# Patient Record
Sex: MALE | Race: WHITE | NOT HISPANIC OR LATINO | Employment: FULL TIME | URBAN - METROPOLITAN AREA
[De-identification: names, ages, dates, MRNs, and addresses within clinical notes are randomized per-mention and may not be internally consistent; named-entity substitution may affect disease eponyms.]

---

## 2023-10-04 ENCOUNTER — HOSPITAL ENCOUNTER (EMERGENCY)
Facility: HOSPITAL | Age: 53
Discharge: HOME | End: 2023-10-04
Attending: STUDENT IN AN ORGANIZED HEALTH CARE EDUCATION/TRAINING PROGRAM | Admitting: STUDENT IN AN ORGANIZED HEALTH CARE EDUCATION/TRAINING PROGRAM
Payer: COMMERCIAL

## 2023-10-04 VITALS
SYSTOLIC BLOOD PRESSURE: 137 MMHG | TEMPERATURE: 98 F | OXYGEN SATURATION: 96 % | HEIGHT: 75 IN | BODY MASS INDEX: 35.43 KG/M2 | HEART RATE: 64 BPM | RESPIRATION RATE: 18 BRPM | WEIGHT: 285 LBS | DIASTOLIC BLOOD PRESSURE: 94 MMHG

## 2023-10-04 DIAGNOSIS — E11.9 NEW ONSET TYPE 2 DIABETES MELLITUS (MULTI): Primary | ICD-10-CM

## 2023-10-04 LAB
ALBUMIN SERPL BCP-MCNC: 4.4 G/DL (ref 3.4–5)
ALP SERPL-CCNC: 85 U/L (ref 33–120)
ALT SERPL W P-5'-P-CCNC: 49 U/L (ref 10–52)
ANION GAP BLDV CALCULATED.4IONS-SCNC: 4 MMOL/L (ref 10–25)
ANION GAP SERPL CALC-SCNC: 11 MMOL/L (ref 10–20)
AST SERPL W P-5'-P-CCNC: 19 U/L (ref 9–39)
B-OH-BUTYR SERPL-SCNC: 0.15 MMOL/L (ref 0.02–0.27)
BASE EXCESS BLDV CALC-SCNC: 8.1 MMOL/L (ref -2–3)
BASOPHILS # BLD AUTO: 0.07 X10*3/UL (ref 0–0.1)
BASOPHILS NFR BLD AUTO: 1 %
BILIRUB SERPL-MCNC: 0.7 MG/DL (ref 0–1.2)
BODY TEMPERATURE: 37 DEGREES CELSIUS
BUN SERPL-MCNC: 20 MG/DL (ref 6–23)
CA-I BLDV-SCNC: 1.28 MMOL/L (ref 1.1–1.33)
CALCIUM SERPL-MCNC: 10 MG/DL (ref 8.6–10.3)
CHLORIDE BLDV-SCNC: 100 MMOL/L (ref 98–107)
CHLORIDE SERPL-SCNC: 100 MMOL/L (ref 98–107)
CO2 SERPL-SCNC: 27 MMOL/L (ref 21–32)
CREAT SERPL-MCNC: 0.98 MG/DL (ref 0.5–1.3)
EOSINOPHIL # BLD AUTO: 0.35 X10*3/UL (ref 0–0.7)
EOSINOPHIL NFR BLD AUTO: 5.2 %
ERYTHROCYTE [DISTWIDTH] IN BLOOD BY AUTOMATED COUNT: 12.2 % (ref 11.5–14.5)
GFR SERPL CREATININE-BSD FRML MDRD: >90 ML/MIN/1.73M*2
GLUCOSE BLDV-MCNC: 434 MG/DL (ref 74–99)
GLUCOSE SERPL-MCNC: 386 MG/DL (ref 74–99)
HCO3 BLDV-SCNC: 35.3 MMOL/L (ref 22–26)
HCT VFR BLD AUTO: 44.4 % (ref 41–52)
HCT VFR BLD EST: 49 % (ref 41–52)
HGB BLD-MCNC: 15.8 G/DL (ref 13.5–17.5)
HGB BLDV-MCNC: 16.2 G/DL (ref 13.5–17.5)
IMM GRANULOCYTES # BLD AUTO: 0.01 X10*3/UL (ref 0–0.7)
IMM GRANULOCYTES NFR BLD AUTO: 0.1 % (ref 0–0.9)
LACTATE BLDV-SCNC: 1.3 MMOL/L (ref 0.4–2)
LYMPHOCYTES # BLD AUTO: 2.22 X10*3/UL (ref 1.2–4.8)
LYMPHOCYTES NFR BLD AUTO: 33.1 %
MCH RBC QN AUTO: 30.9 PG (ref 26–34)
MCHC RBC AUTO-ENTMCNC: 35.6 G/DL (ref 32–36)
MCV RBC AUTO: 87 FL (ref 80–100)
MONOCYTES # BLD AUTO: 0.52 X10*3/UL (ref 0.1–1)
MONOCYTES NFR BLD AUTO: 7.7 %
NEUTROPHILS # BLD AUTO: 3.54 X10*3/UL (ref 1.2–7.7)
NEUTROPHILS NFR BLD AUTO: 52.9 %
NRBC BLD-RTO: 0 /100 WBCS (ref 0–0)
OXYHGB MFR BLDV: 47.7 % (ref 45–75)
PCO2 BLDV: 57 MM HG (ref 41–51)
PH BLDV: 7.4 PH (ref 7.33–7.43)
PLATELET # BLD AUTO: 163 X10*3/UL (ref 150–450)
PMV BLD AUTO: 9.5 FL (ref 7.5–11.5)
PO2 BLDV: 32 MM HG (ref 35–45)
POTASSIUM BLDV-SCNC: 4.3 MMOL/L (ref 3.5–5.3)
POTASSIUM SERPL-SCNC: 4.4 MMOL/L (ref 3.5–5.3)
PROT SERPL-MCNC: 7 G/DL (ref 6.4–8.2)
RBC # BLD AUTO: 5.11 X10*6/UL (ref 4.5–5.9)
SAO2 % BLDV: 48 % (ref 45–75)
SODIUM BLDV-SCNC: 135 MMOL/L (ref 136–145)
SODIUM SERPL-SCNC: 134 MMOL/L (ref 136–145)
WBC # BLD AUTO: 6.7 X10*3/UL (ref 4.4–11.3)

## 2023-10-04 PROCEDURE — 82010 KETONE BODYS QUAN: CPT | Performed by: NURSE PRACTITIONER

## 2023-10-04 PROCEDURE — 80053 COMPREHEN METABOLIC PANEL: CPT | Performed by: NURSE PRACTITIONER

## 2023-10-04 PROCEDURE — 82435 ASSAY OF BLOOD CHLORIDE: CPT | Performed by: NURSE PRACTITIONER

## 2023-10-04 PROCEDURE — 2500000004 HC RX 250 GENERAL PHARMACY W/ HCPCS (ALT 636 FOR OP/ED): Performed by: NURSE PRACTITIONER

## 2023-10-04 PROCEDURE — 36415 COLL VENOUS BLD VENIPUNCTURE: CPT | Performed by: NURSE PRACTITIONER

## 2023-10-04 PROCEDURE — 99284 EMERGENCY DEPT VISIT MOD MDM: CPT | Mod: 25 | Performed by: STUDENT IN AN ORGANIZED HEALTH CARE EDUCATION/TRAINING PROGRAM

## 2023-10-04 PROCEDURE — 96360 HYDRATION IV INFUSION INIT: CPT

## 2023-10-04 PROCEDURE — 85025 COMPLETE CBC W/AUTO DIFF WBC: CPT | Performed by: NURSE PRACTITIONER

## 2023-10-04 RX ORDER — METFORMIN HYDROCHLORIDE 500 MG/1
500 TABLET ORAL
Qty: 60 TABLET | Refills: 0 | Status: SHIPPED | OUTPATIENT
Start: 2023-10-04 | End: 2023-10-05 | Stop reason: SDUPTHER

## 2023-10-04 RX ADMIN — SODIUM CHLORIDE 1000 ML: 9 INJECTION, SOLUTION INTRAVENOUS at 08:43

## 2023-10-04 ASSESSMENT — PAIN SCALES - GENERAL: PAINLEVEL_OUTOF10: 0 - NO PAIN

## 2023-10-04 ASSESSMENT — COLUMBIA-SUICIDE SEVERITY RATING SCALE - C-SSRS
6. HAVE YOU EVER DONE ANYTHING, STARTED TO DO ANYTHING, OR PREPARED TO DO ANYTHING TO END YOUR LIFE?: NO
2. HAVE YOU ACTUALLY HAD ANY THOUGHTS OF KILLING YOURSELF?: NO
1. IN THE PAST MONTH, HAVE YOU WISHED YOU WERE DEAD OR WISHED YOU COULD GO TO SLEEP AND NOT WAKE UP?: NO

## 2023-10-04 ASSESSMENT — PAIN - FUNCTIONAL ASSESSMENT: PAIN_FUNCTIONAL_ASSESSMENT: 0-10

## 2023-10-04 NOTE — ED PROVIDER NOTES
Emergency Department Encounter  [facility] EMERGENCY MEDICINE    Patient: Nate Alvarez  MRN: [id number]  : 1970  Date of Evaluation: 10/4/2023  ED Provider: LEE Rosario    ED care was supervised by Dr. Lazo who independently examined and evaluated the patient. Please see their attestation note for further details.    Chief Complaint       Chief Complaint   Patient presents with    Hyperglycemia     New onset with urinary symptoms     [place]    (Location/Symptom, Timing/Onset, Context/Setting, Quality, Duration, Modifying Factors, Severity) Note limiting factors.     Limitations to history: none  Historian: self  Records reviewed: Care everywhere, inpatient and outpatient notes    Nate Alvarez is a 53 y.o. male who presents to the emergency department complaining of polyuria, polydipsia, new onset hyperglycemia, took his blood sugar with his wife's glucometer and was found to have a blood sugar greater than 400.  Denies any history of diabetes.  Symptoms started yesterday.  Denies any fevers, chills, chest pain, shortness of breath, abdominal pain, nausea, vomiting, lightheadedness, dizziness, syncope.    ROS:     Review of Systems  14 systems reviewed and otherwise acutely negative except as in the [place].          Past History     Past Medical History:   Diagnosis Date    Other conditions influencing health status 2018    History of cough    Otitis media, unspecified, right ear 2018    Right otitis media    Personal history of other malignant neoplasm of skin 2021    History of other malignant neoplasm of skin    Personal history of other specified conditions 2018    History of nausea and vomiting     Past Surgical History:   Procedure Laterality Date    OTHER SURGICAL HISTORY  09/10/2020    Tonsillectomy    OTHER SURGICAL HISTORY  2021    Vasectomy    OTHER SURGICAL HISTORY  2021    Knee surgery    OTHER SURGICAL HISTORY  2021     Shoulder surgery    OTHER SURGICAL HISTORY  11/30/2021    Testicular surgery     Social History     Socioeconomic History    Marital status:      Spouse name: Not on file    Number of children: Not on file    Years of education: Not on file    Highest education level: Not on file   Occupational History    Not on file   Tobacco Use    Smoking status: Not on file    Smokeless tobacco: Not on file   Substance and Sexual Activity    Alcohol use: Not on file    Drug use: Not on file    Sexual activity: Not on file   Other Topics Concern    Not on file   Social History Narrative    Not on file     Social Determinants of Health     Financial Resource Strain: Not on file   Food Insecurity: Not on file   Transportation Needs: Not on file   Physical Activity: Not on file   Stress: Not on file   Social Connections: Not on file   Intimate Partner Violence: Not on file   Housing Stability: Not on file       Medications/Allergies     Previous Medications    No medications on file     Allergies   Allergen Reactions    Tramadol Nausea/vomiting        Physical Exam       ED Triage Vitals [10/04/23 0819]   Temp Heart Rate Resp BP   36.7 °C (98 °F) 59 18 (!) 147/91      SpO2 Temp Source Heart Rate Source Patient Position   95 % Temporal Monitor Sitting      BP Location FiO2 (%)     Left arm --         Physical Exam    GENERAL:  The patient appears nourished and normally developed. Vital signs as documented.     HEENT:  Head normocephalic, atraumatic, EOMs intact, PERRLA, Mucous membranes moist. Nares patent without copious rhinorrhea.  No lymphadenopathy.    PULMONARY:  Lungs are clear to auscultation, without any respiratory distress. Able to speak full sentences, no accessory muscle use    CARDIAC:   Normal rate. No murmurs, rubs or gallops    ABDOMEN:  Soft, non distended, non tender, BS positive x 4 quadrants, No rebound or guarding, no peritoneal signs, no CVA tenderness, no masses or organomegaly    MUSCULOSKELETAL:    Able to ambulate, Non edematous, with no obvious deformities. Pulses intact distal    SKIN:   Good color, with no significant rashes.  No pallor.    NEURO:  No obvious neurological deficits, normal sensation and strength bilaterally.  Able to follow commands, NIH 0, CN 2-12 intact.        Diagnostics   Labs:  Labs Reviewed   BLOOD GAS VENOUS FULL PANEL - Abnormal       Result Value    POCT pH, Venous 7.40      POCT pCO2, Venous 57 (*)     POCT pO2, Venous 32 (*)     POCT SO2, Venous 48      POCT Oxy Hemoglobin, Venous 47.7      POCT Hematocrit Calculated, Venous 49.0      POCT Sodium, Venous 135 (*)     POCT Potassium, Venous 4.3      POCT Chloride, Venous 100      POCT Ionized Calicum, Venous 1.28      POCT Glucose, Venous 434 (*)     POCT Lactate, Venous 1.3      POCT Base Excess, Venous 8.1 (*)     POCT HCO3 Calculated, Venous 35.3 (*)     POCT Hemoglobin, Venous 16.2      POCT Anion Gap, Venous 4.0 (*)     Patient Temperature 37.0     COMPREHENSIVE METABOLIC PANEL - Abnormal    Glucose 386 (*)     Sodium 134 (*)     Potassium 4.4      Chloride 100      Bicarbonate 27      Anion Gap 11      Urea Nitrogen 20      Creatinine 0.98      eGFR >90      Calcium 10.0      Albumin 4.4      Alkaline Phosphatase 85      Total Protein 7.0      AST 19      Bilirubin, Total 0.7      ALT 49     BETA HYDROXYBUTYRATE - Normal    Beta-Hydroxybutyrate 0.15      Narrative:     The beta-hydroxybutyrate test performance characteristics have been validated by  [facility]. This test has not been approved by the FDA; however,such approval is not necessary.     CBC WITH AUTO DIFFERENTIAL    WBC 6.7      nRBC 0.0      RBC 5.11      Hemoglobin 15.8      Hematocrit 44.4      MCV 87      MCH 30.9      MCHC 35.6      RDW 12.2      Platelets 163      MPV 9.5      Neutrophils % 52.9      Immature Granulocytes %, Automated 0.1      Lymphocytes % 33.1      Monocytes % 7.7      Eosinophils % 5.2       "Basophils % 1.0      Neutrophils Absolute 3.54      Immature Granulocytes Absolute, Automated 0.01      Lymphocytes Absolute 2.22      Monocytes Absolute 0.52      Eosinophils Absolute 0.35      Basophils Absolute 0.07     URINALYSIS WITH REFLEX MICROSCOPIC   POCT FINGERSTICK GLUCOSE     Radiographs:  No orders to display           Assessment   In brief, Nate Alvarez is a 53 y.o. male who presented to the emergency department with hyperglycemia, polyuria, polydipsia with no history of diabetes    Plan   IV, fluids, lab work    Differentials   Hyperglycemia, new onset diabetes  DKA  HHNK    ED Course   Visit Vitals  BP (!) 147/91 (BP Location: Left arm, Patient Position: Sitting)   Pulse 59   Temp 36.7 °C (98 °F) (Temporal)   Resp 18   Ht 1.905 m (6' 3\")   Wt 129 kg (285 lb)   SpO2 95%   BMI 35.62 kg/m²   BSA 2.61 m²       Medications   sodium chloride 0.9 % bolus 1,000 mL (1,000 mL intravenous New Bag 10/4/23 0843)       Plan of care discussed, patient is well-appearing, in no distress, lab work was reviewed showing elevated glucose, anion gap is normal, vital signs remained stable, patient does not wish to be admitted, has likely type 2 diabetes, new onset, consulted patient's primary care doctor, Dr. Neela Medeiros who was able to see patient in her office tomorrow morning at 730, patient will be discharged home with a prescription for metformin 500 mg twice a day, educated on any worsening signs and symptoms to return to the emergency department      Final Impression      1. New onset type 2 diabetes mellitus (CMS/[facility])          DISPOSITION  Disposition: Discharge to home  Patient condition is stable    Comment: Please note this report has been produced using speech recognition software and may contain errors related to that system including errors in grammar, punctuation, and spelling, as well as words and phrases that may be inappropriate.  If there are any questions or concerns please feel free to contact the " dictating provider for clarification.    LEE Rosario APRN-CNP  10/04/23 0919

## 2023-10-05 ENCOUNTER — OFFICE VISIT (OUTPATIENT)
Dept: PRIMARY CARE | Facility: CLINIC | Age: 53
End: 2023-10-05
Payer: COMMERCIAL

## 2023-10-05 VITALS
BODY MASS INDEX: 34.94 KG/M2 | WEIGHT: 281 LBS | SYSTOLIC BLOOD PRESSURE: 127 MMHG | TEMPERATURE: 98 F | RESPIRATION RATE: 16 BRPM | HEIGHT: 75 IN | HEART RATE: 70 BPM | DIASTOLIC BLOOD PRESSURE: 90 MMHG | OXYGEN SATURATION: 94 %

## 2023-10-05 DIAGNOSIS — Z23 ENCOUNTER FOR IMMUNIZATION: ICD-10-CM

## 2023-10-05 DIAGNOSIS — R60.0 PEDAL EDEMA: ICD-10-CM

## 2023-10-05 DIAGNOSIS — G47.31 COMPLEX SLEEP APNEA SYNDROME: ICD-10-CM

## 2023-10-05 DIAGNOSIS — E66.01 CLASS 2 SEVERE OBESITY DUE TO EXCESS CALORIES WITH SERIOUS COMORBIDITY AND BODY MASS INDEX (BMI) OF 35.0 TO 35.9 IN ADULT (MULTI): ICD-10-CM

## 2023-10-05 DIAGNOSIS — E11.65 TYPE 2 DIABETES MELLITUS WITH HYPERGLYCEMIA, WITHOUT LONG-TERM CURRENT USE OF INSULIN (MULTI): Primary | ICD-10-CM

## 2023-10-05 DIAGNOSIS — R06.01 ORTHOPNEA: ICD-10-CM

## 2023-10-05 DIAGNOSIS — Z13.220 LIPID SCREENING: ICD-10-CM

## 2023-10-05 DIAGNOSIS — H53.8 BLURRY VISION: ICD-10-CM

## 2023-10-05 DIAGNOSIS — I10 ESSENTIAL HYPERTENSION: ICD-10-CM

## 2023-10-05 PROBLEM — L30.9 ECZEMA OF BOTH HANDS: Status: ACTIVE | Noted: 2023-10-05

## 2023-10-05 PROBLEM — E55.9 VITAMIN D DEFICIENCY: Status: ACTIVE | Noted: 2023-10-05

## 2023-10-05 PROBLEM — G47.39 COMPLEX SLEEP APNEA SYNDROME: Status: ACTIVE | Noted: 2023-04-13

## 2023-10-05 PROBLEM — E66.812 CLASS 2 SEVERE OBESITY DUE TO EXCESS CALORIES WITH SERIOUS COMORBIDITY AND BODY MASS INDEX (BMI) OF 35.0 TO 35.9 IN ADULT: Status: ACTIVE | Noted: 2023-10-05

## 2023-10-05 PROBLEM — R25.2 LEG CRAMPS: Status: RESOLVED | Noted: 2023-10-05 | Resolved: 2023-10-05

## 2023-10-05 PROBLEM — Z80.0 FAMILY HISTORY OF COLON CANCER IN FATHER: Status: ACTIVE | Noted: 2023-10-05

## 2023-10-05 PROBLEM — R13.12 OROPHARYNGEAL DYSPHAGIA: Status: RESOLVED | Noted: 2023-10-05 | Resolved: 2023-10-05

## 2023-10-05 PROBLEM — K21.9 GERD (GASTROESOPHAGEAL REFLUX DISEASE): Status: ACTIVE | Noted: 2023-10-05

## 2023-10-05 PROBLEM — M19.90 ARTHRITIS: Status: ACTIVE | Noted: 2023-10-05

## 2023-10-05 LAB — POC HEMOGLOBIN A1C: 8.8 % (ref 4.2–6.5)

## 2023-10-05 PROCEDURE — 1036F TOBACCO NON-USER: CPT | Performed by: FAMILY MEDICINE

## 2023-10-05 PROCEDURE — 83036 HEMOGLOBIN GLYCOSYLATED A1C: CPT | Performed by: FAMILY MEDICINE

## 2023-10-05 PROCEDURE — 3008F BODY MASS INDEX DOCD: CPT | Performed by: FAMILY MEDICINE

## 2023-10-05 PROCEDURE — 3080F DIAST BP >= 90 MM HG: CPT | Performed by: FAMILY MEDICINE

## 2023-10-05 PROCEDURE — 99214 OFFICE O/P EST MOD 30 MIN: CPT | Performed by: FAMILY MEDICINE

## 2023-10-05 PROCEDURE — 4010F ACE/ARB THERAPY RXD/TAKEN: CPT | Performed by: FAMILY MEDICINE

## 2023-10-05 PROCEDURE — 3074F SYST BP LT 130 MM HG: CPT | Performed by: FAMILY MEDICINE

## 2023-10-05 RX ORDER — LANCETS
EACH MISCELLANEOUS
Qty: 100 EACH | Refills: 3 | Status: SHIPPED | OUTPATIENT
Start: 2023-10-05

## 2023-10-05 RX ORDER — METFORMIN HYDROCHLORIDE 500 MG/1
500 TABLET ORAL
Qty: 60 TABLET | Refills: 2 | Status: SHIPPED | OUTPATIENT
Start: 2023-10-05 | End: 2023-10-12 | Stop reason: SINTOL

## 2023-10-05 RX ORDER — IBUPROFEN 200 MG
CAPSULE ORAL
Qty: 100 STRIP | Refills: 3 | Status: SHIPPED | OUTPATIENT
Start: 2023-10-05

## 2023-10-05 RX ORDER — LISINOPRIL 5 MG/1
5 TABLET ORAL DAILY
Qty: 30 TABLET | Refills: 5 | Status: SHIPPED | OUTPATIENT
Start: 2023-10-05 | End: 2024-04-17 | Stop reason: SDUPTHER

## 2023-10-05 RX ORDER — DEXTROSE 4 G
TABLET,CHEWABLE ORAL
Qty: 1 EACH | Refills: 0 | Status: SHIPPED | OUTPATIENT
Start: 2023-10-05

## 2023-10-05 ASSESSMENT — ENCOUNTER SYMPTOMS
DYSURIA: 0
CHILLS: 0
CONSTIPATION: 0
SINUS PRESSURE: 0
FREQUENCY: 0
NAUSEA: 0
UNEXPECTED WEIGHT CHANGE: 0
COUGH: 0
VOMITING: 0
ADENOPATHY: 0
POLYPHAGIA: 0
FEVER: 0
WHEEZING: 0
HEMATURIA: 0
CONFUSION: 0
SINUS PAIN: 0
DIZZINESS: 0
HEADACHES: 0
NERVOUS/ANXIOUS: 0
CHEST TIGHTNESS: 0
POLYDIPSIA: 0
NUMBNESS: 0
SORE THROAT: 0
DYSPHORIC MOOD: 0
PALPITATIONS: 0
LIGHT-HEADEDNESS: 0
DIARRHEA: 0
SHORTNESS OF BREATH: 0
DIAPHORESIS: 0
ABDOMINAL PAIN: 0

## 2023-10-05 ASSESSMENT — PATIENT HEALTH QUESTIONNAIRE - PHQ9
1. LITTLE INTEREST OR PLEASURE IN DOING THINGS: NOT AT ALL
2. FEELING DOWN, DEPRESSED OR HOPELESS: NOT AT ALL
SUM OF ALL RESPONSES TO PHQ9 QUESTIONS 1 AND 2: 0

## 2023-10-05 NOTE — ASSESSMENT & PLAN NOTE
- possible dependent edema, although he does not note any improvements with elevation of legs  - check echocardiogram

## 2023-10-05 NOTE — ASSESSMENT & PLAN NOTE
- secondary to hyperglycemia  - should improve as glucose improves  - recommended he schedule with his ophthalmologist

## 2023-10-05 NOTE — ASSESSMENT & PLAN NOTE
- started on metformin 500 mg twice daily yesterday  - Check blood sugars once daily, first thing in the morning prior to eating. Record and bring to each visit.   - handouts on diabetic education provided

## 2023-10-05 NOTE — PATIENT INSTRUCTIONS
Nate Alvarez ,    Thank you for coming in today. We at [facility] appreciate your trust in our care. If you have any questions or concerns about the care you received today, please do not hesitate to contact us at [phone].    The following instructions were discussed today:    - get labs in 1-2 weeks   - For blood work: Nothing to eat or drink for at least 10 hours prior. Okay for water or black coffee.   - Check blood sugars once daily, first thing in the morning prior to eating. Record and bring to each visit. Goal fasting blood sugar for you is between 80 to 120.  - Follow up in 1 month for virtual visit and 3 months for in person visit   - get in to see your eye doctor for vision exam  - get chest x-ray   - get echocardiogram

## 2023-10-05 NOTE — PROGRESS NOTES
Subjective   Patient ID: Nate Alvarez is a 53 y.o. male who presents for ER follow up at [facility] for new onset (Of diabetes  refusing flu vaccine).    ED follow up. Was in ED yesterday complaining of polyuria, polydipsia, new onset hyperglycemia. Took his blood sugar with his wife's glucometer and was found to have a blood sugar greater than 400.  Denies any history of diabetes.  Symptoms started two days.  Denies any fevers, chills, chest pain, shortness of breath, abdominal pain, nausea, vomiting, lightheadedness, dizziness, syncope. Lab work up as below. Was not found to be in DKA of [facility]. Was discharged home with a prescription for metformin 500 mg twice a day    HbA1c done in the office today was 8.8. Gluocse was 255 (not recorded because test strip was ).     He complains of edema in his bilateral legs. He will notice it every once and awhile, not a on a daily basis. Not painful. He does spend a lot of time on his feet at work. He states it seems worse when he is off his feet more. Denies shortness of breath. Occasional does have to prop himself up on pillows at night. Denies chest pain.          Office Visit on 10/05/2023   Component Date Value Ref Range Status    POC HEMOGLOBIN A1c 10/05/2023 8.8 (A)  4.2 - 6.5 % Final   Admission on 10/04/2023, Discharged on 10/04/2023   Component Date Value Ref Range Status    POCT pH, Venous 10/04/2023 7.40  7.33 - 7.43 pH Final    POCT pCO2, Venous 10/04/2023 57 (H)  41 - 51 mm Hg Final    POCT pO2, Venous 10/04/2023 32 (L)  35 - 45 mm Hg Final    POCT SO2, Venous 10/04/2023 48  45 - 75 % Final    POCT Oxy Hemoglobin, Venous 10/04/2023 47.7  45.0 - 75.0 % Final    POCT Hematocrit Calculated, Venous 10/04/2023 49.0  41.0 - 52.0 % Final    POCT Sodium, Venous 10/04/2023 135 (L)  136 - 145 mmol/L Final    POCT Potassium, Venous 10/04/2023 4.3  3.5 - 5.3 mmol/L Final    POCT Chloride, Venous 10/04/2023 100  98 - 107 mmol/L Final    POCT Ionized Calicum, Venous  10/04/2023 1.28  1.10 - 1.33 mmol/L Final    POCT Glucose, Venous 10/04/2023 434 (H)  74 - 99 mg/dL Final    POCT Lactate, Venous 10/04/2023 1.3  0.4 - 2.0 mmol/L Final    POCT Base Excess, Venous 10/04/2023 8.1 (H)  -2.0 - 3.0 mmol/L Final    POCT HCO3 Calculated, Venous 10/04/2023 35.3 (H)  22.0 - 26.0 mmol/L Final    POCT Hemoglobin, Venous 10/04/2023 16.2  13.5 - 17.5 g/dL Final    POCT Anion Gap, Venous 10/04/2023 4.0 (L)  10.0 - 25.0 mmol/L Final    Patient Temperature 10/04/2023 37.0  degrees Celsius Final    NOTE: Patient Results are Not Corrected for Temperature    Glucose 10/04/2023 386 (H)  74 - 99 mg/dL Final    Sodium 10/04/2023 134 (L)  136 - 145 mmol/L Final    Potassium 10/04/2023 4.4  3.5 - 5.3 mmol/L Final    MILD HEMOLYSIS DETECTED. The result may be falsely elevated due to hemolysis or other interferents. Clinical correlation is recommended. Repeat testing may be considered.    Chloride 10/04/2023 100  98 - 107 mmol/L Final    Bicarbonate 10/04/2023 27  21 - 32 mmol/L Final    Anion Gap 10/04/2023 11  10 - 20 mmol/L Final    Urea Nitrogen 10/04/2023 20  6 - 23 mg/dL Final    Creatinine 10/04/2023 0.98  0.50 - 1.30 mg/dL Final    eGFR 10/04/2023 >90  >60 mL/min/1.73m*2 Final    Calculations of estimated GFR are performed using the 2021 CKD-EPI Study Refit equation without the race variable for the IDMS-Traceable creatinine methods.  https://jasn.asnjournals.org/content/early/2021/09/22/ASN.8383058868    Calcium 10/04/2023 10.0  8.6 - 10.3 mg/dL Final    Albumin 10/04/2023 4.4  3.4 - 5.0 g/dL Final    Alkaline Phosphatase 10/04/2023 85  33 - 120 U/L Final    Total Protein 10/04/2023 7.0  6.4 - 8.2 g/dL Final    AST 10/04/2023 19  9 - 39 U/L Final    MILD HEMOLYSIS DETECTED. The result may be falsely elevated due to hemolysis or other interferents. Clinical correlation is recommended. Repeat testing may be considered.    Bilirubin, Total 10/04/2023 0.7  0.0 - 1.2 mg/dL Final    ALT 10/04/2023 49  10  - 52 U/L Final    Patients treated with Sulfasalazine may generate falsely decreased results for ALT.    WBC 10/04/2023 6.7  4.4 - 11.3 x10*3/uL Final    nRBC 10/04/2023 0.0  0.0 - 0.0 /100 WBCs Final    RBC 10/04/2023 5.11  4.50 - 5.90 x10*6/uL Final    Hemoglobin 10/04/2023 15.8  13.5 - 17.5 g/dL Final    Hematocrit 10/04/2023 44.4  41.0 - 52.0 % Final    MCV 10/04/2023 87  80 - 100 fL Final    MCH 10/04/2023 30.9  26.0 - 34.0 pg Final    MCHC 10/04/2023 35.6  32.0 - 36.0 g/dL Final    RDW 10/04/2023 12.2  11.5 - 14.5 % Final    Platelets 10/04/2023 163  150 - 450 x10*3/uL Final    MPV 10/04/2023 9.5  7.5 - 11.5 fL Final    Neutrophils % 10/04/2023 52.9  40.0 - 80.0 % Final    Immature Granulocytes %, Automated 10/04/2023 0.1  0.0 - 0.9 % Final    Immature Granulocyte Count (IG) includes promyelocytes, myelocytes and metamyelocytes but does not include bands. Percent differential counts (%) should be interpreted in the context of the absolute cell counts (cells/UL).    Lymphocytes % 10/04/2023 33.1  13.0 - 44.0 % Final    Monocytes % 10/04/2023 7.7  2.0 - 10.0 % Final    Eosinophils % 10/04/2023 5.2  0.0 - 6.0 % Final    Basophils % 10/04/2023 1.0  0.0 - 2.0 % Final    Neutrophils Absolute 10/04/2023 3.54  1.20 - 7.70 x10*3/uL Final    Percent differential counts (%) should be interpreted in the context of the absolute cell counts (cells/uL).    Immature Granulocytes Absolute, Au* 10/04/2023 0.01  0.00 - 0.70 x10*3/uL Final    Lymphocytes Absolute 10/04/2023 2.22  1.20 - 4.80 x10*3/uL Final    Monocytes Absolute 10/04/2023 0.52  0.10 - 1.00 x10*3/uL Final    Eosinophils Absolute 10/04/2023 0.35  0.00 - 0.70 x10*3/uL Final    Basophils Absolute 10/04/2023 0.07  0.00 - 0.10 x10*3/uL Final    Beta-Hydroxybutyrate 10/04/2023 0.15  0.02 - 0.27 mmol/L Final    MILD HEMOLYSIS DETECTED. The result may be falsely elevated due to hemolysis or other interferents. Clinical correlation is recommended. Repeat testing may be  "considered.        Review of Systems   Constitutional:  Negative for chills, diaphoresis, fever and unexpected weight change.   HENT:  Negative for congestion, sinus pressure, sinus pain, sneezing and sore throat.    Respiratory:  Negative for cough, chest tightness, shortness of breath and wheezing.    Cardiovascular:  Positive for leg swelling. Negative for chest pain and palpitations.   Gastrointestinal:  Negative for abdominal pain, constipation, diarrhea, nausea and vomiting.   Endocrine: Negative for cold intolerance, heat intolerance, polydipsia, polyphagia and polyuria.   Genitourinary:  Negative for dysuria, frequency, hematuria and urgency.   Neurological:  Negative for dizziness, syncope, light-headedness, numbness and headaches.   Hematological:  Negative for adenopathy.   Psychiatric/Behavioral:  Negative for confusion and dysphoric mood. The patient is not nervous/anxious.        Objective   /90   Pulse 70   Temp 36.7 °C (98 °F)   Resp 16   Ht 1.905 m (6' 3\")   Wt 127 kg (281 lb)   SpO2 94%   BMI 35.12 kg/m²     Physical Exam  Vitals and nursing note reviewed.   Constitutional:       General: He is not in acute distress.     Appearance: Normal appearance.   HENT:      Head: Normocephalic and atraumatic.      Nose: Nose normal.   Eyes:      Extraocular Movements: Extraocular movements intact.      Conjunctiva/sclera: Conjunctivae normal.      Pupils: Pupils are equal, round, and reactive to light.   Cardiovascular:      Rate and Rhythm: Normal rate and regular rhythm.      Heart sounds: No murmur heard.     No friction rub. No gallop.   Pulmonary:      Effort: Pulmonary effort is normal.      Breath sounds: Normal breath sounds. No wheezing, rhonchi or rales.   Abdominal:      General: Bowel sounds are normal. There is no distension.      Palpations: Abdomen is soft.      Tenderness: There is no abdominal tenderness.   Musculoskeletal:         General: Normal range of motion.      Cervical " back: Normal range of motion and neck supple.   Skin:     General: Skin is warm and dry.   Neurological:      General: No focal deficit present.      Mental Status: He is alert and oriented to person, place, and time.      Deep Tendon Reflexes: Reflexes normal.   Psychiatric:         Mood and Affect: Mood normal.         Behavior: Behavior normal.         Thought Content: Thought content normal.         Judgment: Judgment normal.         Assessment/Plan   Problem List Items Addressed This Visit             ICD-10-CM    Blurry vision H53.8     - secondary to hyperglycemia  - should improve as glucose improves  - recommended he schedule with his ophthalmologist         Relevant Orders    Vitamin B12    BMI 35.0-35.9,adult Z68.35     - Encouraged healthy lifestyle, including adequate exercise and high fiber, low fat and low carb diet.          Relevant Orders    Vitamin B12    Vitamin D 25-Hydroxy,Total (for eval of Vitamin D levels)    Class 2 severe obesity due to excess calories with serious comorbidity and body mass index (BMI) of 35.0 to 35.9 in adult (CMS/[facility]) E66.01, Z68.35     - Encouraged healthy lifestyle, including adequate exercise and high fiber, low fat and low carb diet.          Relevant Orders    Vitamin B12    Vitamin D 25-Hydroxy,Total (for eval of Vitamin D levels)    Complex sleep apnea syndrome G47.31     - follows with sleep medicine          Encounter for immunization Z23     - Declined flu vaccine.           Essential hypertension I10     - blood pressure 132/92 initially but 127/90 on recheck  - start lisinopril 5 mg daily          Relevant Medications    lisinopril 5 mg tablet    Orthopnea R06.01     - check chest x-ray   - check echocardiogram         Relevant Orders    Vitamin B12    Transthoracic Echo (TTE) Complete    XR chest 2 views    Pedal edema R60.0     - possible dependent edema, although he does not note any improvements with elevation of legs  - check echocardiogram           Relevant Orders    Vitamin B12    Transthoracic Echo (TTE) Complete    XR chest 2 views    Type 2 diabetes mellitus with hyperglycemia, without long-term current use of insulin (CMS/HCC) - Primary E11.65     - started on metformin 500 mg twice daily yesterday  - Check blood sugars once daily, first thing in the morning prior to eating. Record and bring to each visit.   - handouts on diabetic education provided          Relevant Medications    blood sugar diagnostic (Blood Glucose Test) strip    blood-glucose meter misc    lancets misc    metFORMIN (Glucophage) 500 mg tablet    Other Relevant Orders    POCT glycosylated hemoglobin (Hb A1C) manually resulted (Completed)    Vitamin B12    Albumin , Urine Random    TSH with reflex to Free T4 if abnormal    Comprehensive Metabolic Panel     Other Visit Diagnoses         Codes    Lipid screening     Z13.220    Relevant Orders    Lipid Panel

## 2023-10-11 ENCOUNTER — TELEPHONE (OUTPATIENT)
Dept: PRIMARY CARE | Facility: CLINIC | Age: 53
End: 2023-10-11
Payer: COMMERCIAL

## 2023-10-12 ENCOUNTER — TELEPHONE (OUTPATIENT)
Dept: PRIMARY CARE | Facility: CLINIC | Age: 53
End: 2023-10-12
Payer: COMMERCIAL

## 2023-10-12 DIAGNOSIS — E11.65 TYPE 2 DIABETES MELLITUS WITH HYPERGLYCEMIA, WITHOUT LONG-TERM CURRENT USE OF INSULIN (MULTI): Primary | ICD-10-CM

## 2023-10-12 RX ORDER — DAPAGLIFLOZIN 5 MG/1
5 TABLET, FILM COATED ORAL DAILY
Qty: 30 TABLET | Refills: 2 | Status: SHIPPED | OUTPATIENT
Start: 2023-10-12 | End: 2023-11-03 | Stop reason: DRUGHIGH

## 2023-11-03 ENCOUNTER — TELEPHONE (OUTPATIENT)
Dept: PRIMARY CARE | Facility: CLINIC | Age: 53
End: 2023-11-03

## 2023-11-03 ENCOUNTER — TELEMEDICINE (OUTPATIENT)
Dept: PRIMARY CARE | Facility: CLINIC | Age: 53
End: 2023-11-03
Payer: COMMERCIAL

## 2023-11-03 DIAGNOSIS — E11.65 TYPE 2 DIABETES MELLITUS WITH HYPERGLYCEMIA, WITHOUT LONG-TERM CURRENT USE OF INSULIN (MULTI): Primary | ICD-10-CM

## 2023-11-03 DIAGNOSIS — K64.9 HEMORRHOIDS, UNSPECIFIED HEMORRHOID TYPE: ICD-10-CM

## 2023-11-03 PROCEDURE — 99442 PR PHYS/QHP TELEPHONE EVALUATION 11-20 MIN: CPT | Performed by: FAMILY MEDICINE

## 2023-11-03 RX ORDER — HYDROCORTISONE ACETATE 25 MG/1
25 SUPPOSITORY RECTAL 2 TIMES DAILY PRN
Qty: 28 SUPPOSITORY | Refills: 0 | Status: SHIPPED | OUTPATIENT
Start: 2023-11-03 | End: 2023-11-17

## 2023-11-03 RX ORDER — DAPAGLIFLOZIN 10 MG/1
10 TABLET, FILM COATED ORAL DAILY
Qty: 30 TABLET | Refills: 2 | Status: SHIPPED | OUTPATIENT
Start: 2023-11-03 | End: 2023-12-31 | Stop reason: SINTOL

## 2023-11-03 ASSESSMENT — ENCOUNTER SYMPTOMS
NUMBNESS: 0
HEMATURIA: 0
COUGH: 0
CHEST TIGHTNESS: 0
LIGHT-HEADEDNESS: 0
DYSPHORIC MOOD: 0
CONFUSION: 0
HEADACHES: 0
DIZZINESS: 0
CONSTIPATION: 0
NAUSEA: 0
FEVER: 0
DYSURIA: 0
NERVOUS/ANXIOUS: 0
SINUS PAIN: 0
UNEXPECTED WEIGHT CHANGE: 0
ABDOMINAL PAIN: 0
PALPITATIONS: 0
POLYDIPSIA: 0
POLYPHAGIA: 0
WHEEZING: 0
ADENOPATHY: 0
SHORTNESS OF BREATH: 0
SORE THROAT: 0
CHILLS: 0
FREQUENCY: 0
SINUS PRESSURE: 0
DIAPHORESIS: 0
VOMITING: 0
DIARRHEA: 1

## 2023-11-03 NOTE — LETTER
November 3, 2023     Patient: Nate Alvarez   YOB: 1970   Date of Visit: 11/3/2023       To Whom It May Concern:    Nate Alvarez was seen in my clinic on 11/3/2023 at 8:00 am. Please excuse Nate for his absence from work 10/30/23 through 11/03/23. He has been ill.     If you have any questions or concerns, please don't hesitate to call.         Sincerely,         Hazel Medeiros MD

## 2023-11-03 NOTE — TELEPHONE ENCOUNTER
Did virtual with patient. He is requesting work note for time missed due to illness. Note written and in my outbox. He will pick it up on 11/6/23.

## 2023-11-03 NOTE — PATIENT INSTRUCTIONS
Nate Alvarez ,    Thank you for coming in today. We at [facility] appreciate your trust in our care. If you have any questions or concerns about the care you received today, please do not hesitate to contact us at [phone].    The following instructions were discussed today:    - INCREASE farxiga to 10 mg daily   - Follow up 1/11/2024 as scheduled.

## 2023-11-03 NOTE — ASSESSMENT & PLAN NOTE
- improving on farxiga 5 mg daily with fasting blood sugars averaging 140 to 150  - INCREASE farxiga to 10 mg daily

## 2023-11-03 NOTE — PROGRESS NOTES
Subjective   Patient ID: Nate Alvarez is a 53 y.o. male who presents for Follow-up.    Virtual or Telephone Consent    A telephone visit (audio only) between the patient (at the originating site) and the provider (at the distant site) was utilized to provide this telehealth service.   Verbal consent was requested and obtained from Nate Alvarez on this date, 11/03/23 for a telehealth visit.      HPI    Follow up new onset diabetes mellitus. HbA1c was found to be 8.8 on 10/5/23. Started metformin. Stated lisinopril 5 mg daily for renal protection. Also ordered chest x-ray and echo for complaints of pedal edema. He called in 10/12/23 with complaints of nausea on metformin. Stopped metformin and started farxiga instead. He states on the farxiga blood sugars are running 120s to 180s. Usually 140-150 fasting.     He is not feeling well today. He has missed the last four days of week. States he has been having issues with diarrhea. He had been taking magnesium and potassium for muscle cramps but he stopped that due to the diarrhea. He does not the diarrhea stopped yesterday. He states the diarrhea has flared up his hemorrhoids. He is having blood when he wipes. He does state the diarrhea has improved.       Review of Systems   Constitutional:  Negative for chills, diaphoresis, fever and unexpected weight change.   HENT:  Negative for congestion, sinus pressure, sinus pain, sneezing and sore throat.    Respiratory:  Negative for cough, chest tightness, shortness of breath and wheezing.    Cardiovascular:  Negative for chest pain, palpitations and leg swelling.   Gastrointestinal:  Positive for diarrhea. Negative for abdominal pain, constipation, nausea and vomiting.   Endocrine: Negative for cold intolerance, heat intolerance, polydipsia, polyphagia and polyuria.   Genitourinary:  Negative for dysuria, frequency, hematuria and urgency.   Neurological:  Negative for dizziness, syncope, light-headedness, numbness and  headaches.   Hematological:  Negative for adenopathy.   Psychiatric/Behavioral:  Negative for confusion and dysphoric mood. The patient is not nervous/anxious.          Assessment/Plan   Problem List Items Addressed This Visit       Hemorrhoids     - secondary to diarrhea and frequent wiping  - start anusol.          Relevant Medications    hydrocortisone (Anusol-HC) 25 mg suppository    Type 2 diabetes mellitus with hyperglycemia, without long-term current use of insulin (CMS/[facility]) - Primary     - improving on farxiga 5 mg daily with fasting blood sugars averaging 140 to 150  - INCREASE farxiga to 10 mg daily          Relevant Medications    dapagliflozin propanediol (Farxiga) 10 mg       This telephone visit lasted approximately 11 minutes.

## 2023-12-29 ENCOUNTER — TELEPHONE (OUTPATIENT)
Dept: PRIMARY CARE | Facility: CLINIC | Age: 53
End: 2023-12-29
Payer: COMMERCIAL

## 2023-12-29 DIAGNOSIS — E11.65 TYPE 2 DIABETES MELLITUS WITH HYPERGLYCEMIA, WITHOUT LONG-TERM CURRENT USE OF INSULIN (MULTI): Primary | ICD-10-CM

## 2023-12-29 NOTE — LETTER
January 4, 2024     Patient: Nate Alvarez   YOB: 1970   Date of Visit: 12/29/2023       To Whom It May Concern:     Please excuse Nate for his absence from work on Wednesday 12.27.23 and Friday 12.29.23. If you have any questions or concerns, please don't hesitate to call.         Sincerely,         Hazel Medeiros M.D.        CC: No Recipients

## 2023-12-29 NOTE — TELEPHONE ENCOUNTER
Side effects from Farxiga 10mg diarrhea, missed work because of this  Metformin was vomiting and diarrhea    [facility]    Missed Wednesday and Friday of this week, will need work note because of the medication side effects, will  next week, call to let know when ready.      Advised patient to stop medication

## 2023-12-31 NOTE — TELEPHONE ENCOUNTER
1) okay to write work not for him    2) okay to stop medications. He has appointment  1/11/2024 so we can discuss other options then.

## 2024-01-02 ENCOUNTER — TELEPHONE (OUTPATIENT)
Dept: PRIMARY CARE | Facility: CLINIC | Age: 54
End: 2024-01-02
Payer: COMMERCIAL

## 2024-01-03 RX ORDER — GLIPIZIDE 5 MG/1
5 TABLET, FILM COATED, EXTENDED RELEASE ORAL DAILY
Qty: 30 TABLET | Refills: 0 | Status: SHIPPED | OUTPATIENT
Start: 2024-01-03 | End: 2024-02-09 | Stop reason: SINTOL

## 2024-01-03 NOTE — TELEPHONE ENCOUNTER
Nate stopped in to  note and I read him the response from Dr Medeiros about stopping meds and will review at upcoming 1/11/24 visit.

## 2024-01-03 NOTE — TELEPHONE ENCOUNTER
Spoke with wife, she would like you to send in another medication to [facility] [place], his blood sugar numbers have been very high.  Is there any other oral medications or even any of the injectables that would be easier on the stomach for him?  I advised that the injectables may have to go thru [place] with insurance and may still have unpleasant side effects as well, but they are a possibility.

## 2024-01-11 ENCOUNTER — OFFICE VISIT (OUTPATIENT)
Dept: PRIMARY CARE | Facility: CLINIC | Age: 54
End: 2024-01-11
Payer: COMMERCIAL

## 2024-01-11 VITALS
RESPIRATION RATE: 18 BRPM | HEART RATE: 70 BPM | WEIGHT: 268 LBS | DIASTOLIC BLOOD PRESSURE: 80 MMHG | BODY MASS INDEX: 33.32 KG/M2 | SYSTOLIC BLOOD PRESSURE: 120 MMHG | HEIGHT: 75 IN

## 2024-01-11 DIAGNOSIS — I10 ESSENTIAL HYPERTENSION: ICD-10-CM

## 2024-01-11 DIAGNOSIS — Z11.59 NEED FOR HEPATITIS C SCREENING TEST: ICD-10-CM

## 2024-01-11 DIAGNOSIS — Z11.4 ENCOUNTER FOR SCREENING FOR HIV: ICD-10-CM

## 2024-01-11 DIAGNOSIS — G47.31 COMPLEX SLEEP APNEA SYNDROME: ICD-10-CM

## 2024-01-11 DIAGNOSIS — Z13.220 LIPID SCREENING: ICD-10-CM

## 2024-01-11 DIAGNOSIS — Z23 ENCOUNTER FOR IMMUNIZATION: ICD-10-CM

## 2024-01-11 DIAGNOSIS — E55.9 VITAMIN D DEFICIENCY: ICD-10-CM

## 2024-01-11 DIAGNOSIS — E11.65 TYPE 2 DIABETES MELLITUS WITH HYPERGLYCEMIA, WITHOUT LONG-TERM CURRENT USE OF INSULIN (MULTI): Primary | ICD-10-CM

## 2024-01-11 DIAGNOSIS — E66.09 CLASS 1 OBESITY DUE TO EXCESS CALORIES WITH SERIOUS COMORBIDITY AND BODY MASS INDEX (BMI) OF 33.0 TO 33.9 IN ADULT: ICD-10-CM

## 2024-01-11 DIAGNOSIS — R11.0 NAUSEA: ICD-10-CM

## 2024-01-11 PROBLEM — R06.01 ORTHOPNEA: Status: RESOLVED | Noted: 2023-10-05 | Resolved: 2024-01-11

## 2024-01-11 PROBLEM — E66.811 CLASS 1 OBESITY DUE TO EXCESS CALORIES WITH SERIOUS COMORBIDITY AND BODY MASS INDEX (BMI) OF 33.0 TO 33.9 IN ADULT: Status: ACTIVE | Noted: 2023-10-05

## 2024-01-11 PROBLEM — R60.0 PEDAL EDEMA: Status: RESOLVED | Noted: 2023-10-05 | Resolved: 2024-01-11

## 2024-01-11 PROBLEM — R09.81 NASAL CONGESTION: Status: RESOLVED | Noted: 2024-01-11 | Resolved: 2024-01-11

## 2024-01-11 PROBLEM — R12 HEARTBURN: Status: ACTIVE | Noted: 2024-01-11

## 2024-01-11 PROBLEM — R09.81 NASAL CONGESTION: Status: ACTIVE | Noted: 2024-01-11

## 2024-01-11 PROBLEM — R40.0 DAYTIME SOMNOLENCE: Status: ACTIVE | Noted: 2024-01-11

## 2024-01-11 PROBLEM — M19.90 ARTHRITIS: Status: RESOLVED | Noted: 2023-10-05 | Resolved: 2024-01-11

## 2024-01-11 PROBLEM — R40.0 DAYTIME SOMNOLENCE: Status: RESOLVED | Noted: 2024-01-11 | Resolved: 2024-01-11

## 2024-01-11 PROBLEM — R10.9 ABDOMINAL PAIN: Status: RESOLVED | Noted: 2018-11-07 | Resolved: 2024-01-11

## 2024-01-11 PROBLEM — R10.9 ABDOMINAL PAIN: Status: ACTIVE | Noted: 2018-11-07

## 2024-01-11 PROBLEM — C44.310 BASAL CELL CARCINOMA (BCC) OF FACE: Status: ACTIVE | Noted: 2024-01-11

## 2024-01-11 PROBLEM — K64.9 HEMORRHOIDS: Status: RESOLVED | Noted: 2023-11-03 | Resolved: 2024-01-11

## 2024-01-11 LAB — POC HEMOGLOBIN A1C: 7.4 % (ref 4.2–6.5)

## 2024-01-11 PROCEDURE — 4010F ACE/ARB THERAPY RXD/TAKEN: CPT | Performed by: FAMILY MEDICINE

## 2024-01-11 PROCEDURE — 3008F BODY MASS INDEX DOCD: CPT | Performed by: FAMILY MEDICINE

## 2024-01-11 PROCEDURE — 83036 HEMOGLOBIN GLYCOSYLATED A1C: CPT | Performed by: FAMILY MEDICINE

## 2024-01-11 PROCEDURE — 1036F TOBACCO NON-USER: CPT | Performed by: FAMILY MEDICINE

## 2024-01-11 PROCEDURE — 99214 OFFICE O/P EST MOD 30 MIN: CPT | Performed by: FAMILY MEDICINE

## 2024-01-11 PROCEDURE — 3074F SYST BP LT 130 MM HG: CPT | Performed by: FAMILY MEDICINE

## 2024-01-11 PROCEDURE — 3079F DIAST BP 80-89 MM HG: CPT | Performed by: FAMILY MEDICINE

## 2024-01-11 RX ORDER — DAPAGLIFLOZIN 10 MG/1
5 TABLET, FILM COATED ORAL
COMMUNITY
Start: 2023-10-12 | End: 2024-01-11 | Stop reason: WASHOUT

## 2024-01-11 RX ORDER — ONDANSETRON 4 MG/1
4 TABLET, FILM COATED ORAL EVERY 8 HOURS PRN
Qty: 20 TABLET | Refills: 0 | Status: SHIPPED | OUTPATIENT
Start: 2024-01-11 | End: 2024-06-02 | Stop reason: WASHOUT

## 2024-01-11 ASSESSMENT — ENCOUNTER SYMPTOMS
HEMATURIA: 0
CHEST TIGHTNESS: 0
FREQUENCY: 0
DIARRHEA: 0
PALPITATIONS: 0
CHILLS: 0
SINUS PAIN: 0
NUMBNESS: 0
POLYDIPSIA: 0
NAUSEA: 0
VOMITING: 0
SORE THROAT: 0
DYSURIA: 0
ABDOMINAL PAIN: 0
NERVOUS/ANXIOUS: 0
FEVER: 0
SINUS PRESSURE: 0
COUGH: 0
WHEEZING: 0
CONFUSION: 0
DIZZINESS: 0
ADENOPATHY: 0
DYSPHORIC MOOD: 0
UNEXPECTED WEIGHT CHANGE: 0
POLYPHAGIA: 0
LIGHT-HEADEDNESS: 0
CONSTIPATION: 0
DIAPHORESIS: 0
HEADACHES: 0
SHORTNESS OF BREATH: 0

## 2024-01-11 ASSESSMENT — PATIENT HEALTH QUESTIONNAIRE - PHQ9
1. LITTLE INTEREST OR PLEASURE IN DOING THINGS: NOT AT ALL
SUM OF ALL RESPONSES TO PHQ9 QUESTIONS 1 AND 2: 0
SUM OF ALL RESPONSES TO PHQ9 QUESTIONS 1 AND 2: 0
2. FEELING DOWN, DEPRESSED OR HOPELESS: NOT AT ALL
1. LITTLE INTEREST OR PLEASURE IN DOING THINGS: NOT AT ALL
2. FEELING DOWN, DEPRESSED OR HOPELESS: NOT AT ALL

## 2024-01-11 NOTE — PROGRESS NOTES
"Subjective   Patient ID: Nate Alvarez is a 53 y.o. male who presents for Follow-up.    [facility]    routine follow up. chronic issues as per assessment and plan.     Review of Systems   Constitutional:  Negative for chills, diaphoresis, fever and unexpected weight change.   HENT:  Negative for congestion, sinus pressure, sinus pain, sneezing and sore throat.    Respiratory:  Negative for cough, chest tightness, shortness of breath and wheezing.    Cardiovascular:  Negative for chest pain, palpitations and leg swelling.   Gastrointestinal:  Negative for abdominal pain, constipation, diarrhea, nausea and vomiting.   Endocrine: Negative for cold intolerance, heat intolerance, polydipsia, polyphagia and polyuria.   Genitourinary:  Negative for dysuria, frequency, hematuria and urgency.   Neurological:  Negative for dizziness, syncope, light-headedness, numbness and headaches.   Hematological:  Negative for adenopathy.   Psychiatric/Behavioral:  Negative for confusion and dysphoric mood. The patient is not nervous/anxious.        Objective   /80   Pulse 70   Resp 18   Ht 1.905 m (6' 3\")   Wt 122 kg (268 lb)   BMI 33.50 kg/m²     Physical Exam  Vitals and nursing note reviewed.   Constitutional:       General: He is not in acute distress.     Appearance: Normal appearance.   HENT:      Head: Normocephalic and atraumatic.      Nose: Nose normal.   Eyes:      Extraocular Movements: Extraocular movements intact.      Conjunctiva/sclera: Conjunctivae normal.      Pupils: Pupils are equal, round, and reactive to light.   Cardiovascular:      Rate and Rhythm: Normal rate and regular rhythm.      Heart sounds: No murmur heard.     No friction rub. No gallop.   Pulmonary:      Effort: Pulmonary effort is normal.      Breath sounds: Normal breath sounds. No wheezing, rhonchi or rales.   Abdominal:      General: Bowel sounds are normal. There is no distension.      Palpations: Abdomen is soft.      Tenderness: There is no " abdominal tenderness.   Musculoskeletal:         General: Normal range of motion.      Cervical back: Normal range of motion and neck supple.   Skin:     General: Skin is warm and dry.   Neurological:      General: No focal deficit present.      Mental Status: He is alert and oriented to person, place, and time.      Deep Tendon Reflexes: Reflexes normal.   Psychiatric:         Mood and Affect: Mood normal.         Behavior: Behavior normal.         Thought Content: Thought content normal.         Judgment: Judgment normal.         Assessment/Plan   Problem List Items Addressed This Visit             ICD-10-CM    BMI 33.0-33.9,adult Z68.33     - Encouraged healthy lifestyle, including adequate exercise and high fiber, low fat and low carb diet.          Relevant Orders    Vitamin B12    Vitamin D 25-Hydroxy,Total (for eval of Vitamin D levels)    TSH with reflex to Free T4 if abnormal    Comprehensive Metabolic Panel    CBC and Auto Differential    Class 1 obesity due to excess calories with serious comorbidity and body mass index (BMI) of 33.0 to 33.9 in adult E66.09, Z68.33     - Encouraged healthy lifestyle, including adequate exercise and high fiber, low fat and low carb diet.          Relevant Orders    Vitamin B12    Vitamin D 25-Hydroxy,Total (for eval of Vitamin D levels)    TSH with reflex to Free T4 if abnormal    Comprehensive Metabolic Panel    CBC and Auto Differential    Complex sleep apnea syndrome G47.31     - follows with sleep medicine          Encounter for immunization Z23     - Declined flu vaccine.    - Declined PCV 20  - recommended the following vaccines at the pharmacy: Tdap, COVID-19         Essential hypertension I10     - controlled. Continue lisinopril         Relevant Orders    Vitamin B12    TSH with reflex to Free T4 if abnormal    Comprehensive Metabolic Panel    CBC and Auto Differential    Nausea R11.0     - sometimes gets nauseated when he takes medication. Will give him zofran to  have to use as needed          Relevant Medications    ondansetron (Zofran) 4 mg tablet    Other Relevant Orders    Vitamin B12    TSH with reflex to Free T4 if abnormal    Comprehensive Metabolic Panel    CBC and Auto Differential    Type 2 diabetes mellitus with hyperglycemia, without long-term current use of insulin (CMS/[facility]) - Primary E11.65     - HbA1c improved to 7.4 (8.8)  - was on metformin in the past and this caused diarrhea  - was on farxiga but he developed diarreha on this as well. Stopped this about 1 week ago  - current regimen is glipizide XL 5 mg daily. Fasting blood sugars have been in the 90s to 130s  - will continue glipizide 5 mg daily for now  - check HbA1c in 3 months            Relevant Orders    POCT glycosylated hemoglobin (Hb A1C) manually resulted (Completed)    Vitamin B12    TSH with reflex to Free T4 if abnormal    Comprehensive Metabolic Panel    CBC and Auto Differential    Albumin , Urine Random    Vitamin D deficiency E55.9     - continue vitamin D  - check labs          Relevant Orders    Vitamin D 25-Hydroxy,Total (for eval of Vitamin D levels)     Other Visit Diagnoses         Codes    Lipid screening     Z13.220    Relevant Orders    Lipid Panel    Encounter for screening for HIV     Z11.4    Relevant Orders    HIV 1/2 Antigen/Antibody Screen with Reflex to Confirmation    Need for hepatitis C screening test     Z11.59    Relevant Orders    Hepatitis C Antibody

## 2024-01-11 NOTE — ASSESSMENT & PLAN NOTE
- sometimes gets nauseated when he takes medication. Will give him zofran to have to use as needed

## 2024-01-11 NOTE — ASSESSMENT & PLAN NOTE
- Declined flu vaccine.    - Declined PCV 20  - recommended the following vaccines at the pharmacy: Tdap, COVID-19

## 2024-01-11 NOTE — PATIENT INSTRUCTIONS
Nate Alvarez ,    Thank you for coming in today. We at [facility] appreciate your trust in our care. If you have any questions or concerns about the care you received today, please do not hesitate to contact us at [phone].    The following instructions were discussed today:    - I recommend the following vaccines at the pharmacy: Tdap, COVID-19  - get labs. For blood work: Nothing to eat or drink for at least 10 hours prior. Okay for water or black coffee.   - Follow up Visit date not found as scheduled.

## 2024-01-11 NOTE — ASSESSMENT & PLAN NOTE
- HbA1c improved to 7.4 (8.8)  - was on metformin in the past and this caused diarrhea  - was on farxiga but he developed diarreha on this as well. Stopped this about 1 week ago  - current regimen is glipizide XL 5 mg daily. Fasting blood sugars have been in the 90s to 130s  - will continue glipizide 5 mg daily for now  - check HbA1c in 3 months

## 2024-01-24 DIAGNOSIS — E11.65 TYPE 2 DIABETES MELLITUS WITH HYPERGLYCEMIA, WITHOUT LONG-TERM CURRENT USE OF INSULIN (MULTI): ICD-10-CM

## 2024-01-24 RX ORDER — GLIPIZIDE 5 MG/1
5 TABLET, FILM COATED, EXTENDED RELEASE ORAL DAILY
Qty: 30 TABLET | Refills: 0 | OUTPATIENT
Start: 2024-01-24

## 2024-02-05 ENCOUNTER — TELEPHONE (OUTPATIENT)
Dept: PRIMARY CARE | Facility: CLINIC | Age: 54
End: 2024-02-05
Payer: COMMERCIAL

## 2024-02-05 DIAGNOSIS — E11.65 TYPE 2 DIABETES MELLITUS WITH HYPERGLYCEMIA, WITHOUT LONG-TERM CURRENT USE OF INSULIN (MULTI): Primary | ICD-10-CM

## 2024-02-09 NOTE — TELEPHONE ENCOUNTER
He is talking about glipizide. This is the third medication for his diabetes that he has been unable to tolerate. We are running out of options. Can try januvia and see if he tolerates that. Script sent.

## 2024-03-01 ENCOUNTER — APPOINTMENT (OUTPATIENT)
Dept: PRIMARY CARE | Facility: CLINIC | Age: 54
End: 2024-03-01
Payer: COMMERCIAL

## 2024-04-16 ENCOUNTER — LAB (OUTPATIENT)
Dept: LAB | Facility: LAB | Age: 54
End: 2024-04-16
Payer: COMMERCIAL

## 2024-04-16 DIAGNOSIS — I10 ESSENTIAL HYPERTENSION: ICD-10-CM

## 2024-04-16 DIAGNOSIS — E66.09 CLASS 1 OBESITY DUE TO EXCESS CALORIES WITH SERIOUS COMORBIDITY AND BODY MASS INDEX (BMI) OF 33.0 TO 33.9 IN ADULT: ICD-10-CM

## 2024-04-16 DIAGNOSIS — Z13.220 LIPID SCREENING: ICD-10-CM

## 2024-04-16 DIAGNOSIS — R11.0 NAUSEA: ICD-10-CM

## 2024-04-16 DIAGNOSIS — E11.65 TYPE 2 DIABETES MELLITUS WITH HYPERGLYCEMIA, WITHOUT LONG-TERM CURRENT USE OF INSULIN (MULTI): ICD-10-CM

## 2024-04-16 DIAGNOSIS — Z11.4 ENCOUNTER FOR SCREENING FOR HIV: ICD-10-CM

## 2024-04-16 DIAGNOSIS — E55.9 VITAMIN D DEFICIENCY: ICD-10-CM

## 2024-04-16 DIAGNOSIS — Z11.59 NEED FOR HEPATITIS C SCREENING TEST: ICD-10-CM

## 2024-04-16 LAB
25(OH)D3 SERPL-MCNC: 13 NG/ML (ref 30–100)
ALBUMIN SERPL BCP-MCNC: 4.1 G/DL (ref 3.4–5)
ALP SERPL-CCNC: 71 U/L (ref 33–120)
ALT SERPL W P-5'-P-CCNC: 21 U/L (ref 10–52)
ANION GAP SERPL CALC-SCNC: 9 MMOL/L (ref 10–20)
AST SERPL W P-5'-P-CCNC: 13 U/L (ref 9–39)
BASOPHILS # BLD AUTO: 0.06 X10*3/UL (ref 0–0.1)
BASOPHILS NFR BLD AUTO: 0.9 %
BILIRUB SERPL-MCNC: 0.7 MG/DL (ref 0–1.2)
BUN SERPL-MCNC: 17 MG/DL (ref 6–23)
CALCIUM SERPL-MCNC: 9.1 MG/DL (ref 8.6–10.3)
CHLORIDE SERPL-SCNC: 108 MMOL/L (ref 98–107)
CHOLEST SERPL-MCNC: 121 MG/DL (ref 0–199)
CHOLESTEROL/HDL RATIO: 3.5
CO2 SERPL-SCNC: 25 MMOL/L (ref 21–32)
CREAT SERPL-MCNC: 0.78 MG/DL (ref 0.5–1.3)
EGFRCR SERPLBLD CKD-EPI 2021: >90 ML/MIN/1.73M*2
EOSINOPHIL # BLD AUTO: 0.39 X10*3/UL (ref 0–0.7)
EOSINOPHIL NFR BLD AUTO: 5.9 %
ERYTHROCYTE [DISTWIDTH] IN BLOOD BY AUTOMATED COUNT: 13.2 % (ref 11.5–14.5)
GLUCOSE SERPL-MCNC: 216 MG/DL (ref 74–99)
HCT VFR BLD AUTO: 40.8 % (ref 41–52)
HCV AB SER QL: NONREACTIVE
HDLC SERPL-MCNC: 34.2 MG/DL
HGB BLD-MCNC: 13.8 G/DL (ref 13.5–17.5)
HIV 1+2 AB+HIV1 P24 AG SERPL QL IA: NONREACTIVE
IMM GRANULOCYTES # BLD AUTO: 0.02 X10*3/UL (ref 0–0.7)
IMM GRANULOCYTES NFR BLD AUTO: 0.3 % (ref 0–0.9)
LDLC SERPL CALC-MCNC: 59 MG/DL
LYMPHOCYTES # BLD AUTO: 2.25 X10*3/UL (ref 1.2–4.8)
LYMPHOCYTES NFR BLD AUTO: 34.1 %
MCH RBC QN AUTO: 29.6 PG (ref 26–34)
MCHC RBC AUTO-ENTMCNC: 33.8 G/DL (ref 32–36)
MCV RBC AUTO: 88 FL (ref 80–100)
MONOCYTES # BLD AUTO: 0.54 X10*3/UL (ref 0.1–1)
MONOCYTES NFR BLD AUTO: 8.2 %
NEUTROPHILS # BLD AUTO: 3.33 X10*3/UL (ref 1.2–7.7)
NEUTROPHILS NFR BLD AUTO: 50.6 %
NON HDL CHOLESTEROL: 87 MG/DL (ref 0–149)
NRBC BLD-RTO: 0 /100 WBCS (ref 0–0)
PLATELET # BLD AUTO: 166 X10*3/UL (ref 150–450)
POTASSIUM SERPL-SCNC: 3.9 MMOL/L (ref 3.5–5.3)
PROT SERPL-MCNC: 6.1 G/DL (ref 6.4–8.2)
RBC # BLD AUTO: 4.66 X10*6/UL (ref 4.5–5.9)
SODIUM SERPL-SCNC: 138 MMOL/L (ref 136–145)
TRIGL SERPL-MCNC: 141 MG/DL (ref 0–149)
TSH SERPL-ACNC: 3.53 MIU/L (ref 0.44–3.98)
VIT B12 SERPL-MCNC: 311 PG/ML (ref 211–911)
VLDL: 28 MG/DL (ref 0–40)
WBC # BLD AUTO: 6.6 X10*3/UL (ref 4.4–11.3)

## 2024-04-16 PROCEDURE — 87389 HIV-1 AG W/HIV-1&-2 AB AG IA: CPT

## 2024-04-16 PROCEDURE — 36415 COLL VENOUS BLD VENIPUNCTURE: CPT

## 2024-04-16 PROCEDURE — 80053 COMPREHEN METABOLIC PANEL: CPT

## 2024-04-16 PROCEDURE — 85025 COMPLETE CBC W/AUTO DIFF WBC: CPT

## 2024-04-16 PROCEDURE — 86803 HEPATITIS C AB TEST: CPT

## 2024-04-16 PROCEDURE — 82607 VITAMIN B-12: CPT

## 2024-04-16 PROCEDURE — 80061 LIPID PANEL: CPT

## 2024-04-16 PROCEDURE — 82306 VITAMIN D 25 HYDROXY: CPT

## 2024-04-16 PROCEDURE — 84443 ASSAY THYROID STIM HORMONE: CPT

## 2024-04-17 ENCOUNTER — OFFICE VISIT (OUTPATIENT)
Dept: PRIMARY CARE | Facility: CLINIC | Age: 54
End: 2024-04-17
Payer: COMMERCIAL

## 2024-04-17 VITALS
DIASTOLIC BLOOD PRESSURE: 80 MMHG | BODY MASS INDEX: 31.95 KG/M2 | HEIGHT: 75 IN | OXYGEN SATURATION: 95 % | RESPIRATION RATE: 16 BRPM | SYSTOLIC BLOOD PRESSURE: 122 MMHG | WEIGHT: 257 LBS | HEART RATE: 58 BPM

## 2024-04-17 DIAGNOSIS — R63.4 WEIGHT LOSS: ICD-10-CM

## 2024-04-17 DIAGNOSIS — Z23 ENCOUNTER FOR IMMUNIZATION: ICD-10-CM

## 2024-04-17 DIAGNOSIS — Z00.00 WELL ADULT EXAM: Primary | ICD-10-CM

## 2024-04-17 DIAGNOSIS — C44.310 BASAL CELL CARCINOMA (BCC) OF FACE: ICD-10-CM

## 2024-04-17 DIAGNOSIS — E55.9 VITAMIN D DEFICIENCY: ICD-10-CM

## 2024-04-17 DIAGNOSIS — E66.09 CLASS 1 OBESITY DUE TO EXCESS CALORIES WITH SERIOUS COMORBIDITY AND BODY MASS INDEX (BMI) OF 32.0 TO 32.9 IN ADULT: ICD-10-CM

## 2024-04-17 DIAGNOSIS — I10 ESSENTIAL HYPERTENSION: ICD-10-CM

## 2024-04-17 DIAGNOSIS — E11.65 TYPE 2 DIABETES MELLITUS WITH HYPERGLYCEMIA, WITHOUT LONG-TERM CURRENT USE OF INSULIN (MULTI): ICD-10-CM

## 2024-04-17 PROBLEM — H53.8 BLURRY VISION: Status: RESOLVED | Noted: 2023-10-05 | Resolved: 2024-04-17

## 2024-04-17 PROBLEM — K21.9 GASTROESOPHAGEAL REFLUX DISEASE WITHOUT ESOPHAGITIS: Status: RESOLVED | Noted: 2023-10-05 | Resolved: 2024-04-17

## 2024-04-17 LAB
POC ALBUMIN /CREATININE RATIO MANUALLY ENTERED: <30 UG/MG CREAT
POC HEMOGLOBIN A1C: 9.3 % (ref 4.2–6.5)
POC URINE ALBUMIN: 30 MG/L
POC URINE CREATININE: 100 MG/DL

## 2024-04-17 PROCEDURE — 3048F LDL-C <100 MG/DL: CPT | Performed by: FAMILY MEDICINE

## 2024-04-17 PROCEDURE — 3079F DIAST BP 80-89 MM HG: CPT | Performed by: FAMILY MEDICINE

## 2024-04-17 PROCEDURE — 1036F TOBACCO NON-USER: CPT | Performed by: FAMILY MEDICINE

## 2024-04-17 PROCEDURE — 83036 HEMOGLOBIN GLYCOSYLATED A1C: CPT | Performed by: FAMILY MEDICINE

## 2024-04-17 PROCEDURE — 4010F ACE/ARB THERAPY RXD/TAKEN: CPT | Performed by: FAMILY MEDICINE

## 2024-04-17 PROCEDURE — 99214 OFFICE O/P EST MOD 30 MIN: CPT | Performed by: FAMILY MEDICINE

## 2024-04-17 PROCEDURE — 82044 UR ALBUMIN SEMIQUANTITATIVE: CPT | Performed by: FAMILY MEDICINE

## 2024-04-17 PROCEDURE — 3008F BODY MASS INDEX DOCD: CPT | Performed by: FAMILY MEDICINE

## 2024-04-17 PROCEDURE — 3074F SYST BP LT 130 MM HG: CPT | Performed by: FAMILY MEDICINE

## 2024-04-17 PROCEDURE — 99396 PREV VISIT EST AGE 40-64: CPT | Performed by: FAMILY MEDICINE

## 2024-04-17 RX ORDER — TIRZEPATIDE 7.5 MG/.5ML
7.5 INJECTION, SOLUTION SUBCUTANEOUS
Qty: 2 ML | Refills: 0 | Status: SHIPPED | OUTPATIENT
Start: 2024-06-12 | End: 2024-05-18 | Stop reason: HOSPADM

## 2024-04-17 RX ORDER — TIRZEPATIDE 2.5 MG/.5ML
2.5 INJECTION, SOLUTION SUBCUTANEOUS
Qty: 2 ML | Refills: 0 | Status: SHIPPED | OUTPATIENT
Start: 2024-04-21 | End: 2024-05-18 | Stop reason: HOSPADM

## 2024-04-17 RX ORDER — TIRZEPATIDE 5 MG/.5ML
5 INJECTION, SOLUTION SUBCUTANEOUS
Qty: 2 ML | Refills: 0 | Status: SHIPPED | OUTPATIENT
Start: 2024-05-15 | End: 2024-05-18 | Stop reason: HOSPADM

## 2024-04-17 RX ORDER — LISINOPRIL 5 MG/1
5 TABLET ORAL DAILY
Qty: 30 TABLET | Refills: 5 | Status: SHIPPED | OUTPATIENT
Start: 2024-04-17 | End: 2024-10-14

## 2024-04-17 RX ORDER — CHOLECALCIFEROL (VITAMIN D3) 125 MCG
125 CAPSULE ORAL DAILY
Start: 2024-04-17 | End: 2025-04-17

## 2024-04-17 ASSESSMENT — ENCOUNTER SYMPTOMS
DIAPHORESIS: 0
SORE THROAT: 0
HEADACHES: 0
VOMITING: 0
SINUS PRESSURE: 0
DIZZINESS: 0
DIARRHEA: 0
ADENOPATHY: 0
NERVOUS/ANXIOUS: 0
NAUSEA: 0
POLYPHAGIA: 0
NUMBNESS: 0
WHEEZING: 0
CHEST TIGHTNESS: 0
UNEXPECTED WEIGHT CHANGE: 0
SINUS PAIN: 0
DYSPHORIC MOOD: 0
FREQUENCY: 0
CHILLS: 0
CONFUSION: 0
DYSURIA: 0
HEMATURIA: 0
POLYDIPSIA: 0
PALPITATIONS: 0
SHORTNESS OF BREATH: 0
COUGH: 0
FEVER: 0
ABDOMINAL PAIN: 0
CONSTIPATION: 0
LIGHT-HEADEDNESS: 0

## 2024-04-17 ASSESSMENT — PATIENT HEALTH QUESTIONNAIRE - PHQ9
SUM OF ALL RESPONSES TO PHQ9 QUESTIONS 1 AND 2: 0
2. FEELING DOWN, DEPRESSED OR HOPELESS: NOT AT ALL
1. LITTLE INTEREST OR PLEASURE IN DOING THINGS: NOT AT ALL

## 2024-04-17 NOTE — ASSESSMENT & PLAN NOTE
- has lost about 30-40 pounds in the last year  - he has had uncontrolled diabetes in that year. He has also had diarrhea from diabetic medications  - will see if weight stabilizes with improvement in blood sugars  - had colonoscopy in 2020. If weight continues to decrease, will need repeat

## 2024-04-17 NOTE — PATIENT INSTRUCTIONS
Nate Alvarez ,    Thank you for coming in today. We at [facility] appreciate your trust in our care. If you have any questions or concerns about the care you received today, please do not hesitate to contact us at [phone].    The following instructions were discussed today:    - start mounjaro   - restart lisinopril 5 mg daily   - start mounjaro 2.5 mg once weekly. Will increase by 2.5 mg every 4 weeks until reaching goal of 15 mg weekly   - INCREASE  vitamin D to 5000 IU daily   - Follow up in 3 months.    - Please get blood work done 1-2 weeks prior to your next visit.   - I recommend dental exams every 6 months   - I recommend eye exams once a year  - I recommend the following vaccines: PCV 20 (pneumonia); Shingrix (shingles); Tdap (tetanus)

## 2024-04-17 NOTE — ASSESSMENT & PLAN NOTE
- controlled  - is on lisinopril 5 mg daily but has not been taking it. Will restart for renal protection

## 2024-04-17 NOTE — PROGRESS NOTES
Subjective   Patient ID: Nate Alvarez is a 53 y.o. male who presents for lab results (Needs A1C and micalb/Hasn't taken any medication for approx 6-7 wks).    Well Adult Physical   Patient here for a comprehensive physical exam. Also here for routine follow up. chronic issues as per assessment and plan. He has not taking any of his medications for 6 weeks. Was unable to tolerate januvia because it gave him diarrhea. Previously unable to tolerate farxiga, glipizide, metformin because those also gave him diarrhea.     Nate reports his health as Fair.    Does the patient take any herbs or supplements that were not prescribed by a doctor? yes   Is the patiet taking calcium supplements? no   Is the patient taking aspirin daily? no    Outpatient Medications Prior to Visit   Medication Sig Dispense Refill    blood sugar diagnostic (Blood Glucose Test) strip Check blood sugars once daily 100 strip 3    blood-glucose meter misc Check  blood sugar as needed 1 each 0    lancets misc Check blood sugars once daily 100 each 3    ondansetron (Zofran) 4 mg tablet Take 1 tablet (4 mg) by mouth every 8 hours if needed for nausea or vomiting for up to 7 days. 20 tablet 0    lisinopril 5 mg tablet Take 1 tablet (5 mg) by mouth once daily. 30 tablet 5    SITagliptin phosphate (Januvia) 100 mg tablet Take 1 tablet (100 mg) by mouth once daily. (Patient not taking: Reported on 4/17/2024) 30 tablet 2     No facility-administered medications prior to visit.       Social History     Socioeconomic History    Marital status:      Spouse name: None    Number of children: None    Years of education: None    Highest education level: None   Occupational History    None   Tobacco Use    Smoking status: Never    Smokeless tobacco: Never   Vaping Use    Vaping status: Never Used   Substance and Sexual Activity    Alcohol use: Not Currently    Drug use: Never    Sexual activity: None   Other Topics Concern    None   Social History  "Narrative    None     Social Determinants of Health     Financial Resource Strain: Not on file   Food Insecurity: Not on file   Transportation Needs: Not on file   Physical Activity: Not on file   Stress: Not on file   Social Connections: Not on file   Intimate Partner Violence: Not on file   Housing Stability: Not on file           E-cigarette/Vaping       Questions Responses    E-cigarette/Vaping Use Never User            Last Dental Exam: more than 1 year    Last Eye Exam: more than 1 year    Diet: in general, an \"unhealthy\" diet    Exercise: frequently    Health Maintenance Due   Topic Date Due    Derm Melanoma Skin Check  Never done    MMR Vaccines (1 of 1 - Standard series) Never done    Pneumococcal Vaccine: Pediatrics (0 to 5 Years) and At-Risk Patients (6 to 64 Years) (1 of 2 - PCV) Never done    Diabetes: Foot Exam  Never done    Diabetes: Retinopathy Screening  Never done    Hepatitis B Vaccines (1 of 3 - 19+ 3-dose series) Never done    DTaP/Tdap/Td Vaccines (1 - Tdap) Never done    Zoster Vaccines (1 of 2) Never done    COVID-19 Vaccine (3 - 2023-24 season) 09/01/2023     Office Visit on 04/17/2024   Component Date Value Ref Range Status    POC HEMOGLOBIN A1c 04/17/2024 9.3 (A)  4.2 - 6.5 % Final    POC Urine Albumin 04/17/2024 30  No Reference Range Established mg/L Final    POC Urine Creatinine 04/17/2024 100  No Reference Range Established mg/dl Final    POC ALBUMIN /CREATININE RATIO MANU* 04/17/2024 <30  <30 ug/mg Creat Final   Lab on 04/16/2024   Component Date Value Ref Range Status    Vitamin B12 04/16/2024 311  211 - 911 pg/mL Final    Vitamin D, 25-Hydroxy, Total 04/16/2024 13 (L)  30 - 100 ng/mL Final    Thyroid Stimulating Hormone 04/16/2024 3.53  0.44 - 3.98 mIU/L Final    Cholesterol 04/16/2024 121  0 - 199 mg/dL Final          Age      Desirable   Borderline High   High     0-19 Y     0 - 169       170 - 199     >/= 200    20-24 Y     0 - 189       190 - 224     >/= 225         >24 Y     0 " - 199       200 - 239     >/= 240   **All ranges are based on fasting samples. Specific   therapeutic targets will vary based on patient-specific   cardiac risk.    Pediatric guidelines reference:Pediatrics 2011, 128(S5).Adult guidelines reference: NCEP ATPIII Guidelines,COLLINS 2001, 258:2486-97    Venipuncture immediately after or during the administration of Metamizole may lead to falsely low results. Testing should be performed immediately prior to Metamizole dosing.    HDL-Cholesterol 04/16/2024 34.2  mg/dL Final      Age       Very Low   Low     Normal    High    0-19 Y    < 35      < 40     40-45     ----  20-24 Y    ----     < 40      >45      ----        >24 Y      ----     < 40     40-60      >60      Cholesterol/HDL Ratio 04/16/2024 3.5   Final      Ref Values  Desirable  < 3.4  High Risk  > 5.0    LDL Calculated 04/16/2024 59  <=99 mg/dL Final                                Near   Borderline      AGE      Desirable  Optimal    High     High     Very High     0-19 Y     0 - 109     ---    110-129   >/= 130     ----    20-24 Y     0 - 119     ---    120-159   >/= 160     ----      >24 Y     0 -  99   100-129  130-159   160-189     >/=190      VLDL 04/16/2024 28  0 - 40 mg/dL Final    Triglycerides 04/16/2024 141  0 - 149 mg/dL Final       Age         Desirable   Borderline High   High     Very High   0 D-90 D    19 - 174         ----         ----        ----  91 D- 9 Y     0 -  74        75 -  99     >/= 100      ----    10-19 Y     0 -  89        90 - 129     >/= 130      ----    20-24 Y     0 - 114       115 - 149     >/= 150      ----         >24 Y     0 - 149       150 - 199    200- 499    >/= 500    Venipuncture immediately after or during the administration of Metamizole may lead to falsely low results. Testing should be performed immediately prior to Metamizole dosing.    Non HDL Cholesterol 04/16/2024 87  0 - 149 mg/dL Final          Age       Desirable   Borderline High   High     Very High     0-19  Y     0 - 119       120 - 144     >/= 145    >/= 160    20-24 Y     0 - 149       150 - 189     >/= 190      ----         >24 Y    30 mg/dL above LDL Cholesterol goal      Glucose 04/16/2024 216 (H)  74 - 99 mg/dL Final    Sodium 04/16/2024 138  136 - 145 mmol/L Final    Potassium 04/16/2024 3.9  3.5 - 5.3 mmol/L Final    Chloride 04/16/2024 108 (H)  98 - 107 mmol/L Final    Bicarbonate 04/16/2024 25  21 - 32 mmol/L Final    Anion Gap 04/16/2024 9 (L)  10 - 20 mmol/L Final    Urea Nitrogen 04/16/2024 17  6 - 23 mg/dL Final    Creatinine 04/16/2024 0.78  0.50 - 1.30 mg/dL Final    eGFR 04/16/2024 >90  >60 mL/min/1.73m*2 Final    Calculations of estimated GFR are performed using the 2021 CKD-EPI Study Refit equation without the race variable for the IDMS-Traceable creatinine methods.  https://jasn.asnjournals.org/content/early/2021/09/22/ASN.8814868974    Calcium 04/16/2024 9.1  8.6 - 10.3 mg/dL Final    Albumin 04/16/2024 4.1  3.4 - 5.0 g/dL Final    Alkaline Phosphatase 04/16/2024 71  33 - 120 U/L Final    Total Protein 04/16/2024 6.1 (L)  6.4 - 8.2 g/dL Final    AST 04/16/2024 13  9 - 39 U/L Final    Bilirubin, Total 04/16/2024 0.7  0.0 - 1.2 mg/dL Final    ALT 04/16/2024 21  10 - 52 U/L Final    Patients treated with Sulfasalazine may generate falsely decreased results for ALT.    WBC 04/16/2024 6.6  4.4 - 11.3 x10*3/uL Final    nRBC 04/16/2024 0.0  0.0 - 0.0 /100 WBCs Final    RBC 04/16/2024 4.66  4.50 - 5.90 x10*6/uL Final    Hemoglobin 04/16/2024 13.8  13.5 - 17.5 g/dL Final    Hematocrit 04/16/2024 40.8 (L)  41.0 - 52.0 % Final    MCV 04/16/2024 88  80 - 100 fL Final    MCH 04/16/2024 29.6  26.0 - 34.0 pg Final    MCHC 04/16/2024 33.8  32.0 - 36.0 g/dL Final    RDW 04/16/2024 13.2  11.5 - 14.5 % Final    Platelets 04/16/2024 166  150 - 450 x10*3/uL Final    Neutrophils % 04/16/2024 50.6  40.0 - 80.0 % Final    Immature Granulocytes %, Automated 04/16/2024 0.3  0.0 - 0.9 % Final    Immature Granulocyte Count  (IG) includes promyelocytes, myelocytes and metamyelocytes but does not include bands. Percent differential counts (%) should be interpreted in the context of the absolute cell counts (cells/UL).    Lymphocytes % 04/16/2024 34.1  13.0 - 44.0 % Final    Monocytes % 04/16/2024 8.2  2.0 - 10.0 % Final    Eosinophils % 04/16/2024 5.9  0.0 - 6.0 % Final    Basophils % 04/16/2024 0.9  0.0 - 2.0 % Final    Neutrophils Absolute 04/16/2024 3.33  1.20 - 7.70 x10*3/uL Final    Percent differential counts (%) should be interpreted in the context of the absolute cell counts (cells/uL).    Immature Granulocytes Absolute, Au* 04/16/2024 0.02  0.00 - 0.70 x10*3/uL Final    Lymphocytes Absolute 04/16/2024 2.25  1.20 - 4.80 x10*3/uL Final    Monocytes Absolute 04/16/2024 0.54  0.10 - 1.00 x10*3/uL Final    Eosinophils Absolute 04/16/2024 0.39  0.00 - 0.70 x10*3/uL Final    Basophils Absolute 04/16/2024 0.06  0.00 - 0.10 x10*3/uL Final    HIV 1/2 Antigen/Antibody Screen wi* 04/16/2024 Nonreactive  Nonreactive Final    Hepatitis C AB 04/16/2024 Nonreactive  Nonreactive Final    Results from patients taking biotin supplements or receiving high-dose biotin therapy should be interpreted with caution due to possible interference with this test. Providers may contact their local laboratory for further information.           Review of Systems   Constitutional:  Negative for chills, diaphoresis, fever and unexpected weight change.   HENT:  Negative for congestion, sinus pressure, sinus pain, sneezing and sore throat.    Respiratory:  Negative for cough, chest tightness, shortness of breath and wheezing.    Cardiovascular:  Negative for chest pain, palpitations and leg swelling.   Gastrointestinal:  Negative for abdominal pain, constipation, diarrhea, nausea and vomiting.   Endocrine: Negative for cold intolerance, heat intolerance, polydipsia, polyphagia and polyuria.   Genitourinary:  Negative for dysuria, frequency, hematuria and urgency.  "  Neurological:  Negative for dizziness, syncope, light-headedness, numbness and headaches.   Hematological:  Negative for adenopathy.   Psychiatric/Behavioral:  Negative for confusion and dysphoric mood. The patient is not nervous/anxious.          Objective   /80 (BP Location: Right arm, Patient Position: Sitting, BP Cuff Size: Large adult)   Pulse 58   Resp 16   Ht 1.905 m (6' 3\")   Wt 117 kg (257 lb)   SpO2 95%   BMI 32.12 kg/m²     Physical Exam  Vitals and nursing note reviewed.   Constitutional:       General: He is not in acute distress.     Appearance: Normal appearance.   HENT:      Head: Normocephalic and atraumatic.      Nose: Nose normal.   Eyes:      Extraocular Movements: Extraocular movements intact.      Conjunctiva/sclera: Conjunctivae normal.      Pupils: Pupils are equal, round, and reactive to light.   Cardiovascular:      Rate and Rhythm: Normal rate and regular rhythm.      Heart sounds: No murmur heard.     No friction rub. No gallop.   Pulmonary:      Effort: Pulmonary effort is normal.      Breath sounds: Normal breath sounds. No wheezing, rhonchi or rales.   Abdominal:      General: Bowel sounds are normal. There is no distension.      Palpations: Abdomen is soft.      Tenderness: There is no abdominal tenderness.   Musculoskeletal:         General: Normal range of motion.      Cervical back: Normal range of motion and neck supple.   Skin:     General: Skin is warm and dry.   Neurological:      General: No focal deficit present.      Mental Status: He is alert and oriented to person, place, and time.      Deep Tendon Reflexes: Reflexes normal.   Psychiatric:         Mood and Affect: Mood normal.         Behavior: Behavior normal.         Thought Content: Thought content normal.         Judgment: Judgment normal.           Assessment/Plan   Problem List Items Addressed This Visit             ICD-10-CM    Basal cell carcinoma (BCC) of face C44.310     - follows with dermatology "          BMI 32.0-32.9,adult Z68.32     - Encouraged healthy lifestyle, including adequate exercise and high fiber, low fat and low carb diet.          Class 1 obesity due to excess calories with serious comorbidity and body mass index (BMI) of 32.0 to 32.9 in adult E66.09, Z68.32     - Encouraged healthy lifestyle, including adequate exercise and high fiber, low fat and low carb diet.          Encounter for immunization Z23     - Declined PCV 20  - recommended the following vaccines at the pharmacy: Tdap, Shingrix          Essential hypertension I10     - controlled  - is on lisinopril 5 mg daily but has not been taking it. Will restart for renal protection         Relevant Medications    lisinopril 5 mg tablet    Type 2 diabetes mellitus with hyperglycemia, without long-term current use of insulin (Multi) E11.65     - HbA1c worse at 9.3 (7.4) (8.8). he has been off all medications for 6-7 weeks now  - was on metformin in the past and this caused diarrhea  - was on farxiga but he developed diarreha   - was on glipizide but developed GI upset  - was on januvia but this caused diarrhea   - trial mounjaro             Relevant Medications    tirzepatide (Mounjaro) 2.5 mg/0.5 mL pen injector (Start on 4/21/2024)    tirzepatide (Mounjaro) 5 mg/0.5 mL pen injector (Start on 5/15/2024)    tirzepatide (Mounjaro) 7.5 mg/0.5 mL pen injector (Start on 6/12/2024)    Other Relevant Orders    POCT glycosylated hemoglobin (Hb A1C) manually resulted (Completed)    POCT Albumin random urine manually resulted (Completed)    Hemoglobin A1C    Vitamin D deficiency E55.9     - vitamin D low at 13  - INCREASE vitamin D to 5000 IU daily  - recheck labs in 3 months          Relevant Medications    cholecalciferol (Vitamin D-3) 125 MCG (5000 UT) capsule    Other Relevant Orders    Vitamin D 25-Hydroxy,Total (for eval of Vitamin D levels)    Weight loss R63.4     - has lost about 30-40 pounds in the last year  - he has had uncontrolled  diabetes in that year. He has also had diarrhea from diabetic medications  - will see if weight stabilizes with improvement in blood sugars  - had colonoscopy in 2020. If weight continues to decrease, will need repeat          Other Visit Diagnoses         Codes    Well adult exam    -  Primary Z00.00

## 2024-04-17 NOTE — ASSESSMENT & PLAN NOTE
- HbA1c worse at 9.3 (7.4) (8.8). he has been off all medications for 6-7 weeks now  - was on metformin in the past and this caused diarrhea  - was on farxiga but he developed diarreha   - was on glipizide but developed GI upset  - was on januvia but this caused diarrhea   - trial mounjaro

## 2024-04-29 ENCOUNTER — TELEPHONE (OUTPATIENT)
Dept: PRIMARY CARE | Facility: CLINIC | Age: 54
End: 2024-04-29
Payer: COMMERCIAL

## 2024-04-29 NOTE — TELEPHONE ENCOUNTER
Patient called stating that mounjaro is causing significant nausea and he feels weak. He knows that this can be a common side effect but wanted advice. Today he is able to hold food down but he has only been consuming nutrition shakes. He is on 2.5 mg dose

## 2024-05-02 ENCOUNTER — PATIENT MESSAGE (OUTPATIENT)
Dept: PRIMARY CARE | Facility: CLINIC | Age: 54
End: 2024-05-02
Payer: COMMERCIAL

## 2024-05-02 NOTE — TELEPHONE ENCOUNTER
1) stop mounjaro    2) get him an off work note    3) have him make an appointment for tomorrow. Can be virtual.

## 2024-05-03 ENCOUNTER — TELEPHONE (OUTPATIENT)
Dept: PRIMARY CARE | Facility: CLINIC | Age: 54
End: 2024-05-03

## 2024-05-03 ENCOUNTER — TELEMEDICINE (OUTPATIENT)
Dept: PRIMARY CARE | Facility: CLINIC | Age: 54
End: 2024-05-03
Payer: COMMERCIAL

## 2024-05-03 DIAGNOSIS — Z79.4 TYPE 2 DIABETES MELLITUS WITH HYPERGLYCEMIA, WITH LONG-TERM CURRENT USE OF INSULIN (MULTI): Primary | ICD-10-CM

## 2024-05-03 DIAGNOSIS — E11.65 TYPE 2 DIABETES MELLITUS WITH HYPERGLYCEMIA, WITH LONG-TERM CURRENT USE OF INSULIN (MULTI): Primary | ICD-10-CM

## 2024-05-03 PROCEDURE — 3008F BODY MASS INDEX DOCD: CPT | Performed by: FAMILY MEDICINE

## 2024-05-03 PROCEDURE — 1036F TOBACCO NON-USER: CPT | Performed by: FAMILY MEDICINE

## 2024-05-03 PROCEDURE — 99213 OFFICE O/P EST LOW 20 MIN: CPT | Performed by: FAMILY MEDICINE

## 2024-05-03 PROCEDURE — 3048F LDL-C <100 MG/DL: CPT | Performed by: FAMILY MEDICINE

## 2024-05-03 PROCEDURE — 4010F ACE/ARB THERAPY RXD/TAKEN: CPT | Performed by: FAMILY MEDICINE

## 2024-05-03 RX ORDER — INSULIN GLARGINE-YFGN 100 [IU]/ML
5 INJECTION, SOLUTION SUBCUTANEOUS EVERY 24 HOURS
Qty: 5 ML | Refills: 1 | Status: SHIPPED | OUTPATIENT
Start: 2024-05-03 | End: 2024-05-10 | Stop reason: SDUPTHER

## 2024-05-03 RX ORDER — INSULIN LISPRO 100 [IU]/ML
5 INJECTION, SOLUTION INTRAVENOUS; SUBCUTANEOUS
Qty: 15 ML | Refills: 1 | Status: SHIPPED | OUTPATIENT
Start: 2024-05-03 | End: 2024-05-10 | Stop reason: SDUPTHER

## 2024-05-03 RX ORDER — BLOOD-GLUCOSE SENSOR
EACH MISCELLANEOUS
Qty: 2 EACH | Refills: 11 | Status: SHIPPED | OUTPATIENT
Start: 2024-05-03

## 2024-05-03 ASSESSMENT — ENCOUNTER SYMPTOMS
DIARRHEA: 0
DIAPHORESIS: 0
PALPITATIONS: 0
VOMITING: 1
LIGHT-HEADEDNESS: 0
FATIGUE: 1
CHILLS: 0
ABDOMINAL PAIN: 0
HEMATURIA: 0
SHORTNESS OF BREATH: 0
WHEEZING: 0
POLYDIPSIA: 0
FREQUENCY: 0
ADENOPATHY: 0
CONFUSION: 0
CHEST TIGHTNESS: 0
SORE THROAT: 0
COUGH: 0
NERVOUS/ANXIOUS: 0
HEADACHES: 0
SINUS PAIN: 0
UNEXPECTED WEIGHT CHANGE: 0
NAUSEA: 1
DIZZINESS: 0
DYSURIA: 0
CONSTIPATION: 0
DYSPHORIC MOOD: 0
NUMBNESS: 0
POLYPHAGIA: 0
FEVER: 0
SINUS PRESSURE: 0

## 2024-05-03 NOTE — ASSESSMENT & PLAN NOTE
- HbA1c 9.3 (7.4) (8.8)  - was on metformin in the past and this caused diarrhea  - was on farxiga but he developed diarreha   - was on glipizide but developed GI upset  - was on januvia but this caused diarrhea   - was on mounjaro but this caused nausea and vomiting   - start semglee 5 units at bedtime. Check fasting blood sugar every morning and if greater than 150 for 3 days, increase semglee by 2 units  - start humalog 5 units with meals please sliding scale   - refer to endocrinology   - will start continuous glucose monitoring

## 2024-05-03 NOTE — LETTER
May 3, 2024     Patient: Nate Alvarez   YOB: 1970   Date of Visit: 5/3/2024       To Whom It May Concern:    Nate Alvarez was seen in my clinic on 5/3/2024 at 9:30 am. Please excuse Nate for his absence from work as he has been ill and unable to work since 4/25/24. He may return to work on 5/6/24.     If you have any questions or concerns, please don't hesitate to call.         Sincerely,         Hazel Medeiros MD          
Female

## 2024-05-03 NOTE — PATIENT INSTRUCTIONS
Nate Alvarez ,    Thank you for coming in today. We at [facility] appreciate your trust in our care. If you have any questions or concerns about the care you received today, please do not hesitate to contact us at [phone].    The following instructions were discussed today:    - refer to endocrinology   - start continuous glucose monitoring  - START semglee 5 units at bedtime  - start humalog 5 units three times daily with meals PLUS sliding scale as below  - 0-150 = 0 units   151-200 = 2 units  201-250 = 4 units   251-300 = 6 units  301-350 = 8 units  350 - 400 = 10 units  > 400 = 10 units   - check fasting blood sugar every morning. If it is greater than 150 for 3 days in a row, INCREASE semglee by 2 units.

## 2024-05-03 NOTE — TELEPHONE ENCOUNTER
----- Message from Hazel Medeiros MD sent at 5/3/2024 10:28 AM EDT -----  Did virtual with patient. He needs 1 week virtual follow up. Needs referral to endocrinology

## 2024-05-03 NOTE — TELEPHONE ENCOUNTER
Patient is scheduled to see Dr. Robles next Friday, I gave him the phone number for Dr. Vasquez office-he'll call to schedule an appt..

## 2024-05-03 NOTE — PROGRESS NOTES
Subjective   Patient ID: Nate Alvarez is a 53 y.o. male who presents for Diabetes.    Virtual or Telephone Consent    An interactive audio and video telecommunication system which permits real time communications between the patient (at the originating site) and provider (at the distant site) was utilized to provide this telehealth service.   Verbal consent was requested and obtained from Nate Alvarez on this date, 05/03/24 for a telehealth visit.      HPI  Follow up diabetes. Started mounjaro. 4/17/24. Had previously had side effects from the following: metformin, farxiga, glipizide, januvia. His most recent HbA1c was 9.3 on 4/17/24. He is now getting side effects from the monjaro. Last dose was about 10 days ago. This was also his first dose. He had nausea, vomiting with this. The nausea and vomiting have now subsided. His blood sugars are high, in the 400s. He took his wife's insulin and this did help. He has been very week and unable to work since 4/25/24.     Review of Systems   Constitutional:  Positive for fatigue. Negative for chills, diaphoresis, fever and unexpected weight change.   HENT:  Negative for congestion, sinus pressure, sinus pain, sneezing and sore throat.    Respiratory:  Negative for cough, chest tightness, shortness of breath and wheezing.    Cardiovascular:  Negative for chest pain, palpitations and leg swelling.   Gastrointestinal:  Positive for nausea and vomiting. Negative for abdominal pain, constipation and diarrhea.   Endocrine: Negative for cold intolerance, heat intolerance, polydipsia, polyphagia and polyuria.   Genitourinary:  Negative for dysuria, frequency, hematuria and urgency.   Neurological:  Negative for dizziness, syncope, light-headedness, numbness and headaches.   Hematological:  Negative for adenopathy.   Psychiatric/Behavioral:  Negative for confusion and dysphoric mood. The patient is not nervous/anxious.          Assessment/Plan   Problem List Items Addressed  This Visit       Essential hypertension    Type 2 diabetes mellitus with hyperglycemia, without long-term current use of insulin (Multi) - Primary     - HbA1c 9.3 (7.4) (8.8)  - was on metformin in the past and this caused diarrhea  - was on farxiga but he developed diarreha   - was on glipizide but developed GI upset  - was on januvia but this caused diarrhea   - was on mounjaro but this caused nausea and vomiting   - start semglee 5 units at bedtime. Check fasting blood sugar every morning and if greater than 150 for 3 days, increase semglee by 2 units  - start humalog 5 units with meals please sliding scale   - refer to endocrinology   - will start continuous glucose monitoring             Relevant Medications    insulin glargine-yfgn (Semglee,insulin glarg-yfgn,Pen) 100 unit/mL (3 mL) Pen    insulin lispro (HumaLOG) 100 unit/mL injection    Other Relevant Orders    Referral to Endocrinology

## 2024-05-10 ENCOUNTER — APPOINTMENT (OUTPATIENT)
Dept: CARDIOLOGY | Facility: HOSPITAL | Age: 54
End: 2024-05-10
Payer: COMMERCIAL

## 2024-05-10 ENCOUNTER — TELEMEDICINE (OUTPATIENT)
Dept: PRIMARY CARE | Facility: CLINIC | Age: 54
End: 2024-05-10
Payer: COMMERCIAL

## 2024-05-10 ENCOUNTER — HOSPITAL ENCOUNTER (EMERGENCY)
Facility: HOSPITAL | Age: 54
Discharge: HOME | End: 2024-05-10
Attending: EMERGENCY MEDICINE
Payer: COMMERCIAL

## 2024-05-10 ENCOUNTER — APPOINTMENT (OUTPATIENT)
Dept: RADIOLOGY | Facility: HOSPITAL | Age: 54
End: 2024-05-10
Payer: COMMERCIAL

## 2024-05-10 VITALS
HEIGHT: 75 IN | OXYGEN SATURATION: 100 % | SYSTOLIC BLOOD PRESSURE: 122 MMHG | WEIGHT: 225 LBS | TEMPERATURE: 97.9 F | RESPIRATION RATE: 16 BRPM | DIASTOLIC BLOOD PRESSURE: 99 MMHG | BODY MASS INDEX: 27.98 KG/M2 | HEART RATE: 96 BPM

## 2024-05-10 DIAGNOSIS — Z79.4 TYPE 2 DIABETES MELLITUS WITH HYPERGLYCEMIA, WITH LONG-TERM CURRENT USE OF INSULIN (MULTI): Primary | ICD-10-CM

## 2024-05-10 DIAGNOSIS — R53.1 WEAKNESS: Primary | ICD-10-CM

## 2024-05-10 DIAGNOSIS — R73.9 HYPERGLYCEMIA: ICD-10-CM

## 2024-05-10 DIAGNOSIS — E11.65 TYPE 2 DIABETES MELLITUS WITH HYPERGLYCEMIA, WITH LONG-TERM CURRENT USE OF INSULIN (MULTI): Primary | ICD-10-CM

## 2024-05-10 LAB
ALBUMIN SERPL BCP-MCNC: 4.1 G/DL (ref 3.4–5)
ALP SERPL-CCNC: 72 U/L (ref 33–120)
ALT SERPL W P-5'-P-CCNC: 32 U/L (ref 10–52)
ANION GAP BLDV CALCULATED.4IONS-SCNC: 10 MMOL/L (ref 10–25)
ANION GAP SERPL CALC-SCNC: 10 MMOL/L (ref 10–20)
APPEARANCE UR: CLEAR
AST SERPL W P-5'-P-CCNC: 12 U/L (ref 9–39)
B-OH-BUTYR SERPL-SCNC: 0.05 MMOL/L (ref 0.02–0.27)
BASE EXCESS BLDV CALC-SCNC: 0.6 MMOL/L (ref -2–3)
BASOPHILS # BLD AUTO: 0.08 X10*3/UL (ref 0–0.1)
BASOPHILS NFR BLD AUTO: 0.6 %
BILIRUB DIRECT SERPL-MCNC: 0.2 MG/DL (ref 0–0.3)
BILIRUB SERPL-MCNC: 0.8 MG/DL (ref 0–1.2)
BILIRUB UR STRIP.AUTO-MCNC: NEGATIVE MG/DL
BODY TEMPERATURE: 37 DEGREES CELSIUS
BUN SERPL-MCNC: 23 MG/DL (ref 6–23)
CA-I BLDV-SCNC: 1.27 MMOL/L (ref 1.1–1.33)
CALCIUM SERPL-MCNC: 9.9 MG/DL (ref 8.6–10.3)
CHLORIDE BLDV-SCNC: 104 MMOL/L (ref 98–107)
CHLORIDE SERPL-SCNC: 106 MMOL/L (ref 98–107)
CO2 SERPL-SCNC: 24 MMOL/L (ref 21–32)
COLOR UR: YELLOW
CREAT SERPL-MCNC: 0.91 MG/DL (ref 0.5–1.3)
EGFRCR SERPLBLD CKD-EPI 2021: >90 ML/MIN/1.73M*2
EOSINOPHIL # BLD AUTO: 0.26 X10*3/UL (ref 0–0.7)
EOSINOPHIL NFR BLD AUTO: 1.9 %
ERYTHROCYTE [DISTWIDTH] IN BLOOD BY AUTOMATED COUNT: 12.7 % (ref 11.5–14.5)
EST. AVERAGE GLUCOSE BLD GHB EST-MCNC: 214 MG/DL
GLUCOSE BLD MANUAL STRIP-MCNC: 219 MG/DL (ref 74–99)
GLUCOSE BLD MANUAL STRIP-MCNC: 315 MG/DL (ref 74–99)
GLUCOSE BLDV-MCNC: 350 MG/DL (ref 74–99)
GLUCOSE SERPL-MCNC: 328 MG/DL (ref 74–99)
GLUCOSE UR STRIP.AUTO-MCNC: ABNORMAL MG/DL
HBA1C MFR BLD: 9.1 %
HCO3 BLDV-SCNC: 24.6 MMOL/L (ref 22–26)
HCT VFR BLD AUTO: 46 % (ref 41–52)
HCT VFR BLD EST: 50 % (ref 41–52)
HGB BLD-MCNC: 16.4 G/DL (ref 13.5–17.5)
HGB BLDV-MCNC: 16.5 G/DL (ref 13.5–17.5)
IMM GRANULOCYTES # BLD AUTO: 0.05 X10*3/UL (ref 0–0.7)
IMM GRANULOCYTES NFR BLD AUTO: 0.4 % (ref 0–0.9)
INHALED O2 CONCENTRATION: 21 %
KETONES UR STRIP.AUTO-MCNC: NEGATIVE MG/DL
LACTATE BLDV-SCNC: 2 MMOL/L (ref 0.4–2)
LEUKOCYTE ESTERASE UR QL STRIP.AUTO: NEGATIVE
LIPASE SERPL-CCNC: 41 U/L (ref 9–82)
LYMPHOCYTES # BLD AUTO: 2.53 X10*3/UL (ref 1.2–4.8)
LYMPHOCYTES NFR BLD AUTO: 18.6 %
MAGNESIUM SERPL-MCNC: 2.22 MG/DL (ref 1.6–2.4)
MCH RBC QN AUTO: 30.7 PG (ref 26–34)
MCHC RBC AUTO-ENTMCNC: 35.7 G/DL (ref 32–36)
MCV RBC AUTO: 86 FL (ref 80–100)
MONOCYTES # BLD AUTO: 0.97 X10*3/UL (ref 0.1–1)
MONOCYTES NFR BLD AUTO: 7.1 %
NEUTROPHILS # BLD AUTO: 9.68 X10*3/UL (ref 1.2–7.7)
NEUTROPHILS NFR BLD AUTO: 71.4 %
NITRITE UR QL STRIP.AUTO: NEGATIVE
NRBC BLD-RTO: 0 /100 WBCS (ref 0–0)
OXYHGB MFR BLDV: 32.9 % (ref 45–75)
PCO2 BLDV: 37 MM HG (ref 41–51)
PH BLDV: 7.43 PH (ref 7.33–7.43)
PH UR STRIP.AUTO: 5 [PH]
PLATELET # BLD AUTO: 261 X10*3/UL (ref 150–450)
PO2 BLDV: 22 MM HG (ref 35–45)
POTASSIUM BLDV-SCNC: 4.2 MMOL/L (ref 3.5–5.3)
POTASSIUM SERPL-SCNC: 3.9 MMOL/L (ref 3.5–5.3)
PROT SERPL-MCNC: 6.9 G/DL (ref 6.4–8.2)
PROT UR STRIP.AUTO-MCNC: NEGATIVE MG/DL
RBC # BLD AUTO: 5.34 X10*6/UL (ref 4.5–5.9)
RBC # UR STRIP.AUTO: NEGATIVE /UL
SAO2 % BLDV: 33 % (ref 45–75)
SARS-COV-2 RNA RESP QL NAA+PROBE: NOT DETECTED
SODIUM BLDV-SCNC: 134 MMOL/L (ref 136–145)
SODIUM SERPL-SCNC: 136 MMOL/L (ref 136–145)
SP GR UR STRIP.AUTO: 1.02
UROBILINOGEN UR STRIP.AUTO-MCNC: <2 MG/DL
WBC # BLD AUTO: 13.6 X10*3/UL (ref 4.4–11.3)

## 2024-05-10 PROCEDURE — 96361 HYDRATE IV INFUSION ADD-ON: CPT

## 2024-05-10 PROCEDURE — 99213 OFFICE O/P EST LOW 20 MIN: CPT | Performed by: FAMILY MEDICINE

## 2024-05-10 PROCEDURE — 99283 EMERGENCY DEPT VISIT LOW MDM: CPT | Mod: 25

## 2024-05-10 PROCEDURE — 71046 X-RAY EXAM CHEST 2 VIEWS: CPT | Performed by: RADIOLOGY

## 2024-05-10 PROCEDURE — 36415 COLL VENOUS BLD VENIPUNCTURE: CPT | Performed by: EMERGENCY MEDICINE

## 2024-05-10 PROCEDURE — 82947 ASSAY GLUCOSE BLOOD QUANT: CPT | Mod: 59

## 2024-05-10 PROCEDURE — 99284 EMERGENCY DEPT VISIT MOD MDM: CPT | Mod: 25

## 2024-05-10 PROCEDURE — 4010F ACE/ARB THERAPY RXD/TAKEN: CPT | Performed by: FAMILY MEDICINE

## 2024-05-10 PROCEDURE — 3048F LDL-C <100 MG/DL: CPT | Performed by: FAMILY MEDICINE

## 2024-05-10 PROCEDURE — 84132 ASSAY OF SERUM POTASSIUM: CPT | Performed by: EMERGENCY MEDICINE

## 2024-05-10 PROCEDURE — 93005 ELECTROCARDIOGRAM TRACING: CPT

## 2024-05-10 PROCEDURE — 1036F TOBACCO NON-USER: CPT | Performed by: FAMILY MEDICINE

## 2024-05-10 PROCEDURE — 2500000004 HC RX 250 GENERAL PHARMACY W/ HCPCS (ALT 636 FOR OP/ED): Performed by: EMERGENCY MEDICINE

## 2024-05-10 PROCEDURE — 82010 KETONE BODYS QUAN: CPT | Performed by: EMERGENCY MEDICINE

## 2024-05-10 PROCEDURE — 81003 URINALYSIS AUTO W/O SCOPE: CPT | Performed by: EMERGENCY MEDICINE

## 2024-05-10 PROCEDURE — 96360 HYDRATION IV INFUSION INIT: CPT

## 2024-05-10 PROCEDURE — 87635 SARS-COV-2 COVID-19 AMP PRB: CPT | Performed by: EMERGENCY MEDICINE

## 2024-05-10 PROCEDURE — 71046 X-RAY EXAM CHEST 2 VIEWS: CPT

## 2024-05-10 PROCEDURE — 83036 HEMOGLOBIN GLYCOSYLATED A1C: CPT | Performed by: EMERGENCY MEDICINE

## 2024-05-10 PROCEDURE — 3008F BODY MASS INDEX DOCD: CPT | Performed by: FAMILY MEDICINE

## 2024-05-10 PROCEDURE — 83735 ASSAY OF MAGNESIUM: CPT | Performed by: EMERGENCY MEDICINE

## 2024-05-10 PROCEDURE — 85025 COMPLETE CBC W/AUTO DIFF WBC: CPT | Performed by: EMERGENCY MEDICINE

## 2024-05-10 PROCEDURE — 83690 ASSAY OF LIPASE: CPT | Performed by: EMERGENCY MEDICINE

## 2024-05-10 PROCEDURE — 82248 BILIRUBIN DIRECT: CPT | Performed by: EMERGENCY MEDICINE

## 2024-05-10 RX ORDER — INSULIN LISPRO 100 [IU]/ML
30 INJECTION, SOLUTION INTRAVENOUS; SUBCUTANEOUS
Qty: 135 ML | Refills: 1 | Status: SHIPPED | OUTPATIENT
Start: 2024-05-10 | End: 2024-05-13

## 2024-05-10 RX ORDER — INSULIN GLARGINE-YFGN 100 [IU]/ML
20 INJECTION, SOLUTION SUBCUTANEOUS EVERY 24 HOURS
Qty: 20 ML | Refills: 1 | Status: SHIPPED | OUTPATIENT
Start: 2024-05-10 | End: 2024-05-13

## 2024-05-10 RX ORDER — PEN NEEDLE, DIABETIC 30 GX3/16"
NEEDLE, DISPOSABLE MISCELLANEOUS
Qty: 400 EACH | Refills: 1 | Status: SHIPPED | OUTPATIENT
Start: 2024-05-10

## 2024-05-10 RX ADMIN — SODIUM CHLORIDE 1000 ML: 9 INJECTION, SOLUTION INTRAVENOUS at 12:18

## 2024-05-10 RX ADMIN — SODIUM CHLORIDE 1000 ML: 9 INJECTION, SOLUTION INTRAVENOUS at 13:21

## 2024-05-10 ASSESSMENT — ENCOUNTER SYMPTOMS
COUGH: 0
NAUSEA: 0
POLYPHAGIA: 0
CONFUSION: 0
SHORTNESS OF BREATH: 0
SORE THROAT: 0
DIZZINESS: 0
SINUS PAIN: 0
SINUS PRESSURE: 0
FATIGUE: 1
CHILLS: 0
NUMBNESS: 0
ABDOMINAL PAIN: 0
LIGHT-HEADEDNESS: 0
DYSURIA: 0
DIAPHORESIS: 0
WHEEZING: 0
POLYDIPSIA: 0
DIARRHEA: 0
FEVER: 0
ADENOPATHY: 0
HEMATURIA: 0
VOMITING: 0
FREQUENCY: 0
CONSTIPATION: 0
DYSPHORIC MOOD: 0
HEADACHES: 0
NERVOUS/ANXIOUS: 0
UNEXPECTED WEIGHT CHANGE: 0
CHEST TIGHTNESS: 0
PALPITATIONS: 0

## 2024-05-10 ASSESSMENT — COLUMBIA-SUICIDE SEVERITY RATING SCALE - C-SSRS
2. HAVE YOU ACTUALLY HAD ANY THOUGHTS OF KILLING YOURSELF?: NO
6. HAVE YOU EVER DONE ANYTHING, STARTED TO DO ANYTHING, OR PREPARED TO DO ANYTHING TO END YOUR LIFE?: NO
1. IN THE PAST MONTH, HAVE YOU WISHED YOU WERE DEAD OR WISHED YOU COULD GO TO SLEEP AND NOT WAKE UP?: NO

## 2024-05-10 ASSESSMENT — PAIN DESCRIPTION - LOCATION: LOCATION: CHEST

## 2024-05-10 ASSESSMENT — PAIN SCALES - GENERAL: PAINLEVEL_OUTOF10: 0 - NO PAIN

## 2024-05-10 ASSESSMENT — PAIN - FUNCTIONAL ASSESSMENT: PAIN_FUNCTIONAL_ASSESSMENT: 0-10

## 2024-05-10 NOTE — ED PROVIDER NOTES
HPI   Chief Complaint   Patient presents with    Hyperglycemia     Pt states his sugar 339. Pt has been struggling to keep his sugar under control. Pt woke up at 221, ate a peanut butter sandwich, took a hundred units yesterday and thirty units today and is now 339. Pt s weak and fatigued.  Pt has dry mouth. Pt is always thirsty.        HPI:  53-year-old male presents to the emergency department brought in by his significant other with concern for generalized weakness for the last 2 weeks.  He has a newer diagnosis of diabetes as of October and is on Lantus at night and Humalog sliding scale with meals.  He states that despite taking his insulin he has blood sugars that typically get a typically 10 to run high like in the 200s to 300s and he cannot seem to control his last hemoglobin A1c was 9 as performed by his primary care provider.  He denies any pain no fevers or chills no chest discomfort no shortness of breath no cough no diarrhea no vomiting able to eat and drink however just has generalized weakness and no energy.  He came in today for evaluation to make sure there is nothing else going on    Limitations to history: None  Independent Historians: Family at bedside  External Records Reviewed: EMR records, discharge summary, primary providers labs and notes  ------------------------------------------------------------------------------------------------------------------------------------------  ROS: a ten point review of systems was performed and negative except as per HPI.  ------------------------------------------------------------------------------------------------------------------------------------------  PMH / PSH: as per HPI, reviewed in EMR and discussed with the patient []  MEDS:  reviewed in EMR and discussed with the patient []  ALLERGIES: reviewed in EMR[]  SocH:  as per HPI, otherwise reviewed in EMR []  FH:  as per HPI, otherwise reviewed in EMR  []  ------------------------------------------------------------------------------------------------------------------------------------------  Physical Exam:  VS: As documented in the triage note and EMR flowsheet from this visit was reviewed  General: Well appearing. No acute distress.   Eyes:  Extraocular movements grossly intact. No scleral icterus.   HEENT: Atraumatic. Normocephalic.    Neck: Supple. No gross masses  CV: RRR, audible S1/S2, 2+ symmetric peripheral pulses  Resp: Clear to auscultation bilaterally. No respiratory distress.  Non-labored respirations  GI: Soft, non-tender, non-distended, no rebound or gaurding  MSK: Symmetric muscle bulk. No gross step offs or deformities.  Skin: Warm, dry, no obvious rash.  Neuro: Speech fluent. Awake. Alert. Appropriate conversation.  Psych: Appropriate mood and affect for situation  ------------------------------------------------------------------------------------------------------------------------------------------  Hospital Course / Medical Decision Making:  Independent Interpretations:  EKG -   Twelve-lead EKG performed and independently interpreted by me as showing normal sinus rhythm at a ventricular rate of 91 with axis that is upright and normal ST segments are flat not elevated QRS duration of 87 no signs of acute ischemia    53-year-old male with newer diagnosis of diabetes states that his blood sugars have been running high at his primary care doctor his last hemoglobin A1c was 9.4.  He states that he is pretty careful with his diet but despite taking insulin blood sugars seem to run in the 300s he denies being on any other routine medicines for the last 2 weeks he has been having generalized fatigue weakness states is even just difficult for him to take a shower because he has no energy.  He denies any pain no headache no chest pain no shortness of breath no nausea vomiting or diarrhea.    He was hooked up to the cardiac monitor peripheral IV access  was obtained labs were drawn.  Blood work was within normal limits chest x-ray was clear without any acute cardiopulmonary process no signs of a focal consolidation.  No obvious signs of infection hemoglobin A1c was elevated today at 9.1 but his beta hydroxybutyrate was normal lactate normal white blood cell count normal.  Patient was given 2 L of IV fluids he was given reassurance regarding his unremarkable ED workup here today he has an upcoming appointment scheduled with his primary provider in a few days which he was encouraged to keep as well as to get into see the endocrine specialist to get better control over his diabetes he was discharged home from the ED in stable condition and is to see his doctor in 2 days        Patient care discussed with:   Social Determinants affecting care:     Final diagnosis and disposition: Generalized weakness, hyperglycemia            Kerline Lam MD                                      [place] Coma Scale Score: 15                     Patient History   Past Medical History:   Diagnosis Date    Gastroesophageal reflux disease without esophagitis 10/05/2023    Hemorrhoids 11/03/2023    Leg cramps 10/05/2023    Oropharyngeal dysphagia 10/05/2023    Other conditions influencing health status 12/11/2018    History of cough    Otitis media, unspecified, right ear 12/11/2018    Right otitis media    Personal history of other malignant neoplasm of skin 11/30/2021    History of other malignant neoplasm of skin    Personal history of other specified conditions 06/20/2018    History of nausea and vomiting     Past Surgical History:   Procedure Laterality Date    OTHER SURGICAL HISTORY  09/10/2020    Tonsillectomy    OTHER SURGICAL HISTORY  11/30/2021    Vasectomy    OTHER SURGICAL HISTORY  11/30/2021    Knee surgery    OTHER SURGICAL HISTORY  11/30/2021    Shoulder surgery    OTHER SURGICAL HISTORY  11/30/2021    Testicular surgery     Family History   Problem Relation Name Age of  Onset    Diabetes Mother      Ovarian cancer Mother      Liver cancer Father      Lung cancer Father      Colon cancer Father      Hypertension Father      Stroke Maternal Grandmother      Diabetes Maternal Grandmother      Liver cancer Maternal Grandfather      Lung cancer Paternal Grandfather       Social History     Tobacco Use    Smoking status: Never    Smokeless tobacco: Never   Vaping Use    Vaping status: Never Used   Substance Use Topics    Alcohol use: Not Currently    Drug use: Never       Physical Exam   ED Triage Vitals [05/10/24 1147]   Temperature Heart Rate Respirations BP   36.6 °C (97.9 °F) (!) 112 18 123/69      Pulse Ox Temp src Heart Rate Source Patient Position   100 % -- -- Sitting      BP Location FiO2 (%)     Left arm --       Physical Exam    ED Course & MDM   Diagnoses as of 05/10/24 1429   Weakness   Hyperglycemia       Medical Decision Making      Procedure  Procedures     Kerline Lam MD  05/10/24 1430

## 2024-05-10 NOTE — PROGRESS NOTES
Subjective   Patient ID: Nate Alvarez is a 53 y.o. male who presents for Diabetes.    Virtual or Telephone Consent    An interactive audio and video telecommunication system which permits real time communications between the patient (at the originating site) and provider (at the distant site) was utilized to provide this telehealth service.   Verbal consent was requested and obtained from Nate Alvarez on this date, 05/10/24 for a telehealth visit.      HPI  Follow up diabetes. Has had issues (usually nausea or diarrhea) with the following: mounjaro, metformin, farxiga, glipizide, januvia. His most recent HbA1c was 9.3 on 4/17/24. Started insulin at last visit (5/3/24). He states his blood sugars are still very high. He is feeling very weak. He takes 25-30 units of insulin with every meal. Taking 12 units of lantus at night. Cannot get into see endocrinology for 6 months. He states he cannot work because of how weak he is. He cannot mow his lawn. He is having a hard time taking a shower.     Review of Systems   Constitutional:  Positive for fatigue. Negative for chills, diaphoresis, fever and unexpected weight change.   HENT:  Negative for congestion, sinus pressure, sinus pain, sneezing and sore throat.    Respiratory:  Negative for cough, chest tightness, shortness of breath and wheezing.    Cardiovascular:  Negative for chest pain, palpitations and leg swelling.   Gastrointestinal:  Negative for abdominal pain, constipation, diarrhea, nausea and vomiting.   Endocrine: Negative for cold intolerance, heat intolerance, polydipsia, polyphagia and polyuria.   Genitourinary:  Negative for dysuria, frequency, hematuria and urgency.   Neurological:  Negative for dizziness, syncope, light-headedness, numbness and headaches.   Hematological:  Negative for adenopathy.   Psychiatric/Behavioral:  Negative for confusion and dysphoric mood. The patient is not nervous/anxious.          Assessment/Plan   Problem List Items  "Addressed This Visit       Type 2 diabetes mellitus with hyperglycemia, with long-term current use of insulin (Multi) - Primary     - HbA1c 9.3 (7.4) (8.8)  - was on metformin in the past and this caused diarrhea  - was on farxiga but he developed diarreha   - was on glipizide but developed GI upset  - was on januvia but this caused diarrhea   - was on mounjaro but this caused nausea and vomiting   - started semglee and humalog at last visit  - states blood sugars still very high. He is feeling very weak  - INCREASE semglee to 20 units at bedtime. Check fasting blood sugar every morning and if greater than 150 for 3 days, increase semglee by 2 units  - INCREASE humalog to 30 units with meals please sliding scale   - referred to endocrinology but cannot get in for 6 months. Told him to call his insurance to see who else is covered so he can get in sooner  - I would like to see him in the office in 1 week to evaluate his weakness              Relevant Medications    pen needle, diabetic (BD Ultra-Fine Short Pen Needle) 31 gauge x 5/16\" needle    insulin glargine-yfgn (Semglee,insulin glarg-yfgn,Pen) 100 unit/mL (3 mL) Pen    insulin lispro (HumaLOG) 100 unit/mL injection         "

## 2024-05-10 NOTE — PATIENT INSTRUCTIONS
Nate Alvarez ,    Thank you for coming in today. We at [facility] appreciate your trust in our care. If you have any questions or concerns about the care you received today, please do not hesitate to contact us at [phone].    The following instructions were discussed today:    - INCREASE semglee to 20 units at bedtime. If fasting blood sugar greater than 150 for 3 days in a row, increase lantus by 2 units.   - INCREASE humalog to 30 units three times daily PLUS sliding scale   - Follow up 7/11/2024 as scheduled.

## 2024-05-10 NOTE — ASSESSMENT & PLAN NOTE
- HbA1c 9.3 (7.4) (8.8)  - was on metformin in the past and this caused diarrhea  - was on farxiga but he developed diarreha   - was on glipizide but developed GI upset  - was on januvia but this caused diarrhea   - was on mounjaro but this caused nausea and vomiting   - started semglee and humalog at last visit  - states blood sugars still very high. He is feeling very weak  - INCREASE semglee to 20 units at bedtime. Check fasting blood sugar every morning and if greater than 150 for 3 days, increase semglee by 2 units  - INCREASE humalog to 30 units with meals please sliding scale   - referred to endocrinology but cannot get in for 6 months. Told him to call his insurance to see who else is covered so he can get in sooner  - I would like to see him in the office in 1 week to evaluate his weakness

## 2024-05-10 NOTE — LETTER
May 10, 2024     Patient: Nate Alvarez   YOB: 1970   Date of Visit: 5/10/2024       To Whom It May Concern:    Nate Alvarez was seen in my clinic on 5/10/2024 at 9:00 am. Please excuse Nate for his absence from work as he has been ill and unable to work since 4/25/24. He may return to work on 5/20/24  If you have any questions or concerns, please don't hesitate to call.         Sincerely,         Hazel Medeiros MD

## 2024-05-13 ENCOUNTER — OFFICE VISIT (OUTPATIENT)
Dept: PRIMARY CARE | Facility: CLINIC | Age: 54
End: 2024-05-13
Payer: COMMERCIAL

## 2024-05-13 VITALS
HEIGHT: 75 IN | DIASTOLIC BLOOD PRESSURE: 79 MMHG | OXYGEN SATURATION: 98 % | SYSTOLIC BLOOD PRESSURE: 116 MMHG | WEIGHT: 230 LBS | BODY MASS INDEX: 28.6 KG/M2 | RESPIRATION RATE: 16 BRPM | HEART RATE: 54 BPM

## 2024-05-13 DIAGNOSIS — K92.1 MELENA: ICD-10-CM

## 2024-05-13 DIAGNOSIS — E11.65 TYPE 2 DIABETES MELLITUS WITH HYPERGLYCEMIA, WITH LONG-TERM CURRENT USE OF INSULIN (MULTI): ICD-10-CM

## 2024-05-13 DIAGNOSIS — Z79.4 TYPE 2 DIABETES MELLITUS WITH HYPERGLYCEMIA, WITH LONG-TERM CURRENT USE OF INSULIN (MULTI): ICD-10-CM

## 2024-05-13 DIAGNOSIS — R63.4 WEIGHT LOSS: Primary | ICD-10-CM

## 2024-05-13 DIAGNOSIS — D72.829 LEUKOCYTOSIS, UNSPECIFIED TYPE: ICD-10-CM

## 2024-05-13 PROCEDURE — 3074F SYST BP LT 130 MM HG: CPT | Performed by: FAMILY MEDICINE

## 2024-05-13 PROCEDURE — 3046F HEMOGLOBIN A1C LEVEL >9.0%: CPT | Performed by: FAMILY MEDICINE

## 2024-05-13 PROCEDURE — 3078F DIAST BP <80 MM HG: CPT | Performed by: FAMILY MEDICINE

## 2024-05-13 PROCEDURE — 4010F ACE/ARB THERAPY RXD/TAKEN: CPT | Performed by: FAMILY MEDICINE

## 2024-05-13 PROCEDURE — 3048F LDL-C <100 MG/DL: CPT | Performed by: FAMILY MEDICINE

## 2024-05-13 PROCEDURE — 1036F TOBACCO NON-USER: CPT | Performed by: FAMILY MEDICINE

## 2024-05-13 PROCEDURE — 3008F BODY MASS INDEX DOCD: CPT | Performed by: FAMILY MEDICINE

## 2024-05-13 PROCEDURE — 99213 OFFICE O/P EST LOW 20 MIN: CPT | Performed by: FAMILY MEDICINE

## 2024-05-13 RX ORDER — DAPAGLIFLOZIN 10 MG/1
10 TABLET, FILM COATED ORAL DAILY
Qty: 30 TABLET | Refills: 5 | Status: SHIPPED | OUTPATIENT
Start: 2024-05-13 | End: 2024-05-24 | Stop reason: WASHOUT

## 2024-05-13 RX ORDER — DAPAGLIFLOZIN 5 MG/1
5 TABLET, FILM COATED ORAL DAILY
Qty: 14 TABLET | Refills: 0 | Status: SHIPPED | OUTPATIENT
Start: 2024-05-13 | End: 2024-05-24 | Stop reason: WASHOUT

## 2024-05-13 ASSESSMENT — ENCOUNTER SYMPTOMS
DIARRHEA: 0
LIGHT-HEADEDNESS: 0
NUMBNESS: 0
HEADACHES: 0
POLYPHAGIA: 0
UNEXPECTED WEIGHT CHANGE: 0
COUGH: 0
DYSPHORIC MOOD: 0
SHORTNESS OF BREATH: 0
FREQUENCY: 0
VOMITING: 0
FEVER: 0
CONFUSION: 0
SINUS PAIN: 0
CONSTIPATION: 0
PALPITATIONS: 0
DIAPHORESIS: 0
CHILLS: 0
POLYDIPSIA: 0
NAUSEA: 0
HEMATURIA: 0
SINUS PRESSURE: 0
DYSURIA: 0
ABDOMINAL PAIN: 0
CHEST TIGHTNESS: 0
DIZZINESS: 0
BLOOD IN STOOL: 1
FATIGUE: 1
NERVOUS/ANXIOUS: 0
WHEEZING: 0
SORE THROAT: 0
ADENOPATHY: 0

## 2024-05-13 NOTE — PROGRESS NOTES
Subjective   Patient ID: Nate Alvarez is a 53 y.o. male who presents for ER follow up for diabetes (He feels that farxiga worked the best with controlling his numbers and will deal with the diarrhea.  Insulin wasn't taking his numbers down./Blood in stool, dark red blood, started yesterday).    HPI   Acute visit for his blood sugars. States no matter how much insulin he takes, he sugars will not improve. He takes 20 units of lantus before bed and is taking 40 units of lantus with meals. Blood sugars still running upper 200s to lower 300s. Went to ED on 5/10/24 because he could not get his sugars down. They did cardiac monitoring and EKG which were normal. chest x-ray was normal. COVID-19 negative. UA positive for glucose but otherwise negative. Beta hydroxybutyrate was normal. pH normal. white blood cell count high at 13.6 with a left shift. They did give him 2 liters of fluids in the ED.     Has been losing weight over the past year. Weight in Jan. 2023 was 299. Weight today is 230. He has very little appetite. Started having dark blood in his bowel movements yesterday. No pain with with bowel movements. No diarrhea. Last colonoscopy was Oct. 2020    States when he was on farxiga, his blood sugars were much better. This was without insulin. He would like to try this again. He monitors his blood sugars at home. No chance that the machine is bad as his wife monitors hers with the same machine and hers are in the normal range.     Review of Systems   Constitutional:  Positive for fatigue. Negative for chills, diaphoresis, fever and unexpected weight change.   HENT:  Negative for congestion, sinus pressure, sinus pain, sneezing and sore throat.    Respiratory:  Negative for cough, chest tightness, shortness of breath and wheezing.    Cardiovascular:  Negative for chest pain, palpitations and leg swelling.   Gastrointestinal:  Positive for blood in stool. Negative for abdominal pain, constipation, diarrhea, nausea  "and vomiting.   Endocrine: Negative for cold intolerance, heat intolerance, polydipsia, polyphagia and polyuria.   Genitourinary:  Negative for dysuria, frequency, hematuria and urgency.   Neurological:  Negative for dizziness, syncope, light-headedness, numbness and headaches.   Hematological:  Negative for adenopathy.   Psychiatric/Behavioral:  Negative for confusion and dysphoric mood. The patient is not nervous/anxious.        Objective   /79 (BP Location: Right arm, Patient Position: Sitting, BP Cuff Size: Large adult long)   Pulse 54   Resp 16   Ht 1.905 m (6' 3\")   Wt 104 kg (230 lb)   SpO2 98%   BMI 28.75 kg/m²     Physical Exam  Vitals and nursing note reviewed.   Constitutional:       General: He is not in acute distress.     Appearance: Normal appearance.   HENT:      Head: Normocephalic and atraumatic.      Nose: Nose normal.   Eyes:      Extraocular Movements: Extraocular movements intact.      Conjunctiva/sclera: Conjunctivae normal.      Pupils: Pupils are equal, round, and reactive to light.   Cardiovascular:      Rate and Rhythm: Normal rate and regular rhythm.      Heart sounds: No murmur heard.     No friction rub. No gallop.   Pulmonary:      Effort: Pulmonary effort is normal.      Breath sounds: Normal breath sounds. No wheezing, rhonchi or rales.   Abdominal:      General: Bowel sounds are normal. There is no distension.      Palpations: Abdomen is soft.      Tenderness: There is no abdominal tenderness.   Musculoskeletal:         General: Normal range of motion.      Cervical back: Normal range of motion and neck supple.   Skin:     General: Skin is warm and dry.   Neurological:      General: No focal deficit present.      Mental Status: He is alert and oriented to person, place, and time.      Deep Tendon Reflexes: Reflexes normal.   Psychiatric:         Mood and Affect: Mood normal.         Behavior: Behavior normal.         Thought Content: Thought content normal.         " Judgment: Judgment normal.         Assessment/Plan   Problem List Items Addressed This Visit             ICD-10-CM    Leukocytosis D72.829     - recheck CBC         Relevant Orders    CBC and Auto Differential    Melena K92.1     - refer to GI. Has seen Dr. Monahan in the past          Relevant Orders    Referral to Gastroenterology    Type 2 diabetes mellitus with hyperglycemia, with long-term current use of insulin (Multi) E11.65, Z79.4     - HbA1c 9.3 (7.4) (8.8)  - was on metformin in the past and this caused diarrhea  - was on farxiga but he developed diarreha   - was on glipizide but developed GI upset  - was on januvia but this caused diarrhea   - was on mounjaro but this caused nausea and vomiting   - started semglee and humalog at last visit  - current regimen: semglee 20 units at bedtime and humalog 40 units three times daily. Even with this, blood sugars upper 200s to lower 300s  - patient states blood sugars were controlled on farxiga even before he starting insulin. He did have diarrhea, but he is willing to accept that risk   - start  farxiga 5 mg daily for 2 weeks and then increase to 10 mg daily              Relevant Medications    Farxiga 5 mg    Farxiga 10 mg    Weight loss - Primary R63.4     - has lost about 60 pounds in the last year  - he has had uncontrolled diabetes in that year. He has also had diarrhea from diabetic medications  - had colonoscopy in 2020.  - now also having melena. Refer to GI         Relevant Orders    Referral to Gastroenterology

## 2024-05-13 NOTE — ASSESSMENT & PLAN NOTE
- has lost about 60 pounds in the last year  - he has had uncontrolled diabetes in that year. He has also had diarrhea from diabetic medications  - had colonoscopy in 2020.  - now also having melena. Refer to GI

## 2024-05-13 NOTE — PATIENT INSTRUCTIONS
Nate Alvarez ,    Thank you for coming in today. We at [facility] appreciate your trust in our care. If you have any questions or concerns about the care you received today, please do not hesitate to contact us at [phone].    The following instructions were discussed today:    - refer to gastroenterology   - let me know the other endocrinologists that take your insurance so we can try to get you in ASAP  - STOP insulin   - START farxiga 5 mg daily for 2 weeks and then increase to 10 mg daily   - Follow up in 1 week.

## 2024-05-13 NOTE — ASSESSMENT & PLAN NOTE
- HbA1c 9.3 (7.4) (8.8)  - was on metformin in the past and this caused diarrhea  - was on farxiga but he developed diarreha   - was on glipizide but developed GI upset  - was on januvia but this caused diarrhea   - was on mounjaro but this caused nausea and vomiting   - started semglee and humalog at last visit  - current regimen: semglee 20 units at bedtime and humalog 40 units three times daily. Even with this, blood sugars upper 200s to lower 300s  - patient states blood sugars were controlled on farxiga even before he starting insulin. He did have diarrhea, but he is willing to accept that risk   - start  farxiga 5 mg daily for 2 weeks and then increase to 10 mg daily

## 2024-05-15 ENCOUNTER — APPOINTMENT (OUTPATIENT)
Dept: RADIOLOGY | Facility: HOSPITAL | Age: 54
DRG: 378 | End: 2024-05-15
Payer: COMMERCIAL

## 2024-05-15 ENCOUNTER — HOSPITAL ENCOUNTER (INPATIENT)
Facility: HOSPITAL | Age: 54
LOS: 3 days | Discharge: HOME | DRG: 378 | End: 2024-05-18
Attending: EMERGENCY MEDICINE | Admitting: INTERNAL MEDICINE
Payer: COMMERCIAL

## 2024-05-15 ENCOUNTER — TELEPHONE (OUTPATIENT)
Dept: PRIMARY CARE | Facility: CLINIC | Age: 54
End: 2024-05-15
Payer: COMMERCIAL

## 2024-05-15 ENCOUNTER — APPOINTMENT (OUTPATIENT)
Dept: CARDIOLOGY | Facility: HOSPITAL | Age: 54
DRG: 378 | End: 2024-05-15
Payer: COMMERCIAL

## 2024-05-15 DIAGNOSIS — K62.5 RECTAL BLEED: ICD-10-CM

## 2024-05-15 DIAGNOSIS — K92.1 MELENA: ICD-10-CM

## 2024-05-15 DIAGNOSIS — K86.89 PANCREATIC DUCT DILATED (HHS-HCC): Primary | ICD-10-CM

## 2024-05-15 DIAGNOSIS — K86.89 PANCREATIC MASS (HHS-HCC): ICD-10-CM

## 2024-05-15 DIAGNOSIS — R63.4 WEIGHT LOSS: ICD-10-CM

## 2024-05-15 LAB
ABO GROUP (TYPE) IN BLOOD: NORMAL
ALBUMIN SERPL BCP-MCNC: 3.9 G/DL (ref 3.4–5)
ALP SERPL-CCNC: 79 U/L (ref 33–120)
ALT SERPL W P-5'-P-CCNC: 27 U/L (ref 10–52)
ANION GAP BLDV CALCULATED.4IONS-SCNC: 11 MMOL/L (ref 10–25)
ANION GAP SERPL CALC-SCNC: 13 MMOL/L (ref 10–20)
ANTIBODY SCREEN: NORMAL
AST SERPL W P-5'-P-CCNC: 11 U/L (ref 9–39)
BASE EXCESS BLDV CALC-SCNC: -0.8 MMOL/L (ref -2–3)
BASOPHILS # BLD AUTO: 0.07 X10*3/UL (ref 0–0.1)
BASOPHILS NFR BLD AUTO: 0.6 %
BILIRUB SERPL-MCNC: 0.6 MG/DL (ref 0–1.2)
BNP SERPL-MCNC: 22 PG/ML (ref 0–99)
BODY TEMPERATURE: 37 DEGREES CELSIUS
BUN SERPL-MCNC: 22 MG/DL (ref 6–23)
CA-I BLDV-SCNC: 1.31 MMOL/L (ref 1.1–1.33)
CALCIUM SERPL-MCNC: 9.4 MG/DL (ref 8.6–10.3)
CARDIAC TROPONIN I PNL SERPL HS: <3 NG/L (ref 0–20)
CARDIAC TROPONIN I PNL SERPL HS: <3 NG/L (ref 0–20)
CHLORIDE BLDV-SCNC: 105 MMOL/L (ref 98–107)
CHLORIDE SERPL-SCNC: 107 MMOL/L (ref 98–107)
CO2 SERPL-SCNC: 23 MMOL/L (ref 21–32)
CREAT SERPL-MCNC: 0.8 MG/DL (ref 0.5–1.3)
EGFRCR SERPLBLD CKD-EPI 2021: >90 ML/MIN/1.73M*2
EOSINOPHIL # BLD AUTO: 0.32 X10*3/UL (ref 0–0.7)
EOSINOPHIL NFR BLD AUTO: 2.9 %
ERYTHROCYTE [DISTWIDTH] IN BLOOD BY AUTOMATED COUNT: 12.6 % (ref 11.5–14.5)
GLUCOSE BLDV-MCNC: 260 MG/DL (ref 74–99)
GLUCOSE SERPL-MCNC: 253 MG/DL (ref 74–99)
HCO3 BLDV-SCNC: 23.5 MMOL/L (ref 22–26)
HCT VFR BLD AUTO: 43.5 % (ref 41–52)
HCT VFR BLD EST: 46 % (ref 41–52)
HGB BLD-MCNC: 15 G/DL (ref 13.5–17.5)
HGB BLDV-MCNC: 15.4 G/DL (ref 13.5–17.5)
IMM GRANULOCYTES # BLD AUTO: 0.03 X10*3/UL (ref 0–0.7)
IMM GRANULOCYTES NFR BLD AUTO: 0.3 % (ref 0–0.9)
INHALED O2 CONCENTRATION: 21 %
LACTATE BLDV-SCNC: 2 MMOL/L (ref 0.4–2)
LACTATE BLDV-SCNC: 2.2 MMOL/L (ref 0.4–2)
LIPASE SERPL-CCNC: 50 U/L (ref 9–82)
LYMPHOCYTES # BLD AUTO: 2.67 X10*3/UL (ref 1.2–4.8)
LYMPHOCYTES NFR BLD AUTO: 23.9 %
MCH RBC QN AUTO: 30.7 PG (ref 26–34)
MCHC RBC AUTO-ENTMCNC: 34.5 G/DL (ref 32–36)
MCV RBC AUTO: 89 FL (ref 80–100)
MONOCYTES # BLD AUTO: 0.73 X10*3/UL (ref 0.1–1)
MONOCYTES NFR BLD AUTO: 6.5 %
NEUTROPHILS # BLD AUTO: 7.37 X10*3/UL (ref 1.2–7.7)
NEUTROPHILS NFR BLD AUTO: 65.8 %
NRBC BLD-RTO: 0 /100 WBCS (ref 0–0)
OXYHGB MFR BLDV: 43.4 % (ref 45–75)
PCO2 BLDV: 37 MM HG (ref 41–51)
PH BLDV: 7.41 PH (ref 7.33–7.43)
PLATELET # BLD AUTO: 269 X10*3/UL (ref 150–450)
PO2 BLDV: 33 MM HG (ref 35–45)
POTASSIUM BLDV-SCNC: 3.9 MMOL/L (ref 3.5–5.3)
POTASSIUM SERPL-SCNC: 4 MMOL/L (ref 3.5–5.3)
PROT SERPL-MCNC: 6 G/DL (ref 6.4–8.2)
RBC # BLD AUTO: 4.89 X10*6/UL (ref 4.5–5.9)
RH FACTOR (ANTIGEN D): NORMAL
SAO2 % BLDV: 44 % (ref 45–75)
SODIUM BLDV-SCNC: 136 MMOL/L (ref 136–145)
SODIUM SERPL-SCNC: 139 MMOL/L (ref 136–145)
WBC # BLD AUTO: 11.2 X10*3/UL (ref 4.4–11.3)

## 2024-05-15 PROCEDURE — 96374 THER/PROPH/DIAG INJ IV PUSH: CPT

## 2024-05-15 PROCEDURE — 86901 BLOOD TYPING SEROLOGIC RH(D): CPT | Performed by: EMERGENCY MEDICINE

## 2024-05-15 PROCEDURE — 93005 ELECTROCARDIOGRAM TRACING: CPT

## 2024-05-15 PROCEDURE — 99285 EMERGENCY DEPT VISIT HI MDM: CPT | Mod: 25

## 2024-05-15 PROCEDURE — 83690 ASSAY OF LIPASE: CPT | Performed by: EMERGENCY MEDICINE

## 2024-05-15 PROCEDURE — 2500000004 HC RX 250 GENERAL PHARMACY W/ HCPCS (ALT 636 FOR OP/ED): Performed by: EMERGENCY MEDICINE

## 2024-05-15 PROCEDURE — 1210000001 HC SEMI-PRIVATE ROOM DAILY

## 2024-05-15 PROCEDURE — 74177 CT ABD & PELVIS W/CONTRAST: CPT | Performed by: RADIOLOGY

## 2024-05-15 PROCEDURE — 2550000001 HC RX 255 CONTRASTS: Performed by: EMERGENCY MEDICINE

## 2024-05-15 PROCEDURE — 36415 COLL VENOUS BLD VENIPUNCTURE: CPT | Performed by: EMERGENCY MEDICINE

## 2024-05-15 PROCEDURE — 84132 ASSAY OF SERUM POTASSIUM: CPT | Performed by: EMERGENCY MEDICINE

## 2024-05-15 PROCEDURE — 74177 CT ABD & PELVIS W/CONTRAST: CPT

## 2024-05-15 PROCEDURE — 84484 ASSAY OF TROPONIN QUANT: CPT | Performed by: EMERGENCY MEDICINE

## 2024-05-15 PROCEDURE — 85025 COMPLETE CBC W/AUTO DIFF WBC: CPT | Performed by: EMERGENCY MEDICINE

## 2024-05-15 PROCEDURE — 83605 ASSAY OF LACTIC ACID: CPT | Performed by: EMERGENCY MEDICINE

## 2024-05-15 PROCEDURE — 83880 ASSAY OF NATRIURETIC PEPTIDE: CPT | Performed by: EMERGENCY MEDICINE

## 2024-05-15 PROCEDURE — 96361 HYDRATE IV INFUSION ADD-ON: CPT

## 2024-05-15 RX ORDER — CHOLECALCIFEROL (VITAMIN D3) 25 MCG
5000 TABLET ORAL DAILY
Status: DISCONTINUED | OUTPATIENT
Start: 2024-05-16 | End: 2024-05-18 | Stop reason: HOSPADM

## 2024-05-15 RX ORDER — PANTOPRAZOLE SODIUM 40 MG/10ML
40 INJECTION, POWDER, LYOPHILIZED, FOR SOLUTION INTRAVENOUS
Status: DISCONTINUED | OUTPATIENT
Start: 2024-05-16 | End: 2024-05-16

## 2024-05-15 RX ORDER — FENTANYL CITRATE 50 UG/ML
50 INJECTION, SOLUTION INTRAMUSCULAR; INTRAVENOUS ONCE
Status: DISCONTINUED | OUTPATIENT
Start: 2024-05-15 | End: 2024-05-16

## 2024-05-15 RX ORDER — DEXTROSE 50 % IN WATER (D50W) INTRAVENOUS SYRINGE
25
Status: DISCONTINUED | OUTPATIENT
Start: 2024-05-15 | End: 2024-05-18 | Stop reason: HOSPADM

## 2024-05-15 RX ORDER — PANTOPRAZOLE SODIUM 40 MG/1
40 TABLET, DELAYED RELEASE ORAL
Status: DISCONTINUED | OUTPATIENT
Start: 2024-05-16 | End: 2024-05-16

## 2024-05-15 RX ORDER — ACETAMINOPHEN 325 MG/1
650 TABLET ORAL EVERY 4 HOURS PRN
Status: DISCONTINUED | OUTPATIENT
Start: 2024-05-15 | End: 2024-05-18 | Stop reason: HOSPADM

## 2024-05-15 RX ORDER — ACETAMINOPHEN 160 MG/5ML
650 SOLUTION ORAL EVERY 4 HOURS PRN
Status: DISCONTINUED | OUTPATIENT
Start: 2024-05-15 | End: 2024-05-18 | Stop reason: HOSPADM

## 2024-05-15 RX ORDER — ONDANSETRON HYDROCHLORIDE 2 MG/ML
4 INJECTION, SOLUTION INTRAVENOUS EVERY 8 HOURS PRN
Status: DISCONTINUED | OUTPATIENT
Start: 2024-05-15 | End: 2024-05-18 | Stop reason: HOSPADM

## 2024-05-15 RX ORDER — DAPAGLIFLOZIN 10 MG/1
10 TABLET, FILM COATED ORAL DAILY
Status: DISCONTINUED | OUTPATIENT
Start: 2024-05-16 | End: 2024-05-16 | Stop reason: ALTCHOICE

## 2024-05-15 RX ORDER — ACETAMINOPHEN 650 MG/1
650 SUPPOSITORY RECTAL EVERY 4 HOURS PRN
Status: DISCONTINUED | OUTPATIENT
Start: 2024-05-15 | End: 2024-05-18 | Stop reason: HOSPADM

## 2024-05-15 RX ORDER — ONDANSETRON 4 MG/1
4 TABLET, FILM COATED ORAL EVERY 8 HOURS PRN
Status: DISCONTINUED | OUTPATIENT
Start: 2024-05-15 | End: 2024-05-18 | Stop reason: HOSPADM

## 2024-05-15 RX ORDER — DEXTROSE 50 % IN WATER (D50W) INTRAVENOUS SYRINGE
12.5
Status: DISCONTINUED | OUTPATIENT
Start: 2024-05-15 | End: 2024-05-18 | Stop reason: HOSPADM

## 2024-05-15 RX ORDER — LISINOPRIL 5 MG/1
5 TABLET ORAL DAILY
Status: DISCONTINUED | OUTPATIENT
Start: 2024-05-16 | End: 2024-05-18 | Stop reason: HOSPADM

## 2024-05-15 RX ORDER — ONDANSETRON HYDROCHLORIDE 2 MG/ML
4 INJECTION, SOLUTION INTRAVENOUS ONCE
Status: COMPLETED | OUTPATIENT
Start: 2024-05-15 | End: 2024-05-15

## 2024-05-15 RX ORDER — ENOXAPARIN SODIUM 100 MG/ML
40 INJECTION SUBCUTANEOUS EVERY 24 HOURS
Status: DISCONTINUED | OUTPATIENT
Start: 2024-05-15 | End: 2024-05-18 | Stop reason: HOSPADM

## 2024-05-15 RX ADMIN — ONDANSETRON 4 MG: 2 INJECTION INTRAMUSCULAR; INTRAVENOUS at 22:54

## 2024-05-15 RX ADMIN — SODIUM CHLORIDE, SODIUM LACTATE, POTASSIUM CHLORIDE, AND CALCIUM CHLORIDE 1000 ML: 600; 310; 30; 20 INJECTION, SOLUTION INTRAVENOUS at 19:15

## 2024-05-15 RX ADMIN — IOHEXOL 75 ML: 350 INJECTION, SOLUTION INTRAVENOUS at 20:35

## 2024-05-15 ASSESSMENT — PAIN SCALES - GENERAL
PAINLEVEL_OUTOF10: 0 - NO PAIN
PAINLEVEL_OUTOF10: 2
PAINLEVEL_OUTOF10: 0 - NO PAIN

## 2024-05-15 ASSESSMENT — COLUMBIA-SUICIDE SEVERITY RATING SCALE - C-SSRS
2. HAVE YOU ACTUALLY HAD ANY THOUGHTS OF KILLING YOURSELF?: NO
1. IN THE PAST MONTH, HAVE YOU WISHED YOU WERE DEAD OR WISHED YOU COULD GO TO SLEEP AND NOT WAKE UP?: NO
6. HAVE YOU EVER DONE ANYTHING, STARTED TO DO ANYTHING, OR PREPARED TO DO ANYTHING TO END YOUR LIFE?: NO

## 2024-05-15 ASSESSMENT — PAIN - FUNCTIONAL ASSESSMENT
PAIN_FUNCTIONAL_ASSESSMENT: 0-10
PAIN_FUNCTIONAL_ASSESSMENT: 0-10

## 2024-05-15 NOTE — TELEPHONE ENCOUNTER
Office is aware it is not screening colonoscopy but they still want an order for diagnostic scope. They want to get the patient in quickly and have all orders ready due to his condition. Whenever it is put in I will fax, [phone].

## 2024-05-15 NOTE — TELEPHONE ENCOUNTER
I pended order to you. One of the MA's messaged me asking for it. This would be the only way they can see the patient quickly

## 2024-05-15 NOTE — ED PROVIDER NOTES
Limitations to History: None  Additional History Obtained from: None    HPI:    This is a 53-year-old male presenting to the emergency department due to abdominal discomfort, generalized weakness and fatigue.  He has been having increasing shortness of breath for the last 4 days.  Has had associated 70 pound weight loss in the last 7 months.  He states he came to the emergency department today due to increasing generalized abdominal discomfort.  He was seen in outside hospital and had imaging done that was overall unremarkable blood work done that was overall unremarkable.  He was discharged home with outpatient follow-up.  Due to the patient noticing hematochezia today he came back to the emergency department for evaluation with his family.  States he had been previously on Wegovy injections but stopped those several weeks ago.  Denied any other changes in his medications or routine.  Review of systems is otherwise negative.  Denied the use of blood thinners.  No exacerbating remitting factors to his symptoms.    ------------------------------------------------------------------------------------------------------------------------------------------  Physical Exam:    ED Triage Vitals [05/15/24 1812]   Temperature Heart Rate Respirations BP   36.5 °C (97.7 °F) (!) 105 20 (!) 128/92      Pulse Ox Temp src Heart Rate Source Patient Position   99 % -- -- --      BP Location FiO2 (%)     -- --        VS: As documented in the triage note and EMR flowsheet from this visit were reviewed.  General: Well appearing.  Appears generally fatigued  Eyes: Pupils round and reactive. No scleral icterus. No conjunctival injection  HENT: Atraumatic. Normocephalic. Moist mucous membranes. Trachea midline  CV: Tachycardic but regular rhythm, No MRG. No pedal edema appreciated.  Resp: Clear to auscultation bilaterally. Non-labored.    GI: Soft, mild diffuse tenderness to palpation. Nondistended. No guarding, rigidity or rebound  Skin:  Warm, dry, intact. No systemic rashes or lesions appreciated.  Extremities: No deformities or pain out of proportion; pulses intact   Neuro: Alert. No focal motor or sensory deficits observed. Speech fluent. Answers questions appropriately.   Psych: Appropriate. Kempt.    ------------------------------------------------------------------------------------------------------------------------------------------    Medical Decision Making  Patient is presenting to the emergency department due to hematochezia, generalized weakness and weight loss.  Abdominal exam is overall benign.  Not on any blood thinners, hemodynamically mildly tachycardic but otherwise stable.  Concern for intra-abdominal process versus colitis versus inflammatory bowel disease versus possible malignancy due to the patient's associated weight loss.  EKG was reviewed and interpreted by me showing sinus rhythm at 97 bpm, No significant ST elevations, depressions or T wave inversions, no significant change from previous EKG on May 10 of this year.  Labs and imaging were remarkable for concern for possible pancreatic edema/inflammatory changes versus malignancy with ductal dilation of the common bile duct.  Received call from radiology regarding these results.  Patient and family were updated with the findings including possible differential.  Patient to be admitted for further evaluation and management.      External Records Reviewed: I reviewed recent and relevant outside records including: HIE/Community Record  Escalation of Care: Appropriate for Admission  Social Determinants Affecting Care:Not applicable  Prescription Drug Consideration: ivf  Diagnostic testing considered: ct  Discussion of Management with Other Providers: I discussed the patient/results with: handoff team    Objective Data  I have independently interpreted the following labs, imaging studies and MDM added to ED Course  Labs Reviewed   COMPREHENSIVE METABOLIC PANEL - Abnormal        Result Value    Glucose 253 (*)     Sodium 139      Potassium 4.0      Chloride 107      Bicarbonate 23      Anion Gap 13      Urea Nitrogen 22      Creatinine 0.80      eGFR >90      Calcium 9.4      Albumin 3.9      Alkaline Phosphatase 79      Total Protein 6.0 (*)     AST 11      Bilirubin, Total 0.6      ALT 27     BLOOD GAS VENOUS FULL PANEL - Abnormal    POCT pH, Venous 7.41      POCT pCO2, Venous 37 (*)     POCT pO2, Venous 33 (*)     POCT SO2, Venous 44 (*)     POCT Oxy Hemoglobin, Venous 43.4 (*)     POCT Hematocrit Calculated, Venous 46.0      POCT Sodium, Venous 136      POCT Potassium, Venous 3.9      POCT Chloride, Venous 105      POCT Ionized Calicum, Venous 1.31      POCT Glucose, Venous 260 (*)     POCT Lactate, Venous 2.2 (*)     POCT Base Excess, Venous -0.8      POCT HCO3 Calculated, Venous 23.5      POCT Hemoglobin, Venous 15.4      POCT Anion Gap, Venous 11.0      Patient Temperature 37.0      FiO2 21     CBC - Abnormal    WBC 10.1      nRBC 0.0      RBC 4.49 (*)     Hemoglobin 13.7      Hematocrit 40.6 (*)     MCV 90      MCH 30.5      MCHC 33.7      RDW 12.6      Platelets 220     BASIC METABOLIC PANEL - Abnormal    Glucose 167 (*)     Sodium 139      Potassium 3.9      Chloride 108 (*)     Bicarbonate 22      Anion Gap 13      Urea Nitrogen 18      Creatinine 0.77      eGFR >90      Calcium 8.8     COMPREHENSIVE METABOLIC PANEL - Abnormal    Glucose 165 (*)     Sodium 136      Potassium 4.8      Chloride 105      Bicarbonate 20 (*)     Anion Gap 16      Urea Nitrogen 16      Creatinine 0.85      eGFR >90      Calcium 9.5      Albumin 3.8      Alkaline Phosphatase 78      Total Protein 6.7      AST 24      Bilirubin, Total 1.1      ALT 23     CBC - Abnormal    WBC 8.9      nRBC 0.0      RBC 4.61      Hemoglobin 14.0      Hematocrit 40.8 (*)     MCV 89      MCH 30.4      MCHC 34.3      RDW 12.5      Platelets 248     CBC - Abnormal    WBC 9.9      nRBC 0.0      RBC 4.52      Hemoglobin 13.9       Hematocrit 40.1 (*)     MCV 89      MCH 30.8      MCHC 34.7      RDW 12.7      Platelets 239     BASIC METABOLIC PANEL - Abnormal    Glucose 176 (*)     Sodium 135 (*)     Potassium 4.0      Chloride 103      Bicarbonate 24      Anion Gap 12      Urea Nitrogen 22      Creatinine 0.95      eGFR >90      Calcium 9.3     CANCER ANTIGEN 19-9 - Abnormal    Cancer AG 19-9 >700.00 (*)     Narrative:     CA 19-9 testing is performed by chemiluminescent  immunoassay using the Siemens BrightQube. Values obtained with different analytic methods cannot be  used interchangeably.    Serum CA 19-9 measurement is indicated for the serial measurement of CA 19-9 to aid in the management of patients diagnosed with cancers of the exocrine pancreas. This assay is not intended for screening or diagnosis of cancer in the general population. The results must not be used as the sole means for clinical diagnosis or patient management decisions.    Patients known to be genotypically negative for the Alfonso blood group antigens will be unable to produce CA 19-9 antigen, even in malignant tissue. Phenotyping for the presence of the Alfonso antigen may be insufficient to  detect true Alfonso antigen negative individuals.    The results must not be used as the sole means for clinical diagnosis or patient management decisions.   POCT GLUCOSE - Abnormal    POCT Glucose 186 (*)    POCT GLUCOSE - Abnormal    POCT Glucose 169 (*)    POCT GLUCOSE - Abnormal    POCT Glucose 188 (*)    POCT GLUCOSE - Abnormal    POCT Glucose 174 (*)    POCT GLUCOSE - Abnormal    POCT Glucose 164 (*)    POCT GLUCOSE - Abnormal    POCT Glucose 189 (*)    POCT GLUCOSE - Abnormal    POCT Glucose 190 (*)    POCT GLUCOSE - Abnormal    POCT Glucose 192 (*)    POCT GLUCOSE - Abnormal    POCT Glucose 172 (*)    POCT GLUCOSE - Abnormal    POCT Glucose 162 (*)    POCT GLUCOSE - Abnormal    POCT Glucose 185 (*)    POCT GLUCOSE - Abnormal    POCT Glucose 273 (*)    POCT GLUCOSE -  Abnormal    POCT Glucose 220 (*)    POCT GLUCOSE - Abnormal    POCT Glucose 179 (*)    POCT GLUCOSE - Abnormal    POCT Glucose 165 (*)    B-TYPE NATRIURETIC PEPTIDE - Normal    BNP 22      Narrative:        <100 pg/mL - Heart failure unlikely  100-299 pg/mL - Intermediate probability of acute heart                  failure exacerbation. Correlate with clinical                  context and patient history.    >=300 pg/mL - Heart Failure likely. Correlate with clinical                  context and patient history.    BNP testing is performed using different testing methodology at [facility] than at other [facility]. Direct result comparisons should only be made within the same method.      SERIAL TROPONIN-INITIAL - Normal    Troponin I, High Sensitivity <3      Narrative:     Less than 99th percentile of normal range cutoff-  Female and children under 18 years old <14 ng/L; Male <21 ng/L: Negative  Repeat testing should be performed if clinically indicated.     Female and children under 18 years old 14-50 ng/L; Male 21-50 ng/L:  Consistent with possible cardiac damage and possible increased clinical   risk. Serial measurements may help to assess extent of myocardial damage.     >50 ng/L: Consistent with cardiac damage, increased clinical risk and  myocardial infarction. Serial measurements may help assess extent of   myocardial damage.      NOTE: Children less than 1 year old may have higher baseline troponin   levels and results should be interpreted in conjunction with the overall   clinical context.     NOTE: Troponin I testing is performed using a different   testing methodology at [facility] than at other   [facility]. Direct result comparisons should only   be made within the same method.   SERIAL TROPONIN, 1 HOUR - Normal    Troponin I, High Sensitivity <3      Narrative:     Less than 99th percentile of normal range cutoff-  Female and children under 18 years old <14  ng/L; Male <21 ng/L: Negative  Repeat testing should be performed if clinically indicated.     Female and children under 18 years old 14-50 ng/L; Male 21-50 ng/L:  Consistent with possible cardiac damage and possible increased clinical   risk. Serial measurements may help to assess extent of myocardial damage.     >50 ng/L: Consistent with cardiac damage, increased clinical risk and  myocardial infarction. Serial measurements may help assess extent of   myocardial damage.      NOTE: Children less than 1 year old may have higher baseline troponin   levels and results should be interpreted in conjunction with the overall   clinical context.     NOTE: Troponin I testing is performed using a different   testing methodology at [facility] than at other   [facility]. Direct result comparisons should only   be made within the same method.   BLOOD GAS LACTIC ACID, VENOUS - Normal    POCT Lactate, Venous 2.0     LIPASE - Normal    Lipase 50      Narrative:     Venipuncture immediately after or during the administration of Metamizole may lead to falsely low results. Testing should be performed immediately prior to Metamizole dosing.   CBC WITH AUTO DIFFERENTIAL    WBC 11.2      nRBC 0.0      RBC 4.89      Hemoglobin 15.0      Hematocrit 43.5      MCV 89      MCH 30.7      MCHC 34.5      RDW 12.6      Platelets 269      Neutrophils % 65.8      Immature Granulocytes %, Automated 0.3      Lymphocytes % 23.9      Monocytes % 6.5      Eosinophils % 2.9      Basophils % 0.6      Neutrophils Absolute 7.37      Immature Granulocytes Absolute, Automated 0.03      Lymphocytes Absolute 2.67      Monocytes Absolute 0.73      Eosinophils Absolute 0.32      Basophils Absolute 0.07     TYPE AND SCREEN    ABO TYPE A      Rh TYPE POS      ANTIBODY SCREEN NEG     TROPONIN SERIES- (INITIAL, 1 HR)    Narrative:     The following orders were created for panel order Troponin I Series, High Sensitivity (0, 1 HR).  Procedure                                Abnormality         Status                     ---------                               -----------         ------                     Troponin I, High Sensiti...[[id number]]  Normal              Final result               Troponin, High Sensitivi...[[id number]]  Normal              Final result                 Please view results for these tests on the individual orders.   CARCINOEMBRYONIC ANTIGEN    Carcinoembryonic AG 4.9      Narrative:      REF VALUES   NONSMOKERS 0-2.5   SMOKERS    0-5.0      CEA testing is performed by chemiluminescent immunoassay  using the Siemens Atellica. Values obtained with  different analytic methods cannot be used interchangeably.    Serum CEA measurement is intended for use as an aid in the management of patients previously treated for cancer. This assay is not intended for screening or diagnosis of cancer in the general population. The results must not  be used as the sole means for clinical diagnosis or patient management decisions.   INSULIN, FREE + TOTAL   CYTOLOGY CONSULTATION (NON-GYNECOLOGIC)   SURGICAL PATHOLOGY EXAM       CT chest w IV contrast   Final Result   1. No suspicious pulmonary nodules. Few small calcific granulomas   noted in the lingula.   2. No enlarged intrathoracic lymphadenopathy.   3. Left thyroid nodule measuring 1.1 cm. This could be further   assessed by dedicated nonemergent thyroid ultrasound if clinically   desired.   4. Subdiaphragmatic findings regarding the known pancreatic mass and   duct dilatation as well as the hepatic observations are better   evaluated in prior MRI dated 05/16/2024        Signed by: Cipriano Moura 5/18/2024 11:43 AM   Dictation workstation:   [id number]      MR abdomen w and wo IV contrast MRCP   Final Result   1. Pancreatic head/uncinate process mass measuring 2.9 x 2.5 cm with   upstream pancreatic duct dilatation and parenchymal atrophy highly   concerning for primary pancreatic neoplasm (specifically    adenocarcinoma). Surrounding changes of mild superimposed   pancreatitis. The mass is in contact with the adjacent 3rd part of   the duodenum but no upstream dilatation. No biliary obstruction. The   mass has 180 degree contact with the adjacent superior mesenteric   vein and likely encasing the 1st right lateral branch. The mass is   likely encasing the gastroduodenal artery distal component. The   celiac axis, superior mesenteric artery and main portal vein are   intact.   2. Few subcentimeter peripancreatic indeterminate lymph nodes noted.   3. Hepatic steatosis with segment 4A lesion measuring 0.7 cm with   imaging characteristics favoring hemangioma .        Recommendation:   Hepatobiliary surgery consult and accordingly EUS to be considered.        MACRO:   None        Signed by: Cipriano Moura 5/17/2024 8:38 AM   Dictation workstation:   [id number]      CT abdomen pelvis w IV contrast   Final Result   Edemasurrounding the pancreatic tail compatible with acute   pancreatitis. Dilatation of the pancreatic duct which measures 6 mm   in diameter and an ill marginated area of hypodensity in the   pancreatic head highly concerning for pancreatic malignancy, and   pancreatic protocol MRI is recommended for further evaluation. An   area of necrotizing pancreatitis/pancreatic necrosis is other   consideration, however there is no surrounding edema in this region   and given the ductal dilatation, findings highly concerning for an   underlying malignancy.        Small nonobstructive left renal calculi.        MACRO:   Sudheer Vidales discussed the significance and urgency of this critical   finding by telephone with  ELIZABETH MOODY on 5/15/2024 at 9:32 pm.   (**-RCF-**) Findings:  See findings.        Signed by: Sudheer Vidales 5/15/2024 9:39 PM   Dictation workstation:   [id number]          ED Course  ED Course as of 05/19/24 1034   Wed May 15, 2024   2227 Patient signed out pending acceptance by primary medicine team,  GI consult order placed [LP]      ED Course User Index  [LP] Mary Kay Harding DO         Diagnoses as of 05/19/24 1034   Pancreatic duct dilated ([facility])   Rectal bleed       Procedure  Procedures    Disposition: admission    Mary Kay Harding DO  Emergency Medicine  Medical Toxicology     Mary Kay Harding DO  05/19/24 1037

## 2024-05-15 NOTE — ED TRIAGE NOTES
"C/O HEMATOCHEZIA/WEAKNESS/FATIGUE/SOB X4 DAYS, PT STATES \"IT HAS BEEN BLACK AND FULL OF BLOOD, I LOST 70 LBS IN THE 7 MONTHS\" HX TYPE 2 DM, -BLOOD THINNERS, DENIES RECENT LONG TRAVELS, DENIES ABD PAIN/N/V/D/F/CHILLS, DENIES CP/PALPITATIONS, AMBULATED TO TRIAGE GAIT STEADY, EKG OBTAINED IN TRIAGE   "

## 2024-05-15 NOTE — TELEPHONE ENCOUNTER
Patient called about GI referral. He was trying to make appt but the referral  needs to be specifically for colonoscopy in order for the office to schedule him faster

## 2024-05-15 NOTE — TELEPHONE ENCOUNTER
It's not for a screening colonoscopy. It's for rectal bleeding. Please call over to GI and see what the issue is.

## 2024-05-16 ENCOUNTER — APPOINTMENT (OUTPATIENT)
Dept: RADIOLOGY | Facility: HOSPITAL | Age: 54
DRG: 378 | End: 2024-05-16
Payer: COMMERCIAL

## 2024-05-16 LAB
ANION GAP SERPL CALC-SCNC: 13 MMOL/L (ref 10–20)
BUN SERPL-MCNC: 18 MG/DL (ref 6–23)
CALCIUM SERPL-MCNC: 8.8 MG/DL (ref 8.6–10.3)
CHLORIDE SERPL-SCNC: 108 MMOL/L (ref 98–107)
CO2 SERPL-SCNC: 22 MMOL/L (ref 21–32)
CREAT SERPL-MCNC: 0.77 MG/DL (ref 0.5–1.3)
EGFRCR SERPLBLD CKD-EPI 2021: >90 ML/MIN/1.73M*2
ERYTHROCYTE [DISTWIDTH] IN BLOOD BY AUTOMATED COUNT: 12.6 % (ref 11.5–14.5)
GLUCOSE BLD MANUAL STRIP-MCNC: 164 MG/DL (ref 74–99)
GLUCOSE BLD MANUAL STRIP-MCNC: 169 MG/DL (ref 74–99)
GLUCOSE BLD MANUAL STRIP-MCNC: 174 MG/DL (ref 74–99)
GLUCOSE BLD MANUAL STRIP-MCNC: 186 MG/DL (ref 74–99)
GLUCOSE BLD MANUAL STRIP-MCNC: 188 MG/DL (ref 74–99)
GLUCOSE SERPL-MCNC: 167 MG/DL (ref 74–99)
HCT VFR BLD AUTO: 40.6 % (ref 41–52)
HGB BLD-MCNC: 13.7 G/DL (ref 13.5–17.5)
MCH RBC QN AUTO: 30.5 PG (ref 26–34)
MCHC RBC AUTO-ENTMCNC: 33.7 G/DL (ref 32–36)
MCV RBC AUTO: 90 FL (ref 80–100)
NRBC BLD-RTO: 0 /100 WBCS (ref 0–0)
PLATELET # BLD AUTO: 220 X10*3/UL (ref 150–450)
POTASSIUM SERPL-SCNC: 3.9 MMOL/L (ref 3.5–5.3)
RBC # BLD AUTO: 4.49 X10*6/UL (ref 4.5–5.9)
SODIUM SERPL-SCNC: 139 MMOL/L (ref 136–145)
WBC # BLD AUTO: 10.1 X10*3/UL (ref 4.4–11.3)

## 2024-05-16 PROCEDURE — 36415 COLL VENOUS BLD VENIPUNCTURE: CPT | Performed by: INTERNAL MEDICINE

## 2024-05-16 PROCEDURE — 1100000001 HC PRIVATE ROOM DAILY

## 2024-05-16 PROCEDURE — 2500000001 HC RX 250 WO HCPCS SELF ADMINISTERED DRUGS (ALT 637 FOR MEDICARE OP): Performed by: INTERNAL MEDICINE

## 2024-05-16 PROCEDURE — 85027 COMPLETE CBC AUTOMATED: CPT | Performed by: INTERNAL MEDICINE

## 2024-05-16 PROCEDURE — 2500000004 HC RX 250 GENERAL PHARMACY W/ HCPCS (ALT 636 FOR OP/ED): Performed by: INTERNAL MEDICINE

## 2024-05-16 PROCEDURE — C9113 INJ PANTOPRAZOLE SODIUM, VIA: HCPCS | Performed by: INTERNAL MEDICINE

## 2024-05-16 PROCEDURE — 82947 ASSAY GLUCOSE BLOOD QUANT: CPT

## 2024-05-16 PROCEDURE — 2500000002 HC RX 250 W HCPCS SELF ADMINISTERED DRUGS (ALT 637 FOR MEDICARE OP, ALT 636 FOR OP/ED): Performed by: INTERNAL MEDICINE

## 2024-05-16 PROCEDURE — 74183 MRI ABD W/O CNTR FLWD CNTR: CPT

## 2024-05-16 PROCEDURE — 2550000001 HC RX 255 CONTRASTS: Performed by: INTERNAL MEDICINE

## 2024-05-16 PROCEDURE — 99221 1ST HOSP IP/OBS SF/LOW 40: CPT | Performed by: INTERNAL MEDICINE

## 2024-05-16 PROCEDURE — 76376 3D RENDER W/INTRP POSTPROCES: CPT | Performed by: STUDENT IN AN ORGANIZED HEALTH CARE EDUCATION/TRAINING PROGRAM

## 2024-05-16 PROCEDURE — 80048 BASIC METABOLIC PNL TOTAL CA: CPT | Performed by: INTERNAL MEDICINE

## 2024-05-16 PROCEDURE — 74183 MRI ABD W/O CNTR FLWD CNTR: CPT | Performed by: STUDENT IN AN ORGANIZED HEALTH CARE EDUCATION/TRAINING PROGRAM

## 2024-05-16 PROCEDURE — A9575 INJ GADOTERATE MEGLUMI 0.1ML: HCPCS | Performed by: INTERNAL MEDICINE

## 2024-05-16 PROCEDURE — 99222 1ST HOSP IP/OBS MODERATE 55: CPT | Performed by: INTERNAL MEDICINE

## 2024-05-16 RX ORDER — INSULIN GLARGINE 100 [IU]/ML
20 INJECTION, SOLUTION SUBCUTANEOUS EVERY 24 HOURS
COMMUNITY
End: 2024-05-24 | Stop reason: SDUPTHER

## 2024-05-16 RX ORDER — PANTOPRAZOLE SODIUM 40 MG/10ML
40 INJECTION, POWDER, LYOPHILIZED, FOR SOLUTION INTRAVENOUS EVERY 12 HOURS
Status: DISCONTINUED | OUTPATIENT
Start: 2024-05-16 | End: 2024-05-16

## 2024-05-16 RX ORDER — PANTOPRAZOLE SODIUM 40 MG/1
40 TABLET, DELAYED RELEASE ORAL
Status: DISCONTINUED | OUTPATIENT
Start: 2024-05-17 | End: 2024-05-18 | Stop reason: HOSPADM

## 2024-05-16 RX ORDER — INSULIN LISPRO 100 [IU]/ML
30 INJECTION, SOLUTION INTRAVENOUS; SUBCUTANEOUS 3 TIMES DAILY
COMMUNITY
End: 2024-05-24 | Stop reason: SDUPTHER

## 2024-05-16 RX ORDER — GADOTERATE MEGLUMINE 376.9 MG/ML
20 INJECTION INTRAVENOUS
Status: COMPLETED | OUTPATIENT
Start: 2024-05-16 | End: 2024-05-16

## 2024-05-16 RX ADMIN — LISINOPRIL 5 MG: 5 TABLET ORAL at 09:07

## 2024-05-16 RX ADMIN — PANTOPRAZOLE SODIUM 40 MG: 40 INJECTION, POWDER, FOR SOLUTION INTRAVENOUS at 01:53

## 2024-05-16 RX ADMIN — ENOXAPARIN SODIUM 40 MG: 40 INJECTION SUBCUTANEOUS at 01:54

## 2024-05-16 RX ADMIN — GADOTERATE MEGLUMINE 20 ML: 376.9 INJECTION INTRAVENOUS at 19:22

## 2024-05-16 RX ADMIN — INSULIN HUMAN 2 UNITS: 100 INJECTION, SOLUTION PARENTERAL at 03:20

## 2024-05-16 SDOH — SOCIAL STABILITY: SOCIAL INSECURITY: DO YOU FEEL UNSAFE GOING BACK TO THE PLACE WHERE YOU ARE LIVING?: NO

## 2024-05-16 SDOH — SOCIAL STABILITY: SOCIAL INSECURITY: ARE YOU OR HAVE YOU BEEN THREATENED OR ABUSED PHYSICALLY, EMOTIONALLY, OR SEXUALLY BY ANYONE?: NO

## 2024-05-16 SDOH — SOCIAL STABILITY: SOCIAL INSECURITY: ABUSE: ADULT

## 2024-05-16 SDOH — SOCIAL STABILITY: SOCIAL INSECURITY: DOES ANYONE TRY TO KEEP YOU FROM HAVING/CONTACTING OTHER FRIENDS OR DOING THINGS OUTSIDE YOUR HOME?: NO

## 2024-05-16 SDOH — SOCIAL STABILITY: SOCIAL INSECURITY: HAS ANYONE EVER THREATENED TO HURT YOUR FAMILY OR YOUR PETS?: NO

## 2024-05-16 SDOH — SOCIAL STABILITY: SOCIAL INSECURITY: HAVE YOU HAD ANY THOUGHTS OF HARMING ANYONE ELSE?: NO

## 2024-05-16 SDOH — SOCIAL STABILITY: SOCIAL INSECURITY: DO YOU FEEL ANYONE HAS EXPLOITED OR TAKEN ADVANTAGE OF YOU FINANCIALLY OR OF YOUR PERSONAL PROPERTY?: NO

## 2024-05-16 SDOH — SOCIAL STABILITY: SOCIAL INSECURITY: ARE THERE ANY APPARENT SIGNS OF INJURIES/BEHAVIORS THAT COULD BE RELATED TO ABUSE/NEGLECT?: NO

## 2024-05-16 SDOH — SOCIAL STABILITY: SOCIAL INSECURITY: WERE YOU ABLE TO COMPLETE ALL THE BEHAVIORAL HEALTH SCREENINGS?: YES

## 2024-05-16 SDOH — SOCIAL STABILITY: SOCIAL INSECURITY: HAVE YOU HAD THOUGHTS OF HARMING ANYONE ELSE?: NO

## 2024-05-16 ASSESSMENT — ACTIVITIES OF DAILY LIVING (ADL)
FEEDING YOURSELF: INDEPENDENT
LACK_OF_TRANSPORTATION: NO
JUDGMENT_ADEQUATE_SAFELY_COMPLETE_DAILY_ACTIVITIES: YES
ADEQUATE_TO_COMPLETE_ADL: YES
WALKS IN HOME: INDEPENDENT
TOILETING: INDEPENDENT
BATHING: INDEPENDENT
LACK_OF_TRANSPORTATION: NO
HEARING - RIGHT EAR: FUNCTIONAL
PATIENT'S MEMORY ADEQUATE TO SAFELY COMPLETE DAILY ACTIVITIES?: YES
GROOMING: INDEPENDENT
HEARING - LEFT EAR: FUNCTIONAL
DRESSING YOURSELF: INDEPENDENT

## 2024-05-16 ASSESSMENT — ENCOUNTER SYMPTOMS
NAUSEA: 1
ALLERGIC/IMMUNOLOGIC NEGATIVE: 1
CARDIOVASCULAR NEGATIVE: 1
ABDOMINAL PAIN: 1
HEMATOLOGIC/LYMPHATIC NEGATIVE: 1
APPETITE CHANGE: 1
DIARRHEA: 1
NEUROLOGICAL NEGATIVE: 1
ENDOCRINE NEGATIVE: 1
MUSCULOSKELETAL NEGATIVE: 1
BLOOD IN STOOL: 1
ANAL BLEEDING: 1
EYES NEGATIVE: 1
RESPIRATORY NEGATIVE: 1
UNEXPECTED WEIGHT CHANGE: 1
PSYCHIATRIC NEGATIVE: 1

## 2024-05-16 ASSESSMENT — PAIN - FUNCTIONAL ASSESSMENT: PAIN_FUNCTIONAL_ASSESSMENT: 0-10

## 2024-05-16 ASSESSMENT — COGNITIVE AND FUNCTIONAL STATUS - GENERAL
DAILY ACTIVITIY SCORE: 24
MOBILITY SCORE: 24
PATIENT BASELINE BEDBOUND: NO

## 2024-05-16 ASSESSMENT — LIFESTYLE VARIABLES
AUDIT-C TOTAL SCORE: 0
AUDIT-C TOTAL SCORE: 0
HOW OFTEN DO YOU HAVE A DRINK CONTAINING ALCOHOL: NEVER
SKIP TO QUESTIONS 9-10: 1
HOW MANY STANDARD DRINKS CONTAINING ALCOHOL DO YOU HAVE ON A TYPICAL DAY: PATIENT DOES NOT DRINK
HOW OFTEN DO YOU HAVE 6 OR MORE DRINKS ON ONE OCCASION: NEVER

## 2024-05-16 ASSESSMENT — PATIENT HEALTH QUESTIONNAIRE - PHQ9
SUM OF ALL RESPONSES TO PHQ9 QUESTIONS 1 & 2: 0
1. LITTLE INTEREST OR PLEASURE IN DOING THINGS: NOT AT ALL
2. FEELING DOWN, DEPRESSED OR HOPELESS: NOT AT ALL

## 2024-05-16 ASSESSMENT — PAIN SCALES - GENERAL: PAINLEVEL_OUTOF10: 0 - NO PAIN

## 2024-05-16 NOTE — CARE PLAN
The patient's goals for the shift include      The clinical goals for the shift include      Over the shift, the patient did not make progress toward the following goals. Barriers to progression include   Problem: Pain  Goal: My pain/discomfort is manageable  Outcome: Progressing     Problem: Safety  Goal: Patient will be injury free during hospitalization  Outcome: Progressing  Goal: I will remain free of falls  Outcome: Progressing     Problem: Daily Care  Goal: Daily care needs are met  Outcome: Progressing     Problem: Psychosocial Needs  Goal: Demonstrates ability to cope with hospitalization/illness  Outcome: Progressing  Goal: Collaborate with me, my family, and caregiver to identify my specific goals  Outcome: Progressing     Problem: Discharge Barriers  Goal: My discharge needs are met  Outcome: Progressing     Problem: Safety  Goal: Patient will be injury free during hospitalization  Outcome: Progressing     Problem: Daily Care  Goal: Daily care needs are met  Outcome: Progressing     Problem: Psychosocial Needs  Goal: Demonstrates ability to cope with hospitalization/illness  Outcome: Progressing     Problem: Discharge Barriers  Goal: My discharge needs are met  Outcome: Progressing   . Recommendations to address these barriers include .

## 2024-05-16 NOTE — H&P (VIEW-ONLY)
Inpatient consult to Gastroenterology  Consult performed by: Zachary Lovett DO  Consult ordered by: Chico Bergman DO  Reason for consult: dilated pancreatic duct and pancreatic edema      Patient: Nate Alvarez   Age: 53 y.o.   Gender: male   Room/Bed: 732/732-A     Attending: Kadeem Haynes DO  Code Status:  Full Code    History of Presenting Illness  Patient: Nate Alvarez is a 53 y.o. male with past medical history of hypertension, newly diagnosed diabetes in October (last A1c 9.4) who presented to the hospital for fatigue x 2 weeks, dark stools.  Patient developed fatigue and exertional dyspnea for the past 2 weeks.  He has a new diagnosis of diabetes in October and has been on 20 Lantus at night 30 units Humalog with meals with sliding scale and Farxiga.  Blood sugars have still been uncontrolled and was placed on Mounjaro 3 weeks ago after taking the first dose he developed nausea and vomiting for 3 days which then resolved.  He also has had a 70 pound weight loss in the past 7 months.  He has no associated abdominal pain but does have pain into the scapula that is present with rest.  He has a non-smoker, no alcohol or illicit drug use. he does have family history of pancreatic cancer although no first-degree relatives.  He does have first-degree relatives including grandfather and father with colorectal cancer.  Last colonoscopy was 2020 which showed tubular adenoma transverse colon, sigmoid hyperplastic polyp, mild diverticulosis and external hemorrhoids.  Bowel movements are normally regular but stated has had dark stool since Sunday with bright red blood with wiping as well as in the toilet bowl.  Last bowel movement was yesterday which was brown and black  He did have some dark stools 4 months ago when he was taking Pepto-Bismol, however, black stools continued even after Pepto-Bismol was discontinued which eventually resolved.  He was taken the Pepto-Bismol for some indigestion but no clear  abdominal pain or GERD.  He possibly uses NSAIDs once a month if that, however, has been using Aleve PM for the past 4 days due to symptoms impairing ability to sleep.  He denies any use of antiplatelet or anticoagulation.  He denies any fever, chills, night sweats, skin lesions, exertional or chest pain at rest, syncope, lower extremity edema, changes in urination, saddle anesthesia, bladder or bowel incontinence.      Past Medical History   Past Medical History:   Diagnosis Date    Gastroesophageal reflux disease without esophagitis 10/05/2023    Hemorrhoids 11/03/2023    Leg cramps 10/05/2023    Oropharyngeal dysphagia 10/05/2023    Other conditions influencing health status 12/11/2018    History of cough    Otitis media, unspecified, right ear 12/11/2018    Right otitis media    Personal history of other malignant neoplasm of skin 11/30/2021    History of other malignant neoplasm of skin    Personal history of other specified conditions 06/20/2018    History of nausea and vomiting      Past Surgical History:   Past Surgical History:   Procedure Laterality Date    OTHER SURGICAL HISTORY  09/10/2020    Tonsillectomy    OTHER SURGICAL HISTORY  11/30/2021    Vasectomy    OTHER SURGICAL HISTORY  11/30/2021    Knee surgery    OTHER SURGICAL HISTORY  11/30/2021    Shoulder surgery    OTHER SURGICAL HISTORY  11/30/2021    Testicular surgery     Family History:   Family History   Problem Relation Name Age of Onset    Diabetes Mother      Ovarian cancer Mother      Liver cancer Father      Lung cancer Father      Colon cancer Father      Hypertension Father      Stroke Maternal Grandmother      Diabetes Maternal Grandmother      Liver cancer Maternal Grandfather      Lung cancer Paternal Grandfather       Social History:   Tobacco Use: Low Risk  (5/13/2024)    Patient History     Smoking Tobacco Use: Never     Smokeless Tobacco Use: Never     Passive Exposure: Not on file      Social History     Substance and Sexual  "Activity   Alcohol Use Not Currently       Outpatient Medications:  Current Outpatient Medications   Medication Instructions    blood sugar diagnostic (Blood Glucose Test) strip Check blood sugars once daily    blood-glucose meter misc Check  blood sugar as needed    blood-glucose sensor (FreeStyle Jef 3 Sensor) device Use as directed    cholecalciferol (VITAMIN D-3) 125 mcg, oral, Daily    Farxiga 5 mg, oral, Daily    Farxiga 10 mg, oral, Daily    lancets misc Check blood sugars once daily    lisinopril 5 mg, oral, Daily    Mounjaro 2.5 mg, subcutaneous, Once Weekly    Mounjaro 5 mg, subcutaneous, Once Weekly    [START ON 6/12/2024] Mounjaro 7.5 mg, subcutaneous, Once Weekly    ondansetron (ZOFRAN) 4 mg, oral, Every 8 hours PRN    pen needle, diabetic (BD Ultra-Fine Short Pen Needle) 31 gauge x 5/16\" needle Use four times daily with insulin        ED Course   Vitals - BP (!) 146/92   Pulse 74   Temp 36.6 °C (97.9 °F) (Temporal)   Resp 18   Wt 102 kg (225 lb)   SpO2 100%     Labs:   CBC:  Recent Labs     05/16/24  0655 05/15/24  1916 05/10/24  1208   WBC 10.1 11.2 13.6*   HGB 13.7 15.0 16.4   HCT 40.6* 43.5 46.0    269 261   MCV 90 89 86     CMP:  Recent Labs     05/16/24  0655 05/15/24  1916 05/10/24  1208    139 136   K 3.9 4.0 3.9   * 107 106   CO2 22 23 24   ANIONGAP 13 13 10   BUN 18 22 23   CREATININE 0.77 0.80 0.91   EGFR >90 >90 >90   GLUCOSE 167* 253* 328*     Recent Labs     05/15/24  1916 05/10/24  1208 04/16/24  0740   ALBUMIN 3.9 4.1 4.1   ALKPHOS 79 72 71   ALT 27 32 21   AST 11 12 13   BILITOT 0.6 0.8 0.7   LIPASE 50 41  --      Calcium/Phos:   Lab Results   Component Value Date    CALCIUM 8.8 05/16/2024    PHOS 3.0 08/09/2022      COAG: No results for input(s): \"INR\", \"DDIMERVTE\" in the last 54497 hours.  CRP: No results found for: \"CRP\"   [unfilled]   ENDO:  Recent Labs     05/10/24  1208 04/17/24  1646 04/16/24  0740 01/11/24  0943 10/05/23  0754 08/09/22  1106 " "09/28/20  1428   TSH  --   --  3.53  --   --  1.46 0.32*   HGBA1C 9.1* 9.3*  --  7.4* 8.8*  --  5.4      CARDIAC:   Recent Labs     05/15/24  2015 05/15/24  1916   TROPHS <3 <3   BNP  --  22     Recent Labs     04/16/24  0740 08/09/22  1106 09/28/20  1428   CHOL 121 131 193   LDLF  --  66 125*   LDLCALC 59  --   --    HDL 34.2 31.2* 42.4   TRIG 141 167* 126     No data recorded    Micro/ID:   No results found for the last 90 days.                   No lab exists for component: \"AGALPCRNB\"   .ID  No results found for: \"URINECULTURE\", \"BLOODCULT\", \"CSFCULTSMEAR\"    Imaging:   No orders to display     Encounter Date: 05/15/24   ECG 12 lead   Result Value    Ventricular Rate 97    Atrial Rate 97    CA Interval 166    QRS Duration 78    QT Interval 326    QTC Calculation(Bazett) 414    P Axis 35    R Axis -8    T Axis 40    QRS Count 16    Q Onset 218    P Onset 135    P Offset 191    T Offset 381    QTC Fredericia 382    Narrative    Normal sinus rhythm  Anterior infarct , age undetermined  Abnormal ECG  When compared with ECG of 10-MAY-2024 12:29,  PREVIOUS ECG IS PRESENT     No echocardiogram results found for the past 12 months  ECG 12 lead    Result Date: 5/16/2024  Normal sinus rhythm Anterior infarct , age undetermined Abnormal ECG When compared with ECG of 10-MAY-2024 12:29, PREVIOUS ECG IS PRESENT    CT abdomen pelvis w IV contrast    Result Date: 5/15/2024  Interpreted By:  Sudheer Vidales, STUDY: CT ABDOMEN PELVIS W IV CONTRAST;  5/15/2024 8:40 pm   INDICATION: Signs/Symptoms:abdominal pain, bright red blood per rectum.   COMPARISON: 5/15/2024   ACCESSION NUMBER(S): [id number]   ORDERING CLINICIAN: ELIZABETH MOODY   TECHNIQUE: Contiguous axial images of the abdomen and pelvis were obtained after the intravenous administration of  contrast. Coronal and sagittal reformatted images were obtained from the axial images.   FINDINGS: Mild basilar subsegmental atelectasis. Calcified granuloma in the left lung base. No " pleural effusion.   There is hepatic steatosis. The gallbladder is contracted and not well evaluated. No significant dilatation of the common bile duct.   There is dilatation of the pancreatic duct measuring 6 mm in diameter and edema surrounding the pancreatic tail. There is ill marginated area of hypodensity in the pancreatic head. 10 mm lymph node inferior to the pancreatic head and 12 mm lymph node superior to the pancreatic head.   No splenomegaly.   The adrenal glands appear unremarkable.   Symmetric enhancement of the kidneys. 4 mm and 1 mm nonobstructive left renal calculi. A subcentimeter hypodensity in the left kidney is too small to characterize. No hydronephrosis.   No evidence of bowel obstruction.   Urinary bladder is underdistended and not well evaluated.   Multilevel degenerative change of the lumbar spine.       Edemasurrounding the pancreatic tail compatible with acute pancreatitis. Dilatation of the pancreatic duct which measures 6 mm in diameter and an ill marginated area of hypodensity in the pancreatic head highly concerning for pancreatic malignancy, and pancreatic protocol MRI is recommended for further evaluation. An area of necrotizing pancreatitis/pancreatic necrosis is other consideration, however there is no surrounding edema in this region and given the ductal dilatation, findings highly concerning for an underlying malignancy.   Small nonobstructive left renal calculi.   MACRO: Sudheer Vidales discussed the significance and urgency of this critical finding by telephone with  ELIZABETH MOODY on 5/15/2024 at 9:32 pm. (**-RCF-**) Findings:  See findings.   Signed by: Sudheer Vidales 5/15/2024 9:39 PM Dictation workstation:   [id number]     Interventions:  Medications   cholecalciferol (Vitamin D-3) tablet 5,000 Units (5,000 Units oral Not Given 5/16/24 0900)   lisinopril tablet 5 mg (5 mg oral Given 5/16/24 0907)   glucagon (Glucagen) injection 1 mg (has no administration in time range)    dextrose 50 % injection 25 g (has no administration in time range)   glucagon (Glucagen) injection 1 mg (has no administration in time range)   dextrose 50 % injection 12.5 g (has no administration in time range)   insulin regular (HumuLIN R,NovoLIN R) injection 0-10 Units ( subcutaneous Not Given 5/16/24 4877)   acetaminophen (Tylenol) tablet 650 mg (has no administration in time range)     Or   acetaminophen (Tylenol) oral liquid 650 mg (has no administration in time range)     Or   acetaminophen (Tylenol) suppository 650 mg (has no administration in time range)   enoxaparin (Lovenox) syringe 40 mg (40 mg subcutaneous Given 5/16/24 0154)   ondansetron (Zofran) tablet 4 mg (has no administration in time range)     Or   ondansetron (Zofran) injection 4 mg (has no administration in time range)   HYDROmorphone PF (Dilaudid) injection 0.2 mg (has no administration in time range)   HYDROmorphone (Dilaudid) injection 0.4 mg (has no administration in time range)   pantoprazole (ProtoNix) injection 40 mg (40 mg intravenous Given 5/16/24 0153)   lactated Ringer's bolus 1,000 mL (0 mL intravenous Stopped 5/15/24 2015)   iohexol (OMNIPaque) 350 mg iodine/mL solution 75 mL (75 mL intravenous Given 5/15/24 2035)   ondansetron (Zofran) injection 4 mg (4 mg intravenous Given 5/15/24 2254)       Objective Data  Physical Exam  Constitutional:       General: He is not in acute distress.     Appearance: Normal appearance.   HENT:      Head: Normocephalic and atraumatic.      Right Ear: Tympanic membrane, ear canal and external ear normal.      Left Ear: Tympanic membrane, ear canal and external ear normal.      Nose: Nose normal.      Mouth/Throat:      Mouth: Mucous membranes are moist.      Pharynx: Oropharynx is clear.   Eyes:      General: No scleral icterus.     Extraocular Movements: Extraocular movements intact.      Conjunctiva/sclera: Conjunctivae normal.      Pupils: Pupils are equal, round, and reactive to light.    Cardiovascular:      Rate and Rhythm: Normal rate and regular rhythm.      Pulses: Normal pulses.      Heart sounds: Normal heart sounds. No murmur heard.     No gallop.   Pulmonary:      Effort: Pulmonary effort is normal.      Breath sounds: Normal breath sounds.   Abdominal:      General: Abdomen is flat. Bowel sounds are normal.      Palpations: Abdomen is soft.      Tenderness: There is no abdominal tenderness. There is no guarding or rebound.   Genitourinary:     Comments: Digital rectal exam with brown fecal matter particles, no gross blood or dark tarry stools  Musculoskeletal:         General: Normal range of motion.      Cervical back: Normal range of motion and neck supple.      Right lower leg: No edema.      Left lower leg: No edema.   Skin:     General: Skin is warm and dry.      Capillary Refill: Capillary refill takes less than 2 seconds.      Coloration: Skin is not jaundiced or pale.      Findings: No bruising, erythema, lesion or rash.   Neurological:      General: No focal deficit present.      Mental Status: He is alert and oriented to person, place, and time. Mental status is at baseline.      Cranial Nerves: No cranial nerve deficit.      Sensory: No sensory deficit.      Motor: No weakness.      Coordination: Coordination normal.      Deep Tendon Reflexes: Reflexes normal.   Psychiatric:         Mood and Affect: Mood normal.         Behavior: Behavior normal.                Assessment and Plan   Nate Alvarez is a 53 y.o. male new diagnosis of uncontrolled diabetes presenting for fatigue x 2 weeks,     #uncontrolled DM, insulin dependent, recently dx 10/23  #pancreatic tail edema and dilated pancreatic duct and hypodensity of pancreatic head, cf malignancy vs acute pancreatitis with necrosis iso GLP-1 use however less likely  #intermittent episodes of melena, infrequent NSAID use, now resolved. HDS, hgb wnl, ROBERT with brown fecal matter   #BRBPR when wiping, hx of EH on last colonoscopy      Plan  -MRCP pending. CLD but NPO at midnight in case of EUS in am for possible pancreatic malignancy.   -no endoscopy at this time. Scheduled for colonscopy with Dr. Monahan next week, can have EGD at that time.    -continue PPI PO daily      Thank you for the consult, Gi will continue to follow.     Zachary Lovett, DO  PGY-2, IM  I saw and evaluated the patient. I personally obtained the key and critical portions of the history and physical exam or was physically present for key and critical portions performed by the resident. I reviewed the resident's documentation and discussed the patient with the resident. I agree with the resident's medical decision making as documented in the note.    This patient admitted with generalized weakness, significant weight loss  Weight loss even before start GLP-1.  Although GLP-1 causes pancreatitis, patient did not complain epigastric pain.  CT scan shows dilated PD, suspected pancreatic lesion/necrotizing pancreatitis.  New diagnosis of diabetes.  Rectal bleed  Suspected melena  At this point it is imperative to rule out underlying malignancy due to significant symptom/findings.

## 2024-05-16 NOTE — H&P
History Of Present Illness  Nate Alvarez is a 53 y.o. male with a past medical history with a past medical history of GERD, and type 2 diabetes mellitus presents to [facility] ER complaining of abdominal pain, nausea, bloating diarrhea over the past 3 weeks .over the past 7 months since he has been taking a GLP-1 agonist he has had a  70 pound weight loss.  More recently has had bright red blood per rectum.  He denies any fever or chills shortness of breath chest pain.  CT scan of the abdomen and pelvis showed pancreatic edema and dilatation of pancreatic duct concerning for possible pancreatitis although his lipase is normal.  An MRCP has been recommended    Past Medical History  Past Medical History:   Diagnosis Date    Gastroesophageal reflux disease without esophagitis 10/05/2023    Hemorrhoids 11/03/2023    Leg cramps 10/05/2023    Oropharyngeal dysphagia 10/05/2023    Other conditions influencing health status 12/11/2018    History of cough    Otitis media, unspecified, right ear 12/11/2018    Right otitis media    Personal history of other malignant neoplasm of skin 11/30/2021    History of other malignant neoplasm of skin    Personal history of other specified conditions 06/20/2018    History of nausea and vomiting        Surgical History  Past Surgical History:   Procedure Laterality Date    OTHER SURGICAL HISTORY  09/10/2020    Tonsillectomy    OTHER SURGICAL HISTORY  11/30/2021    Vasectomy    OTHER SURGICAL HISTORY  11/30/2021    Knee surgery    OTHER SURGICAL HISTORY  11/30/2021    Shoulder surgery    OTHER SURGICAL HISTORY  11/30/2021    Testicular surgery         Social History  He reports that he has never smoked. He has never used smokeless tobacco. He reports that he does not currently use alcohol. He reports that he does not use drugs.    Family History  Family History   Problem Relation Name Age of Onset    Diabetes Mother      Ovarian cancer Mother      Liver cancer Father       Lung cancer Father      Colon cancer Father      Hypertension Father      Stroke Maternal Grandmother      Diabetes Maternal Grandmother      Liver cancer Maternal Grandfather      Lung cancer Paternal Grandfather          Allergies  Farxiga [dapagliflozin], Glipizide, Januvia [sitagliptin], Metformin, Mounjaro [tirzepatide], and Tramadol    Review of Systems   Constitutional:  Positive for appetite change and unexpected weight change.   HENT: Negative.     Eyes: Negative.    Respiratory: Negative.     Cardiovascular: Negative.    Gastrointestinal:  Positive for abdominal pain, anal bleeding, blood in stool, diarrhea and nausea.   Endocrine: Negative.    Genitourinary: Negative.    Musculoskeletal: Negative.    Skin: Negative.    Allergic/Immunologic: Negative.    Neurological: Negative.    Hematological: Negative.    Psychiatric/Behavioral: Negative.     All other systems reviewed and are negative.       Physical Exam  Vitals and nursing note reviewed.   Constitutional:       Appearance: Normal appearance.   HENT:      Head: Normocephalic.      Right Ear: External ear normal.      Left Ear: External ear normal.      Nose: Nose normal.      Mouth/Throat:      Mouth: Mucous membranes are dry.      Pharynx: Oropharynx is clear.   Eyes:      Extraocular Movements: Extraocular movements intact.      Conjunctiva/sclera: Conjunctivae normal.      Pupils: Pupils are equal, round, and reactive to light.   Cardiovascular:      Rate and Rhythm: Normal rate and regular rhythm.   Pulmonary:      Effort: Pulmonary effort is normal.      Breath sounds: Normal breath sounds.   Abdominal:      General: Abdomen is flat. Bowel sounds are normal.      Palpations: Abdomen is soft.      Tenderness: There is abdominal tenderness.   Musculoskeletal:         General: Normal range of motion.   Skin:     General: Skin is warm and dry.   Neurological:      General: No focal deficit present.      Mental Status: He is alert. Mental status is at  "baseline.   Psychiatric:         Mood and Affect: Mood normal.         Behavior: Behavior normal.          Last Recorded Vitals  Blood pressure (!) 127/95, pulse 75, temperature 36.5 °C (97.7 °F), resp. rate 16, height 1.91 m (6' 3.2\"), weight 102 kg (225 lb), SpO2 99%.    Relevant Results  Meds:  Scheduled medications  cholecalciferol, 5,000 Units, oral, Daily  dapagliflozin propanediol, 10 mg, oral, Daily  enoxaparin, 40 mg, subcutaneous, q24h  fentaNYL, 50 mcg, intravenous, Once  insulin regular, 0-10 Units, subcutaneous, q4h  lisinopril, 5 mg, oral, Daily  pantoprazole, 40 mg, oral, Daily before breakfast   Or  pantoprazole, 40 mg, intravenous, Daily before breakfast      Continuous medications     PRN medications  PRN medications: acetaminophen **OR** acetaminophen **OR** acetaminophen, dextrose, dextrose, glucagon, glucagon, ondansetron **OR** ondansetron   Current Outpatient Medications   Medication Instructions    blood sugar diagnostic (Blood Glucose Test) strip Check blood sugars once daily    blood-glucose meter misc Check  blood sugar as needed    blood-glucose sensor (FreeStyle Jef 3 Sensor) device Use as directed    cholecalciferol (VITAMIN D-3) 125 mcg, oral, Daily    Farxiga 5 mg, oral, Daily    Farxiga 10 mg, oral, Daily    lancets misc Check blood sugars once daily    lisinopril 5 mg, oral, Daily    Mounjaro 2.5 mg, subcutaneous, Once Weekly    Mounjaro 5 mg, subcutaneous, Once Weekly    [START ON 6/12/2024] Mounjaro 7.5 mg, subcutaneous, Once Weekly    ondansetron (ZOFRAN) 4 mg, oral, Every 8 hours PRN    pen needle, diabetic (BD Ultra-Fine Short Pen Needle) 31 gauge x 5/16\" needle Use four times daily with insulin        Labs:  Results for orders placed or performed during the hospital encounter of 05/15/24 (from the past 24 hour(s))   CBC and Auto Differential   Result Value Ref Range    WBC 11.2 4.4 - 11.3 x10*3/uL    nRBC 0.0 0.0 - 0.0 /100 WBCs    RBC 4.89 4.50 - 5.90 x10*6/uL    Hemoglobin " 15.0 13.5 - 17.5 g/dL    Hematocrit 43.5 41.0 - 52.0 %    MCV 89 80 - 100 fL    MCH 30.7 26.0 - 34.0 pg    MCHC 34.5 32.0 - 36.0 g/dL    RDW 12.6 11.5 - 14.5 %    Platelets 269 150 - 450 x10*3/uL    Neutrophils % 65.8 40.0 - 80.0 %    Immature Granulocytes %, Automated 0.3 0.0 - 0.9 %    Lymphocytes % 23.9 13.0 - 44.0 %    Monocytes % 6.5 2.0 - 10.0 %    Eosinophils % 2.9 0.0 - 6.0 %    Basophils % 0.6 0.0 - 2.0 %    Neutrophils Absolute 7.37 1.20 - 7.70 x10*3/uL    Immature Granulocytes Absolute, Automated 0.03 0.00 - 0.70 x10*3/uL    Lymphocytes Absolute 2.67 1.20 - 4.80 x10*3/uL    Monocytes Absolute 0.73 0.10 - 1.00 x10*3/uL    Eosinophils Absolute 0.32 0.00 - 0.70 x10*3/uL    Basophils Absolute 0.07 0.00 - 0.10 x10*3/uL   Comprehensive metabolic panel   Result Value Ref Range    Glucose 253 (H) 74 - 99 mg/dL    Sodium 139 136 - 145 mmol/L    Potassium 4.0 3.5 - 5.3 mmol/L    Chloride 107 98 - 107 mmol/L    Bicarbonate 23 21 - 32 mmol/L    Anion Gap 13 10 - 20 mmol/L    Urea Nitrogen 22 6 - 23 mg/dL    Creatinine 0.80 0.50 - 1.30 mg/dL    eGFR >90 >60 mL/min/1.73m*2    Calcium 9.4 8.6 - 10.3 mg/dL    Albumin 3.9 3.4 - 5.0 g/dL    Alkaline Phosphatase 79 33 - 120 U/L    Total Protein 6.0 (L) 6.4 - 8.2 g/dL    AST 11 9 - 39 U/L    Bilirubin, Total 0.6 0.0 - 1.2 mg/dL    ALT 27 10 - 52 U/L   Type And Screen   Result Value Ref Range    ABO TYPE A     Rh TYPE POS     ANTIBODY SCREEN NEG    Blood Gas Venous Full Panel   Result Value Ref Range    POCT pH, Venous 7.41 7.33 - 7.43 pH    POCT pCO2, Venous 37 (L) 41 - 51 mm Hg    POCT pO2, Venous 33 (L) 35 - 45 mm Hg    POCT SO2, Venous 44 (L) 45 - 75 %    POCT Oxy Hemoglobin, Venous 43.4 (L) 45.0 - 75.0 %    POCT Hematocrit Calculated, Venous 46.0 41.0 - 52.0 %    POCT Sodium, Venous 136 136 - 145 mmol/L    POCT Potassium, Venous 3.9 3.5 - 5.3 mmol/L    POCT Chloride, Venous 105 98 - 107 mmol/L    POCT Ionized Calicum, Venous 1.31 1.10 - 1.33 mmol/L    POCT Glucose,  Venous 260 (H) 74 - 99 mg/dL    POCT Lactate, Venous 2.2 (H) 0.4 - 2.0 mmol/L    POCT Base Excess, Venous -0.8 -2.0 - 3.0 mmol/L    POCT HCO3 Calculated, Venous 23.5 22.0 - 26.0 mmol/L    POCT Hemoglobin, Venous 15.4 13.5 - 17.5 g/dL    POCT Anion Gap, Venous 11.0 10.0 - 25.0 mmol/L    Patient Temperature 37.0 degrees Celsius    FiO2 21 %   B-Type Natriuretic Peptide   Result Value Ref Range    BNP 22 0 - 99 pg/mL   Troponin I, High Sensitivity, Initial   Result Value Ref Range    Troponin I, High Sensitivity <3 0 - 20 ng/L   Lipase   Result Value Ref Range    Lipase 50 9 - 82 U/L   Troponin, High Sensitivity, 1 Hour   Result Value Ref Range    Troponin I, High Sensitivity <3 0 - 20 ng/L   Blood Gas Lactic Acid, Venous   Result Value Ref Range    POCT Lactate, Venous 2.0 0.4 - 2.0 mmol/L      Imaging:  CT abdomen pelvis w IV contrast    Result Date: 5/15/2024  Interpreted By:  Sudheer Vidales, STUDY: CT ABDOMEN PELVIS W IV CONTRAST;  5/15/2024 8:40 pm   INDICATION: Signs/Symptoms:abdominal pain, bright red blood per rectum.   COMPARISON: 5/15/2024   ACCESSION NUMBER(S): [id number]   ORDERING CLINICIAN: ELIZABETH MOODY   TECHNIQUE: Contiguous axial images of the abdomen and pelvis were obtained after the intravenous administration of  contrast. Coronal and sagittal reformatted images were obtained from the axial images.   FINDINGS: Mild basilar subsegmental atelectasis. Calcified granuloma in the left lung base. No pleural effusion.   There is hepatic steatosis. The gallbladder is contracted and not well evaluated. No significant dilatation of the common bile duct.   There is dilatation of the pancreatic duct measuring 6 mm in diameter and edema surrounding the pancreatic tail. There is ill marginated area of hypodensity in the pancreatic head. 10 mm lymph node inferior to the pancreatic head and 12 mm lymph node superior to the pancreatic head.   No splenomegaly.   The adrenal glands appear unremarkable.   Symmetric  enhancement of the kidneys. 4 mm and 1 mm nonobstructive left renal calculi. A subcentimeter hypodensity in the left kidney is too small to characterize. No hydronephrosis.   No evidence of bowel obstruction.   Urinary bladder is underdistended and not well evaluated.   Multilevel degenerative change of the lumbar spine.       Edemasurrounding the pancreatic tail compatible with acute pancreatitis. Dilatation of the pancreatic duct which measures 6 mm in diameter and an ill marginated area of hypodensity in the pancreatic head highly concerning for pancreatic malignancy, and pancreatic protocol MRI is recommended for further evaluation. An area of necrotizing pancreatitis/pancreatic necrosis is other consideration, however there is no surrounding edema in this region and given the ductal dilatation, findings highly concerning for an underlying malignancy.   Small nonobstructive left renal calculi.   MACRO: Sudheer Vidales discussed the significance and urgency of this critical finding by telephone with  ELIZABETH MOODY on 5/15/2024 at 9:32 pm. (**-RCF-**) Findings:  See findings.   Signed by: Sudheer Vidales 5/15/2024 9:39 PM Dictation workstation:   [id number]      Assessment/Plan   Rectal bleeding  Plan:  Protonix 40 mg IV every 12  Serial H&H  Transfuse to keep hemoglobin greater than 7.0  GI consultation    Pancreatic edema  DC GLP-1 for now  Pain control  MRCP    Weight loss abdominal pain nausea and diarrhea since starting GLP-1 agonist  Plan:  Would DC GLP-1 agonist for now    Type 2 diabetes mellitus  Plan:  Humalog per corrective scale    Hypertension  Plan:  Lisinopril 5 mg orally    DVT prophylaxis  No heparin or Lovenox at this time due to GI bleeding  SCDs      I spent 60 minutes in the professional and overall care of this patient.      Chico Bergman DO

## 2024-05-16 NOTE — PROGRESS NOTES
Emergency Medicine Transition of Care Note.    I received Nate Alvarez in signout from Dr. Harding.  Please see the previous ED provider note for all HPI, PE and MDM up to the time of signout. This is in addition to the primary record.    In brief Nate Alvarez is an 53 y.o. male presenting for   Chief Complaint   Patient presents with    Black or Bloody Stool     At the time of signout we were awaiting: Admisison    ED Course as of 05/15/24 2259   Wed May 15, 2024   2227 Patient signed out pending acceptance by primary medicine team, GI consult order placed [LP]      ED Course User Index  [LP] Mary Kay Harding,          Diagnoses as of 05/15/24 2259   Pancreatic duct dilated ([facility])   Rectal bleed       Medical Decision Making  Patient admitted    Final diagnoses:   [K86.89] Pancreatic duct dilated ([facility])   [K62.5] Rectal bleed           Procedure  Procedures    Natan De La Paz MD

## 2024-05-16 NOTE — PROGRESS NOTES
Care Coordinator Note:  TCC spoke with patient and patients wife Dianna latif is correct. Lives at home independently denies falls. Denies dme. Has CBG monitoring system on order for DM. PCP is helene Medeiros seen Monday. [facility] [place].     Plan: Patient in with abd pain, bloating, diarrhea/nausea. CT- pancreatitis with edema. Gi following- plan for MRCP today    Status: inpatient  Payor: Priyanka  Disposition: Home with wife [facility]  Barrier: Gi clearance  ADOD: 1-2 days    Maricel Stillkolowski [facility]      05/16/24 1323   Discharge Planning   Living Arrangements Spouse/significant other   Support Systems Spouse/significant other   Assistance Needed IND with ADL   Type of Residence Private residence   Number of Stairs to Enter Residence 0   Number of Stairs Within Residence 12   Do you have animals or pets at home? No   Who is requesting discharge planning? Provider   Home or Post Acute Services None   Patient expects to be discharged to: [facility] with wife   Does the patient need discharge transport arranged? Yes   RoundTrip coordination needed? Yes   Has discharge transport been arranged? No   Financial Resource Strain   How hard is it for you to pay for the very basics like food, housing, medical care, and heating? Not hard   Housing Stability   In the last 12 months, was there a time when you were not able to pay the mortgage or rent on time? N   In the last 12 months, was there a time when you did not have a steady place to sleep or slept in a shelter (including now)? N   Transportation Needs   In the past 12 months, has lack of transportation kept you from medical appointments or from getting medications? no   In the past 12 months, has lack of transportation kept you from meetings, work, or from getting things needed for daily living? No

## 2024-05-16 NOTE — PROGRESS NOTES
Pharmacy Medication History Review    Per patient. When took the Mounjaro made him vomit and wasn't able to keep any food down. So no longer taking.     Nate Alvarez is a 53 y.o. male admitted for Pancreatic duct dilated ([facility]). Pharmacy reviewed the patient's [id number] medications and allergies for accuracy.    The list below reflectives the updated PTA list. Please review each medication in order reconciliation for additional clarification and justification.       The list below reflectives the updated allergy list. Please review each documented allergy for additional clarification and justification.  Allergies  Reviewed by Mary Justin RN on 5/15/2024        Severity Reactions Comments    Farxiga [dapagliflozin] Not Specified Diarrhea     Glipizide Not Specified GI Upset     Januvia [sitagliptin] Not Specified Diarrhea     Metformin Not Specified Nausea/vomiting     Mounjaro [tirzepatide] Not Specified Nausea/vomiting     Tramadol Not Specified Nausea/vomiting             Below are additional concerns with the patient's PTA list.  Prior to Admission Medications   Prescriptions Last Dose Informant   Farxiga 10 mg Unknown    Sig: Take 1 tablet (10 mg) by mouth once daily.   Farxiga 5 mg Unknown    Sig: Take 1 tablet (5 mg) by mouth once daily for 14 days.   blood sugar diagnostic (Blood Glucose Test) strip     Sig: Check blood sugars once daily   blood-glucose meter misc     Sig: Check  blood sugar as needed   blood-glucose sensor (FreeStyle Jef 3 Sensor) device     Sig: Use as directed   cholecalciferol (Vitamin D-3) 125 MCG (5000 UT) capsule 5/15/2024    Sig: Take 1 capsule (125 mcg) by mouth once daily.   insulin glargine (Lantus U-100 Insulin) 100 unit/mL injection     Sig: Inject 20 Units under the skin once every 24 hours. Take as directed per insulin instructions.   insulin lispro (HumaLOG) 100 unit/mL injection     Sig: Inject 0.3 mL (30 Units) under the skin 3 times a day. Take as  "directed per insulin instructions. Plus sliding scale   lancets misc     Sig: Check blood sugars once daily   lisinopril 5 mg tablet 5/15/2024    Sig: Take 1 tablet (5 mg) by mouth once daily.   ondansetron (Zofran) 4 mg tablet     Sig: Take 1 tablet (4 mg) by mouth every 8 hours if needed for nausea or vomiting for up to 7 days.   pen needle, diabetic (BD Ultra-Fine Short Pen Needle) 31 gauge x 5/16\" needle Unknown    Sig: Use four times daily with insulin                                       Facility-Administered Medications: None          Kerline Carson    "

## 2024-05-16 NOTE — CONSULTS
Inpatient consult to Gastroenterology  Consult performed by: Zachary Lovett DO  Consult ordered by: Chico Bergman DO  Reason for consult: dilated pancreatic duct and pancreatic edema      Patient: Nate Alvarez   Age: 53 y.o.   Gender: male   Room/Bed: 732/732-A     Attending: Kadeem Haynes DO  Code Status:  Full Code    History of Presenting Illness  Patient: Nate Alvarez is a 53 y.o. male with past medical history of hypertension, newly diagnosed diabetes in October (last A1c 9.4) who presented to the hospital for fatigue x 2 weeks, dark stools.  Patient developed fatigue and exertional dyspnea for the past 2 weeks.  He has a new diagnosis of diabetes in October and has been on 20 Lantus at night 30 units Humalog with meals with sliding scale and Farxiga.  Blood sugars have still been uncontrolled and was placed on Mounjaro 3 weeks ago after taking the first dose he developed nausea and vomiting for 3 days which then resolved.  He also has had a 70 pound weight loss in the past 7 months.  He has no associated abdominal pain but does have pain into the scapula that is present with rest.  He has a non-smoker, no alcohol or illicit drug use. he does have family history of pancreatic cancer although no first-degree relatives.  He does have first-degree relatives including grandfather and father with colorectal cancer.  Last colonoscopy was 2020 which showed tubular adenoma transverse colon, sigmoid hyperplastic polyp, mild diverticulosis and external hemorrhoids.  Bowel movements are normally regular but stated has had dark stool since Sunday with bright red blood with wiping as well as in the toilet bowl.  Last bowel movement was yesterday which was brown and black  He did have some dark stools 4 months ago when he was taking Pepto-Bismol, however, black stools continued even after Pepto-Bismol was discontinued which eventually resolved.  He was taken the Pepto-Bismol for some indigestion but no clear  abdominal pain or GERD.  He possibly uses NSAIDs once a month if that, however, has been using Aleve PM for the past 4 days due to symptoms impairing ability to sleep.  He denies any use of antiplatelet or anticoagulation.  He denies any fever, chills, night sweats, skin lesions, exertional or chest pain at rest, syncope, lower extremity edema, changes in urination, saddle anesthesia, bladder or bowel incontinence.      Past Medical History   Past Medical History:   Diagnosis Date    Gastroesophageal reflux disease without esophagitis 10/05/2023    Hemorrhoids 11/03/2023    Leg cramps 10/05/2023    Oropharyngeal dysphagia 10/05/2023    Other conditions influencing health status 12/11/2018    History of cough    Otitis media, unspecified, right ear 12/11/2018    Right otitis media    Personal history of other malignant neoplasm of skin 11/30/2021    History of other malignant neoplasm of skin    Personal history of other specified conditions 06/20/2018    History of nausea and vomiting      Past Surgical History:   Past Surgical History:   Procedure Laterality Date    OTHER SURGICAL HISTORY  09/10/2020    Tonsillectomy    OTHER SURGICAL HISTORY  11/30/2021    Vasectomy    OTHER SURGICAL HISTORY  11/30/2021    Knee surgery    OTHER SURGICAL HISTORY  11/30/2021    Shoulder surgery    OTHER SURGICAL HISTORY  11/30/2021    Testicular surgery     Family History:   Family History   Problem Relation Name Age of Onset    Diabetes Mother      Ovarian cancer Mother      Liver cancer Father      Lung cancer Father      Colon cancer Father      Hypertension Father      Stroke Maternal Grandmother      Diabetes Maternal Grandmother      Liver cancer Maternal Grandfather      Lung cancer Paternal Grandfather       Social History:   Tobacco Use: Low Risk  (5/13/2024)    Patient History     Smoking Tobacco Use: Never     Smokeless Tobacco Use: Never     Passive Exposure: Not on file      Social History     Substance and Sexual  "Activity   Alcohol Use Not Currently       Outpatient Medications:  Current Outpatient Medications   Medication Instructions    blood sugar diagnostic (Blood Glucose Test) strip Check blood sugars once daily    blood-glucose meter misc Check  blood sugar as needed    blood-glucose sensor (FreeStyle Jef 3 Sensor) device Use as directed    cholecalciferol (VITAMIN D-3) 125 mcg, oral, Daily    Farxiga 5 mg, oral, Daily    Farxiga 10 mg, oral, Daily    lancets misc Check blood sugars once daily    lisinopril 5 mg, oral, Daily    Mounjaro 2.5 mg, subcutaneous, Once Weekly    Mounjaro 5 mg, subcutaneous, Once Weekly    [START ON 6/12/2024] Mounjaro 7.5 mg, subcutaneous, Once Weekly    ondansetron (ZOFRAN) 4 mg, oral, Every 8 hours PRN    pen needle, diabetic (BD Ultra-Fine Short Pen Needle) 31 gauge x 5/16\" needle Use four times daily with insulin        ED Course   Vitals - BP (!) 146/92   Pulse 74   Temp 36.6 °C (97.9 °F) (Temporal)   Resp 18   Wt 102 kg (225 lb)   SpO2 100%     Labs:   CBC:  Recent Labs     05/16/24  0655 05/15/24  1916 05/10/24  1208   WBC 10.1 11.2 13.6*   HGB 13.7 15.0 16.4   HCT 40.6* 43.5 46.0    269 261   MCV 90 89 86     CMP:  Recent Labs     05/16/24  0655 05/15/24  1916 05/10/24  1208    139 136   K 3.9 4.0 3.9   * 107 106   CO2 22 23 24   ANIONGAP 13 13 10   BUN 18 22 23   CREATININE 0.77 0.80 0.91   EGFR >90 >90 >90   GLUCOSE 167* 253* 328*     Recent Labs     05/15/24  1916 05/10/24  1208 04/16/24  0740   ALBUMIN 3.9 4.1 4.1   ALKPHOS 79 72 71   ALT 27 32 21   AST 11 12 13   BILITOT 0.6 0.8 0.7   LIPASE 50 41  --      Calcium/Phos:   Lab Results   Component Value Date    CALCIUM 8.8 05/16/2024    PHOS 3.0 08/09/2022      COAG: No results for input(s): \"INR\", \"DDIMERVTE\" in the last 95417 hours.  CRP: No results found for: \"CRP\"   [unfilled]   ENDO:  Recent Labs     05/10/24  1208 04/17/24  1646 04/16/24  0740 01/11/24  0943 10/05/23  0754 08/09/22  1106 " "09/28/20  1428   TSH  --   --  3.53  --   --  1.46 0.32*   HGBA1C 9.1* 9.3*  --  7.4* 8.8*  --  5.4      CARDIAC:   Recent Labs     05/15/24  2015 05/15/24  1916   TROPHS <3 <3   BNP  --  22     Recent Labs     04/16/24  0740 08/09/22  1106 09/28/20  1428   CHOL 121 131 193   LDLF  --  66 125*   LDLCALC 59  --   --    HDL 34.2 31.2* 42.4   TRIG 141 167* 126     No data recorded    Micro/ID:   No results found for the last 90 days.                   No lab exists for component: \"AGALPCRNB\"   .ID  No results found for: \"URINECULTURE\", \"BLOODCULT\", \"CSFCULTSMEAR\"    Imaging:   No orders to display     Encounter Date: 05/15/24   ECG 12 lead   Result Value    Ventricular Rate 97    Atrial Rate 97    NE Interval 166    QRS Duration 78    QT Interval 326    QTC Calculation(Bazett) 414    P Axis 35    R Axis -8    T Axis 40    QRS Count 16    Q Onset 218    P Onset 135    P Offset 191    T Offset 381    QTC Fredericia 382    Narrative    Normal sinus rhythm  Anterior infarct , age undetermined  Abnormal ECG  When compared with ECG of 10-MAY-2024 12:29,  PREVIOUS ECG IS PRESENT     No echocardiogram results found for the past 12 months  ECG 12 lead    Result Date: 5/16/2024  Normal sinus rhythm Anterior infarct , age undetermined Abnormal ECG When compared with ECG of 10-MAY-2024 12:29, PREVIOUS ECG IS PRESENT    CT abdomen pelvis w IV contrast    Result Date: 5/15/2024  Interpreted By:  Sudheer Vidales, STUDY: CT ABDOMEN PELVIS W IV CONTRAST;  5/15/2024 8:40 pm   INDICATION: Signs/Symptoms:abdominal pain, bright red blood per rectum.   COMPARISON: 5/15/2024   ACCESSION NUMBER(S): [id number]   ORDERING CLINICIAN: ELIZABETH MOODY   TECHNIQUE: Contiguous axial images of the abdomen and pelvis were obtained after the intravenous administration of  contrast. Coronal and sagittal reformatted images were obtained from the axial images.   FINDINGS: Mild basilar subsegmental atelectasis. Calcified granuloma in the left lung base. No " pleural effusion.   There is hepatic steatosis. The gallbladder is contracted and not well evaluated. No significant dilatation of the common bile duct.   There is dilatation of the pancreatic duct measuring 6 mm in diameter and edema surrounding the pancreatic tail. There is ill marginated area of hypodensity in the pancreatic head. 10 mm lymph node inferior to the pancreatic head and 12 mm lymph node superior to the pancreatic head.   No splenomegaly.   The adrenal glands appear unremarkable.   Symmetric enhancement of the kidneys. 4 mm and 1 mm nonobstructive left renal calculi. A subcentimeter hypodensity in the left kidney is too small to characterize. No hydronephrosis.   No evidence of bowel obstruction.   Urinary bladder is underdistended and not well evaluated.   Multilevel degenerative change of the lumbar spine.       Edemasurrounding the pancreatic tail compatible with acute pancreatitis. Dilatation of the pancreatic duct which measures 6 mm in diameter and an ill marginated area of hypodensity in the pancreatic head highly concerning for pancreatic malignancy, and pancreatic protocol MRI is recommended for further evaluation. An area of necrotizing pancreatitis/pancreatic necrosis is other consideration, however there is no surrounding edema in this region and given the ductal dilatation, findings highly concerning for an underlying malignancy.   Small nonobstructive left renal calculi.   MACRO: Sudheer Vidales discussed the significance and urgency of this critical finding by telephone with  ELIZABETH MOODY on 5/15/2024 at 9:32 pm. (**-RCF-**) Findings:  See findings.   Signed by: Sudheer Vidales 5/15/2024 9:39 PM Dictation workstation:   [id number]     Interventions:  Medications   cholecalciferol (Vitamin D-3) tablet 5,000 Units (5,000 Units oral Not Given 5/16/24 0900)   lisinopril tablet 5 mg (5 mg oral Given 5/16/24 0907)   glucagon (Glucagen) injection 1 mg (has no administration in time range)    dextrose 50 % injection 25 g (has no administration in time range)   glucagon (Glucagen) injection 1 mg (has no administration in time range)   dextrose 50 % injection 12.5 g (has no administration in time range)   insulin regular (HumuLIN R,NovoLIN R) injection 0-10 Units ( subcutaneous Not Given 5/16/24 4372)   acetaminophen (Tylenol) tablet 650 mg (has no administration in time range)     Or   acetaminophen (Tylenol) oral liquid 650 mg (has no administration in time range)     Or   acetaminophen (Tylenol) suppository 650 mg (has no administration in time range)   enoxaparin (Lovenox) syringe 40 mg (40 mg subcutaneous Given 5/16/24 0154)   ondansetron (Zofran) tablet 4 mg (has no administration in time range)     Or   ondansetron (Zofran) injection 4 mg (has no administration in time range)   HYDROmorphone PF (Dilaudid) injection 0.2 mg (has no administration in time range)   HYDROmorphone (Dilaudid) injection 0.4 mg (has no administration in time range)   pantoprazole (ProtoNix) injection 40 mg (40 mg intravenous Given 5/16/24 0153)   lactated Ringer's bolus 1,000 mL (0 mL intravenous Stopped 5/15/24 2015)   iohexol (OMNIPaque) 350 mg iodine/mL solution 75 mL (75 mL intravenous Given 5/15/24 2035)   ondansetron (Zofran) injection 4 mg (4 mg intravenous Given 5/15/24 2254)       Objective Data  Physical Exam  Constitutional:       General: He is not in acute distress.     Appearance: Normal appearance.   HENT:      Head: Normocephalic and atraumatic.      Right Ear: Tympanic membrane, ear canal and external ear normal.      Left Ear: Tympanic membrane, ear canal and external ear normal.      Nose: Nose normal.      Mouth/Throat:      Mouth: Mucous membranes are moist.      Pharynx: Oropharynx is clear.   Eyes:      General: No scleral icterus.     Extraocular Movements: Extraocular movements intact.      Conjunctiva/sclera: Conjunctivae normal.      Pupils: Pupils are equal, round, and reactive to light.    Cardiovascular:      Rate and Rhythm: Normal rate and regular rhythm.      Pulses: Normal pulses.      Heart sounds: Normal heart sounds. No murmur heard.     No gallop.   Pulmonary:      Effort: Pulmonary effort is normal.      Breath sounds: Normal breath sounds.   Abdominal:      General: Abdomen is flat. Bowel sounds are normal.      Palpations: Abdomen is soft.      Tenderness: There is no abdominal tenderness. There is no guarding or rebound.   Genitourinary:     Comments: Digital rectal exam with brown fecal matter particles, no gross blood or dark tarry stools  Musculoskeletal:         General: Normal range of motion.      Cervical back: Normal range of motion and neck supple.      Right lower leg: No edema.      Left lower leg: No edema.   Skin:     General: Skin is warm and dry.      Capillary Refill: Capillary refill takes less than 2 seconds.      Coloration: Skin is not jaundiced or pale.      Findings: No bruising, erythema, lesion or rash.   Neurological:      General: No focal deficit present.      Mental Status: He is alert and oriented to person, place, and time. Mental status is at baseline.      Cranial Nerves: No cranial nerve deficit.      Sensory: No sensory deficit.      Motor: No weakness.      Coordination: Coordination normal.      Deep Tendon Reflexes: Reflexes normal.   Psychiatric:         Mood and Affect: Mood normal.         Behavior: Behavior normal.                Assessment and Plan   Nate Alvarez is a 53 y.o. male new diagnosis of uncontrolled diabetes presenting for fatigue x 2 weeks,     #uncontrolled DM, insulin dependent, recently dx 10/23  #pancreatic tail edema and dilated pancreatic duct and hypodensity of pancreatic head, cf malignancy vs acute pancreatitis with necrosis iso GLP-1 use however less likely  #intermittent episodes of melena, infrequent NSAID use, now resolved. HDS, hgb wnl, ROBERT with brown fecal matter   #BRBPR when wiping, hx of EH on last colonoscopy      Plan  -MRCP pending. CLD but NPO at midnight in case of EUS in am for possible pancreatic malignancy.   -no endoscopy at this time. Scheduled for colonscopy with Dr. Monahan next week, can have EGD at that time.    -continue PPI PO daily      Thank you for the consult, Gi will continue to follow.     Zachary Lovett, DO  PGY-2, IM  I saw and evaluated the patient. I personally obtained the key and critical portions of the history and physical exam or was physically present for key and critical portions performed by the resident. I reviewed the resident's documentation and discussed the patient with the resident. I agree with the resident's medical decision making as documented in the note.    This patient admitted with generalized weakness, significant weight loss  Weight loss even before start GLP-1.  Although GLP-1 causes pancreatitis, patient did not complain epigastric pain.  CT scan shows dilated PD, suspected pancreatic lesion/necrotizing pancreatitis.  New diagnosis of diabetes.  Rectal bleed  Suspected melena  At this point it is imperative to rule out underlying malignancy due to significant symptom/findings.

## 2024-05-17 ENCOUNTER — PHARMACY VISIT (OUTPATIENT)
Dept: PHARMACY | Facility: CLINIC | Age: 54
End: 2024-05-17
Payer: COMMERCIAL

## 2024-05-17 ENCOUNTER — ANESTHESIA EVENT (OUTPATIENT)
Dept: GASTROENTEROLOGY | Facility: HOSPITAL | Age: 54
DRG: 378 | End: 2024-05-17
Payer: COMMERCIAL

## 2024-05-17 ENCOUNTER — APPOINTMENT (OUTPATIENT)
Dept: GASTROENTEROLOGY | Facility: HOSPITAL | Age: 54
DRG: 378 | End: 2024-05-17
Payer: COMMERCIAL

## 2024-05-17 ENCOUNTER — TELEMEDICINE (OUTPATIENT)
Dept: PHARMACY | Facility: HOSPITAL | Age: 54
End: 2024-05-17
Payer: COMMERCIAL

## 2024-05-17 ENCOUNTER — ANESTHESIA (OUTPATIENT)
Dept: GASTROENTEROLOGY | Facility: HOSPITAL | Age: 54
DRG: 378 | End: 2024-05-17
Payer: COMMERCIAL

## 2024-05-17 DIAGNOSIS — Z79.4 TYPE 2 DIABETES MELLITUS WITH HYPERGLYCEMIA, WITH LONG-TERM CURRENT USE OF INSULIN (MULTI): Primary | ICD-10-CM

## 2024-05-17 DIAGNOSIS — E11.65 TYPE 2 DIABETES MELLITUS WITH HYPERGLYCEMIA, WITH LONG-TERM CURRENT USE OF INSULIN (MULTI): Primary | ICD-10-CM

## 2024-05-17 LAB
ALBUMIN SERPL BCP-MCNC: 3.8 G/DL (ref 3.4–5)
ALP SERPL-CCNC: 78 U/L (ref 33–120)
ALT SERPL W P-5'-P-CCNC: 23 U/L (ref 10–52)
ANION GAP SERPL CALC-SCNC: 16 MMOL/L (ref 10–20)
AST SERPL W P-5'-P-CCNC: 24 U/L (ref 9–39)
ATRIAL RATE: 97 BPM
BILIRUB SERPL-MCNC: 1.1 MG/DL (ref 0–1.2)
BUN SERPL-MCNC: 16 MG/DL (ref 6–23)
CALCIUM SERPL-MCNC: 9.5 MG/DL (ref 8.6–10.3)
CHLORIDE SERPL-SCNC: 105 MMOL/L (ref 98–107)
CO2 SERPL-SCNC: 20 MMOL/L (ref 21–32)
CREAT SERPL-MCNC: 0.85 MG/DL (ref 0.5–1.3)
EGFRCR SERPLBLD CKD-EPI 2021: >90 ML/MIN/1.73M*2
ERYTHROCYTE [DISTWIDTH] IN BLOOD BY AUTOMATED COUNT: 12.5 % (ref 11.5–14.5)
GLUCOSE BLD MANUAL STRIP-MCNC: 162 MG/DL (ref 74–99)
GLUCOSE BLD MANUAL STRIP-MCNC: 172 MG/DL (ref 74–99)
GLUCOSE BLD MANUAL STRIP-MCNC: 185 MG/DL (ref 74–99)
GLUCOSE BLD MANUAL STRIP-MCNC: 189 MG/DL (ref 74–99)
GLUCOSE BLD MANUAL STRIP-MCNC: 190 MG/DL (ref 74–99)
GLUCOSE BLD MANUAL STRIP-MCNC: 192 MG/DL (ref 74–99)
GLUCOSE BLD MANUAL STRIP-MCNC: 273 MG/DL (ref 74–99)
GLUCOSE SERPL-MCNC: 165 MG/DL (ref 74–99)
HCT VFR BLD AUTO: 40.8 % (ref 41–52)
HGB BLD-MCNC: 14 G/DL (ref 13.5–17.5)
HOLD SPECIMEN: NORMAL
MCH RBC QN AUTO: 30.4 PG (ref 26–34)
MCHC RBC AUTO-ENTMCNC: 34.3 G/DL (ref 32–36)
MCV RBC AUTO: 89 FL (ref 80–100)
NRBC BLD-RTO: 0 /100 WBCS (ref 0–0)
P AXIS: 35 DEGREES
P OFFSET: 191 MS
P ONSET: 135 MS
PLATELET # BLD AUTO: 248 X10*3/UL (ref 150–450)
POTASSIUM SERPL-SCNC: 4.8 MMOL/L (ref 3.5–5.3)
PR INTERVAL: 166 MS
PROT SERPL-MCNC: 6.7 G/DL (ref 6.4–8.2)
Q ONSET: 218 MS
QRS COUNT: 16 BEATS
QRS DURATION: 78 MS
QT INTERVAL: 326 MS
QTC CALCULATION(BAZETT): 414 MS
QTC FREDERICIA: 382 MS
R AXIS: -8 DEGREES
RBC # BLD AUTO: 4.61 X10*6/UL (ref 4.5–5.9)
SODIUM SERPL-SCNC: 136 MMOL/L (ref 136–145)
T AXIS: 40 DEGREES
T OFFSET: 381 MS
VENTRICULAR RATE: 97 BPM
WBC # BLD AUTO: 8.9 X10*3/UL (ref 4.4–11.3)

## 2024-05-17 PROCEDURE — 85027 COMPLETE CBC AUTOMATED: CPT | Performed by: INTERNAL MEDICINE

## 2024-05-17 PROCEDURE — 2500000004 HC RX 250 GENERAL PHARMACY W/ HCPCS (ALT 636 FOR OP/ED): Performed by: INTERNAL MEDICINE

## 2024-05-17 PROCEDURE — 7100000010 HC PHASE TWO TIME - EACH INCREMENTAL 1 MINUTE

## 2024-05-17 PROCEDURE — 83527 ASSAY OF INSULIN: CPT | Performed by: INTERNAL MEDICINE

## 2024-05-17 PROCEDURE — 88342 IMHCHEM/IMCYTCHM 1ST ANTB: CPT | Performed by: STUDENT IN AN ORGANIZED HEALTH CARE EDUCATION/TRAINING PROGRAM

## 2024-05-17 PROCEDURE — 99232 SBSQ HOSP IP/OBS MODERATE 35: CPT | Performed by: INTERNAL MEDICINE

## 2024-05-17 PROCEDURE — A43238 PR EDG INTRMURAL US NEEDLE ASPIRATE/BIOPSY ESOPHAGS: Performed by: NURSE ANESTHETIST, CERTIFIED REGISTERED

## 2024-05-17 PROCEDURE — 86301 IMMUNOASSAY TUMOR CA 19-9: CPT | Mod: AHULAB | Performed by: INTERNAL MEDICINE

## 2024-05-17 PROCEDURE — 43242 EGD US FINE NEEDLE BX/ASPIR: CPT | Performed by: INTERNAL MEDICINE

## 2024-05-17 PROCEDURE — 88172 CYTP DX EVAL FNA 1ST EA SITE: CPT | Performed by: PATHOLOGY

## 2024-05-17 PROCEDURE — 2500000001 HC RX 250 WO HCPCS SELF ADMINISTERED DRUGS (ALT 637 FOR MEDICARE OP): Performed by: INTERNAL MEDICINE

## 2024-05-17 PROCEDURE — 88307 TISSUE EXAM BY PATHOLOGIST: CPT | Performed by: STUDENT IN AN ORGANIZED HEALTH CARE EDUCATION/TRAINING PROGRAM

## 2024-05-17 PROCEDURE — A43238 PR EDG INTRMURAL US NEEDLE ASPIRATE/BIOPSY ESOPHAGS: Performed by: ANESTHESIOLOGY

## 2024-05-17 PROCEDURE — 3700000001 HC GENERAL ANESTHESIA TIME - INITIAL BASE CHARGE

## 2024-05-17 PROCEDURE — 82947 ASSAY GLUCOSE BLOOD QUANT: CPT

## 2024-05-17 PROCEDURE — RXMED WILLOW AMBULATORY MEDICATION CHARGE

## 2024-05-17 PROCEDURE — 80053 COMPREHEN METABOLIC PANEL: CPT | Performed by: INTERNAL MEDICINE

## 2024-05-17 PROCEDURE — 2500000002 HC RX 250 W HCPCS SELF ADMINISTERED DRUGS (ALT 637 FOR MEDICARE OP, ALT 636 FOR OP/ED): Performed by: INTERNAL MEDICINE

## 2024-05-17 PROCEDURE — 88305 TISSUE EXAM BY PATHOLOGIST: CPT | Performed by: PATHOLOGY

## 2024-05-17 PROCEDURE — 82378 CARCINOEMBRYONIC ANTIGEN: CPT | Mod: AHULAB | Performed by: INTERNAL MEDICINE

## 2024-05-17 PROCEDURE — 7100000009 HC PHASE TWO TIME - INITIAL BASE CHARGE

## 2024-05-17 PROCEDURE — 88173 CYTOPATH EVAL FNA REPORT: CPT | Performed by: PATHOLOGY

## 2024-05-17 PROCEDURE — 88173 CYTOPATH EVAL FNA REPORT: CPT | Mod: TC | Performed by: INTERNAL MEDICINE

## 2024-05-17 PROCEDURE — 2720000007 HC OR 272 NO HCPCS

## 2024-05-17 PROCEDURE — 36415 COLL VENOUS BLD VENIPUNCTURE: CPT | Performed by: INTERNAL MEDICINE

## 2024-05-17 PROCEDURE — 88341 IMHCHEM/IMCYTCHM EA ADD ANTB: CPT | Performed by: STUDENT IN AN ORGANIZED HEALTH CARE EDUCATION/TRAINING PROGRAM

## 2024-05-17 PROCEDURE — 88307 TISSUE EXAM BY PATHOLOGIST: CPT | Mod: TC,AHULAB | Performed by: INTERNAL MEDICINE

## 2024-05-17 PROCEDURE — 2500000004 HC RX 250 GENERAL PHARMACY W/ HCPCS (ALT 636 FOR OP/ED): Performed by: NURSE ANESTHETIST, CERTIFIED REGISTERED

## 2024-05-17 PROCEDURE — 3700000002 HC GENERAL ANESTHESIA TIME - EACH INCREMENTAL 1 MINUTE

## 2024-05-17 PROCEDURE — 1100000001 HC PRIVATE ROOM DAILY

## 2024-05-17 RX ORDER — MIDAZOLAM HYDROCHLORIDE 1 MG/ML
INJECTION INTRAMUSCULAR; INTRAVENOUS AS NEEDED
Status: DISCONTINUED | OUTPATIENT
Start: 2024-05-17 | End: 2024-05-17

## 2024-05-17 RX ORDER — PROPOFOL 10 MG/ML
INJECTION, EMULSION INTRAVENOUS AS NEEDED
Status: DISCONTINUED | OUTPATIENT
Start: 2024-05-17 | End: 2024-05-17

## 2024-05-17 RX ORDER — BLOOD-GLUCOSE,RECEIVER,CONT
EACH MISCELLANEOUS
Qty: 1 EACH | Refills: 0 | OUTPATIENT
Start: 2024-05-17

## 2024-05-17 RX ORDER — INSULIN LISPRO 100 [IU]/ML
0-10 INJECTION, SOLUTION INTRAVENOUS; SUBCUTANEOUS
Status: DISCONTINUED | OUTPATIENT
Start: 2024-05-18 | End: 2024-05-18 | Stop reason: HOSPADM

## 2024-05-17 RX ADMIN — SODIUM CHLORIDE, SODIUM LACTATE, POTASSIUM CHLORIDE, AND CALCIUM CHLORIDE: 600; 310; 30; 20 INJECTION, SOLUTION INTRAVENOUS at 16:00

## 2024-05-17 RX ADMIN — LISINOPRIL 5 MG: 5 TABLET ORAL at 08:52

## 2024-05-17 RX ADMIN — PROPOFOL 100 MG: 10 INJECTION, EMULSION INTRAVENOUS at 16:04

## 2024-05-17 RX ADMIN — MIDAZOLAM HYDROCHLORIDE 2 MG: 1 INJECTION INTRAMUSCULAR; INTRAVENOUS at 16:00

## 2024-05-17 RX ADMIN — ONDANSETRON 4 MG: 2 INJECTION INTRAMUSCULAR; INTRAVENOUS at 22:26

## 2024-05-17 RX ADMIN — INSULIN HUMAN 6 UNITS: 100 INJECTION, SOLUTION PARENTERAL at 22:13

## 2024-05-17 RX ADMIN — ENOXAPARIN SODIUM 40 MG: 40 INJECTION SUBCUTANEOUS at 00:24

## 2024-05-17 RX ADMIN — ONDANSETRON 4 MG: 2 INJECTION INTRAMUSCULAR; INTRAVENOUS at 00:39

## 2024-05-17 RX ADMIN — INSULIN HUMAN 2 UNITS: 100 INJECTION, SOLUTION PARENTERAL at 00:24

## 2024-05-17 RX ADMIN — PROPOFOL 150 MCG/KG/MIN: 10 INJECTION, EMULSION INTRAVENOUS at 16:06

## 2024-05-17 RX ADMIN — ENOXAPARIN SODIUM 40 MG: 40 INJECTION SUBCUTANEOUS at 22:28

## 2024-05-17 RX ADMIN — HYDROMORPHONE HYDROCHLORIDE 0.2 MG: 0.2 INJECTION, SOLUTION INTRAMUSCULAR; INTRAVENOUS; SUBCUTANEOUS at 22:28

## 2024-05-17 ASSESSMENT — COGNITIVE AND FUNCTIONAL STATUS - GENERAL
DAILY ACTIVITIY SCORE: 24
DAILY ACTIVITIY SCORE: 24
MOBILITY SCORE: 24
MOBILITY SCORE: 24

## 2024-05-17 ASSESSMENT — PAIN DESCRIPTION - DESCRIPTORS
DESCRIPTORS: ACHING
DESCRIPTORS: ACHING

## 2024-05-17 ASSESSMENT — PAIN DESCRIPTION - ORIENTATION: ORIENTATION: RIGHT

## 2024-05-17 ASSESSMENT — PAIN SCALES - GENERAL
PAINLEVEL_OUTOF10: 0 - NO PAIN
PAINLEVEL_OUTOF10: 1
PAINLEVEL_OUTOF10: 3
PAINLEVEL_OUTOF10: 0 - NO PAIN
PAINLEVEL_OUTOF10: 2
PAINLEVEL_OUTOF10: 2
PAINLEVEL_OUTOF10: 5 - MODERATE PAIN
PAINLEVEL_OUTOF10: 0 - NO PAIN
PAINLEVEL_OUTOF10: 0 - NO PAIN

## 2024-05-17 ASSESSMENT — PAIN SCALES - PAIN ASSESSMENT IN ADVANCED DEMENTIA (PAINAD): TOTALSCORE: MEDICATION (SEE MAR)

## 2024-05-17 ASSESSMENT — PAIN DESCRIPTION - LOCATION: LOCATION: ABDOMEN

## 2024-05-17 ASSESSMENT — PAIN - FUNCTIONAL ASSESSMENT
PAIN_FUNCTIONAL_ASSESSMENT: 0-10
PAIN_FUNCTIONAL_ASSESSMENT: UNABLE TO SELF-REPORT
PAIN_FUNCTIONAL_ASSESSMENT: 0-10

## 2024-05-17 NOTE — PROGRESS NOTES
Patient: Nate Alvarez Age: 53 y.o.   Gender: male Room/bed: 732/732-A     Attending: Kadeem Hayens DO  Code Status:  Full Code    Subjective  No acute overnight events.  Patient denies fever, chills, worsening abdominal pain.  No hematochezia, melena, nausea, vomiting.  He is able to tolerate p.o. intake.    Objective:  Physical Exam  Constitutional:       General: He is awake.      Appearance: Normal appearance.   HENT:      Head: Normocephalic and atraumatic.      Nose: Nose normal.      Mouth/Throat:      Mouth: Mucous membranes are moist.   Eyes:      Pupils: Pupils are equal, round, and reactive to light.   Pulmonary:      Effort: Pulmonary effort is normal.   Abdominal:      General: Abdomen is flat. Bowel sounds are normal. There is no distension.      Palpations: Abdomen is soft.      Tenderness: There is no abdominal tenderness. There is no guarding or rebound.   Neurological:      Mental Status: He is alert and oriented to person, place, and time. Mental status is at baseline.   Psychiatric:         Attention and Perception: Attention and perception normal.         Mood and Affect: Mood normal.         Behavior: Behavior normal.          Temp:  [36.6 °C (97.8 °F)-36.7 °C (98 °F)] 36.6 °C (97.9 °F)  Heart Rate:  [77-81] 81  Resp:  [18] 18  BP: (122-147)/(85-92) 132/89      Intake/Output Summary (Last 24 hours) at 5/17/2024 1017  Last data filed at 5/16/2024 2054  Gross per 24 hour   Intake 360 ml   Output --   Net 360 ml       Vitals:    05/15/24 1812   Weight: 102 kg (225 lb)             I/Os    Intake/Output Summary (Last 24 hours) at 5/17/2024 1017  Last data filed at 5/16/2024 2054  Gross per 24 hour   Intake 360 ml   Output --   Net 360 ml       Labs:   CBC:  Recent Labs     05/17/24  0928 05/16/24  0655 05/15/24  1916   WBC 8.9 10.1 11.2   HGB 14.0 13.7 15.0   HCT 40.8* 40.6* 43.5    220 269   MCV 89 90 89     CMP:  Recent Labs     05/17/24  0725 05/16/24  0655 05/15/24  1916     "139 139   K 4.8 3.9 4.0    108* 107   CO2 20* 22 23   ANIONGAP 16 13 13   BUN 16 18 22   CREATININE 0.85 0.77 0.80   EGFR >90 >90 >90   GLUCOSE 165* 167* 253*     Recent Labs     05/17/24  0725 05/15/24  1916 05/10/24  1208   ALBUMIN 3.8 3.9 4.1   ALKPHOS 78 79 72   ALT 23 27 32   AST 24 11 12   BILITOT 1.1 0.6 0.8   LIPASE  --  50 41     Calcium/Phos:   Lab Results   Component Value Date    CALCIUM 9.5 05/17/2024    PHOS 3.0 08/09/2022      COAG: No results for input(s): \"INR\", \"DDIMERVTE\" in the last 25992 hours.  CRP: No results found for: \"CRP\"   [unfilled]   ENDO:  Recent Labs     05/10/24  1208 04/17/24  1646 04/16/24  0740 01/11/24  0943 10/05/23  0754 08/09/22  1106 09/28/20  1428   TSH  --   --  3.53  --   --  1.46 0.32*   HGBA1C 9.1* 9.3*  --  7.4* 8.8*  --  5.4      CARDIAC:   Recent Labs     05/15/24  2015 05/15/24  1916   TROPHS <3 <3   BNP  --  22     Recent Labs     04/16/24  0740 08/09/22  1106 09/28/20  1428   CHOL 121 131 193   LDLF  --  66 125*   LDLCALC 59  --   --    HDL 34.2 31.2* 42.4   TRIG 141 167* 126     No data recorded    Micro/ID:   No results found for the last 90 days.                   No lab exists for component: \"AGALPCRNB\"   .ID  No results found for: \"URINECULTURE\", \"BLOODCULT\", \"CSFCULTSMEAR\"    Images:  MR abdomen w and wo IV contrast MRCP  Narrative: Interpreted By:  Cipriano Moura,   STUDY:  MR ABDOMEN W AND WO IV CONTRAST MRCP;  5/16/2024 7:32 pm      INDICATION:  Signs/Symptoms:Dilated pancreatic duct and pancreatic edema, please  do it with pancreatic protocol.      COMPARISON:  Abdomen CT scan dated 09/15/2016. Abdomen CT scan dated 05/15/2024.      ACCESSION NUMBER(S):  [id number]      ORDERING CLINICIAN:  GUTIERREZ CUMMINGS      TECHNIQUE:  MRI PANCREAS; Multiplanar magnetic resonance images of the abdomen  were obtained including the following sequences; T2-weighted SSFSE  with and without fat saturation, T1-weighted GRE in/opposed phase,  DWI, fat saturated " 3D-T1w GRE pre and dynamically post contrast.  Radial thick slab T2w RARE MRCP and coronally reconstructed navigator  gated high resolution 3-D T2w RESTORE MRCP with MIP reconstruction  were also performed for MRCP.  20 ml of  Gadolinium contrast agent  Dotarem were administered intravenously without immediate  complication.      FINDINGS:  LIVER:  Normal size. Normal contour. Hepatic steatosis. Hepatic dome segment  4A T2 hyperintense focus measuring 0.7 cm with suggestion of possible  delayed intrinsic enhancement (series [id number], image 9) suggesting  hemangioma.      BILE DUCTS:  No intrahepatic or extrahepatic bile duct dilatation is demonstrated.      GALLBLADDER:  Within normal limits.      PANCREAS:  Hypoenhancing mass in the pancreatic head/uncinate process measuring  2.9 x 2.5 cm (series [id number], image 44) in the region with pancreatic  duct caliber change with upstream pancreatic duct dilatation  measuring up to 0.7 cm and parenchymal atrophy. The mass has 180  degree contact with the adjacent superior mesenteric vein and almost  encasing the 1st branch. The mass is abutting and in contact with the  adjacent 3rd part of the duodenal but no upstream dilatation. The  mass is likely encasing the gastroduodenal artery. The superior  mesenteric artery, celiac trunk and main portal vein appear intact.  Minimal peripancreatic fat stranding and inflammatory changes likely  related to secondary superimposed pancreatitis.      SPLEEN:  Within normal limits.      ADRENAL GLANDS:  Within normal limits.      KIDNEYS:  No hydronephrosis. Left cortical simple cysts.      LYMPH NODES:  Few peripancreatic tiny lymph nodes noted largest measures 0.9 cm  (series [id number], image 36).      ABDOMINAL VESSELS:  No aneurysmal dilatation of the abdominal aorta. The relation of the  pancreatic mass with the adjacent vasculature as detailed above.      BOWEL:  No bowel dilatation.      PERITONEUM/RETROPERITONEUM:  Trace  peripancreatic free fluid.      BONES AND LOWER THORAX:  Mild multilevel degenerative spine changes and facet joint disease.      Impression: 1. Pancreatic head/uncinate process mass measuring 2.9 x 2.5 cm with  upstream pancreatic duct dilatation and parenchymal atrophy highly  concerning for primary pancreatic neoplasm (specifically  adenocarcinoma). Surrounding changes of mild superimposed  pancreatitis. The mass is in contact with the adjacent 3rd part of  the duodenum but no upstream dilatation. No biliary obstruction. The  mass has 180 degree contact with the adjacent superior mesenteric  vein and likely encasing the 1st right lateral branch. The mass is  likely encasing the gastroduodenal artery distal component. The  celiac axis, superior mesenteric artery and main portal vein are  intact.  2. Few subcentimeter peripancreatic indeterminate lymph nodes noted.  3. Hepatic steatosis with segment 4A lesion measuring 0.7 cm with  imaging characteristics favoring hemangioma .      Recommendation:  Hepatobiliary surgery consult and accordingly EUS to be considered.      MACRO:  None      Signed by: Cipriano Moura 5/17/2024 8:38 AM  Dictation workstation:   [id number]       Medications:  Scheduled medications  cholecalciferol, 5,000 Units, oral, Daily  enoxaparin, 40 mg, subcutaneous, q24h  insulin regular, 0-10 Units, subcutaneous, q4h  lisinopril, 5 mg, oral, Daily  pantoprazole, 40 mg, oral, Daily before breakfast      Continuous medications     PRN medications  PRN medications: acetaminophen **OR** acetaminophen **OR** acetaminophen, dextrose, dextrose, glucagon, glucagon, HYDROmorphone, HYDROmorphone, ondansetron **OR** ondansetron     Assessment and Plan:  Nate Alvarez is a 53 y.o. male new diagnosis of uncontrolled diabetes presenting for fatigue x 2 weeks,wt loss. MRCP concerning for pancreatic neoplasm.      Assessment:   #MRCP 5/16 concerning for primary pancreatic neoplasm.  Contact with third part of  the duodenum, adjacent superior mesenteric vein and likely encasing gastroduodenal artery distal component.  Few subcentimeter.  Pancreatic indeterminate lymph nodes.   # Hepatic steatosis with 0.7 cm lesion with imaging characteristics favoring hemangioma  #intermittent episodes of melena, infrequent NSAID use, now resolved. HDS, hgb wnl, ROBERT with brown fecal matter   #BRBPR when wiping, hx of EH on last colonoscopy   #uncontrolled DM, insulin dependent, recently dx 10/23     Recommendations:  -EUS with bx today. Will need heme-onc and hepatobiliary surgery follow up outpatient   -no endoscopy at this time. Scheduled for colonscopy with Dr. Monahan next week, can have EGD at that time if necessary. Follow up  outpatient    -continue PPI PO daily     Thank you for the consult, GI will signoff     Zachary Lovett, DO  PGY-2, IM    I saw and evaluated the patient. I personally obtained the key and critical portions of the history and physical exam or was physically present for key and critical portions performed by the resident-Dr Lovett. I reviewed the resident's documentation and discussed the patient with the resident. I agree with the resident's medical decision making as documented in the note.      Plan discussed with patient and family members

## 2024-05-17 NOTE — PROGRESS NOTES
Home with wife     05/17/24 1344   Current Planned Discharge Disposition   Current Planned Discharge Disposition Home

## 2024-05-17 NOTE — PROGRESS NOTES
Nate Alvarez is a 53 y.o. male who was referred to the Clinical Pharmacy Team to complete a virtual Transitions of Care encounter for discharge medication optimization. The patient was referred for their DM.    Attending: Dr. Kadeem Haynes    PCP: Dr. Hazel Medeiros    _______________________________________________________________________  PHARMACY ASSESSMENT    Home Pharmacy: [facility] [place]  Meds to beds? yes    No issues reported in regards to affordability, accessibility, adherence, organization, and adverse effects  _______________________________________________________________________  DIABETES ASSESSMENT    CURRENT PHARMACOTHERAPY  - Insulin glargine 20 units daily  - Insulin lispro 30 units TID before meals  - Farxiga 10mg daily    HISTORICAL PHARMACOTHERAPY  - Glipizide - GI upset  - Januvia - diarrhea  - Metformin - GI upset/nausea/vomiting  - Mounjaro - nausea/vomiting    BLOOD SUGAR TESTING AT HOME  -Frequency: TID  -CGM: Jef 3 (no , not compatible with current phone)    SECONDARY PREVENTION  - Statin? no  - ACE-I/ARB? Lisinopril 5mg  - Aspirin? no    Per patient, will be establishing with endocrinology but first appointment isn't until November. He has had a lot of trouble controlling blood sugar (consistently in 200s) and multiple medication intolerances. His wife is a type 1 diabetic and has been a good support for him. There was also a mishap of sorts at the pharmacy with his supply on Humalog where patient reports only getting 1 box of pens instead of 3 and he has been using some of his wife's insulin as a result.    Lab Results   Component Value Date    HGBA1C 9.1 (H) 05/10/2024     Lab Results   Component Value Date    CHOL 121 04/16/2024    CHOL 131 08/09/2022    CHOL 193 09/28/2020     Lab Results   Component Value Date    HDL 34.2 04/16/2024    HDL 31.2 (A) 08/09/2022    HDL 42.4 09/28/2020     Lab Results   Component Value Date    LDLCALC 59 04/16/2024     Lab Results  "  Component Value Date    TRIG 141 2024    TRIG 167 (H) 2022    TRIG 126 2020     No components found for: \"CHOLHDL\"    ___________________________________________________________________  PATIENT EDUCATION/GOALS  - Fasting B - 130 mg/dL  - Postprandial BG: less than 180 mg/dL  - A1c: less than 7%  - LDL Goal: < 70mg/dL  - Your blood pressure goal is less than 130/80 mmHg  - Discussed Jef 3  - will try to coordinate getting it for you here in hospital  - Discussed clinical pharmacy team's role in transition of care. They will be able to check in with you frequently until you are able to see endocrinology in November  - Answered all patient questions and concerns to best of my ability  _______________________________________________________________________  RECOMMENDATIONS/PLAN  Difficult BG control - may need increase in basal insulin and/or may need to use longer acting agent (degludec) or use twice daily dosing with glargine  Please send prescriptions to [facility] pharmacy for assistance on insurance prior authorization and copay. Prescriptions will be delivered to the patient's bedside prior to discharge with the [facility] to [facility] program.   Continuity of care to be provided by PCP and clinical pharmacy team. To establish with endocrinology in November      Donna Vogel PharmD    Verbal consent to manage patient's drug therapy was obtained from the patient. They were informed they may decline to participate or withdraw from participation in pharmacy services at any time.    "

## 2024-05-17 NOTE — PROGRESS NOTES
Transitional Care Coordination Progress Note:  Plan per Medical/Surgical team: treatment of abd pain, nausea & diarrhea with dark stools with protonix, IV Dilaudid, zofran, GI consult  S/P MRCP  Status: Inpatient day 2  Payor source: [place]  Discharge disposition: Home with wife   Potential Barriers: glucose 167, H/H 13.7/40.6  ADOD: 5/18/2024   ARNULFO Mean RN, BSN Transitional Care Coordinator ED# [phone]      05/17/24 0718   Discharge Planning   Assistance Needed GI work up   Home or Post Acute Services None   Patient expects to be discharged to: Home with wife

## 2024-05-17 NOTE — PERIOPERATIVE NURSING NOTE
1641 - Patient arrived to [place] after procedure with Anesthesia and procedure RN, procedure discussed, plan reviewed, VSS    1705 - pt tolerating small sips of water . Denies nausea at this time    1717 - pt wife updated via telephone at this time.     1723 - Report given to Christal SURESH RN via [employer] at this time.     1725 - Dr. Ledesma at bedside discussing procedure findings with pt.     1728 - pt placed into transport at this time

## 2024-05-17 NOTE — ANESTHESIA PREPROCEDURE EVALUATION
Patient: Nate Alvarez    Procedure Information       Date/Time: 05/17/24 1500    Scheduled providers: Angelic Ledesma MD; Deon Mcgowan MD    Procedure: ENDOSCOPIC ULTRASOUND (UPPER)    Location: [facility]            Relevant Problems   Cardiac   (+) Essential hypertension      Pulmonary   (+) Complex sleep apnea syndrome      Endocrine   (+) Class 1 obesity due to excess calories with serious comorbidity and body mass index (BMI) of 32.0 to 32.9 in adult   (+) Type 2 diabetes mellitus with hyperglycemia, with long-term current use of insulin (Multi)      Skin   (+) Basal cell carcinoma (BCC) of face   (+) Eczema of both hands       Clinical information reviewed:   Tobacco  Allergies  Meds   Med Hx  Surg Hx   Fam Hx  Soc Hx         Past Medical History:   Diagnosis Date   • Diabetes mellitus (Multi)    • Gastroesophageal reflux disease without esophagitis 10/05/2023   • Hemorrhoids 11/03/2023   • HTN (hypertension)    • Leg cramps 10/05/2023   • Oropharyngeal dysphagia 10/05/2023   • RADHA (obstructive sleep apnea)    • Pancreatic mass ([facility])    • Personal history of other malignant neoplasm of skin       Past Surgical History:   Procedure Laterality Date   • COLONOSCOPY     • ESOPHAGOGASTRODUODENOSCOPY     • EYELID CARCINOMA EXCISION     • KNEE ARTHROSCOPY W/ DEBRIDEMENT     • ROTATOR CUFF REPAIR Bilateral    • TESTICLE SURGERY      Hydrocelectomy   • TONSILLECTOMY     • VASECTOMY       Social History     Tobacco Use   • Smoking status: Never   • Smokeless tobacco: Never   Vaping Use   • Vaping status: Never Used   Substance Use Topics   • Alcohol use: Not Currently   • Drug use: Never      Current Outpatient Medications   Medication Instructions   • blood sugar diagnostic (Blood Glucose Test) strip Check blood sugars once daily   • blood-glucose meter misc Check  blood sugar as needed   • blood-glucose sensor (FreeStyle Jef 3 Sensor) device Use as directed   • cholecalciferol (VITAMIN  "D-3) 125 mcg, oral, Daily   • Farxiga 5 mg, oral, Daily   • Farxiga 10 mg, oral, Daily   • FreeStyle Jef 3 [place] misc Use with sensors as directed   • insulin lispro (HUMALOG) 30 Units, subcutaneous, 3 times daily, Take as directed per insulin instructions. Plus sliding scale    • lancets misc Check blood sugars once daily   • Lantus U-100 Insulin 20 Units, subcutaneous, Every 24 hours, Take as directed per insulin instructions.   • lisinopril 5 mg, oral, Daily   • Mounjaro 2.5 mg, subcutaneous, Once Weekly   • Mounjaro 5 mg, subcutaneous, Once Weekly   • [START ON 6/12/2024] Mounjaro 7.5 mg, subcutaneous, Once Weekly   • ondansetron (ZOFRAN) 4 mg, oral, Every 8 hours PRN   • pen needle, diabetic (BD Ultra-Fine Short Pen Needle) 31 gauge x 5/16\" needle Use four times daily with insulin      Allergies   Allergen Reactions   • Farxiga [Dapagliflozin] Diarrhea   • Glipizide GI Upset   • Januvia [Sitagliptin] Diarrhea   • Metformin Nausea/vomiting   • Mounjaro [Tirzepatide] Nausea/vomiting   • Tramadol Nausea/vomiting        Chemistry    Lab Results   Component Value Date/Time     05/17/2024 0725    K 4.8 05/17/2024 0725     05/17/2024 0725    CO2 20 (L) 05/17/2024 0725    BUN 16 05/17/2024 0725    CREATININE 0.85 05/17/2024 0725    Lab Results   Component Value Date/Time    CALCIUM 9.5 05/17/2024 0725    ALKPHOS 78 05/17/2024 0725    AST 24 05/17/2024 0725    ALT 23 05/17/2024 0725    BILITOT 1.1 05/17/2024 0725          Lab Results   Component Value Date    HGBA1C 9.1 (H) 05/10/2024     Lab Results   Component Value Date/Time    WBC 8.9 05/17/2024 0928    HGB 14.0 05/17/2024 0928    HCT 40.8 (L) 05/17/2024 0928     05/17/2024 0928     No results found for: \"PROTIME\", \"PTT\", \"INR\"  No results found for: \"ABORH\"  Encounter Date: 05/15/24   ECG 12 lead   Result Value    Ventricular Rate 97    Atrial Rate 97    NC Interval 166    QRS Duration 78    QT Interval 326    QTC Calculation(Bazett) 414    P " "Axis 35    R Axis -8    T Axis 40    QRS Count 16    Q Onset 218    P Onset 135    P Offset 191    T Offset 381    QTC Fredericia 382    Narrative    See ED provider note for full interpretation and clinical correlation  Normal sinus rhythm  Anterior infarct , age undetermined  Abnormal ECG  When compared with ECG of 10-MAY-2024 12:29,  PREVIOUS ECG IS PRESENT  Confirmed by Mariposa Quintanilla ([id number]) on 5/17/2024 10:35:35 AM     No results found for this or any previous visit from the past 1095 days.       Visit Vitals  /90   Pulse 85   Temp 36.1 °C (97 °F) (Temporal)   Resp 20   Ht 1.91 m (6' 3.2\")   Wt 102 kg (224 lb 13.9 oz)   SpO2 99%   BMI 27.96 kg/m²   Smoking Status Never   BSA 2.33 m²     NPO/Void Status  Date of Last Liquid: 05/17/24  Time of Last Liquid: 0852  Date of Last Solid: 05/16/24  Time of Last Solid: 0000  Last Intake Type: Clear fluids  Time of Last Void: 1351         Physical Exam    Airway  Mallampati: I  TM distance: >3 FB  Neck ROM: full     Cardiovascular   Rhythm: regular  Rate: normal     Dental - normal exam     Pulmonary   Breath sounds clear to auscultation     Abdominal - normal exam            Anesthesia Plan    History of general anesthesia?: yes  History of complications of general anesthesia?: no    ASA 3     MAC   (With standard ASA monitoring.)  intravenous induction   Anesthetic plan and risks discussed with patient.    Plan discussed with CRNA and CAA.      "

## 2024-05-17 NOTE — ANESTHESIA POSTPROCEDURE EVALUATION
Patient: Nate Alvarez    Procedure Summary       Date: 05/17/24 Room / Location: [facility]    Anesthesia Start: 1600 Anesthesia Stop: 1646    Procedure: ENDOSCOPIC ULTRASOUND (UPPER) Diagnosis: Pancreatic mass ([facility])    Scheduled Providers: Angelic Ledesma MD; Deon Mcgowan MD Responsible Provider: Rhett Vergara MD    Anesthesia Type: MAC ASA Status: 3            Anesthesia Type: MAC    Vitals Value Taken Time   /90 05/17/24 1731   Temp 36 °C (96.8 °F) 05/17/24 1641   Pulse 77 05/17/24 1740   Resp 20 05/17/24 1726   SpO2 99 % 05/17/24 1740   Vitals shown include unfiled device data.    Anesthesia Post Evaluation    Patient location during evaluation: PACU  Patient participation: complete - patient participated  Level of consciousness: awake  Pain management: adequate  Airway patency: patent  Cardiovascular status: acceptable  Respiratory status: acceptable  Hydration status: acceptable  Postoperative Nausea and Vomiting: none        No notable events documented.

## 2024-05-17 NOTE — CARE PLAN
The patient's goals for the shift include      The clinical goals for the shift include manage patient's pain and maintain patient's safety    Over the shift, the patient did not make progress toward the following goals. Barriers to progression include . Recommendations to address these barriers include   Problem: Pain  Goal: My pain/discomfort is manageable  Outcome: Progressing     Problem: Safety  Goal: Patient will be injury free during hospitalization  Outcome: Progressing  Goal: I will remain free of falls  Outcome: Progressing     Problem: Daily Care  Goal: Daily care needs are met  Outcome: Progressing     Problem: Psychosocial Needs  Goal: Demonstrates ability to cope with hospitalization/illness  Outcome: Progressing  Goal: Collaborate with me, my family, and caregiver to identify my specific goals  Outcome: Progressing     Problem: Discharge Barriers  Goal: My discharge needs are met  Outcome: Progressing   .

## 2024-05-18 ENCOUNTER — APPOINTMENT (OUTPATIENT)
Dept: RADIOLOGY | Facility: HOSPITAL | Age: 54
DRG: 378 | End: 2024-05-18
Payer: COMMERCIAL

## 2024-05-18 VITALS
HEIGHT: 75 IN | OXYGEN SATURATION: 98 % | TEMPERATURE: 98.8 F | SYSTOLIC BLOOD PRESSURE: 128 MMHG | DIASTOLIC BLOOD PRESSURE: 85 MMHG | BODY MASS INDEX: 27.96 KG/M2 | RESPIRATION RATE: 20 BRPM | WEIGHT: 224.87 LBS | HEART RATE: 89 BPM

## 2024-05-18 LAB
ANION GAP SERPL CALC-SCNC: 12 MMOL/L (ref 10–20)
BUN SERPL-MCNC: 22 MG/DL (ref 6–23)
CALCIUM SERPL-MCNC: 9.3 MG/DL (ref 8.6–10.3)
CHLORIDE SERPL-SCNC: 103 MMOL/L (ref 98–107)
CO2 SERPL-SCNC: 24 MMOL/L (ref 21–32)
CREAT SERPL-MCNC: 0.95 MG/DL (ref 0.5–1.3)
EGFRCR SERPLBLD CKD-EPI 2021: >90 ML/MIN/1.73M*2
ERYTHROCYTE [DISTWIDTH] IN BLOOD BY AUTOMATED COUNT: 12.7 % (ref 11.5–14.5)
GLUCOSE BLD MANUAL STRIP-MCNC: 165 MG/DL (ref 74–99)
GLUCOSE BLD MANUAL STRIP-MCNC: 179 MG/DL (ref 74–99)
GLUCOSE BLD MANUAL STRIP-MCNC: 220 MG/DL (ref 74–99)
GLUCOSE SERPL-MCNC: 176 MG/DL (ref 74–99)
HCT VFR BLD AUTO: 40.1 % (ref 41–52)
HGB BLD-MCNC: 13.9 G/DL (ref 13.5–17.5)
MCH RBC QN AUTO: 30.8 PG (ref 26–34)
MCHC RBC AUTO-ENTMCNC: 34.7 G/DL (ref 32–36)
MCV RBC AUTO: 89 FL (ref 80–100)
NRBC BLD-RTO: 0 /100 WBCS (ref 0–0)
PLATELET # BLD AUTO: 239 X10*3/UL (ref 150–450)
POTASSIUM SERPL-SCNC: 4 MMOL/L (ref 3.5–5.3)
RBC # BLD AUTO: 4.52 X10*6/UL (ref 4.5–5.9)
SODIUM SERPL-SCNC: 135 MMOL/L (ref 136–145)
WBC # BLD AUTO: 9.9 X10*3/UL (ref 4.4–11.3)

## 2024-05-18 PROCEDURE — 80048 BASIC METABOLIC PNL TOTAL CA: CPT | Performed by: INTERNAL MEDICINE

## 2024-05-18 PROCEDURE — 2500000001 HC RX 250 WO HCPCS SELF ADMINISTERED DRUGS (ALT 637 FOR MEDICARE OP)

## 2024-05-18 PROCEDURE — 99239 HOSP IP/OBS DSCHRG MGMT >30: CPT | Performed by: INTERNAL MEDICINE

## 2024-05-18 PROCEDURE — 85027 COMPLETE CBC AUTOMATED: CPT | Performed by: INTERNAL MEDICINE

## 2024-05-18 PROCEDURE — 82947 ASSAY GLUCOSE BLOOD QUANT: CPT

## 2024-05-18 PROCEDURE — 36415 COLL VENOUS BLD VENIPUNCTURE: CPT | Performed by: INTERNAL MEDICINE

## 2024-05-18 PROCEDURE — 71260 CT THORAX DX C+: CPT

## 2024-05-18 PROCEDURE — 2500000002 HC RX 250 W HCPCS SELF ADMINISTERED DRUGS (ALT 637 FOR MEDICARE OP, ALT 636 FOR OP/ED): Performed by: INTERNAL MEDICINE

## 2024-05-18 PROCEDURE — 2550000001 HC RX 255 CONTRASTS: Performed by: INTERNAL MEDICINE

## 2024-05-18 PROCEDURE — 71260 CT THORAX DX C+: CPT | Performed by: STUDENT IN AN ORGANIZED HEALTH CARE EDUCATION/TRAINING PROGRAM

## 2024-05-18 PROCEDURE — 2500000004 HC RX 250 GENERAL PHARMACY W/ HCPCS (ALT 636 FOR OP/ED): Performed by: INTERNAL MEDICINE

## 2024-05-18 PROCEDURE — 2500000001 HC RX 250 WO HCPCS SELF ADMINISTERED DRUGS (ALT 637 FOR MEDICARE OP): Performed by: INTERNAL MEDICINE

## 2024-05-18 RX ORDER — ONDANSETRON 4 MG/1
4 TABLET, FILM COATED ORAL EVERY 8 HOURS PRN
Qty: 30 TABLET | Refills: 0 | Status: SHIPPED | OUTPATIENT
Start: 2024-05-18 | End: 2024-06-02 | Stop reason: WASHOUT

## 2024-05-18 RX ORDER — PANTOPRAZOLE SODIUM 40 MG/1
40 TABLET, DELAYED RELEASE ORAL
Qty: 30 TABLET | Refills: 1 | Status: SHIPPED | OUTPATIENT
Start: 2024-05-19 | End: 2024-05-29

## 2024-05-18 RX ORDER — OXYCODONE AND ACETAMINOPHEN 5; 325 MG/1; MG/1
1 TABLET ORAL EVERY 6 HOURS PRN
Qty: 20 TABLET | Refills: 0 | Status: SHIPPED | OUTPATIENT
Start: 2024-05-18 | End: 2024-06-02 | Stop reason: WASHOUT

## 2024-05-18 RX ADMIN — PANTOPRAZOLE SODIUM 40 MG: 40 TABLET, DELAYED RELEASE ORAL at 07:14

## 2024-05-18 RX ADMIN — IOHEXOL 75 ML: 350 INJECTION, SOLUTION INTRAVENOUS at 09:33

## 2024-05-18 RX ADMIN — ONDANSETRON 4 MG: 2 INJECTION INTRAMUSCULAR; INTRAVENOUS at 07:18

## 2024-05-18 RX ADMIN — INSULIN LISPRO 2 UNITS: 100 INJECTION, SOLUTION INTRAVENOUS; SUBCUTANEOUS at 08:36

## 2024-05-18 RX ADMIN — Medication 5000 UNITS: at 08:36

## 2024-05-18 RX ADMIN — HYDROMORPHONE HYDROCHLORIDE 0.2 MG: 0.2 INJECTION, SOLUTION INTRAMUSCULAR; INTRAVENOUS; SUBCUTANEOUS at 07:18

## 2024-05-18 RX ADMIN — LISINOPRIL 5 MG: 5 TABLET ORAL at 08:36

## 2024-05-18 ASSESSMENT — PAIN SCALES - GENERAL
PAINLEVEL_OUTOF10: 0 - NO PAIN
PAINLEVEL_OUTOF10: 5 - MODERATE PAIN
PAINLEVEL_OUTOF10: 1

## 2024-05-18 ASSESSMENT — PAIN DESCRIPTION - DESCRIPTORS: DESCRIPTORS: ACHING

## 2024-05-18 ASSESSMENT — PAIN SCALES - PAIN ASSESSMENT IN ADVANCED DEMENTIA (PAINAD): TOTALSCORE: MEDICATION (SEE MAR)

## 2024-05-18 ASSESSMENT — PAIN - FUNCTIONAL ASSESSMENT
PAIN_FUNCTIONAL_ASSESSMENT: 0-10

## 2024-05-18 NOTE — CARE PLAN
The patient's goals for the shift include      The clinical goals for the shift include pain control    Over the shift, the patient did not make progress toward the following goals. Barriers to progression include . Recommendations to address these barriers include   Problem: Pain  Goal: My pain/discomfort is manageable  Outcome: Progressing     Problem: Safety  Goal: Patient will be injury free during hospitalization  Outcome: Progressing  Goal: I will remain free of falls  Outcome: Progressing     Problem: Daily Care  Goal: Daily care needs are met  Outcome: Progressing     Problem: Psychosocial Needs  Goal: Demonstrates ability to cope with hospitalization/illness  Outcome: Progressing  Goal: Collaborate with me, my family, and caregiver to identify my specific goals  Outcome: Progressing     Problem: Discharge Barriers  Goal: My discharge needs are met  Outcome: Progressing   .

## 2024-05-18 NOTE — PROGRESS NOTES
Nate Alvarez is a 53 y.o. male on day 2 of admission presenting with Pancreatic duct dilated ([facility]).      Subjective   Patient was seen and examined at bedside this morning, stated that he had uneventful night, denies fever, chills, nausea, vomiting, chest pain or shortness of breath.  Patient was awaiting results of MRCP which came related after my rounds, and agreed with GI to discuss finding of suspected pancreatic neoplasm.       Objective     Last Recorded Vitals  /86   Pulse 82   Temp 36.3 °C (97.4 °F) (Oral)   Resp 18   Wt 102 kg (224 lb 13.9 oz)   SpO2 98%   Intake/Output last 3 Shifts:    Intake/Output Summary (Last 24 hours) at 5/17/2024 2030  Last data filed at 5/17/2024 1639  Gross per 24 hour   Intake 610 ml   Output --   Net 610 ml       Admission Weight  Weight: 102 kg (225 lb) (05/15/24 1812)    Daily Weight  05/17/24 : 102 kg (224 lb 13.9 oz)    Image Results  Endoscopic Ultrasound (Upper)  Table formatting from the original result was not included.  Impression  Limited endoscopic views of the esophagus, stomach and duodenum were   obtained. There was heme and gastritis noted in the stomach. The rest of   the exam was unremarkable   A 27 mm by 23 mm irregular and hypoechoic T2N0 mass with poorly defined   margins was visualized in the uncinate process of the head with apparent   involvement of the superior mesenteric vein; fine needle biopsy was   performed, final pathology is pending but preliminary evaluation was   positive for malignancy. The pancreas upstream to the mass appeared   atrophic with dilated pancreatic duct.   The bile duct appeared normal.  Visualized portions of the liver, spleen, left adrenal gland, left kidney   and celiac axis were normal on ultrasound examination.    No abnormal-appearing lymph nodes were identified in the abdomen.     Findings  Limited endoscopic views of the esophagus, stomach and duodenum were   obtained. There was heme and gastritis noted  in the stomach. The rest of   the exam was unremarkable with the echoendoscope. Of note, there was no   obvious duodenal obstruction in the distal descending duodenum.  An irregular and hypoechoic T2N0 mass measuring 27 mm x 23 mm with poorly   defined margins was visualized in the pancreas head/uncinate process with   apparent involvement of the superior mesenteric vein; 5 successful fine   needle biopsy passes were taken with a 22 gauge needle using a   transduodenal approach guided by Doppler. An adequate sample was obtained.   Cytology analysis was performed. Onsite cytologist was present and   cytology results were preliminarily malignant  The pancreas upstream to the mass appeared atrophic with dilated   pancreatic duct.   The bile duct appeared normal. The bile duct measured 3 mm at the distal   end and 4 mm at the middle.  Visualized portions of the liver, spleen, left adrenal gland, left kidney   and celiac axis were normal on ultrasound examination.    No abnormal-appearing lymph nodes were identified in the abdomen.     Recommendation   Await pathology results    Follow up with PCP     ---------------------------  - The patient will be observed post-procedure, until all discharge   criteria are met.   - [facility] patient to [facility] for ongoing care.   - Defer resumption of diet and medications to the patient's primary team.   No restrictions from a post-procedural standpoint.  - Observe patient's clinical course following today's procedure with   therapeutic intervention.   - Watch for bleeding, perforation, and infection.   - Follow-up pathology results.   - Refer to Surg Onc and Med Onc.   - The findings and recommendations were discussed with the referring   physicians.   - The signs and symptoms of potential delayed complications were discussed   with the patient/team.   - Patient has a contact number available for emergencies.   - Follow up with Dr. Goldberg for ongoing inpatient GI consultation.      Indication  Pancreatic mass ([facility]-H[facility])  -----------------  Nate Alvarez is a 53 y.o. male with fairly new diagnosis of   uncontrolled diabetes presenting for fatigue x 2 weeks and significant wt   loss over the past 6 months. MRCP concerning for pancreatic neoplasm.    LFTs normal and no biliary dilation at this time. Plan for EUS    Staff  Staff Role   Angelic Ledesma MD Proceduralist     Medications  See Anesthesia Record.     Preprocedure  A history and physical has been performed, and patient medication   allergies have been reviewed. The patient's tolerance of previous   anesthesia has been reviewed. The risks and benefits of the procedure and   the sedation options and risks were discussed with the patient. All   questions were answered and informed consent obtained.    Details of the Procedure  The patient underwent monitored anesthesia care, which was administered by   an anesthesia professional. The patient's blood pressure, heart rate,   level of consciousness, oxygen, respirations, ECG and ETCO2 were monitored   throughout the procedure. The linear scope was introduced through the   mouth and advanced to the second part of the duodenum. The patient's   estimated blood loss was minimal (<5 mL). The procedure was not difficult.   The patient tolerated the procedure well. There were no apparent adverse   events.     Events  Procedure Events   Event Event Time   ENDO SCOPE IN TIME 5/17/2024  4:06 PM   ENDO SCOPE OUT TIME 5/17/2024  4:35 PM     Specimens  ID Type Source Tests Collected by Time   1 : PANCREAS HEAD Non-Gynecologic Cytology PANCREAS MASS FINE NEEDLE   ASPIRATION CYTOLOGY CONSULTATION (NON-GYNECOLOGIC) Abigail Hankins RN   5/17/2024 1617   2 : PANCREAS HEAD Tissue PANCREAS BIOPSY SURGICAL PATHOLOGY EXAM Abigail Hankins RN 5/17/2024 1633     Procedure Location  [facility] [address]  [address] [id number] [phone]    Referring  Provider  MD Myesha Sung, APRN-CNP   Robby Goldberg MD     Procedure Provider  Angelic Ledesma MD  ECG 12 lead  See ED provider note for full interpretation and clinical correlation  Normal sinus rhythm  Anterior infarct , age undetermined  Abnormal ECG  When compared with ECG of 10-MAY-2024 12:29,  PREVIOUS ECG IS PRESENT  Confirmed by Mariposa Quintanilla ([id number]) on 5/17/2024 10:35:35 AM  MR abdomen w and wo IV contrast MRCP  Narrative: Interpreted By:  Cipriano Moura,   STUDY:  MR ABDOMEN W AND WO IV CONTRAST MRCP;  5/16/2024 7:32 pm      INDICATION:  Signs/Symptoms:Dilated pancreatic duct and pancreatic edema, please  do it with pancreatic protocol.      COMPARISON:  Abdomen CT scan dated 09/15/2016. Abdomen CT scan dated 05/15/2024.      ACCESSION NUMBER(S):  [id number]      ORDERING CLINICIAN:  MYESHA CUMMINGS      TECHNIQUE:  MRI PANCREAS; Multiplanar magnetic resonance images of the abdomen  were obtained including the following sequences; T2-weighted SSFSE  with and without fat saturation, T1-weighted GRE in/opposed phase,  DWI, fat saturated 3D-T1w GRE pre and dynamically post contrast.  Radial thick slab T2w RARE MRCP and coronally reconstructed navigator  gated high resolution 3-D T2w RESTORE MRCP with MIP reconstruction  were also performed for MRCP.  20 ml of  Gadolinium contrast agent  Dotarem were administered intravenously without immediate  complication.      FINDINGS:  LIVER:  Normal size. Normal contour. Hepatic steatosis. Hepatic dome segment  4A T2 hyperintense focus measuring 0.7 cm with suggestion of possible  delayed intrinsic enhancement (series [id number], image 9) suggesting  hemangioma.      BILE DUCTS:  No intrahepatic or extrahepatic bile duct dilatation is demonstrated.      GALLBLADDER:  Within normal limits.      PANCREAS:  Hypoenhancing mass in the pancreatic head/uncinate process measuring  2.9 x 2.5 cm (series [id number], image 44) in the region with  pancreatic  duct caliber change with upstream pancreatic duct dilatation  measuring up to 0.7 cm and parenchymal atrophy. The mass has 180  degree contact with the adjacent superior mesenteric vein and almost  encasing the 1st branch. The mass is abutting and in contact with the  adjacent 3rd part of the duodenal but no upstream dilatation. The  mass is likely encasing the gastroduodenal artery. The superior  mesenteric artery, celiac trunk and main portal vein appear intact.  Minimal peripancreatic fat stranding and inflammatory changes likely  related to secondary superimposed pancreatitis.      SPLEEN:  Within normal limits.      ADRENAL GLANDS:  Within normal limits.      KIDNEYS:  No hydronephrosis. Left cortical simple cysts.      LYMPH NODES:  Few peripancreatic tiny lymph nodes noted largest measures 0.9 cm  (series [id number], image 36).      ABDOMINAL VESSELS:  No aneurysmal dilatation of the abdominal aorta. The relation of the  pancreatic mass with the adjacent vasculature as detailed above.      BOWEL:  No bowel dilatation.      PERITONEUM/RETROPERITONEUM:  Trace peripancreatic free fluid.      BONES AND LOWER THORAX:  Mild multilevel degenerative spine changes and facet joint disease.      Impression: 1. Pancreatic head/uncinate process mass measuring 2.9 x 2.5 cm with  upstream pancreatic duct dilatation and parenchymal atrophy highly  concerning for primary pancreatic neoplasm (specifically  adenocarcinoma). Surrounding changes of mild superimposed  pancreatitis. The mass is in contact with the adjacent 3rd part of  the duodenum but no upstream dilatation. No biliary obstruction. The  mass has 180 degree contact with the adjacent superior mesenteric  vein and likely encasing the 1st right lateral branch. The mass is  likely encasing the gastroduodenal artery distal component. The  celiac axis, superior mesenteric artery and main portal vein are  intact.  2. Few subcentimeter peripancreatic indeterminate  lymph nodes noted.  3. Hepatic steatosis with segment 4A lesion measuring 0.7 cm with  imaging characteristics favoring hemangioma .      Recommendation:  Hepatobiliary surgery consult and accordingly EUS to be considered.      MACRO:  None      Signed by: Cipriano Moura 5/17/2024 8:38 AM  Dictation workstation:   [id number]      Physical Exam  Constitutional: Well-developed gentleman, alert active, cooperative not in acute distress  Eyes: PERRLA, clear sclera  ENMT: Moist mucosal membranes, no exudate  Head / Neck: Atraumatic, normocephalic, supple neck, JVP not visualized  Lungs: Patent airways, CTABL  Heart: RRR, S1S2, no murmurs appreciated, palpable pulses in all extremities  GI: Soft, NT, ND, bowel sounds present in all quadrants  MSK: Moves all extremities freely, no restriction  of ROM, no joint edema  Extremities: Intact x 4, no peripheral edema  : No Johansen catheter inserted  Breast: Deferred  Neurological: AAO x 3 to person, place and date, facial muscles symmetrical, sensation intact, strength 4/4, no acute focal neurological deficits appreciated  Psychological: Appropriate mood and behavior    Relevant Results           Scheduled medications  cholecalciferol, 5,000 Units, oral, Daily  enoxaparin, 40 mg, subcutaneous, q24h  insulin regular, 0-10 Units, subcutaneous, q4h  lisinopril, 5 mg, oral, Daily  pantoprazole, 40 mg, oral, Daily before breakfast      Continuous medications     PRN medications  PRN medications: acetaminophen **OR** acetaminophen **OR** acetaminophen, dextrose, dextrose, glucagon, glucagon, HYDROmorphone, HYDROmorphone, ondansetron **OR** ondansetron      Assessment/Plan      53 y.o. male with a past medical history with a past medical history of GERD, and type 2 diabetes mellitus presents to [facility] ER complaining of abdominal pain, nausea, bloating diarrhea over the past 3 weeks .over the past 7 months since he has been taking a GLP-1 agonist he has had a  70 pound  weight loss.  More recently has had bright red blood per rectum.  He denies any fever or chills shortness of breath chest pain.  CT scan of the abdomen and pelvis showed pancreatic edema and dilatation of pancreatic duct concerning for possible pancreatitis although his lipase is normal.   MRCP shows finding concerning with pancreatic neoplasm      Principal Problem:    Pancreatic duct dilated ([facility]-HCC)    Rectal bleeding  Protonix 40 mg IV every 12  Transfuse to keep hemoglobin greater than 7.0  GI consultation: Assessment and plan as below  EUS with bx today. Will need heme-onc and hepatobiliary surgery follow up outpatient   -no endoscopy at this time. Scheduled for colonscopy with Dr. Monahan next week, can have EGD at that time if necessary. Follow up  outpatient    -continue PPI PO daily  Plan colonoscopy next week with Dr. Monahan     Weight loss: CT scan shows pancreatic edema  -DC GLP-1 for now, although unlikely medication induced pancreatitis given clinical presentation  -MRCP shows: Hypoenhancing mass in the pancreatic head/uncinate process measuring 2.9 x 2.5 cm (series [id number], image 44) in the region with pancreatic duct caliber change with upstream pancreatic duct dilatation  measuring up to 0.7 cm and parenchymal atrophy  -Consulted advanced GI: Appreciate management recommendations    - Pain control: Dilaudid 24 mg every 4 hours as needed severe pain, Dilaudid 0.2 mg every 4 hours as needed moderate pain  -Zofran 4 mg every 8 hours IV push as needed nausea     Type 2 diabetes mellitus: Uncontrolled  - Humalog per corrective scale     Hypertension  -Lisinopril 5 mg orally    Diet  - Regular     DVT prophylaxis  No heparin or Lovenox at this time due to GI bleeding  SCDs     Disposition: Presenting with abdominal pain, bright red blood per rectum, and uncontrolled diabetes, MRCP shows finding concerning with neoplasm of the pancreas, need further management, discharge pending final GI  recommendations.      I spent 40 minutes in the professional and overall care of this patient.              Kadeem Haynes, DO

## 2024-05-18 NOTE — DISCHARGE SUMMARY
Discharge Diagnosis  Pancreatic duct dilated ([facility]-HCC)      Discharge Meds     Your medication list        START taking these medications        Instructions Last Dose Given Next Dose Due   FreeStyle Jef 3 [place] misc  Generic drug: blood-glucose meter,continuous      Use with sensors as directed       oxyCODONE-acetaminophen 5-325 mg tablet  Commonly known as: Percocet      Take 1 tablet by mouth every 6 hours if needed for moderate pain (4 - 6) or severe pain (7 - 10).       pantoprazole 40 mg EC tablet  Commonly known as: ProtoNix  Start taking on: May 19, 2024      Take 1 tablet (40 mg) by mouth once daily in the morning. Take before meals. Do not crush, chew, or split.              CHANGE how you take these medications        Instructions Last Dose Given Next Dose Due   ondansetron 4 mg tablet  Commonly known as: Zofran  What changed: Another medication with the same name was added. Make sure you understand how and when to take each.      Take 1 tablet (4 mg) by mouth every 8 hours if needed for nausea or vomiting for up to 7 days.       ondansetron 4 mg tablet  Commonly known as: Zofran  What changed: You were already taking a medication with the same name, and this prescription was added. Make sure you understand how and when to take each.      Take 1 tablet (4 mg) by mouth every 8 hours if needed for nausea or vomiting.              CONTINUE taking these medications        Instructions Last Dose Given Next Dose Due   Blood Glucose Test strip  Generic drug: blood sugar diagnostic      Check blood sugars once daily       blood-glucose meter misc      Check  blood sugar as needed       cholecalciferol 125 MCG (5000 UT) capsule  Commonly known as: Vitamin D-3      Take 1 capsule (125 mcg) by mouth once daily.       Farxiga 5 mg  Generic drug: dapagliflozin propanediol      Take 1 tablet (5 mg) by mouth once daily for 14 days.       Farxiga 10 mg  Generic drug: dapagliflozin propanediol      Take 1 tablet (10  "mg) by mouth once daily.       FreeStyle Jef 3 Sensor device  Generic drug: blood-glucose sensor      Use as directed       insulin lispro 100 unit/mL injection  Commonly known as: HumaLOG           lancets misc      Check blood sugars once daily       Lantus U-100 Insulin 100 unit/mL injection  Generic drug: insulin glargine           lisinopril 5 mg tablet      Take 1 tablet (5 mg) by mouth once daily.       pen needle, diabetic 31 gauge x 5/16\" needle  Commonly known as: BD Ultra-Fine Short Pen Needle      Use four times daily with insulin              STOP taking these medications      Mounjaro 2.5 mg/0.5 mL pen injector  Generic drug: tirzepatide        Mounjaro 5 mg/0.5 mL pen injector  Generic drug: tirzepatide        Mounjaro 7.5 mg/0.5 mL pen injector  Generic drug: tirzepatide                  Where to Get Your Medications        These medications were sent to [id number] - [address] [address]      Phone: [phone]   ondansetron 4 mg tablet  oxyCODONE-acetaminophen 5-325 mg tablet  pantoprazole 40 mg EC tablet       These medications were sent to [facility]  [address], [facility] [address] [address]      Hours: 8 AM to 6 PM Mon-Fri, 9 AM to 1 PM Saturday Phone: [phone]   FreeStyle Jef 3 [place] misc         Test Results Pending At Discharge  Pending Labs       Order Current Status    Cytology Consultation (Non-Gynecologic) Collected (05/17/24 1618)    Surgical Pathology Exam Collected (05/17/24 1633)    Insulin, free In process            Hospital Course   Admitted for brbpr. Found to have a pancreatic mass suspicious for cancer. Underwent EUS with biopsy. H/h stable. Discussed with Dr Ledesma who has already requested f/up with Dr Gamble. She recommends CT chest prior to dc for staging which has been ordered. Also added on CEA and Ca 19-9.     Pt clinically and hemodynamically stable, tolerating PO " "intake and room air.   Instructed to F/U with PCP within 5 days.   He understands and agrees with the dc plan.     More than 30 min spent on dc.       Pertinent Physical Exam At Time of Discharge  Physical Exam  Constitutional:       Appearance: Normal appearance.   Cardiovascular:      Rate and Rhythm: Normal rate and regular rhythm.   Pulmonary:      Effort: Pulmonary effort is normal.      Breath sounds: Normal breath sounds.   Abdominal:      General: Abdomen is flat. Bowel sounds are normal.      Palpations: Abdomen is soft.   Musculoskeletal:      Cervical back: Normal range of motion.   Skin:     General: Skin is warm.   Neurological:      General: No focal deficit present.      Mental Status: He is alert and oriented to person, place, and time. Mental status is at baseline.   Psychiatric:         Mood and Affect: Mood normal.         Behavior: Behavior normal.         Thought Content: Thought content normal.         Judgment: Judgment normal.     /89   Pulse 98   Temp 36.7 °C (98 °F) (Temporal)   Resp 18   Ht 1.91 m (6' 3.2\")   Wt 102 kg (224 lb 13.9 oz)   SpO2 98%   BMI 27.96 kg/m²       Outpatient Follow-Up  Future Appointments   Date Time Provider Department Center   5/22/2024 11:45 AM Leno Monahan MD [id number] [facility]   5/24/2024 11:15 AM MD VINNY [id number] [facility]   7/11/2024  2:30 PM MD VINNY SungBroadPC1 [facility]   11/5/2024  2:45 PM Marya Vasquez MD [id number] [facility]   4/17/2025  1:00 PM MD Cindy SungadPC1 [facility]         Emery Paez MD  "

## 2024-05-19 LAB
CANCER AG19-9 SERPL-ACNC: >700 U/ML
CEA SERPL-MCNC: 4.9 UG/L

## 2024-05-20 ENCOUNTER — PATIENT OUTREACH (OUTPATIENT)
Dept: PRIMARY CARE | Facility: CLINIC | Age: 54
End: 2024-05-20
Payer: COMMERCIAL

## 2024-05-20 LAB
INSULIN FREE SERPL-ACNC: 9 UIU/ML (ref 3–25)
INSULIN SERPL-ACNC: 11 UIU/ML (ref 3–25)

## 2024-05-20 ASSESSMENT — PAIN SCALES - GENERAL: PAINLEVEL_OUTOF10: 0 - NO PAIN

## 2024-05-20 NOTE — PROGRESS NOTES
No contact on first discharge outreach attempt. Unable to leave message. Will attempt outreach again at later time.

## 2024-05-20 NOTE — PROGRESS NOTES
Discharge Facility: [facility]  Discharge Diagnosis: Dilation of Pancreatic Duct  Admission Date: 15 May 24  Discharge Date: 18 May 24    PCP Appointment Date: 24 May 24  Specialist Appointment Date: 22 May 24 ([facility])  Hospital Encounter and Summary: Linked    See discharge assessment below for further details     Engagement  Call Start Time: 0905 (5/20/2024  9:15 AM)    Medications  Medications reviewed with patient/caregiver?: Yes (5/20/2024  9:15 AM)  Is the patient having any side effects they believe may be caused by any medication additions or changes?: No (5/20/2024  9:15 AM)  Does the patient have all medications ordered at discharge?: Yes (5/20/2024  9:15 AM)  Prescription Comments: None (5/20/2024  9:15 AM)  Is the patient taking all medications as directed (includes completed medication regime)?: Yes (5/20/2024  9:15 AM)  Medication Comments: None (5/20/2024  9:15 AM)    Appointments  Does the patient have a primary care provider?: Yes (follow up set by another for 24 May 24) (5/20/2024  9:15 AM)  Care Management Interventions: Verified appointment date/time/provider (5/20/2024  9:15 AM)  Has the patient kept scheduled appointments due by today?: Yes (5/20/2024  9:15 AM)  Care Management Interventions: Advised patient to keep appointment (5/20/2024  9:15 AM)    Self Management  What is the home health agency?: N/A (5/20/2024  9:15 AM)  Has home health visited the patient within 72 hours of discharge?: Not applicable (5/20/2024  9:15 AM)  What Durable Medical Equipment (DME) was ordered?: N/A (5/20/2024  9:15 AM)    Patient Teaching  Care Management Interventions: Reviewed instructions with patient (5/20/2024  9:15 AM)  What is the patient's perception of their health status since discharge?: Improving (5/20/2024  9:15 AM)  Is the patient/caregiver able to teach back the hierarchy of who to call/visit for symptoms/problems? PCP, Specialist, Home Health nurse, Urgent Care, ED, 911: Yes (5/20/2024  9:15  AM)  Patient/Caregiver Education Comments: None (5/20/2024  9:15 AM)    Wrap Up  Wrap Up Additional Comments: None (5/20/2024  9:15 AM)  Call End Time: 0915 (5/20/2024  9:15 AM)     Pt grateful for outreach call and is agreeable to being contacted after PCP appt to see how they are doing.

## 2024-05-21 PROBLEM — C25.0 MALIGNANT NEOPLASM OF HEAD OF PANCREAS (MULTI): Status: ACTIVE | Noted: 2024-05-21

## 2024-05-21 NOTE — PROGRESS NOTES
Chief Complaint:  PDAC    HPI:  52 yo man from [place] with many chart entries in early May for difficult DM control (A1c 9.1 this month)and some ED visits for fatigue/weakness.  Most recently at [facility] May 16-18.  70 lb wt loss mentioned (note some of the new DM meds)    CEA 4.9  19-9  > 700    CT A/P:  IMPRESSION:  Edemasurrounding the pancreatic tail compatible with acute  pancreatitis. Dilatation of the pancreatic duct which measures 6 mm  in diameter and an ill marginated area of hypodensity in the  pancreatic head highly concerning for pancreatic malignancy, and  pancreatic protocol MRI is recommended for further evaluation. An  area of necrotizing pancreatitis/pancreatic necrosis is other  consideration, however there is no surrounding edema in this region  and given the ductal dilatation, findings highly concerning for an  underlying malignancy.  Small nonobstructive left renal calculi.    MRI:  IMPRESSION:  1. Pancreatic head/uncinate process mass measuring 2.9 x 2.5 cm with  upstream pancreatic duct dilatation and parenchymal atrophy highly  concerning for primary pancreatic neoplasm (specifically  adenocarcinoma). Surrounding changes of mild superimposed  pancreatitis. The mass is in contact with the adjacent 3rd part of  the duodenum but no upstream dilatation. No biliary obstruction. The  mass has 180 degree contact with the adjacent superior mesenteric  vein and likely encasing the 1st right lateral branch. The mass is  likely encasing the gastroduodenal artery distal component. The  celiac axis, superior mesenteric artery and main portal vein are  intact.  2. Few subcentimeter peripancreatic indeterminate lymph nodes noted.  3. Hepatic steatosis with segment 4A lesion measuring 0.7 cm with  imaging characteristics favoring hemangioma .    Chest CT:  IMPRESSION:  1. No suspicious pulmonary nodules. Few small calcific granulomas  noted in the lingula.  2. No enlarged intrathoracic  lymphadenopathy.  3. Left thyroid nodule measuring 1.1 cm. This could be further  assessed by dedicated nonemergent thyroid ultrasound if clinically  desired.  4. Subdiaphragmatic findings regarding the known pancreatic mass and  duct dilatation as well as the hepatic observations are better  evaluated in prior MRI dated 05/16/2024    EUS:    Result Text   Impression  Limited endoscopic views of the esophagus, stomach and duodenum were obtained. There was heme and gastritis noted in the stomach. The rest of the exam was unremarkable   A 27 mm by 23 mm irregular and hypoechoic T2N0 mass with poorly defined margins was visualized in the uncinate process of the head with apparent involvement of the superior mesenteric vein; fine needle biopsy was performed, final pathology is pending but preliminary evaluation was positive for malignancy. The pancreas upstream to the mass appeared atrophic with dilated pancreatic duct.   The bile duct appeared normal.  Visualized portions of the liver, spleen, left adrenal gland, left kidney and celiac axis were normal on ultrasound examination.    No abnormal-appearing lymph nodes were identified in the abdomen.         FINAL DIAGNOSIS   A. PANCREAS HEAD MASS, BIOPSY:     -- Invasive adenocarcinoma, moderately differentiated     ----------    This gentleman is having some back pain for which Percocet is providing relief.  He was given that in the hospital.  There is no vomiting.  He did also note during his hospitalization some blood in his stool.  It does not look like his hemoglobin dropped.  I do not see that he got a formal upper endoscopy at the time of his endoscopic ultrasound.  This gentleman did have a colonoscopy 4 years ago and was told he has some colon polyps.  He was told he needs 5-year follow-up .  he says that he is occasionally having some blood in his stool.    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 Father and Mtl mother with colon cancer.    PE: Appears his stated age.  He is not jaundiced.  There is no palpable lymphadenopathy.  His breath sounds are clear and his heart is regular.  His abdomen is soft and nontender.  He has 2+ femoral pulses bilaterally.    Data:  To me there is SMV involvement with contour change, no arterial issues.  Borderline resectable.  Reviewed at conference today.  Group agrees.    Impression / Plan:    This gentleman has a newly diagnosed pancreatic cancer which based on cross-sectional imaging is localized but borderline resectable based on his pattern of venous involvement.  His 19-9 is greater than 700.  Without doubt our group would approach him in a neoadjuvant therapy manner.  Given his hemoglobin A1c he is not a candidate for our ivosidenib clinical trial.  We would pursue upfront chemotherapy followed by surgery.  I doubt based on his imaging at this time that we would do radiation after his chemotherapy but that remains to be determined.  I certainly think that he will likely be a FOLFIRINOX candidate.    I need to complete his workup with a diagnostic laparoscopy and placement of a Mediport.  We are doing that on [place] 3.  I discussed the rationale for that and I discussed the procedure with him.  We talked about this being an outpatient procedure.  We talked about the risks including but not being limited to bleeding, hematoma, infection, hernia, injury to visceral structures, and pneumothorax.  He signed electronic informed consent.    He wishes to be seen at [facility] for his medical oncology consultation and I have reached out to Dr. You for his assistance in arranging that.  I have also reached out to Dr. Ledesma, who performed the EUS to inquire about the need for this gentleman to have a formal upper endoscopy and possibly colonoscopy.    I did provide this gentleman a prescription for 20 five milligram oxycodone  tablets for his back pain.    This note has been dictated with voice recognition software and has not been reviewed for grammar or content errors.

## 2024-05-21 NOTE — H&P (VIEW-ONLY)
Chief Complaint:  PDAC    HPI:  54 yo man from [place] with many chart entries in early May for difficult DM control (A1c 9.1 this month)and some ED visits for fatigue/weakness.  Most recently at [facility] May 16-18.  70 lb wt loss mentioned (note some of the new DM meds)    CEA 4.9  19-9  > 700    CT A/P:  IMPRESSION:  Edemasurrounding the pancreatic tail compatible with acute  pancreatitis. Dilatation of the pancreatic duct which measures 6 mm  in diameter and an ill marginated area of hypodensity in the  pancreatic head highly concerning for pancreatic malignancy, and  pancreatic protocol MRI is recommended for further evaluation. An  area of necrotizing pancreatitis/pancreatic necrosis is other  consideration, however there is no surrounding edema in this region  and given the ductal dilatation, findings highly concerning for an  underlying malignancy.  Small nonobstructive left renal calculi.    MRI:  IMPRESSION:  1. Pancreatic head/uncinate process mass measuring 2.9 x 2.5 cm with  upstream pancreatic duct dilatation and parenchymal atrophy highly  concerning for primary pancreatic neoplasm (specifically  adenocarcinoma). Surrounding changes of mild superimposed  pancreatitis. The mass is in contact with the adjacent 3rd part of  the duodenum but no upstream dilatation. No biliary obstruction. The  mass has 180 degree contact with the adjacent superior mesenteric  vein and likely encasing the 1st right lateral branch. The mass is  likely encasing the gastroduodenal artery distal component. The  celiac axis, superior mesenteric artery and main portal vein are  intact.  2. Few subcentimeter peripancreatic indeterminate lymph nodes noted.  3. Hepatic steatosis with segment 4A lesion measuring 0.7 cm with  imaging characteristics favoring hemangioma .    Chest CT:  IMPRESSION:  1. No suspicious pulmonary nodules. Few small calcific granulomas  noted in the lingula.  2. No enlarged intrathoracic  lymphadenopathy.  3. Left thyroid nodule measuring 1.1 cm. This could be further  assessed by dedicated nonemergent thyroid ultrasound if clinically  desired.  4. Subdiaphragmatic findings regarding the known pancreatic mass and  duct dilatation as well as the hepatic observations are better  evaluated in prior MRI dated 05/16/2024    EUS:    Result Text   Impression  Limited endoscopic views of the esophagus, stomach and duodenum were obtained. There was heme and gastritis noted in the stomach. The rest of the exam was unremarkable   A 27 mm by 23 mm irregular and hypoechoic T2N0 mass with poorly defined margins was visualized in the uncinate process of the head with apparent involvement of the superior mesenteric vein; fine needle biopsy was performed, final pathology is pending but preliminary evaluation was positive for malignancy. The pancreas upstream to the mass appeared atrophic with dilated pancreatic duct.   The bile duct appeared normal.  Visualized portions of the liver, spleen, left adrenal gland, left kidney and celiac axis were normal on ultrasound examination.    No abnormal-appearing lymph nodes were identified in the abdomen.         FINAL DIAGNOSIS   A. PANCREAS HEAD MASS, BIOPSY:     -- Invasive adenocarcinoma, moderately differentiated     ----------    This gentleman is having some back pain for which Percocet is providing relief.  He was given that in the hospital.  There is no vomiting.  He did also note during his hospitalization some blood in his stool.  It does not look like his hemoglobin dropped.  I do not see that he got a formal upper endoscopy at the time of his endoscopic ultrasound.  This gentleman did have a colonoscopy 4 years ago and was told he has some colon polyps.  He was told he needs 5-year follow-up .  he says that he is occasionally having some blood in his stool.    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 Father and Mtl mother with colon cancer.    PE: Appears his stated age.  He is not jaundiced.  There is no palpable lymphadenopathy.  His breath sounds are clear and his heart is regular.  His abdomen is soft and nontender.  He has 2+ femoral pulses bilaterally.    Data:  To me there is SMV involvement with contour change, no arterial issues.  Borderline resectable.  Reviewed at conference today.  Group agrees.    Impression / Plan:    This gentleman has a newly diagnosed pancreatic cancer which based on cross-sectional imaging is localized but borderline resectable based on his pattern of venous involvement.  His 19-9 is greater than 700.  Without doubt our group would approach him in a neoadjuvant therapy manner.  Given his hemoglobin A1c he is not a candidate for our ivosidenib clinical trial.  We would pursue upfront chemotherapy followed by surgery.  I doubt based on his imaging at this time that we would do radiation after his chemotherapy but that remains to be determined.  I certainly think that he will likely be a FOLFIRINOX candidate.    I need to complete his workup with a diagnostic laparoscopy and placement of a Mediport.  We are doing that on [place] 3.  I discussed the rationale for that and I discussed the procedure with him.  We talked about this being an outpatient procedure.  We talked about the risks including but not being limited to bleeding, hematoma, infection, hernia, injury to visceral structures, and pneumothorax.  He signed electronic informed consent.    He wishes to be seen at [facility] for his medical oncology consultation and I have reached out to Dr. You for his assistance in arranging that.  I have also reached out to Dr. Ledesma, who performed the EUS to inquire about the need for this gentleman to have a formal upper endoscopy and possibly colonoscopy.    I did provide this gentleman a prescription for 20 five milligram oxycodone  tablets for his back pain.    This note has been dictated with voice recognition software and has not been reviewed for grammar or content errors.

## 2024-05-22 ENCOUNTER — APPOINTMENT (OUTPATIENT)
Dept: GASTROENTEROLOGY | Facility: EXTERNAL LOCATION | Age: 54
End: 2024-05-22
Payer: COMMERCIAL

## 2024-05-22 ENCOUNTER — OFFICE VISIT (OUTPATIENT)
Dept: SURGERY | Facility: CLINIC | Age: 54
End: 2024-05-22
Payer: COMMERCIAL

## 2024-05-22 VITALS
DIASTOLIC BLOOD PRESSURE: 82 MMHG | SYSTOLIC BLOOD PRESSURE: 128 MMHG | HEIGHT: 75 IN | TEMPERATURE: 98 F | WEIGHT: 224 LBS | HEART RATE: 80 BPM | BODY MASS INDEX: 27.85 KG/M2

## 2024-05-22 DIAGNOSIS — C25.0 MALIGNANT NEOPLASM OF HEAD OF PANCREAS (MULTI): Primary | ICD-10-CM

## 2024-05-22 DIAGNOSIS — K86.89 PANCREATIC MASS (HHS-HCC): ICD-10-CM

## 2024-05-22 LAB
ATRIAL RATE: 92 BPM
LABORATORY COMMENT REPORT: NORMAL
LABORATORY COMMENT REPORT: NORMAL
P AXIS: 27 DEGREES
PATH REPORT.FINAL DX SPEC: NORMAL
PATH REPORT.GROSS SPEC: NORMAL
PATH REPORT.INTRAOP OBS SPEC DOC: NORMAL
PATH REPORT.RELEVANT HX SPEC: NORMAL
PATH REPORT.TOTAL CANCER: NORMAL
PR INTERVAL: 186 MS
Q ONSET: 252 MS
QRS COUNT: 15 BEATS
QRS DURATION: 87 MS
QT INTERVAL: 329 MS
QTC CALCULATION(BAZETT): 405 MS
QTC FREDERICIA: 378 MS
R AXIS: -49 DEGREES
T AXIS: 38 DEGREES
T OFFSET: 416 MS
VENTRICULAR RATE: 91 BPM

## 2024-05-22 PROCEDURE — 1036F TOBACCO NON-USER: CPT | Performed by: SURGERY

## 2024-05-22 PROCEDURE — 3079F DIAST BP 80-89 MM HG: CPT | Performed by: SURGERY

## 2024-05-22 PROCEDURE — 3008F BODY MASS INDEX DOCD: CPT | Performed by: SURGERY

## 2024-05-22 PROCEDURE — 3046F HEMOGLOBIN A1C LEVEL >9.0%: CPT | Performed by: SURGERY

## 2024-05-22 PROCEDURE — 3074F SYST BP LT 130 MM HG: CPT | Performed by: SURGERY

## 2024-05-22 PROCEDURE — 99204 OFFICE O/P NEW MOD 45 MIN: CPT | Performed by: SURGERY

## 2024-05-22 PROCEDURE — 3048F LDL-C <100 MG/DL: CPT | Performed by: SURGERY

## 2024-05-22 PROCEDURE — 4010F ACE/ARB THERAPY RXD/TAKEN: CPT | Performed by: SURGERY

## 2024-05-22 RX ORDER — OXYCODONE AND ACETAMINOPHEN 5; 325 MG/1; MG/1
1 TABLET ORAL EVERY 6 HOURS PRN
Qty: 20 TABLET | Refills: 0 | Status: CANCELLED | OUTPATIENT
Start: 2024-05-22 | End: 2024-06-01

## 2024-05-22 RX ORDER — OXYCODONE HYDROCHLORIDE 5 MG/1
5 TABLET ORAL EVERY 6 HOURS PRN
Qty: 20 TABLET | Refills: 0 | Status: SHIPPED | OUTPATIENT
Start: 2024-05-22 | End: 2024-05-28

## 2024-05-23 ENCOUNTER — TELEPHONE (OUTPATIENT)
Dept: HEMATOLOGY/ONCOLOGY | Facility: CLINIC | Age: 54
End: 2024-05-23
Payer: COMMERCIAL

## 2024-05-23 LAB
LAB AP ASR DISCLAIMER: NORMAL
LABORATORY COMMENT REPORT: NORMAL
PATH REPORT.ADDENDUM SPEC: NORMAL
PATH REPORT.FINAL DX SPEC: NORMAL
PATH REPORT.GROSS SPEC: NORMAL
PATH REPORT.TOTAL CANCER: NORMAL

## 2024-05-23 NOTE — TELEPHONE ENCOUNTER
Spoke with  Antonio about getting him scheduled May 28th at 3 pm for a new patient visit with Dr You. Patient confirmed that [address] is a good location and that time would work. He knows where [address] is and I directed him to [address].   MD alexander Lynch RN

## 2024-05-24 ENCOUNTER — TELEMEDICINE (OUTPATIENT)
Dept: PRIMARY CARE | Facility: CLINIC | Age: 54
End: 2024-05-24
Payer: COMMERCIAL

## 2024-05-24 DIAGNOSIS — C25.0 MALIGNANT NEOPLASM OF HEAD OF PANCREAS (MULTI): ICD-10-CM

## 2024-05-24 DIAGNOSIS — Z12.11 COLON CANCER SCREENING: ICD-10-CM

## 2024-05-24 DIAGNOSIS — E11.65 TYPE 2 DIABETES MELLITUS WITH HYPERGLYCEMIA, WITH LONG-TERM CURRENT USE OF INSULIN (MULTI): Primary | ICD-10-CM

## 2024-05-24 DIAGNOSIS — Z79.4 TYPE 2 DIABETES MELLITUS WITH HYPERGLYCEMIA, WITH LONG-TERM CURRENT USE OF INSULIN (MULTI): Primary | ICD-10-CM

## 2024-05-24 PROCEDURE — 3046F HEMOGLOBIN A1C LEVEL >9.0%: CPT | Performed by: FAMILY MEDICINE

## 2024-05-24 PROCEDURE — 99496 TRANSJ CARE MGMT HIGH F2F 7D: CPT | Performed by: FAMILY MEDICINE

## 2024-05-24 PROCEDURE — 3048F LDL-C <100 MG/DL: CPT | Performed by: FAMILY MEDICINE

## 2024-05-24 PROCEDURE — 4010F ACE/ARB THERAPY RXD/TAKEN: CPT | Performed by: FAMILY MEDICINE

## 2024-05-24 PROCEDURE — 3008F BODY MASS INDEX DOCD: CPT | Performed by: FAMILY MEDICINE

## 2024-05-24 RX ORDER — INSULIN GLARGINE 100 [IU]/ML
20 INJECTION, SOLUTION SUBCUTANEOUS EVERY 24 HOURS
Qty: 18 ML | Refills: 1 | Status: SHIPPED | OUTPATIENT
Start: 2024-05-24 | End: 2024-11-20

## 2024-05-24 RX ORDER — POLYETHYLENE GLYCOL 3350, SODIUM SULFATE ANHYDROUS, SODIUM BICARBONATE, SODIUM CHLORIDE, POTASSIUM CHLORIDE 236; 22.74; 6.74; 5.86; 2.97 G/4L; G/4L; G/4L; G/4L; G/4L
POWDER, FOR SOLUTION ORAL
Qty: 4000 ML | Refills: 0 | Status: SHIPPED | OUTPATIENT
Start: 2024-05-24 | End: 2024-06-02 | Stop reason: WASHOUT

## 2024-05-24 RX ORDER — INSULIN LISPRO 100 [IU]/ML
50 INJECTION, SOLUTION INTRAVENOUS; SUBCUTANEOUS
Qty: 150 ML | Refills: 1 | Status: SHIPPED | OUTPATIENT
Start: 2024-05-24 | End: 2024-11-20

## 2024-05-24 NOTE — PROGRESS NOTES
"Patient: Nate Alvarez  : 1970  PCP: Hazel Medeiros MD  MRN: [id number]  Program: Transitional Care Management  Status: Enrolled  Effective Dates: 2024 - present  Responsible Staff: Douglas Vasquez RN  Social Determinants to be Addressed: No information to display      Virtual or Telephone Consent    An interactive audio and video telecommunication system which permits real time communications between the patient (at the originating site) and provider (at the distant site) was utilized to provide this telehealth service.   Verbal consent was requested and obtained from Nate Alvarez on this date, 24 for a telehealth visit.       Nate Alvarez is a 53 y.o. male presenting today for follow-up after being discharged from the hospital 6 days ago. The main problem requiring admission was pancreatic cancer. The discharge summary and/or Transitional Care Management documentation was reviewed. Medication reconciliation was performed as indicated via the \"[place] as Reviewed\" timestamp.     Nate Alvarez was contacted by Transitional Care Management services two days after his discharge. This encounter and supporting documentation was reviewed.    Admitted 5/15/24 through 24. Presented with bright red blood per rectum. Found to have a pancreatic mass suspicious for cancer. Underwent EUS with biopsy which did confirm cancer. CA 19-9 very elevated. CEA normal. Dr Ledesma who has already requested follow up with Dr Gamble.     He states he is feeling a little better. Less fatigue than he had been experiencing. Is having some abdominal pain.      === 05/15/24 ===    MR ABDOMEN W AND WO IV CONTRAST MRCP    - Impression -  1. Pancreatic head/uncinate process mass measuring 2.9 x 2.5 cm with  upstream pancreatic duct dilatation and parenchymal atrophy highly  concerning for primary pancreatic neoplasm (specifically  adenocarcinoma). Surrounding changes of mild superimposed  pancreatitis. The mass is " in contact with the adjacent 3rd part of  the duodenum but no upstream dilatation. No biliary obstruction. The  mass has 180 degree contact with the adjacent superior mesenteric  vein and likely encasing the 1st right lateral branch. The mass is  likely encasing the gastroduodenal artery distal component. The  celiac axis, superior mesenteric artery and main portal vein are  intact.  2. Few subcentimeter peripancreatic indeterminate lymph nodes noted.  3. Hepatic steatosis with segment 4A lesion measuring 0.7 cm with  imaging characteristics favoring hemangioma .    Recommendation:  Hepatobiliary surgery consult and accordingly EUS to be considered.    MACRO:  None    Signed by: Cipriano Moura 5/17/2024 8:38 AM  Dictation workstation:   [id number]     === 05/15/24 ===    CT CHEST W IV CONTRAST    - Impression -  1. No suspicious pulmonary nodules. Few small calcific granulomas  noted in the lingula.  2. No enlarged intrathoracic lymphadenopathy.  3. Left thyroid nodule measuring 1.1 cm. This could be further  assessed by dedicated nonemergent thyroid ultrasound if clinically  desired.  4. Subdiaphragmatic findings regarding the known pancreatic mass and  duct dilatation as well as the hepatic observations are better  evaluated in prior MRI dated 05/16/2024    Signed by: Cipriano Moura 5/18/2024 11:43 AM  Dictation workstation:   [id number]     Review of Systems   Constitutional:  Negative for chills, diaphoresis, fever and unexpected weight change.   HENT:  Negative for congestion, sinus pressure, sinus pain, sneezing and sore throat.    Respiratory:  Negative for cough, chest tightness, shortness of breath and wheezing.    Cardiovascular:  Negative for chest pain, palpitations and leg swelling.   Gastrointestinal:  Positive for abdominal pain and nausea. Negative for constipation, diarrhea and vomiting.   Endocrine: Negative for cold intolerance, heat intolerance, polydipsia, polyphagia and polyuria.    Genitourinary:  Negative for dysuria, frequency, hematuria and urgency.   Neurological:  Negative for dizziness, syncope, light-headedness, numbness and headaches.   Hematological:  Negative for adenopathy.   Psychiatric/Behavioral:  Negative for confusion and dysphoric mood. The patient is not nervous/anxious.                The complexity of medical decision making for this patient's transitional care is high.    Assessment/Plan   Problem List Items Addressed This Visit             ICD-10-CM    Malignant neoplasm of head of pancreas (Multi) C25.0     - has diagnostic laparoscopy scfheduled with Dr. Gamble on 6/3/24  - has follow up scheduled with oncology          Type 2 diabetes mellitus with hyperglycemia, with long-term current use of insulin (Multi) - Primary E11.65, Z79.4    Relevant Medications    insulin glargine (Lantus U-100 Insulin) 100 unit/mL injection    insulin lispro (HumaLOG) 100 unit/mL injection

## 2024-05-28 ENCOUNTER — DOCUMENTATION (OUTPATIENT)
Dept: CASE MANAGEMENT | Facility: HOSPITAL | Age: 54
End: 2024-05-28
Payer: COMMERCIAL

## 2024-05-28 ENCOUNTER — OFFICE VISIT (OUTPATIENT)
Dept: HEMATOLOGY/ONCOLOGY | Facility: CLINIC | Age: 54
End: 2024-05-28
Payer: COMMERCIAL

## 2024-05-28 ENCOUNTER — ANESTHESIA EVENT (OUTPATIENT)
Dept: GASTROENTEROLOGY | Facility: HOSPITAL | Age: 54
End: 2024-05-28
Payer: COMMERCIAL

## 2024-05-28 VITALS
HEART RATE: 72 BPM | SYSTOLIC BLOOD PRESSURE: 123 MMHG | BODY MASS INDEX: 28.87 KG/M2 | RESPIRATION RATE: 18 BRPM | TEMPERATURE: 96.3 F | OXYGEN SATURATION: 98 % | DIASTOLIC BLOOD PRESSURE: 90 MMHG | WEIGHT: 231 LBS

## 2024-05-28 DIAGNOSIS — C25.0 MALIGNANT NEOPLASM OF HEAD OF PANCREAS (MULTI): Primary | ICD-10-CM

## 2024-05-28 PROCEDURE — 3048F LDL-C <100 MG/DL: CPT | Performed by: INTERNAL MEDICINE

## 2024-05-28 PROCEDURE — 3080F DIAST BP >= 90 MM HG: CPT | Performed by: INTERNAL MEDICINE

## 2024-05-28 PROCEDURE — 3074F SYST BP LT 130 MM HG: CPT | Performed by: INTERNAL MEDICINE

## 2024-05-28 PROCEDURE — 99215 OFFICE O/P EST HI 40 MIN: CPT | Performed by: INTERNAL MEDICINE

## 2024-05-28 PROCEDURE — 1036F TOBACCO NON-USER: CPT | Performed by: INTERNAL MEDICINE

## 2024-05-28 PROCEDURE — 3046F HEMOGLOBIN A1C LEVEL >9.0%: CPT | Performed by: INTERNAL MEDICINE

## 2024-05-28 PROCEDURE — 99205 OFFICE O/P NEW HI 60 MIN: CPT | Performed by: INTERNAL MEDICINE

## 2024-05-28 PROCEDURE — 4010F ACE/ARB THERAPY RXD/TAKEN: CPT | Performed by: INTERNAL MEDICINE

## 2024-05-28 PROCEDURE — 3008F BODY MASS INDEX DOCD: CPT | Performed by: INTERNAL MEDICINE

## 2024-05-28 RX ORDER — PROCHLORPERAZINE MALEATE 10 MG
10 TABLET ORAL EVERY 6 HOURS PRN
Status: CANCELLED | OUTPATIENT
Start: 2024-06-11

## 2024-05-28 RX ORDER — LOPERAMIDE HYDROCHLORIDE 2 MG/1
CAPSULE ORAL
Qty: 100 CAPSULE | Refills: 2 | Status: SHIPPED | OUTPATIENT
Start: 2024-05-28

## 2024-05-28 RX ORDER — EPINEPHRINE 0.3 MG/.3ML
0.3 INJECTION SUBCUTANEOUS EVERY 5 MIN PRN
OUTPATIENT
Start: 2024-06-13

## 2024-05-28 RX ORDER — DIPHENHYDRAMINE HYDROCHLORIDE 50 MG/ML
50 INJECTION INTRAMUSCULAR; INTRAVENOUS AS NEEDED
OUTPATIENT
Start: 2024-06-13

## 2024-05-28 RX ORDER — FAMOTIDINE 10 MG/ML
20 INJECTION INTRAVENOUS ONCE AS NEEDED
Status: CANCELLED | OUTPATIENT
Start: 2024-06-11

## 2024-05-28 RX ORDER — PALONOSETRON 0.05 MG/ML
0.25 INJECTION, SOLUTION INTRAVENOUS ONCE
Status: CANCELLED | OUTPATIENT
Start: 2024-06-11

## 2024-05-28 RX ORDER — ALBUTEROL SULFATE 0.83 MG/ML
3 SOLUTION RESPIRATORY (INHALATION) AS NEEDED
Status: CANCELLED | OUTPATIENT
Start: 2024-06-11

## 2024-05-28 RX ORDER — LORAZEPAM 2 MG/ML
1 INJECTION INTRAMUSCULAR AS NEEDED
Status: CANCELLED | OUTPATIENT
Start: 2024-06-11

## 2024-05-28 RX ORDER — EPINEPHRINE 0.3 MG/.3ML
0.3 INJECTION SUBCUTANEOUS EVERY 5 MIN PRN
Status: CANCELLED | OUTPATIENT
Start: 2024-06-11

## 2024-05-28 RX ORDER — PROCHLORPERAZINE MALEATE 10 MG
10 TABLET ORAL EVERY 6 HOURS PRN
Qty: 30 TABLET | Refills: 5 | Status: SHIPPED | OUTPATIENT
Start: 2024-05-28

## 2024-05-28 RX ORDER — FAMOTIDINE 10 MG/ML
20 INJECTION INTRAVENOUS ONCE AS NEEDED
OUTPATIENT
Start: 2024-06-13

## 2024-05-28 RX ORDER — PROCHLORPERAZINE EDISYLATE 5 MG/ML
10 INJECTION INTRAMUSCULAR; INTRAVENOUS EVERY 6 HOURS PRN
Status: CANCELLED | OUTPATIENT
Start: 2024-06-11

## 2024-05-28 RX ORDER — DIPHENHYDRAMINE HYDROCHLORIDE 50 MG/ML
50 INJECTION INTRAMUSCULAR; INTRAVENOUS AS NEEDED
Status: CANCELLED | OUTPATIENT
Start: 2024-06-11

## 2024-05-28 RX ORDER — ATROPINE SULFATE 0.4 MG/ML
0.4 INJECTION, SOLUTION ENDOTRACHEAL; INTRAMEDULLARY; INTRAMUSCULAR; INTRAVENOUS; SUBCUTANEOUS
Status: CANCELLED | OUTPATIENT
Start: 2024-06-11

## 2024-05-28 RX ORDER — ALBUTEROL SULFATE 0.83 MG/ML
3 SOLUTION RESPIRATORY (INHALATION) AS NEEDED
OUTPATIENT
Start: 2024-06-13

## 2024-05-28 RX ORDER — ONDANSETRON HYDROCHLORIDE 8 MG/1
8 TABLET, FILM COATED ORAL EVERY 8 HOURS PRN
Qty: 30 TABLET | Refills: 5 | Status: SHIPPED | OUTPATIENT
Start: 2024-05-28

## 2024-05-28 RX ORDER — OXYCODONE HYDROCHLORIDE 10 MG/1
10 TABLET ORAL EVERY 6 HOURS PRN
Qty: 90 TABLET | Refills: 0 | Status: SHIPPED | OUTPATIENT
Start: 2024-05-28 | End: 2024-06-27

## 2024-05-28 ASSESSMENT — PAIN - FUNCTIONAL ASSESSMENT: PAIN_FUNCTIONAL_ASSESSMENT: 0-10

## 2024-05-28 ASSESSMENT — PAIN DESCRIPTION - DESCRIPTORS: DESCRIPTORS: ACHING

## 2024-05-28 ASSESSMENT — PAIN SCALES - GENERAL
PAINLEVEL_OUTOF10: 6
PAINLEVEL: 6

## 2024-05-28 NOTE — PROGRESS NOTES
SW referred to patient for needs assessment. Patient is a 53 year old  male with diagnosis of pancreatic cancer. He ihad scheduled surgery and Met with Dr. You for plan of care today. Patient has been unable to work due to deep fatigue and is filing for disability. He has been off  since April 204 without filing for short term leave. MARY JANE  contacted [employer] for fax number to send release form patient signed and proof of his diagnosis. Information sent. Patient and spouse are aware to forward paperwork for completion to MARY JANE.  MARY JANE contact information provided.

## 2024-05-28 NOTE — ANESTHESIA PREPROCEDURE EVALUATION
Patient: Nate Alvarez    Procedure Information       Date/Time: 05/29/24 0730    Scheduled providers: Leno Monahan MD    Procedure: COLONOSCOPY    Location: [facility]            Relevant Problems   Cardiac   (+) Essential hypertension      Pulmonary   (+) Complex sleep apnea syndrome      Liver   (+) Malignant neoplasm of head of pancreas (Multi)      Endocrine   (+) Class 1 obesity due to excess calories with serious comorbidity and body mass index (BMI) of 32.0 to 32.9 in adult   (+) Type 2 diabetes mellitus with hyperglycemia, with long-term current use of insulin (Multi)      Skin   (+) Basal cell carcinoma (BCC) of face   (+) Eczema of both hands       Clinical information reviewed:   Tobacco  Allergies  Meds   Med Hx  Surg Hx   Fam Hx  Soc Hx         Past Medical History:   Diagnosis Date    Diabetes mellitus (Multi)     Gastroesophageal reflux disease without esophagitis 10/05/2023    Hemorrhoids 11/03/2023    HTN (hypertension)     Leg cramps 10/05/2023    Oropharyngeal dysphagia 10/05/2023    RADHA (obstructive sleep apnea)     Pancreatic cancer (Multi)     Personal history of other malignant neoplasm of skin       Past Surgical History:   Procedure Laterality Date    COLONOSCOPY      ESOPHAGOGASTRODUODENOSCOPY      EYELID CARCINOMA EXCISION      KNEE ARTHROSCOPY W/ DEBRIDEMENT      ROTATOR CUFF REPAIR Bilateral     TESTICLE SURGERY      Hydrocelectomy    TONSILLECTOMY      VASECTOMY       Social History     Tobacco Use    Smoking status: Never    Smokeless tobacco: Never   Vaping Use    Vaping status: Never Used   Substance Use Topics    Alcohol use: Not Currently    Drug use: Never      Current Outpatient Medications   Medication Instructions    blood sugar diagnostic (Blood Glucose Test) strip Check blood sugars once daily    blood-glucose meter misc Check  blood sugar as needed    blood-glucose sensor (FreeStyle Jef 3 Sensor) device Use as directed    cholecalciferol (VITAMIN  "D-3) 125 mcg, oral, Daily    FreeStyle Jef 3 Mereta misc Use with sensors as directed    insulin lispro (HUMALOG) 50 Units, subcutaneous, 3 times daily (morning, midday, late afternoon), Take as directed per insulin instructions. Plus sliding scale    lancets misc Check blood sugars once daily    Lantus U-100 Insulin 20 Units, subcutaneous, Every 24 hours, Take as directed per insulin instructions.    lisinopril 5 mg, oral, Daily    loperamide (Imodium) 2 mg capsule Take 2 capsules (4 mg) by mouth with the first episode of diarrhea and 1 capsule (2 mg) by mouth with any additional episodes. Maximum 8 capsules (16 mg) per day.    ondansetron (ZOFRAN) 4 mg, oral, Every 8 hours PRN    ondansetron (ZOFRAN) 4 mg, oral, Every 8 hours PRN    ondansetron (ZOFRAN) 8 mg, oral, Every 8 hours PRN    oxyCODONE (ROXICODONE) 10 mg, oral, Every 6 hours PRN, 1 tablet every 4 hours as needed for cancer related pain (G89.3)    oxyCODONE-acetaminophen (Percocet) 5-325 mg tablet 1 tablet, oral, Every 6 hours PRN    pantoprazole (PROTONIX) 40 mg, oral, Daily before breakfast, Do not crush, chew, or split.    pen needle, diabetic (BD Ultra-Fine Short Pen Needle) 31 gauge x 5/16\" needle Use four times daily with insulin    polyethylene glycol-electrolytes (Golytely) 420 gram solution STARTING AT 6PM DRINK HALF OF THE BOTTLE, DRINK THE OTHER HALF 5 HRS BEFORE PROCEDURE TIME    prochlorperazine (COMPAZINE) 10 mg, oral, Every 6 hours PRN      Allergies   Allergen Reactions    Farxiga [Dapagliflozin] Diarrhea    Glipizide GI Upset    Januvia [Sitagliptin] Diarrhea    Metformin Nausea/vomiting    Mounjaro [Tirzepatide] Nausea/vomiting    Tramadol Nausea/vomiting        Chemistry    Lab Results   Component Value Date/Time     (L) 05/18/2024 0711    K 4.0 05/18/2024 0711     05/18/2024 0711    CO2 24 05/18/2024 0711    BUN 22 05/18/2024 0711    CREATININE 0.95 05/18/2024 0711    Lab Results   Component Value Date/Time    CALCIUM 9.3 " "05/18/2024 0711    ALKPHOS 78 05/17/2024 0725    AST 24 05/17/2024 0725    ALT 23 05/17/2024 0725    BILITOT 1.1 05/17/2024 0725          Lab Results   Component Value Date    HGBA1C 9.1 (H) 05/10/2024     Lab Results   Component Value Date/Time    WBC 9.9 05/18/2024 0711    HGB 13.9 05/18/2024 0711    HCT 40.1 (L) 05/18/2024 0711     05/18/2024 0711     No results found for: \"PROTIME\", \"PTT\", \"INR\"  No results found for: \"ABORH\"  Encounter Date: 05/15/24   ECG 12 lead   Result Value    Ventricular Rate 97    Atrial Rate 97    WI Interval 166    QRS Duration 78    QT Interval 326    QTC Calculation(Bazett) 414    P Axis 35    R Axis -8    T Axis 40    QRS Count 16    Q Onset 218    P Onset 135    P Offset 191    T Offset 381    QTC Fredericia 382    Narrative    See ED provider note for full interpretation and clinical correlation  Normal sinus rhythm  Anterior infarct , age undetermined  Abnormal ECG  When compared with ECG of 10-MAY-2024 12:29,  PREVIOUS ECG IS PRESENT  Confirmed by Mariposa Quintanilla ([id number]) on 5/17/2024 10:35:35 AM     No results found for this or any previous visit from the past 1095 days.       Visit Vitals  /87   Pulse 62   Temp 36.2 °C (97.2 °F) (Temporal)   Resp 16   Ht 1.905 m (6' 3\")   Wt 99.8 kg (220 lb 0.3 oz)   SpO2 100%   BMI 27.50 kg/m²   Smoking Status Never   BSA 2.3 m²     NPO/Void Status  Carbohydrate Drink Given Prior to Surgery? : Y  Date of Last Liquid: 05/29/24  Time of Last Liquid: 0300  Date of Last Solid: 05/28/24  Time of Last Solid: 0800  Last Intake Type: Clear fluids; GI prep  Time of Last Void: 0600         Physical Exam    Airway  Mallampati: III  TM distance: >3 FB  Neck ROM: full     Cardiovascular   Rhythm: regular  Rate: normal     Dental - normal exam     Pulmonary   Breath sounds clear to auscultation     Abdominal - normal exam              Anesthesia Plan    History of general anesthesia?: yes  History of complications of general anesthesia?: " no    ASA 3     MAC   (With standard ASA monitoring.)  intravenous induction   Anesthetic plan and risks discussed with patient.    Plan discussed with CRNA and CAA.

## 2024-05-28 NOTE — PROGRESS NOTES
Patient here with wife for new patient visit. Patient states that he has lost a significant amount of weight, he used to be 250 lbs now he is 230 lbs. He said it just dropped off. He said that is what prompted the doctors to look more into things and thinking it was not just uncontrolled diabetes. He stated he changed up his diabetes meds and he has been able to control his blood sugar better. Patient also stated he has been in constant pain it usually sits at about a 5-6 consistently. He does have pain medication to take and it usually helps. It can get all the way up to between an 8-10. It is typically in his abdomen on the right and radiates to his back. It recently started going to his left side of his back and left hip. Patient stated recently his head has been itchy and he has a rash on his chest.     Patient stated that Dr rachel already went over getting a port and possibility of treatment being folfiri. Patient gave me paper work for disability, I spoke with Christie in social work and gave her the paper work. I gave patient new patient education on treatment and facility.

## 2024-05-28 NOTE — PROGRESS NOTES
Patient ID: Nate Alvarez is a 53 y.o. male from [place], [place].     Referring Physician: Roddy Gamble MD  [id number]  Department of Surgery-[place] [address]    Primary Care Provider: Hazel Medeiros MD    Diagnosis:   Pancreatic cancer 5/2024    Primary Oncologic Surgeon:   Omar Gamble MD    Primary Medical Oncologist:  Borderline Resectable    Primary Radiation Oncologist:  MARSHALL     Current Therapy:  Planned neoadjuvant FOLFIRINOX    Oncologic Surgery History:  Pending    Oncologic Therapy History:  N/A    Molecular Genetics:  Pending    Current Sites of Disease:  Head of the pancreas involving the gastroduodenal artery celiac axis and 180 degree involvement of the SMV    Oncologic Problem List:  Localized but borderline resectable pancreatic adenocarcinoma  New onset diabetes with hyperglycemia  Cancer cachexia      Oncologic Narrative:  53 y.o.  man from [place] who presented in May 2024 with difficult DM control (A1c 9.1 this month)and some ED visits for fatigue/weakness. Most recently at [facility] May 16-18. 70 lb wt loss mentioned (note some of the new DM meds) .  Tumor markers checked:  CEA: 4.9  Ca 19-9: >700    CT A/P Showed:   Edemasurrounding the pancreatic tail compatible with acute pancreatitis. Dilatation of the pancreatic duct which measures 6 mm in diameter and an ill marginated area of hypodensity in the  pancreatic head highly concerning for pancreatic malignancy, and pancreatic protocol MRI is recommended for further evaluation. An  area of necrotizing pancreatitis/pancreatic necrosis is other consideration, however there is no surrounding edema in this region and given the ductal dilatation, findings highly concerning for an underlying malignancy.  Small nonobstructive left renal calculi.     MRI:  IMPRESSION:  1. Pancreatic head/uncinate process mass measuring 2.9 x 2.5 cm with upstream pancreatic duct dilatation and parenchymal atrophy  highly  concerning for primary pancreatic neoplasm (specifically adenocarcinoma). Surrounding changes of mild superimposed pancreatitis. The mass is in contact with the adjacent 3rd part of the duodenum but no upstream dilatation. No biliary obstruction. The  mass has 180 degree contact with the adjacent superior mesenteric vein and likely encasing the 1st right lateral branch. The mass is  likely encasing the gastroduodenal artery distal component. The  celiac axis, superior mesenteric artery and main portal vein are intact.  2. Few subcentimeter peripancreatic indeterminate lymph nodes noted.  3. Hepatic steatosis with segment 4A lesion measuring 0.7 cm with  imaging characteristics favoring hemangioma .     Chest CT:  IMPRESSION:  1. No suspicious pulmonary nodules. Few small calcific granulomas noted in the lingula.  2. No enlarged intrathoracic lymphadenopathy.  3. Left thyroid nodule measuring 1.1 cm. This could be further assessed by dedicated nonemergent thyroid ultrasound if clinically desired.  4. Subdiaphragmatic findings regarding the known pancreatic mass and duct dilatation as well as the hepatic observations are better evaluated in prior MRI dated 05/16/2024     EUS:    Result Text   Impression  Limited endoscopic views of the esophagus, stomach and duodenum were obtained. There was heme and gastritis noted in the stomach. The rest of the exam was unremarkable   A 27 mm by 23 mm irregular and hypoechoic T2N0 mass with poorly defined margins was visualized in the uncinate process of the head with apparent involvement of the superior mesenteric vein; fine needle biopsy was performed, final pathology is pending but preliminary evaluation was positive for malignancy. The pancreas upstream to the mass appeared atrophic with dilated pancreatic duct.   The bile duct appeared normal.  Visualized portions of the liver, spleen, left adrenal gland, left kidney and celiac axis were normal on ultrasound  examination.    No abnormal-appearing lymph nodes were identified in the abdomen.            FINAL DIAGNOSIS   A. PANCREAS HEAD MASS, BIOPSY:     -- Invasive adenocarcinoma, moderately differentiated        Past Medical History: Nate has a past medical history of Diabetes mellitus (Multi), Gastroesophageal reflux disease without esophagitis (10/05/2023), Hemorrhoids (11/03/2023), HTN (hypertension), Leg cramps (10/05/2023), Oropharyngeal dysphagia (10/05/2023), RADHA (obstructive sleep apnea), Pancreatic cancer (Multi), and Personal history of other malignant neoplasm of skin.  Surgical History:  Nate has a past surgical history that includes Tonsillectomy; Vasectomy; Testicle surgery; Esophagogastroduodenoscopy; Colonoscopy; Eyelid carcinoma excision; Rotator cuff repair (Bilateral); and Knee arthroscopy w/ debridement.  Social History:  Nate reports that he has never smoked. He has never used smokeless tobacco. He reports that he does not currently use alcohol. He reports that he does not use drugs.  Family History:    Family History   Problem Relation Name Age of Onset    Diabetes Mother      Ovarian cancer Mother      Liver cancer Father      Lung cancer Father      Colon cancer Father      Hypertension Father      Stroke Maternal Grandmother      Diabetes Maternal Grandmother      Liver cancer Maternal Grandfather      Lung cancer Paternal Grandfather       Family Oncology History:  Cancer-related family history includes Colon cancer in his father; Liver cancer in his father and maternal grandfather; Lung cancer in his father and paternal grandfather; Ovarian cancer in his mother.        SUBJECTIVE:    History of Present Illness:  Nate Alvarez is a 53 y.o. male who was referred by Roddy Gamble MD and presents with Follow-up    HPI        OBJECTIVE:    VS / Pain:  /90   Pulse 72   Temp 35.7 °C (96.3 °F) (Core)   Resp 18   Wt 105 kg (231 lb)   SpO2 98%   BMI 28.87 kg/m²   BSA: 2.36  meters squared  Pain Assessment: 0-10  Pain Score: 6  Pain Type: Acute pain  Pain Location: Back  Pain Orientation: Right; Left  Pain Descriptors: Aching  Pain Frequency: Constant/continuous  Pain Scale: 0    Daily Weight  05/28/24 : 105 kg (231 lb)  05/22/24 : 102 kg (224 lb)  05/17/24 : 102 kg (224 lb 13.9 oz)      Physical Exam    Performance Status:       Diagnostic Results         WBC   Date/Time Value Ref Range Status   05/18/2024 07:11 AM 9.9 4.4 - 11.3 x10*3/uL Final   05/17/2024 09:28 AM 8.9 4.4 - 11.3 x10*3/uL Final   05/16/2024 06:55 AM 10.1 4.4 - 11.3 x10*3/uL Final     Hemoglobin   Date Value Ref Range Status   05/18/2024 13.9 13.5 - 17.5 g/dL Final   05/17/2024 14.0 13.5 - 17.5 g/dL Final   05/16/2024 13.7 13.5 - 17.5 g/dL Final     MCV   Date/Time Value Ref Range Status   05/18/2024 07:11 AM 89 80 - 100 fL Final   05/17/2024 09:28 AM 89 80 - 100 fL Final   05/16/2024 06:55 AM 90 80 - 100 fL Final     Platelets   Date/Time Value Ref Range Status   05/18/2024 07:11  150 - 450 x10*3/uL Final   05/17/2024 09:28  150 - 450 x10*3/uL Final   05/16/2024 06:55  150 - 450 x10*3/uL Final     Neutrophils Absolute   Date/Time Value Ref Range Status   05/15/2024 07:16 PM 7.37 1.20 - 7.70 x10*3/uL Final     Comment:     Percent differential counts (%) should be interpreted in the context of the absolute cell counts (cells/uL).   05/10/2024 12:08 PM 9.68 (H) 1.20 - 7.70 x10*3/uL Final     Comment:     Percent differential counts (%) should be interpreted in the context of the absolute cell counts (cells/uL).   04/16/2024 07:40 AM 3.33 1.20 - 7.70 x10*3/uL Final     Comment:     Percent differential counts (%) should be interpreted in the context of the absolute cell counts (cells/uL).     Bilirubin, Total   Date/Time Value Ref Range Status   05/17/2024 07:25 AM 1.1 0.0 - 1.2 mg/dL Final   05/15/2024 07:16 PM 0.6 0.0 - 1.2 mg/dL Final   05/10/2024 12:08 PM 0.8 0.0 - 1.2 mg/dL Final     AST    Date/Time Value Ref Range Status   05/17/2024 07:25 AM 24 9 - 39 U/L Final     Comment:     MILD HEMOLYSIS DETECTED. The result may be falsely elevated due to hemolysis or other interferents. Clinical correlation is recommended. Repeat testing may be considered.   05/15/2024 07:16 PM 11 9 - 39 U/L Final   05/10/2024 12:08 PM 12 9 - 39 U/L Final     ALT   Date/Time Value Ref Range Status   05/17/2024 07:25 AM 23 10 - 52 U/L Final     Comment:     Patients treated with Sulfasalazine may generate falsely decreased results for ALT.   05/15/2024 07:16 PM 27 10 - 52 U/L Final     Comment:     Patients treated with Sulfasalazine may generate falsely decreased results for ALT.   05/10/2024 12:08 PM 32 10 - 52 U/L Final     Comment:     Patients treated with Sulfasalazine may generate falsely decreased results for ALT.     Creatinine   Date/Time Value Ref Range Status   05/18/2024 07:11 AM 0.95 0.50 - 1.30 mg/dL Final   05/17/2024 07:25 AM 0.85 0.50 - 1.30 mg/dL Final   05/16/2024 06:55 AM 0.77 0.50 - 1.30 mg/dL Final     Urea Nitrogen   Date/Time Value Ref Range Status   05/18/2024 07:11 AM 22 6 - 23 mg/dL Final   05/17/2024 07:25 AM 16 6 - 23 mg/dL Final   05/16/2024 06:55 AM 18 6 - 23 mg/dL Final     Albumin   Date/Time Value Ref Range Status   05/17/2024 07:25 AM 3.8 3.4 - 5.0 g/dL Final   05/15/2024 07:16 PM 3.9 3.4 - 5.0 g/dL Final   05/10/2024 12:08 PM 4.1 3.4 - 5.0 g/dL Final     Cancer AG 19-9   Date/Time Value Ref Range Status   05/17/2024 09:28 AM >700.00 (H) <35.00 U/mL Final     Carcinoembryonic AG   Date/Time Value Ref Range Status   05/17/2024 09:28 AM 4.9 ug/L Final     === 05/15/24 ===    CT CHEST W IV CONTRAST    - Impression -  1. No suspicious pulmonary nodules. Few small calcific granulomas  noted in the lingula.  2. No enlarged intrathoracic lymphadenopathy.  3. Left thyroid nodule measuring 1.1 cm. This could be further  assessed by dedicated nonemergent thyroid ultrasound if clinically  desired.  4.  Subdiaphragmatic findings regarding the known pancreatic mass and  duct dilatation as well as the hepatic observations are better  evaluated in prior MRI dated 05/16/2024    Signed by: Cipriano Moura 5/18/2024 11:43 AM  Dictation workstation:   [id number]    MRI 5/16/24:    IMPRESSION:  1. Pancreatic head/uncinate process mass measuring 2.9 x 2.5 cm with upstream pancreatic duct dilatation and parenchymal atrophy highly concerning for primary pancreatic neoplasm (specifically  adenocarcinoma). Surrounding changes of mild superimposed  pancreatitis. The mass is in contact with the adjacent 3rd part of  the duodenum but no upstream dilatation. No biliary obstruction. The  mass has 180 degree contact with the adjacent superior mesenteric vein and likely encasing the 1st right lateral branch. The mass is likely encasing the gastroduodenal artery distal component. The celiac axis, superior mesenteric artery and main portal vein are  intact.  2. Few subcentimeter peripancreatic indeterminate lymph nodes noted.  3. Hepatic steatosis with segment 4A lesion measuring 0.7 cm with imaging characteristics favoring hemangioma .    Assessment/Plan   This is a 53-year-old man with borderline resectable pancreatic adenocarcinoma.  He presented in May 2024 with greater than 50 pound weight loss and new onset diabetes.  Imaging, EUS and biopsy confirmed adenocarcinoma of the pancreas involving the GDA, celiac axis, SMV.    Borderline resectable Pancreatic cancer:  - Plan for mediport needed for chemotherapy.  - Consented for mFOLFIRINOX.  -Follow Cancer Antigen 19-9 at least every other cycle  -Obtain complete genomic profiling from pancreatic biopsy.    Diabetes:  Continue long and short acting insulin as managed by  Dr. Brandon You MD     [facility]/ [facility] [place]  Office: [phone]  Fax: [phone]

## 2024-05-29 ENCOUNTER — ANESTHESIA (OUTPATIENT)
Dept: GASTROENTEROLOGY | Facility: HOSPITAL | Age: 54
End: 2024-05-29
Payer: COMMERCIAL

## 2024-05-29 ENCOUNTER — HOSPITAL ENCOUNTER (OUTPATIENT)
Dept: GASTROENTEROLOGY | Facility: HOSPITAL | Age: 54
Discharge: HOME | End: 2024-05-29
Payer: COMMERCIAL

## 2024-05-29 ENCOUNTER — PATIENT OUTREACH (OUTPATIENT)
Dept: PRIMARY CARE | Facility: CLINIC | Age: 54
End: 2024-05-29

## 2024-05-29 VITALS
HEART RATE: 56 BPM | BODY MASS INDEX: 27.36 KG/M2 | OXYGEN SATURATION: 100 % | HEIGHT: 75 IN | SYSTOLIC BLOOD PRESSURE: 143 MMHG | TEMPERATURE: 97.5 F | DIASTOLIC BLOOD PRESSURE: 96 MMHG | RESPIRATION RATE: 17 BRPM | WEIGHT: 220.02 LBS

## 2024-05-29 DIAGNOSIS — R63.4 WEIGHT LOSS: ICD-10-CM

## 2024-05-29 DIAGNOSIS — K92.1 MELENA: Primary | ICD-10-CM

## 2024-05-29 DIAGNOSIS — K26.9 DUODENAL ULCER: Primary | ICD-10-CM

## 2024-05-29 LAB
GLUCOSE BLD MANUAL STRIP-MCNC: 146 MG/DL (ref 74–99)
GLUCOSE BLD MANUAL STRIP-MCNC: 166 MG/DL (ref 74–99)

## 2024-05-29 PROCEDURE — 2500000004 HC RX 250 GENERAL PHARMACY W/ HCPCS (ALT 636 FOR OP/ED)

## 2024-05-29 PROCEDURE — 82947 ASSAY GLUCOSE BLOOD QUANT: CPT

## 2024-05-29 PROCEDURE — 45378 DIAGNOSTIC COLONOSCOPY: CPT | Performed by: INTERNAL MEDICINE

## 2024-05-29 PROCEDURE — 7100000009 HC PHASE TWO TIME - INITIAL BASE CHARGE

## 2024-05-29 PROCEDURE — 3700000002 HC GENERAL ANESTHESIA TIME - EACH INCREMENTAL 1 MINUTE

## 2024-05-29 PROCEDURE — 2500000005 HC RX 250 GENERAL PHARMACY W/O HCPCS

## 2024-05-29 PROCEDURE — 88305 TISSUE EXAM BY PATHOLOGIST: CPT | Mod: TC,AHULAB | Performed by: INTERNAL MEDICINE

## 2024-05-29 PROCEDURE — 43239 EGD BIOPSY SINGLE/MULTIPLE: CPT | Performed by: INTERNAL MEDICINE

## 2024-05-29 PROCEDURE — 3700000001 HC GENERAL ANESTHESIA TIME - INITIAL BASE CHARGE

## 2024-05-29 PROCEDURE — 7100000010 HC PHASE TWO TIME - EACH INCREMENTAL 1 MINUTE

## 2024-05-29 RX ORDER — MIDAZOLAM HYDROCHLORIDE 1 MG/ML
INJECTION INTRAMUSCULAR; INTRAVENOUS AS NEEDED
Status: DISCONTINUED | OUTPATIENT
Start: 2024-05-29 | End: 2024-05-29

## 2024-05-29 RX ORDER — SODIUM CHLORIDE, SODIUM LACTATE, POTASSIUM CHLORIDE, CALCIUM CHLORIDE 600; 310; 30; 20 MG/100ML; MG/100ML; MG/100ML; MG/100ML
20 INJECTION, SOLUTION INTRAVENOUS CONTINUOUS
OUTPATIENT
Start: 2024-06-03

## 2024-05-29 RX ORDER — PROPOFOL 10 MG/ML
INJECTION, EMULSION INTRAVENOUS CONTINUOUS PRN
Status: DISCONTINUED | OUTPATIENT
Start: 2024-05-29 | End: 2024-05-29

## 2024-05-29 RX ORDER — PROPOFOL 10 MG/ML
INJECTION, EMULSION INTRAVENOUS AS NEEDED
Status: DISCONTINUED | OUTPATIENT
Start: 2024-05-29 | End: 2024-05-29

## 2024-05-29 RX ORDER — LIDOCAINE HYDROCHLORIDE 20 MG/ML
INJECTION, SOLUTION INFILTRATION; PERINEURAL AS NEEDED
Status: DISCONTINUED | OUTPATIENT
Start: 2024-05-29 | End: 2024-05-29

## 2024-05-29 RX ORDER — PANTOPRAZOLE SODIUM 40 MG/1
40 TABLET, DELAYED RELEASE ORAL 2 TIMES DAILY
Qty: 60 TABLET | Refills: 3 | Status: SHIPPED | OUTPATIENT
Start: 2024-05-29

## 2024-05-29 RX ADMIN — SODIUM CHLORIDE, SODIUM LACTATE, POTASSIUM CHLORIDE, AND CALCIUM CHLORIDE: 600; 310; 30; 20 INJECTION, SOLUTION INTRAVENOUS at 10:22

## 2024-05-29 RX ADMIN — PROPOFOL 40 MG: 10 INJECTION, EMULSION INTRAVENOUS at 10:31

## 2024-05-29 RX ADMIN — PROPOFOL 50 MG: 10 INJECTION, EMULSION INTRAVENOUS at 10:29

## 2024-05-29 RX ADMIN — LIDOCAINE HYDROCHLORIDE 100 MG: 20 INJECTION, SOLUTION INFILTRATION; PERINEURAL at 10:29

## 2024-05-29 RX ADMIN — PROPOFOL 200 MCG/KG/MIN: 10 INJECTION, EMULSION INTRAVENOUS at 10:29

## 2024-05-29 RX ADMIN — MIDAZOLAM HYDROCHLORIDE 2 MG: 1 INJECTION INTRAMUSCULAR; INTRAVENOUS at 10:25

## 2024-05-29 RX ADMIN — PROPOFOL 10 MG: 10 INJECTION, EMULSION INTRAVENOUS at 10:32

## 2024-05-29 ASSESSMENT — PAIN - FUNCTIONAL ASSESSMENT
PAIN_FUNCTIONAL_ASSESSMENT: UNABLE TO SELF-REPORT
PAIN_FUNCTIONAL_ASSESSMENT: 0-10
PAIN_FUNCTIONAL_ASSESSMENT: 0-10
PAIN_FUNCTIONAL_ASSESSMENT: UNABLE TO SELF-REPORT
PAIN_FUNCTIONAL_ASSESSMENT: 0-10

## 2024-05-29 ASSESSMENT — PAIN SCALES - GENERAL
PAINLEVEL_OUTOF10: 5 - MODERATE PAIN
PAINLEVEL_OUTOF10: 0 - NO PAIN
PAINLEVEL_OUTOF10: 0 - NO PAIN

## 2024-05-29 NOTE — ANESTHESIA POSTPROCEDURE EVALUATION
Patient: Nate Alvarez    Procedure Summary       Date: 05/29/24 Room / Location: [facility]    Anesthesia Start: 1026 Anesthesia Stop: 1129    Procedure: COLONOSCOPY Diagnosis:       Melena      Weight loss    Scheduled Providers: Leno Monahan MD; Deon Mcgowan MD; YESENIA Carballo Responsible Provider: Deon Mcgowan MD    Anesthesia Type: MAC ASA Status: 3            Anesthesia Type: MAC    Vitals Value Taken Time   /96 05/29/24 1213   Temp 36.4 °C (97.5 °F) 05/29/24 1125   Pulse 57 05/29/24 1220   Resp 17 05/29/24 1210   SpO2 100 % 05/29/24 1220   Vitals shown include unfiled device data.    Anesthesia Post Evaluation    Patient location during evaluation: PACU  Patient participation: complete - patient participated  Level of consciousness: awake and alert  Pain management: adequate  Airway patency: patent  Cardiovascular status: acceptable and hemodynamically stable  Respiratory status: acceptable, spontaneous ventilation and nonlabored ventilation  Hydration status: acceptable  Postoperative Nausea and Vomiting: none        There were no known notable events for this encounter.

## 2024-05-29 NOTE — DISCHARGE INSTRUCTIONS

## 2024-05-29 NOTE — PROGRESS NOTES
Call regarding appt. with PCP on (24 May 24) after hospitalization.  At time of outreach call the patient feels as if their condition has improved since last visit.  Reviewed the PCP appointment with the pt and addressed any questions or concerns.

## 2024-05-29 NOTE — H&P
GASTROENTEROLOGY PRE-PROCEDURE H&P    HPI:  Nate Alvarez is a 53 y.o. male here for EGD and colonoscopy for GIB.    PAST MEDICAL HISTORY  Past Medical History:   Diagnosis Date    Diabetes mellitus (Multi)     Gastroesophageal reflux disease without esophagitis 10/05/2023    Hemorrhoids 11/03/2023    HTN (hypertension)     Leg cramps 10/05/2023    Oropharyngeal dysphagia 10/05/2023    RADHA (obstructive sleep apnea)     Pancreatic cancer (Multi)     Personal history of other malignant neoplasm of skin        PAST SURGICAL HISTORY  Past Surgical History:   Procedure Laterality Date    COLONOSCOPY      ESOPHAGOGASTRODUODENOSCOPY      EYELID CARCINOMA EXCISION      KNEE ARTHROSCOPY W/ DEBRIDEMENT      ROTATOR CUFF REPAIR Bilateral     TESTICLE SURGERY      Hydrocelectomy    TONSILLECTOMY      VASECTOMY         FAMILY HISTORY  Family History   Problem Relation Name Age of Onset    Diabetes Mother      Ovarian cancer Mother      Liver cancer Father      Lung cancer Father      Colon cancer Father      Hypertension Father      Stroke Maternal Grandmother      Diabetes Maternal Grandmother      Liver cancer Maternal Grandfather      Lung cancer Paternal Grandfather         SOCIAL HISTORY  Social History     Tobacco Use    Smoking status: Never    Smokeless tobacco: Never   Substance Use Topics    Alcohol use: Not Currently       REVIEW OF SYSTEMS  A 10+ point review of systems was completed and was otherwise negative.    ALLERGIES  Allergies   Allergen Reactions    Farxiga [Dapagliflozin] Diarrhea    Glipizide GI Upset    Januvia [Sitagliptin] Diarrhea    Metformin Nausea/vomiting    Mounjaro [Tirzepatide] Nausea/vomiting    Tramadol Nausea/vomiting       MEDICATIONS  Current Outpatient Medications   Medication Instructions    blood sugar diagnostic (Blood Glucose Test) strip Check blood sugars once daily    blood-glucose meter misc Check  blood sugar as needed    blood-glucose sensor (FreeStyle Jef 3 Sensor) device  "Use as directed    cholecalciferol (VITAMIN D-3) 125 mcg, oral, Daily    FreeStyle Jef 3 [place] misc Use with sensors as directed    insulin lispro (HUMALOG) 50 Units, subcutaneous, 3 times daily (morning, midday, late afternoon), Take as directed per insulin instructions. Plus sliding scale    lancets misc Check blood sugars once daily    Lantus U-100 Insulin 20 Units, subcutaneous, Every 24 hours, Take as directed per insulin instructions.    lisinopril 5 mg, oral, Daily    loperamide (Imodium) 2 mg capsule Take 2 capsules (4 mg) by mouth with the first episode of diarrhea and 1 capsule (2 mg) by mouth with any additional episodes. Maximum 8 capsules (16 mg) per day.    ondansetron (ZOFRAN) 4 mg, oral, Every 8 hours PRN    ondansetron (ZOFRAN) 4 mg, oral, Every 8 hours PRN    ondansetron (ZOFRAN) 8 mg, oral, Every 8 hours PRN    oxyCODONE (ROXICODONE) 10 mg, oral, Every 6 hours PRN, 1 tablet every 4 hours as needed for cancer related pain (G89.3)    oxyCODONE-acetaminophen (Percocet) 5-325 mg tablet 1 tablet, oral, Every 6 hours PRN    pantoprazole (PROTONIX) 40 mg, oral, Daily before breakfast, Do not crush, chew, or split.    pen needle, diabetic (BD Ultra-Fine Short Pen Needle) 31 gauge x 5/16\" needle Use four times daily with insulin    polyethylene glycol-electrolytes (Golytely) 420 gram solution STARTING AT 6PM DRINK HALF OF THE BOTTLE, DRINK THE OTHER HALF 5 HRS BEFORE PROCEDURE TIME    prochlorperazine (COMPAZINE) 10 mg, oral, Every 6 hours PRN       VITALS  /87   Pulse 62   Temp 36.2 °C (97.2 °F) (Temporal)   Resp 16   Ht 1.905 m (6' 3\")   Wt 99.8 kg (220 lb 0.3 oz)   SpO2 100%   BMI 27.50 kg/m²      PHYSICAL EXAM  CONSTITUTIONAL: NAD  PULMONARY: CTAB  CARDIOVASCULAR: RRR  ABDOMEN: soft, NTND  NEUROLOGIC: AOx3    ASSESSMENT/PLAN    Proceed with EGD and colonoscopy as scheduled.    Signature: Leno Monahan MD   "

## 2024-05-30 RX ORDER — HEPARIN SODIUM,PORCINE/PF 10 UNIT/ML
50 SYRINGE (ML) INTRAVENOUS AS NEEDED
OUTPATIENT
Start: 2024-05-30

## 2024-05-30 RX ORDER — HEPARIN 100 UNIT/ML
500 SYRINGE INTRAVENOUS AS NEEDED
OUTPATIENT
Start: 2024-05-30

## 2024-05-30 ASSESSMENT — ENCOUNTER SYMPTOMS
DYSPHORIC MOOD: 0
CHILLS: 0
DIAPHORESIS: 0
ABDOMINAL PAIN: 1
PALPITATIONS: 0
ADENOPATHY: 0
HEMATURIA: 0
HEADACHES: 0
DIZZINESS: 0
CONFUSION: 0
DYSURIA: 0
NAUSEA: 1
DIARRHEA: 0
COUGH: 0
FEVER: 0
POLYPHAGIA: 0
LIGHT-HEADEDNESS: 0
SINUS PRESSURE: 0
NUMBNESS: 0
CONSTIPATION: 0
POLYDIPSIA: 0
SHORTNESS OF BREATH: 0
WHEEZING: 0
SORE THROAT: 0
SINUS PAIN: 0
FREQUENCY: 0
UNEXPECTED WEIGHT CHANGE: 0
VOMITING: 0
CHEST TIGHTNESS: 0
NERVOUS/ANXIOUS: 0

## 2024-05-30 ASSESSMENT — PAIN SCALES - GENERAL: PAINLEVEL_OUTOF10: 0 - NO PAIN

## 2024-05-30 NOTE — PROGRESS NOTES
Patient is sent at the request of No ref. provider found for my opinion regarding Type 2 diabetes.  My final recommendations will be communicated back to the requesting provider by way of shared medical record.    Subjective   Nate Alvarez is a 53 y.o. male who presents for initial visit for evaluation of Type 2 diabetes mellitus. The initial diagnosis of diabetes was made in October 2023 .     The patient states that he developed symptoms over the course of the last year or so.  In October, he was drinking 20 bottles of water per day and urinating 20 times per day.  He has lost 70 pounds within the last year.  His wife and son are type 1 diabetics.  His sugar was found to be 400mg/dL.    He was diagnosed in April 2024 with invasive adenocarcinoma moderately differentiated after having a biopsy of a pancreatic mass to the head.  He is to undergo a diagnostic laparoscopy on 6/3 and will start chemotherapy on 6/4.      He feels as though insulin is not actiing predicrably     Known complications due to diabetes included peripheral neuropathy    Cardiovascular risk factors include diabetes mellitus. The patient is on an ACE inhibitor or angiotensin II receptor blocker.  The patient has not been previously hospitalized due to diabetic ketoacidosis.     Current symptoms/problems include paresthesia of the feet. His clinical course has been stable.     Current diabetes regimen is as follows:   Lantus 15 units subcutaneous bedtime   Humalog 20-20-30 units TID AC     The patient is currently checking the blood glucose multiple times per day.    Patient is using: continuous glucose monitor                                                                Hypoglycemia frequency: 0%  Hypoglycemia awareness: No       MEALS:  Breakfast - V8, hard boiled eggs, McMuffin, scrambled eggs   Lunch - soup, chicken salad sandwich   Dinner - steak, salad, watermelon     Review of Systems   Constitutional:  Positive for unexpected weight  "change (Weight loss).   Eyes:  Positive for visual disturbance (Before diagnosis).   Gastrointestinal:  Positive for abdominal pain, constipation (takes daily MiraLAX) and nausea.   Musculoskeletal:  Positive for back pain.   Neurological:  Positive for numbness.        Tingling    All other systems reviewed and are negative.      Objective   /68 (BP Location: Left arm, Patient Position: Sitting, BP Cuff Size: Large adult)   Pulse 61   Ht 1.93 m (6' 4\")   Wt 106 kg (234 lb)   BMI 28.48 kg/m²   Physical Exam  Vitals and nursing note reviewed.   Constitutional:       General: He is not in acute distress.     Appearance: Normal appearance. He is normal weight.   HENT:      Head: Normocephalic and atraumatic.      Nose: Nose normal.      Mouth/Throat:      Mouth: Mucous membranes are moist.   Eyes:      Extraocular Movements: Extraocular movements intact.   Cardiovascular:      Rate and Rhythm: Normal rate and regular rhythm.      Pulses:           Dorsalis pedis pulses are 2+ on the right side and 2+ on the left side.   Pulmonary:      Effort: Pulmonary effort is normal.      Breath sounds: Normal breath sounds.   Musculoskeletal:         General: Normal range of motion.      Right foot: Normal range of motion. No deformity.      Left foot: Normal range of motion. No deformity.   Feet:      Right foot:      Protective Sensation: 5 sites tested.  5 sites sensed.      Skin integrity: Skin integrity normal. No ulcer or blister.      Toenail Condition: Right toenails are normal.      Left foot:      Protective Sensation: 5 sites tested.  5 sites sensed.      Skin integrity: Skin integrity normal. No ulcer or blister.      Toenail Condition: Left toenails are normal.   Skin:     General: Skin is warm.   Neurological:      Mental Status: He is alert and oriented to person, place, and time.   Psychiatric:         Mood and Affect: Mood normal.         Lab Review  Glucose (mg/dL)   Date Value   05/18/2024 176 (H) "   05/17/2024 165 (H)   05/16/2024 167 (H)     POC HEMOGLOBIN A1c (%)   Date Value   04/17/2024 9.3 (A)   01/11/2024 7.4 (A)   10/05/2023 8.8 (A)     Hemoglobin A1C (%)   Date Value   05/10/2024 9.1 (H)     Bicarbonate (mmol/L)   Date Value   05/18/2024 24   05/17/2024 20 (L)   05/16/2024 22     Urea Nitrogen (mg/dL)   Date Value   05/18/2024 22   05/17/2024 16   05/16/2024 18     Creatinine (mg/dL)   Date Value   05/18/2024 0.95   05/17/2024 0.85   05/16/2024 0.77     Lab Results   Component Value Date    CHOL 121 04/16/2024    CHOL 131 08/09/2022    CHOL 193 09/28/2020     Lab Results   Component Value Date    HDL 34.2 04/16/2024    HDL 31.2 (A) 08/09/2022    HDL 42.4 09/28/2020     Lab Results   Component Value Date    LDLCALC 59 04/16/2024     Lab Results   Component Value Date    TRIG 141 04/16/2024    TRIG 167 (H) 08/09/2022    TRIG 126 09/28/2020     Lab Results   Component Value Date    TSH 3.53 04/16/2024       Health Maintenance:   Foot Exam:  May 31, 2024  Eye Exam:  Urine Albumin: April 17, 2024    CGM Interpretation/Plan   14 day CGM download was reviewed in detail as documented above and will be attached to chart.  A minimum of 72 hours of glucose data was used to inform the management plan outlined below.  58 % of values is within target range.  Prandial insulin is almost at at :1:1 or 1:2 ratio.        Assessment/Plan   53 year old male presents for the evaluation and further management of new onset diabetes mellitus.  He was first prescribed oral agents which did not help hyperglycemia.  He is now on basal/bolus insulin.  He was diagnosed in April 2024 with invasive adenocarcinoma moderately differentiated after having a biopsy of a pancreatic mass to the head.  He is to undergo a diagnostic laparoscopy on 6/3 and will start chemotherapy on 6/4.  His blood pressure is at goal.      Type 2 diabetes mellitus with hyperglycemia, with long-term current use of insulin (Multi)  To increase Lantus to 18  units subcutaneous bedtime  Take only 12 units of Lantus Andrews night   To continue Humalog 20 - 20 - 30 units three times daily before meals   Please commence an insulin sliding scale with Humalog before meals as follows:   150-200mg/dL - 2 units   201-250mg/dL - 4 units   251-300 mg/dL - 6 untis   301-350mg/dL - 8 units   >351mg/dL - 10 units    SLIDING SCALE ON THE DAY OF CHEMO IF STEROIDS ARE GIVEN  Please commence an insulin sliding scale with Humalog before meals as follows:   150-200mg/dL - 3 units   201-250mg/dL - 6 units   251-300 mg/dL - 9 untis   301-350mg/dL - 12 units   >351mg/dL - 15 units  Lantus 20 units if steroids are given     To continue the use of your CGM for the intensive glucose monitoring due to the dynamic nature of insulin requirements, the narrow therapeutic index of insulin and the potentially fatal consequences of treatment  Counseled that the time in range should be >70%  Counseled that the goal A1C should be 7% or less  Counseled glycemic control is warranted to prevent microvascular complications  Please rotate insulin injection sites  Counseled that his for most diabetes mellitus can be type IIIc especially if/when he undergoes pancreatic surgery  Counseled that in the head of the pancreas, the mass is destroying beta cells Housel role is to reduce insulin  Patient asked if this can be reversed, the likelihood of which is no  For follow up in 1 month

## 2024-05-30 NOTE — PATIENT INSTRUCTIONS
Thank you for choosing [facility]  for your health care needs.  If you have any questions, concerns or medical needs, please feel free to contact our office at [phone].    Please ensure you complete your blood work one week before the next scheduled appointment.    To increase Lantus to 18 units subcutaneous bedtime  Take only 12 units of Lantus Andrews night   To continue Humalog 20 - 20 - 30 units three times daily before meals   Please commence an insulin sliding scale with Humalog before meals as follows:   150-200mg/dL - 2 units   201-250mg/dL - 4 units   251-300 mg/dL - 6 untis   301-350mg/dL - 8 units   >351mg/dL - 10 units    SLIDING SCALE ON THE DAY OF CHEMO IF STEROIDS ARE GIVEN  Please commence an insulin sliding scale with Humalog before meals as follows:   150-200mg/dL - 3 units   201-250mg/dL - 6 units   251-300 mg/dL - 9 untis   301-350mg/dL - 12 units   >351mg/dL - 15 units  Lantus 20 units if steroids are given     To continue the use of your CGM for the intensive glucose monitoring due to the dynamic nature of insulin requirements, the narrow therapeutic index of insulin and the potentially fatal consequences of treatment  Counseled that the time in range should be >70%  For follow up in 1 month

## 2024-05-30 NOTE — ASSESSMENT & PLAN NOTE
- has diagnostic laparoscopy scfheduled with Dr. Gamble on 6/3/24  - has follow up scheduled with oncology

## 2024-05-31 ENCOUNTER — LAB (OUTPATIENT)
Dept: LAB | Facility: LAB | Age: 54
End: 2024-05-31
Payer: COMMERCIAL

## 2024-05-31 ENCOUNTER — OFFICE VISIT (OUTPATIENT)
Dept: ENDOCRINOLOGY | Facility: CLINIC | Age: 54
End: 2024-05-31
Payer: COMMERCIAL

## 2024-05-31 VITALS
HEART RATE: 61 BPM | SYSTOLIC BLOOD PRESSURE: 110 MMHG | BODY MASS INDEX: 28.49 KG/M2 | WEIGHT: 234 LBS | HEIGHT: 76 IN | DIASTOLIC BLOOD PRESSURE: 68 MMHG

## 2024-05-31 DIAGNOSIS — Z79.4 TYPE 2 DIABETES MELLITUS WITH HYPERGLYCEMIA, WITH LONG-TERM CURRENT USE OF INSULIN (MULTI): ICD-10-CM

## 2024-05-31 DIAGNOSIS — E11.65 TYPE 2 DIABETES MELLITUS WITH HYPERGLYCEMIA, WITH LONG-TERM CURRENT USE OF INSULIN (MULTI): ICD-10-CM

## 2024-05-31 DIAGNOSIS — C25.0 MALIGNANT NEOPLASM OF HEAD OF PANCREAS (MULTI): ICD-10-CM

## 2024-05-31 DIAGNOSIS — E13.9 SECONDARY DIABETES MELLITUS (MULTI): Primary | ICD-10-CM

## 2024-05-31 LAB
ALBUMIN SERPL BCP-MCNC: 4 G/DL (ref 3.4–5)
ALP SERPL-CCNC: 68 U/L (ref 33–120)
ALT SERPL W P-5'-P-CCNC: 19 U/L (ref 10–52)
ANION GAP SERPL CALC-SCNC: 13 MMOL/L (ref 10–20)
AST SERPL W P-5'-P-CCNC: 11 U/L (ref 9–39)
BASOPHILS # BLD AUTO: 0.07 X10*3/UL (ref 0–0.1)
BASOPHILS NFR BLD AUTO: 1 %
BILIRUB SERPL-MCNC: 0.7 MG/DL (ref 0–1.2)
BUN SERPL-MCNC: 22 MG/DL (ref 6–23)
CALCIUM SERPL-MCNC: 9.4 MG/DL (ref 8.6–10.3)
CANCER AG19-9 SERPL-ACNC: 1779.76 U/ML
CHLORIDE SERPL-SCNC: 105 MMOL/L (ref 98–107)
CO2 SERPL-SCNC: 26 MMOL/L (ref 21–32)
CREAT SERPL-MCNC: 0.88 MG/DL (ref 0.5–1.3)
EGFRCR SERPLBLD CKD-EPI 2021: >90 ML/MIN/1.73M*2
EOSINOPHIL # BLD AUTO: 0.18 X10*3/UL (ref 0–0.7)
EOSINOPHIL NFR BLD AUTO: 2.7 %
ERYTHROCYTE [DISTWIDTH] IN BLOOD BY AUTOMATED COUNT: 13.9 % (ref 11.5–14.5)
GLUCOSE SERPL-MCNC: 188 MG/DL (ref 74–99)
HBV CORE AB SER QL: NONREACTIVE
HBV SURFACE AB SER-ACNC: <3.1 MIU/ML
HBV SURFACE AG SERPL QL IA: NONREACTIVE
HCT VFR BLD AUTO: 39.2 % (ref 41–52)
HGB BLD-MCNC: 13.1 G/DL (ref 13.5–17.5)
IMM GRANULOCYTES # BLD AUTO: 0.03 X10*3/UL (ref 0–0.7)
IMM GRANULOCYTES NFR BLD AUTO: 0.4 % (ref 0–0.9)
LYMPHOCYTES # BLD AUTO: 1.69 X10*3/UL (ref 1.2–4.8)
LYMPHOCYTES NFR BLD AUTO: 25.3 %
MCH RBC QN AUTO: 30.5 PG (ref 26–34)
MCHC RBC AUTO-ENTMCNC: 33.4 G/DL (ref 32–36)
MCV RBC AUTO: 91 FL (ref 80–100)
MONOCYTES # BLD AUTO: 0.58 X10*3/UL (ref 0.1–1)
MONOCYTES NFR BLD AUTO: 8.7 %
NEUTROPHILS # BLD AUTO: 4.13 X10*3/UL (ref 1.2–7.7)
NEUTROPHILS NFR BLD AUTO: 61.9 %
NRBC BLD-RTO: 0 /100 WBCS (ref 0–0)
PLATELET # BLD AUTO: 211 X10*3/UL (ref 150–450)
POTASSIUM SERPL-SCNC: 4.2 MMOL/L (ref 3.5–5.3)
PROT SERPL-MCNC: 6 G/DL (ref 6.4–8.2)
RBC # BLD AUTO: 4.3 X10*6/UL (ref 4.5–5.9)
SODIUM SERPL-SCNC: 140 MMOL/L (ref 136–145)
WBC # BLD AUTO: 6.7 X10*3/UL (ref 4.4–11.3)

## 2024-05-31 PROCEDURE — 3046F HEMOGLOBIN A1C LEVEL >9.0%: CPT | Performed by: INTERNAL MEDICINE

## 2024-05-31 PROCEDURE — 99204 OFFICE O/P NEW MOD 45 MIN: CPT | Performed by: INTERNAL MEDICINE

## 2024-05-31 PROCEDURE — 86301 IMMUNOASSAY TUMOR CA 19-9: CPT

## 2024-05-31 PROCEDURE — 36415 COLL VENOUS BLD VENIPUNCTURE: CPT

## 2024-05-31 PROCEDURE — 3008F BODY MASS INDEX DOCD: CPT | Performed by: INTERNAL MEDICINE

## 2024-05-31 PROCEDURE — 85025 COMPLETE CBC W/AUTO DIFF WBC: CPT

## 2024-05-31 PROCEDURE — 80053 COMPREHEN METABOLIC PANEL: CPT

## 2024-05-31 PROCEDURE — 86704 HEP B CORE ANTIBODY TOTAL: CPT

## 2024-05-31 PROCEDURE — 3078F DIAST BP <80 MM HG: CPT | Performed by: INTERNAL MEDICINE

## 2024-05-31 PROCEDURE — 86706 HEP B SURFACE ANTIBODY: CPT

## 2024-05-31 PROCEDURE — 87340 HEPATITIS B SURFACE AG IA: CPT

## 2024-05-31 PROCEDURE — 3074F SYST BP LT 130 MM HG: CPT | Performed by: INTERNAL MEDICINE

## 2024-05-31 PROCEDURE — 95251 CONT GLUC MNTR ANALYSIS I&R: CPT | Performed by: INTERNAL MEDICINE

## 2024-05-31 PROCEDURE — 3048F LDL-C <100 MG/DL: CPT | Performed by: INTERNAL MEDICINE

## 2024-05-31 PROCEDURE — 4010F ACE/ARB THERAPY RXD/TAKEN: CPT | Performed by: INTERNAL MEDICINE

## 2024-05-31 RX ORDER — PEN NEEDLE, DIABETIC 29 G X1/2"
1 NEEDLE, DISPOSABLE MISCELLANEOUS
Qty: 200 EACH | Refills: 11 | Status: SHIPPED | OUTPATIENT
Start: 2024-05-31 | End: 2025-05-31

## 2024-05-31 ASSESSMENT — ENCOUNTER SYMPTOMS
CONSTIPATION: 1
ABDOMINAL PAIN: 1
BACK PAIN: 1
UNEXPECTED WEIGHT CHANGE: 1
NAUSEA: 1
NUMBNESS: 1

## 2024-06-02 NOTE — ASSESSMENT & PLAN NOTE
To increase Lantus to 18 units subcutaneous bedtime  Take only 12 units of Lantus Andrews night   To continue Humalog 20 - 20 - 30 units three times daily before meals   Please commence an insulin sliding scale with Humalog before meals as follows:   150-200mg/dL - 2 units   201-250mg/dL - 4 units   251-300 mg/dL - 6 untis   301-350mg/dL - 8 units   >351mg/dL - 10 units    SLIDING SCALE ON THE DAY OF CHEMO IF STEROIDS ARE GIVEN  Please commence an insulin sliding scale with Humalog before meals as follows:   150-200mg/dL - 3 units   201-250mg/dL - 6 units   251-300 mg/dL - 9 untis   301-350mg/dL - 12 units   >351mg/dL - 15 units  Lantus 20 units if steroids are given     To continue the use of your CGM for the intensive glucose monitoring due to the dynamic nature of insulin requirements, the narrow therapeutic index of insulin and the potentially fatal consequences of treatment  Counseled that the time in range should be >70%  Counseled that the goal A1C should be 7% or less  Counseled glycemic control is warranted to prevent microvascular complications  Please rotate insulin injection sites  Counseled that his for most diabetes mellitus can be type IIIc especially if/when he undergoes pancreatic surgery  Counseled that in the head of the pancreas, the mass is destroying beta cells Housel role is to reduce insulin  Patient asked if this can be reversed, the likelihood of which is no  For follow up in 1 month

## 2024-06-03 ENCOUNTER — HOSPITAL ENCOUNTER (OUTPATIENT)
Facility: HOSPITAL | Age: 54
Setting detail: OUTPATIENT SURGERY
Discharge: HOME | End: 2024-06-03
Attending: SURGERY | Admitting: SURGERY
Payer: COMMERCIAL

## 2024-06-03 ENCOUNTER — APPOINTMENT (OUTPATIENT)
Dept: RADIOLOGY | Facility: HOSPITAL | Age: 54
End: 2024-06-03
Payer: COMMERCIAL

## 2024-06-03 ENCOUNTER — ANESTHESIA (OUTPATIENT)
Dept: OPERATING ROOM | Facility: HOSPITAL | Age: 54
End: 2024-06-03
Payer: COMMERCIAL

## 2024-06-03 ENCOUNTER — ANESTHESIA EVENT (OUTPATIENT)
Dept: OPERATING ROOM | Facility: HOSPITAL | Age: 54
End: 2024-06-03
Payer: COMMERCIAL

## 2024-06-03 VITALS
OXYGEN SATURATION: 97 % | HEART RATE: 69 BPM | RESPIRATION RATE: 16 BRPM | TEMPERATURE: 97.5 F | SYSTOLIC BLOOD PRESSURE: 114 MMHG | DIASTOLIC BLOOD PRESSURE: 70 MMHG | WEIGHT: 232.81 LBS | BODY MASS INDEX: 28.35 KG/M2 | HEIGHT: 76 IN

## 2024-06-03 DIAGNOSIS — Z79.4 TYPE 2 DIABETES MELLITUS WITH HYPERGLYCEMIA, WITH LONG-TERM CURRENT USE OF INSULIN (MULTI): Primary | ICD-10-CM

## 2024-06-03 DIAGNOSIS — E11.65 TYPE 2 DIABETES MELLITUS WITH HYPERGLYCEMIA, WITH LONG-TERM CURRENT USE OF INSULIN (MULTI): Primary | ICD-10-CM

## 2024-06-03 DIAGNOSIS — C25.0 MALIGNANT NEOPLASM OF HEAD OF PANCREAS (MULTI): Primary | ICD-10-CM

## 2024-06-03 LAB
GLUCOSE BLD MANUAL STRIP-MCNC: 135 MG/DL (ref 74–99)
GLUCOSE BLD MANUAL STRIP-MCNC: 154 MG/DL (ref 74–99)

## 2024-06-03 PROCEDURE — 2780000003 HC OR 278 NO HCPCS: Performed by: SURGERY

## 2024-06-03 PROCEDURE — 7100000010 HC PHASE TWO TIME - EACH INCREMENTAL 1 MINUTE: Performed by: SURGERY

## 2024-06-03 PROCEDURE — 82947 ASSAY GLUCOSE BLOOD QUANT: CPT

## 2024-06-03 PROCEDURE — 7100000001 HC RECOVERY ROOM TIME - INITIAL BASE CHARGE: Performed by: SURGERY

## 2024-06-03 PROCEDURE — A4217 STERILE WATER/SALINE, 500 ML: HCPCS | Performed by: SURGERY

## 2024-06-03 PROCEDURE — 49320 DIAG LAPARO SEPARATE PROC: CPT | Performed by: SURGERY

## 2024-06-03 PROCEDURE — C1788 PORT, INDWELLING, IMP: HCPCS | Performed by: SURGERY

## 2024-06-03 PROCEDURE — 2500000004 HC RX 250 GENERAL PHARMACY W/ HCPCS (ALT 636 FOR OP/ED): Performed by: SURGERY

## 2024-06-03 PROCEDURE — 2720000007 HC OR 272 NO HCPCS: Performed by: SURGERY

## 2024-06-03 PROCEDURE — 3600000008 HC OR TIME - EACH INCREMENTAL 1 MINUTE - PROCEDURE LEVEL THREE: Performed by: SURGERY

## 2024-06-03 PROCEDURE — 3700000001 HC GENERAL ANESTHESIA TIME - INITIAL BASE CHARGE: Performed by: SURGERY

## 2024-06-03 PROCEDURE — 36561 INSERT TUNNELED CV CATH: CPT | Performed by: SURGERY

## 2024-06-03 PROCEDURE — 2500000004 HC RX 250 GENERAL PHARMACY W/ HCPCS (ALT 636 FOR OP/ED)

## 2024-06-03 PROCEDURE — 71045 X-RAY EXAM CHEST 1 VIEW: CPT | Performed by: RADIOLOGY

## 2024-06-03 PROCEDURE — 2500000001 HC RX 250 WO HCPCS SELF ADMINISTERED DRUGS (ALT 637 FOR MEDICARE OP): Performed by: ANESTHESIOLOGY

## 2024-06-03 PROCEDURE — 2500000005 HC RX 250 GENERAL PHARMACY W/O HCPCS

## 2024-06-03 PROCEDURE — 2500000004 HC RX 250 GENERAL PHARMACY W/ HCPCS (ALT 636 FOR OP/ED): Performed by: ANESTHESIOLOGY

## 2024-06-03 PROCEDURE — C1894 INTRO/SHEATH, NON-LASER: HCPCS | Performed by: SURGERY

## 2024-06-03 PROCEDURE — 7100000002 HC RECOVERY ROOM TIME - EACH INCREMENTAL 1 MINUTE: Performed by: SURGERY

## 2024-06-03 PROCEDURE — 71045 X-RAY EXAM CHEST 1 VIEW: CPT | Mod: 59

## 2024-06-03 PROCEDURE — 3600000003 HC OR TIME - INITIAL BASE CHARGE - PROCEDURE LEVEL THREE: Performed by: SURGERY

## 2024-06-03 PROCEDURE — 77001 FLUOROGUIDE FOR VEIN DEVICE: CPT | Performed by: SURGERY

## 2024-06-03 PROCEDURE — 3700000002 HC GENERAL ANESTHESIA TIME - EACH INCREMENTAL 1 MINUTE: Performed by: SURGERY

## 2024-06-03 PROCEDURE — 7100000009 HC PHASE TWO TIME - INITIAL BASE CHARGE: Performed by: SURGERY

## 2024-06-03 DEVICE — POWERPORT SLIM IMPLANTABLE PORT WITH ATTACHABLE 6F CHRONOFLEX OPEN-ENDED SINGLE-LUMEN VENOUS CATHETER INTERMEDIATE KIT (WITHOUT SUTURE PLUGS)
Type: IMPLANTABLE DEVICE | Site: CHEST | Status: FUNCTIONAL
Brand: POWERPORT, CHRONOFLEX

## 2024-06-03 RX ORDER — MEPERIDINE HYDROCHLORIDE 25 MG/ML
12.5 INJECTION INTRAMUSCULAR; INTRAVENOUS; SUBCUTANEOUS EVERY 10 MIN PRN
Status: DISCONTINUED | OUTPATIENT
Start: 2024-06-03 | End: 2024-06-03 | Stop reason: HOSPADM

## 2024-06-03 RX ORDER — PROPOFOL 10 MG/ML
INJECTION, EMULSION INTRAVENOUS AS NEEDED
Status: DISCONTINUED | OUTPATIENT
Start: 2024-06-03 | End: 2024-06-03

## 2024-06-03 RX ORDER — SODIUM CHLORIDE, SODIUM LACTATE, POTASSIUM CHLORIDE, CALCIUM CHLORIDE 600; 310; 30; 20 MG/100ML; MG/100ML; MG/100ML; MG/100ML
INJECTION, SOLUTION INTRAVENOUS CONTINUOUS PRN
Status: DISCONTINUED | OUTPATIENT
Start: 2024-06-03 | End: 2024-06-03

## 2024-06-03 RX ORDER — ALBUTEROL SULFATE 0.83 MG/ML
2.5 SOLUTION RESPIRATORY (INHALATION) ONCE AS NEEDED
Status: DISCONTINUED | OUTPATIENT
Start: 2024-06-03 | End: 2024-06-03 | Stop reason: HOSPADM

## 2024-06-03 RX ORDER — ONDANSETRON HYDROCHLORIDE 2 MG/ML
4 INJECTION, SOLUTION INTRAVENOUS ONCE AS NEEDED
Status: DISCONTINUED | OUTPATIENT
Start: 2024-06-03 | End: 2024-06-03 | Stop reason: HOSPADM

## 2024-06-03 RX ORDER — HYDROMORPHONE HYDROCHLORIDE 1 MG/ML
0.2 INJECTION, SOLUTION INTRAMUSCULAR; INTRAVENOUS; SUBCUTANEOUS EVERY 5 MIN PRN
Status: DISCONTINUED | OUTPATIENT
Start: 2024-06-03 | End: 2024-06-03 | Stop reason: HOSPADM

## 2024-06-03 RX ORDER — ONDANSETRON HYDROCHLORIDE 2 MG/ML
INJECTION, SOLUTION INTRAVENOUS AS NEEDED
Status: DISCONTINUED | OUTPATIENT
Start: 2024-06-03 | End: 2024-06-03

## 2024-06-03 RX ORDER — LABETALOL HYDROCHLORIDE 5 MG/ML
5 INJECTION, SOLUTION INTRAVENOUS ONCE AS NEEDED
Status: DISCONTINUED | OUTPATIENT
Start: 2024-06-03 | End: 2024-06-03 | Stop reason: HOSPADM

## 2024-06-03 RX ORDER — ROCURONIUM BROMIDE 10 MG/ML
INJECTION, SOLUTION INTRAVENOUS AS NEEDED
Status: DISCONTINUED | OUTPATIENT
Start: 2024-06-03 | End: 2024-06-03

## 2024-06-03 RX ORDER — CEFAZOLIN 1 G/1
INJECTION, POWDER, FOR SOLUTION INTRAVENOUS AS NEEDED
Status: DISCONTINUED | OUTPATIENT
Start: 2024-06-03 | End: 2024-06-03

## 2024-06-03 RX ORDER — ACETAMINOPHEN 325 MG/1
650 TABLET ORAL EVERY 4 HOURS PRN
Status: DISCONTINUED | OUTPATIENT
Start: 2024-06-03 | End: 2024-06-03 | Stop reason: HOSPADM

## 2024-06-03 RX ORDER — BUPIVACAINE HYDROCHLORIDE 5 MG/ML
INJECTION, SOLUTION EPIDURAL; INTRACAUDAL AS NEEDED
Status: DISCONTINUED | OUTPATIENT
Start: 2024-06-03 | End: 2024-06-03 | Stop reason: HOSPADM

## 2024-06-03 RX ORDER — HYDROMORPHONE HYDROCHLORIDE 1 MG/ML
0.5 INJECTION, SOLUTION INTRAMUSCULAR; INTRAVENOUS; SUBCUTANEOUS EVERY 5 MIN PRN
Status: DISCONTINUED | OUTPATIENT
Start: 2024-06-03 | End: 2024-06-03 | Stop reason: HOSPADM

## 2024-06-03 RX ORDER — GLYCOPYRROLATE 0.2 MG/ML
INJECTION INTRAMUSCULAR; INTRAVENOUS AS NEEDED
Status: DISCONTINUED | OUTPATIENT
Start: 2024-06-03 | End: 2024-06-03

## 2024-06-03 RX ORDER — SODIUM CHLORIDE 0.9 G/100ML
IRRIGANT IRRIGATION AS NEEDED
Status: DISCONTINUED | OUTPATIENT
Start: 2024-06-03 | End: 2024-06-03 | Stop reason: HOSPADM

## 2024-06-03 RX ORDER — MIDAZOLAM HYDROCHLORIDE 1 MG/ML
INJECTION INTRAMUSCULAR; INTRAVENOUS AS NEEDED
Status: DISCONTINUED | OUTPATIENT
Start: 2024-06-03 | End: 2024-06-03

## 2024-06-03 RX ORDER — DIPHENHYDRAMINE HYDROCHLORIDE 50 MG/ML
12.5 INJECTION INTRAMUSCULAR; INTRAVENOUS ONCE AS NEEDED
Status: DISCONTINUED | OUTPATIENT
Start: 2024-06-03 | End: 2024-06-03 | Stop reason: HOSPADM

## 2024-06-03 RX ORDER — OXYCODONE HYDROCHLORIDE 5 MG/1
5 TABLET ORAL EVERY 4 HOURS PRN
Status: DISCONTINUED | OUTPATIENT
Start: 2024-06-03 | End: 2024-06-03 | Stop reason: HOSPADM

## 2024-06-03 RX ORDER — SODIUM CHLORIDE, SODIUM LACTATE, POTASSIUM CHLORIDE, CALCIUM CHLORIDE 600; 310; 30; 20 MG/100ML; MG/100ML; MG/100ML; MG/100ML
100 INJECTION, SOLUTION INTRAVENOUS CONTINUOUS
Status: DISCONTINUED | OUTPATIENT
Start: 2024-06-03 | End: 2024-06-03 | Stop reason: HOSPADM

## 2024-06-03 RX ORDER — LIDOCAINE HYDROCHLORIDE 10 MG/ML
0.1 INJECTION INFILTRATION; PERINEURAL ONCE
Status: DISCONTINUED | OUTPATIENT
Start: 2024-06-03 | End: 2024-06-03 | Stop reason: HOSPADM

## 2024-06-03 RX ORDER — FENTANYL CITRATE 50 UG/ML
INJECTION, SOLUTION INTRAMUSCULAR; INTRAVENOUS AS NEEDED
Status: DISCONTINUED | OUTPATIENT
Start: 2024-06-03 | End: 2024-06-03

## 2024-06-03 RX ORDER — HYDROMORPHONE HYDROCHLORIDE 1 MG/ML
INJECTION, SOLUTION INTRAMUSCULAR; INTRAVENOUS; SUBCUTANEOUS AS NEEDED
Status: DISCONTINUED | OUTPATIENT
Start: 2024-06-03 | End: 2024-06-03

## 2024-06-03 RX ORDER — LIDOCAINE HCL/PF 100 MG/5ML
SYRINGE (ML) INTRAVENOUS AS NEEDED
Status: DISCONTINUED | OUTPATIENT
Start: 2024-06-03 | End: 2024-06-03

## 2024-06-03 RX ADMIN — SODIUM CHLORIDE, POTASSIUM CHLORIDE, SODIUM LACTATE AND CALCIUM CHLORIDE: 600; 310; 30; 20 INJECTION, SOLUTION INTRAVENOUS at 07:18

## 2024-06-03 RX ADMIN — HYDROMORPHONE HYDROCHLORIDE 0.2 MG: 1 INJECTION, SOLUTION INTRAMUSCULAR; INTRAVENOUS; SUBCUTANEOUS at 09:30

## 2024-06-03 RX ADMIN — MIDAZOLAM HYDROCHLORIDE 2 MG: 1 INJECTION, SOLUTION INTRAMUSCULAR; INTRAVENOUS at 07:18

## 2024-06-03 RX ADMIN — ROCURONIUM BROMIDE 20 MG: 10 INJECTION INTRAVENOUS at 08:17

## 2024-06-03 RX ADMIN — SUGAMMADEX 200 MG: 100 INJECTION, SOLUTION INTRAVENOUS at 09:22

## 2024-06-03 RX ADMIN — PROPOFOL 200 MG: 10 INJECTION, EMULSION INTRAVENOUS at 07:26

## 2024-06-03 RX ADMIN — HYDROMORPHONE HYDROCHLORIDE 0.2 MG: 1 INJECTION, SOLUTION INTRAMUSCULAR; INTRAVENOUS; SUBCUTANEOUS at 10:24

## 2024-06-03 RX ADMIN — ROCURONIUM BROMIDE 60 MG: 10 INJECTION INTRAVENOUS at 07:26

## 2024-06-03 RX ADMIN — HYDROMORPHONE HYDROCHLORIDE 0.2 MG: 1 INJECTION, SOLUTION INTRAMUSCULAR; INTRAVENOUS; SUBCUTANEOUS at 09:09

## 2024-06-03 RX ADMIN — LIDOCAINE HYDROCHLORIDE 100 MG: 20 INJECTION INTRAVENOUS at 07:26

## 2024-06-03 RX ADMIN — GLYCOPYRROLATE 0.1 MG: 0.2 INJECTION, SOLUTION INTRAMUSCULAR; INTRAVENOUS at 07:45

## 2024-06-03 RX ADMIN — FENTANYL CITRATE 50 MCG: 50 INJECTION, SOLUTION INTRAMUSCULAR; INTRAVENOUS at 07:26

## 2024-06-03 RX ADMIN — HYDROMORPHONE HYDROCHLORIDE 0.2 MG: 1 INJECTION, SOLUTION INTRAMUSCULAR; INTRAVENOUS; SUBCUTANEOUS at 10:13

## 2024-06-03 RX ADMIN — OXYCODONE HYDROCHLORIDE 5 MG: 5 TABLET ORAL at 10:43

## 2024-06-03 RX ADMIN — ONDANSETRON 4 MG: 2 INJECTION INTRAMUSCULAR; INTRAVENOUS at 09:09

## 2024-06-03 RX ADMIN — FENTANYL CITRATE 50 MCG: 50 INJECTION, SOLUTION INTRAMUSCULAR; INTRAVENOUS at 08:17

## 2024-06-03 RX ADMIN — GLYCOPYRROLATE 0.1 MG: 0.2 INJECTION, SOLUTION INTRAMUSCULAR; INTRAVENOUS at 08:18

## 2024-06-03 RX ADMIN — HYDROMORPHONE HYDROCHLORIDE 0.2 MG: 1 INJECTION, SOLUTION INTRAMUSCULAR; INTRAVENOUS; SUBCUTANEOUS at 09:55

## 2024-06-03 RX ADMIN — CEFAZOLIN 2 G: 1 INJECTION, POWDER, FOR SOLUTION INTRAMUSCULAR; INTRAVENOUS at 07:36

## 2024-06-03 ASSESSMENT — PAIN SCALES - GENERAL
PAINLEVEL_OUTOF10: 5 - MODERATE PAIN
PAINLEVEL_OUTOF10: 4
PAINLEVEL_OUTOF10: 2
PAINLEVEL_OUTOF10: 4
PAINLEVEL_OUTOF10: 5 - MODERATE PAIN
PAINLEVEL_OUTOF10: 5 - MODERATE PAIN
PAINLEVEL_OUTOF10: 4
PAINLEVEL_OUTOF10: 5 - MODERATE PAIN

## 2024-06-03 ASSESSMENT — PAIN - FUNCTIONAL ASSESSMENT
PAIN_FUNCTIONAL_ASSESSMENT: 0-10

## 2024-06-03 NOTE — ANESTHESIA PROCEDURE NOTES
Peripheral IV  Date/Time: 6/3/2024 7:29 AM  Inserted by: Nicolasa Baig MD    Placement  Needle size: 18 G  Laterality: left  Location: hand  Local anesthetic: none  Site prep: alcohol  Technique: anatomical landmarks  Attempts: 1

## 2024-06-03 NOTE — SIGNIFICANT EVENT
Chest x-ray reviewed with attending.  Catheter tip terminates in appropriate location.  No pneumothorax.  Okay for discharge.    Mannie Abarca M.D.  [facility]/ PGY-1 Surgery Resident

## 2024-06-03 NOTE — ANESTHESIA PROCEDURE NOTES
Airway  Date/Time: 6/3/2024 7:29 AM  Urgency: elective    Airway not difficult    Staffing  Performed: YESENIA   Authorized by: Nicolasa Baig MD    Performed by: YESENIA Myers  Patient location during procedure: OR    Indications and Patient Condition  Indications for airway management: anesthesia and airway protection  Spontaneous Ventilation: absent  Sedation level: deep  Preoxygenated: yes  Patient position: sniffing  Mask difficulty assessment: 1 - vent by mask  Planned trial extubation    Final Airway Details  Final airway type: endotracheal airway      Successful airway: ETT  Cuffed: yes   Successful intubation technique: direct laryngoscopy  Facilitating devices/methods: intubating stylet  Endotracheal tube insertion site: oral  Blade: Roland  Blade size: #4  ETT size (mm): 7.5  Cormack-Lehane Classification: grade I - full view of glottis  Placement verified by: chest auscultation   Measured from: teeth  ETT to teeth (cm): 22  Number of attempts at approach: 1    Additional Comments  Lips/teeth in preanesthetic condition. Intubated by BRANDIE Levi.

## 2024-06-03 NOTE — DISCHARGE INSTRUCTIONS
"You will follow-up with your oncologist regarding use of the mediport (no need to follow-up directly with Dr. Gamble). Dressings will stay on until Saturday. At that time the clear dressings may come off but the underlying \"steri-strips\" should stay in place. Those will fall off by themselves. Okay for regular diet this evening if you feel up to it. Cold pack to abdominal incision for pain in addition to tylenol and advil. Okay to shower tomorrow 6/4. No heavy lifting.  "

## 2024-06-03 NOTE — ANESTHESIA PREPROCEDURE EVALUATION
Patient: Nate Alvarez    Procedure Information       Date/Time: 06/03/24 0715    Procedures:       Diagnostic Laparoscopy      Mediport    Location: [facility] OR 17 / [facility]    Surgeons: Roddy Gamble MD            Relevant Problems   Cardiac   (+) Essential hypertension      Pulmonary   (+) Complex sleep apnea syndrome      Liver   (+) Malignant neoplasm of head of pancreas (Multi)      Endocrine   (+) Class 1 obesity due to excess calories with serious comorbidity and body mass index (BMI) of 32.0 to 32.9 in adult   (+) Type 2 diabetes mellitus with hyperglycemia, with long-term current use of insulin (Multi)      Skin   (+) Basal cell carcinoma (BCC) of face   (+) Eczema of both hands       Clinical information reviewed:   Tobacco  Allergies  Meds  Problems  Med Hx  Surg Hx   Fam Hx  Soc   Hx        NPO Detail:  No data recorded     Physical Exam    Airway  Mallampati: II  TM distance: >3 FB  Neck ROM: full     Cardiovascular    Dental - normal exam     Pulmonary    Abdominal            Anesthesia Plan    History of general anesthesia?: yes  History of complications of general anesthesia?: no    ASA 3     general     intravenous induction   Trial extubation is planned.  Anesthetic plan and risks discussed with patient.

## 2024-06-03 NOTE — OP NOTE
Diagnostic Laparoscopy, Mediport Operative Note     Date: 6/3/2024  OR Location: [facility] OR    Name: Nate Alvarez, : 1970, Age: 53 y.o., MRN: [id number], Sex: male    Diagnosis  Pre-op Diagnosis     * Malignant neoplasm of head of pancreas (Multi) [C25.0] Post-op Diagnosis     * Malignant neoplasm of head of pancreas (Multi) [C25.0]     Procedures  Diagnostic Laparoscopy  6Fr Bard PowerPort RIJV with US/Fluoro Guidance    Surgeons      * Roddy Gamble - Primary    Resident/Fellow/Other Assistant:  Surgeons and Role:     * Mannie Abarca MD - Resident - Assisting    Procedure Summary  Anesthesia: General  ASA: III  Anesthesia Staff: Anesthesiologist: Nicolasa Baig MD  CRNA: JUAN Junior-CRNA  C-AA: YESENIA Myers  Estimated Blood Loss: 0 mL  Intra-op Medications:   Administrations occurring from 0715 to 0935 on 24:   Medication Name Total Dose   sodium chloride 0.9 % irrigation solution 1,000 mL   heparin 25,000 Units in sodium chloride 0.9 % 250 mL irrigation 4 mL              Anesthesia Record               Intraprocedure I/O Totals       None           Specimen: No specimens collected     Staff:   Circulator: Qing  Circulator: Rajesh Lau Person: Sean    Implants:  Implants       Type Name Action Serial No.      Implant POWER PORT, SLIM, TI DEVICE W/6FR - [id number] Implanted               Findings: no mets seen    Indications: Nate Alvarez is an 53 y.o. male who is having surgery for Malignant neoplasm of head of pancreas (Multi) [C25.0].   The patient was seen in the preoperative area. The risks, benefits, complications, treatment options, non-operative alternatives, expected recovery and outcomes were discussed with the patient. The possibilities of reaction to medication, pulmonary aspiration, injury to surrounding structures, bleeding, recurrent infection, the need for additional procedures, failure to diagnose a condition, and creating a complication  requiring transfusion or operation were discussed with the patient. The patient concurred with the proposed plan, giving informed consent.  The site of surgery was properly noted/marked if necessary per policy. The patient has been actively warmed in preoperative area. Preoperative antibiotics have been ordered and given within 1 hours of incision. Venous thrombosis prophylaxis have been ordered including bilateral sequential compression devices    Procedure Details:     This man is 53 years old.  He has a newly diagnosed pancreatic cancer.  He is brought to the operating room today for diagnostic laparoscopy and Mediport.    The patient was brought to the operating room and placed on the operating table in supine position.  A timeout was performed.  After the induction of general endotracheal anesthesia, he was given intravenous antibiotics.  He had sequential compression devices placed.  His abdomen and right neck and chest were clipped with electric razor and prepped and draped in standard sterile fashion after confirming that his right internal jugular vein would be a suitable access vessel using ultrasound.    We began the operation with the laparoscopy.  We made a small supraumbilical midline cutdown safely gaining access to the peritoneal cavity.  We inserted our balloontipped trocar and insufflated to 15 mmHg.  We inserted the single port on the patient's right side, that was 5 mm.  A diagnostic laparoscopy revealed no obvious evidence of ascites liver lesions or peritoneal disease.    The ports were withdrawn under direct vision.  The abdomen was desufflated.  The fascia at the camera site was closed with 0 Vicryl suture.  Local anesthetic was infiltrated.  The incision in that 5 mm site were closed with 4-0 Vicryl subcuticular.    We next turned our attention to the Mediport.  Under sterile conditions we used the ultrasound to gain access to the right internal jugular vein using the micropuncture needle.   We inserted the micropuncture wire and confirmed that we are in the appropriate position fluoroscopically.  We then changed over to the wire included in the PowerPort kit and again confirmed that we are in the appropriate position fluoroscopically.    We picked a spot on the right chest wall for the subcutaneous pocket stop the anesthetized, made the incision, and dissected out the pocket.  We secured the port to the chest wall using a series of three 3-0 Prolene sutures.  We tunneled the catheter from the subcutaneous pocket to the vein entry site.  We then cut the catheter length under fluoroscopic guidance such that the tip would be in the distal SVC or right atrium.    Finally we deployed the catheter using the peel-away sheath dilator.  That was placed over wire and fluoroscopic guidance.  At the completion of the deployment of the catheter the catheter tip was in the distal SVC or atrium as confirmed by the tip of the suture scissors.  The port withdrew blood easily and flushed easily.  It was given a final flush of our heparin solution.  The subcutaneous pocket was closed with interrupted 3-0 Vicryl and a 4-0 Vicryl subcuticular.  Mastisol Steri-Strips dry gauze and clear dressings were applied to all incisions.  At the time of my departure from the operating room the patient had been extubated and was being prepared for transport to the recovery room where a postprocedural chest x-ray will be done.    This note has been dictated with voice recognition software and has not been reviewed for grammar or content errors.      Complications:  None; patient tolerated the procedure well.    Disposition: PACU - hemodynamically stable.  Condition: stable     Attending Attestation: I was present and scrubbed for the entire procedure.    Roddy Gamble  Phone Number: [phone]

## 2024-06-03 NOTE — ANESTHESIA POSTPROCEDURE EVALUATION
Patient: Nate Alvarez    Procedure Summary       Date: 06/03/24 Room / Location: [facility] OR 17 / Virtual [facility] OR    Anesthesia Start: 0718 Anesthesia Stop: 0941    Procedures:       Diagnostic Laparoscopy      Mediport Diagnosis:       Malignant neoplasm of head of pancreas (Multi)      (Malignant neoplasm of head of pancreas (Multi) [C25.0])    Surgeons: Roddy Gamble MD Responsible Provider: Nicolasa Baig MD    Anesthesia Type: general ASA Status: 3            Anesthesia Type: general    Vitals Value Taken Time   /70 06/03/24 1015   Temp 36.4 °C (97.5 °F) 06/03/24 0939   Pulse 69 06/03/24 1021   Resp 4 06/03/24 1021   SpO2 95 % 06/03/24 1021   Vitals shown include unfiled device data.    Anesthesia Post Evaluation    Patient location during evaluation: PACU  Patient participation: complete - patient participated  Level of consciousness: awake  Pain management: adequate  Airway patency: patent  Cardiovascular status: acceptable  Respiratory status: acceptable  Hydration status: acceptable  Postoperative Nausea and Vomiting: none        No notable events documented.

## 2024-06-03 NOTE — INTERVAL H&P NOTE
53 y.o. male   Procedure(s):  Diagnostic Laparoscopy  [facility]  Pre-Op Diagnosis Codes:     * Malignant neoplasm of head of pancreas (Multi) [C25.0]     H&P reviewed. The patient was examined and there are no changes to the H&P.

## 2024-06-04 ENCOUNTER — APPOINTMENT (OUTPATIENT)
Dept: HEMATOLOGY/ONCOLOGY | Facility: CLINIC | Age: 54
End: 2024-06-04
Payer: COMMERCIAL

## 2024-06-06 ENCOUNTER — APPOINTMENT (OUTPATIENT)
Dept: HEMATOLOGY/ONCOLOGY | Facility: CLINIC | Age: 54
End: 2024-06-06
Payer: COMMERCIAL

## 2024-06-06 LAB
LABORATORY COMMENT REPORT: NORMAL
PATH REPORT.FINAL DX SPEC: NORMAL
PATH REPORT.GROSS SPEC: NORMAL
PATH REPORT.RELEVANT HX SPEC: NORMAL
PATH REPORT.TOTAL CANCER: NORMAL

## 2024-06-11 ENCOUNTER — DOCUMENTATION (OUTPATIENT)
Dept: CASE MANAGEMENT | Facility: HOSPITAL | Age: 54
End: 2024-06-11
Payer: COMMERCIAL

## 2024-06-11 ENCOUNTER — INFUSION (OUTPATIENT)
Dept: HEMATOLOGY/ONCOLOGY | Facility: CLINIC | Age: 54
End: 2024-06-11
Payer: COMMERCIAL

## 2024-06-11 VITALS
BODY MASS INDEX: 29.41 KG/M2 | TEMPERATURE: 97.5 F | WEIGHT: 229.2 LBS | OXYGEN SATURATION: 97 % | DIASTOLIC BLOOD PRESSURE: 86 MMHG | SYSTOLIC BLOOD PRESSURE: 128 MMHG | RESPIRATION RATE: 18 BRPM | HEIGHT: 74 IN | HEART RATE: 64 BPM

## 2024-06-11 DIAGNOSIS — C25.0 MALIGNANT NEOPLASM OF HEAD OF PANCREAS (MULTI): ICD-10-CM

## 2024-06-11 PROCEDURE — 96417 CHEMO IV INFUS EACH ADDL SEQ: CPT

## 2024-06-11 PROCEDURE — 96413 CHEMO IV INFUSION 1 HR: CPT

## 2024-06-11 PROCEDURE — 96416 CHEMO PROLONG INFUSE W/PUMP: CPT

## 2024-06-11 PROCEDURE — 2500000004 HC RX 250 GENERAL PHARMACY W/ HCPCS (ALT 636 FOR OP/ED): Performed by: INTERNAL MEDICINE

## 2024-06-11 PROCEDURE — 96376 TX/PRO/DX INJ SAME DRUG ADON: CPT

## 2024-06-11 PROCEDURE — 96415 CHEMO IV INFUSION ADDL HR: CPT

## 2024-06-11 PROCEDURE — 96375 TX/PRO/DX INJ NEW DRUG ADDON: CPT | Mod: INF

## 2024-06-11 RX ORDER — ALBUTEROL SULFATE 0.83 MG/ML
3 SOLUTION RESPIRATORY (INHALATION) AS NEEDED
Status: DISCONTINUED | OUTPATIENT
Start: 2024-06-11 | End: 2024-06-11 | Stop reason: HOSPADM

## 2024-06-11 RX ORDER — PROCHLORPERAZINE MALEATE 10 MG
10 TABLET ORAL EVERY 6 HOURS PRN
Status: DISCONTINUED | OUTPATIENT
Start: 2024-06-11 | End: 2024-06-11 | Stop reason: HOSPADM

## 2024-06-11 RX ORDER — PALONOSETRON 0.05 MG/ML
0.25 INJECTION, SOLUTION INTRAVENOUS ONCE
Status: COMPLETED | OUTPATIENT
Start: 2024-06-11 | End: 2024-06-11

## 2024-06-11 RX ORDER — DIPHENHYDRAMINE HYDROCHLORIDE 50 MG/ML
50 INJECTION INTRAMUSCULAR; INTRAVENOUS AS NEEDED
Status: DISCONTINUED | OUTPATIENT
Start: 2024-06-11 | End: 2024-06-11 | Stop reason: HOSPADM

## 2024-06-11 RX ORDER — EPINEPHRINE 0.3 MG/.3ML
0.3 INJECTION SUBCUTANEOUS EVERY 5 MIN PRN
Status: DISCONTINUED | OUTPATIENT
Start: 2024-06-11 | End: 2024-06-11 | Stop reason: HOSPADM

## 2024-06-11 RX ORDER — LORAZEPAM 2 MG/ML
1 INJECTION INTRAMUSCULAR AS NEEDED
Status: DISCONTINUED | OUTPATIENT
Start: 2024-06-11 | End: 2024-06-11 | Stop reason: HOSPADM

## 2024-06-11 RX ORDER — FAMOTIDINE 10 MG/ML
20 INJECTION INTRAVENOUS ONCE AS NEEDED
Status: DISCONTINUED | OUTPATIENT
Start: 2024-06-11 | End: 2024-06-11 | Stop reason: HOSPADM

## 2024-06-11 RX ORDER — PROCHLORPERAZINE EDISYLATE 5 MG/ML
10 INJECTION INTRAMUSCULAR; INTRAVENOUS EVERY 6 HOURS PRN
Status: DISCONTINUED | OUTPATIENT
Start: 2024-06-11 | End: 2024-06-11 | Stop reason: HOSPADM

## 2024-06-11 RX ORDER — ATROPINE SULFATE 0.4 MG/ML
0.4 INJECTION, SOLUTION ENDOTRACHEAL; INTRAMEDULLARY; INTRAMUSCULAR; INTRAVENOUS; SUBCUTANEOUS
Status: COMPLETED | OUTPATIENT
Start: 2024-06-11 | End: 2024-06-11

## 2024-06-11 RX ADMIN — ATROPINE SULFATE 0.4 MG: 0.4 INJECTION, SOLUTION INTRAVENOUS at 13:57

## 2024-06-11 RX ADMIN — DEXAMETHASONE SODIUM PHOSPHATE 12 MG: 10 INJECTION, SOLUTION INTRAMUSCULAR; INTRAVENOUS at 08:28

## 2024-06-11 RX ADMIN — IRINOTECAN HYDROCHLORIDE 350 MG: 20 INJECTION, SOLUTION INTRAVENOUS at 11:52

## 2024-06-11 RX ADMIN — ATROPINE SULFATE 0.4 MG: 0.4 INJECTION, SOLUTION INTRAVENOUS at 13:19

## 2024-06-11 RX ADMIN — OXALIPLATIN 200 MG: 5 INJECTION, SOLUTION INTRAVENOUS at 09:12

## 2024-06-11 RX ADMIN — PALONOSETRON HYDROCHLORIDE 250 MCG: 0.25 INJECTION INTRAVENOUS at 08:28

## 2024-06-11 RX ADMIN — FLUOROURACIL 5650 MG: 50 INJECTION, SOLUTION INTRAVENOUS at 14:02

## 2024-06-11 ASSESSMENT — PAIN SCALES - GENERAL: PAINLEVEL: 2

## 2024-06-11 NOTE — PROGRESS NOTES
Lorelei met with patient and wife today to assist with additional disability coverage. Patient is communicative and pleasant , wife at chairside.  They appear to be managing well under the circumstances. LORELEI will fax additional information to insurance company.

## 2024-06-11 NOTE — PATIENT INSTRUCTIONS
Today you received a medication called aloxi. This is a long acting anti-nausea medication that will help prevent nausea for 3 days. If you have nausea within this 3 day time period, it is recommended that you utilize compazine (prochlorperazine) as directed by your doctor for nausea relief. After day 3, you can start to also utilize zofran (ondansetron) for additional nausea relief.  If you do not get relief from nausea, please reach out to your physician's office for additional support.    You can take imodium for diarrhea every 4 hours. If you have not had diarrhea since the last time you took the imodium, do not take another dose.

## 2024-06-11 NOTE — SIGNIFICANT EVENT
06/11/24 0802   Prechemo Checklist   Has the patient been in the hospital, ED, or urgent care since last date of service No   Chemo/Immuno Consent Completed and Signed Yes   Protocol/Indications Verified Yes   Confirmed to previous date/time of medication N/A  (first dose)   Compared to previous dose N/A   All medications are dated accurately Yes   Pregnancy Test Negative Not applicable   Parameters Met Yes   BSA/Weight-Height Verified Yes   Dose Calculations Verified (current, total, cumulative) Yes

## 2024-06-13 ENCOUNTER — APPOINTMENT (OUTPATIENT)
Dept: HEMATOLOGY/ONCOLOGY | Facility: CLINIC | Age: 54
End: 2024-06-13
Payer: COMMERCIAL

## 2024-06-13 ENCOUNTER — INFUSION (OUTPATIENT)
Dept: HEMATOLOGY/ONCOLOGY | Facility: CLINIC | Age: 54
End: 2024-06-13
Payer: COMMERCIAL

## 2024-06-13 VITALS
OXYGEN SATURATION: 99 % | HEART RATE: 76 BPM | RESPIRATION RATE: 18 BRPM | BODY MASS INDEX: 29.38 KG/M2 | SYSTOLIC BLOOD PRESSURE: 131 MMHG | HEIGHT: 74 IN | DIASTOLIC BLOOD PRESSURE: 76 MMHG | TEMPERATURE: 97.3 F

## 2024-06-13 DIAGNOSIS — C25.0 MALIGNANT NEOPLASM OF HEAD OF PANCREAS (MULTI): ICD-10-CM

## 2024-06-13 PROCEDURE — 2500000004 HC RX 250 GENERAL PHARMACY W/ HCPCS (ALT 636 FOR OP/ED): Performed by: NURSE PRACTITIONER

## 2024-06-13 PROCEDURE — 96523 IRRIG DRUG DELIVERY DEVICE: CPT

## 2024-06-13 RX ORDER — HEPARIN 100 UNIT/ML
500 SYRINGE INTRAVENOUS AS NEEDED
OUTPATIENT
Start: 2024-06-13

## 2024-06-13 RX ORDER — HEPARIN SODIUM,PORCINE/PF 10 UNIT/ML
50 SYRINGE (ML) INTRAVENOUS AS NEEDED
OUTPATIENT
Start: 2024-06-13

## 2024-06-13 RX ORDER — HEPARIN 100 UNIT/ML
500 SYRINGE INTRAVENOUS AS NEEDED
Status: DISCONTINUED | OUTPATIENT
Start: 2024-06-13 | End: 2024-06-13 | Stop reason: HOSPADM

## 2024-06-13 ASSESSMENT — PAIN SCALES - GENERAL: PAINLEVEL_OUTOF10: 2

## 2024-06-17 ENCOUNTER — PATIENT OUTREACH (OUTPATIENT)
Dept: PRIMARY CARE | Facility: CLINIC | Age: 54
End: 2024-06-17
Payer: COMMERCIAL

## 2024-06-17 ENCOUNTER — APPOINTMENT (OUTPATIENT)
Dept: HEMATOLOGY/ONCOLOGY | Facility: CLINIC | Age: 54
End: 2024-06-17
Payer: COMMERCIAL

## 2024-06-18 ENCOUNTER — APPOINTMENT (OUTPATIENT)
Dept: HEMATOLOGY/ONCOLOGY | Facility: CLINIC | Age: 54
End: 2024-06-18
Payer: COMMERCIAL

## 2024-06-24 ENCOUNTER — LAB (OUTPATIENT)
Dept: HEMATOLOGY/ONCOLOGY | Facility: CLINIC | Age: 54
End: 2024-06-24
Payer: COMMERCIAL

## 2024-06-24 VITALS
BODY MASS INDEX: 29.43 KG/M2 | DIASTOLIC BLOOD PRESSURE: 87 MMHG | TEMPERATURE: 97.7 F | RESPIRATION RATE: 16 BRPM | WEIGHT: 229.28 LBS | HEART RATE: 80 BPM | SYSTOLIC BLOOD PRESSURE: 128 MMHG | HEIGHT: 74 IN | OXYGEN SATURATION: 96 %

## 2024-06-24 DIAGNOSIS — C25.0 MALIGNANT NEOPLASM OF HEAD OF PANCREAS (MULTI): ICD-10-CM

## 2024-06-24 LAB
ALBUMIN SERPL BCP-MCNC: 4 G/DL (ref 3.4–5)
ALP SERPL-CCNC: 64 U/L (ref 33–120)
ALT SERPL W P-5'-P-CCNC: 17 U/L (ref 10–52)
ANION GAP SERPL CALC-SCNC: 9 MMOL/L (ref 10–20)
AST SERPL W P-5'-P-CCNC: 12 U/L (ref 9–39)
BASOPHILS # BLD AUTO: 0.05 X10*3/UL (ref 0–0.1)
BASOPHILS NFR BLD AUTO: 0.7 %
BILIRUB SERPL-MCNC: 0.9 MG/DL (ref 0–1.2)
BUN SERPL-MCNC: 17 MG/DL (ref 6–23)
CALCIUM SERPL-MCNC: 9.1 MG/DL (ref 8.6–10.3)
CHLORIDE SERPL-SCNC: 107 MMOL/L (ref 98–107)
CO2 SERPL-SCNC: 26 MMOL/L (ref 21–32)
CREAT SERPL-MCNC: 0.84 MG/DL (ref 0.5–1.3)
EGFRCR SERPLBLD CKD-EPI 2021: >90 ML/MIN/1.73M*2
EOSINOPHIL # BLD AUTO: 0.15 X10*3/UL (ref 0–0.7)
EOSINOPHIL NFR BLD AUTO: 2.1 %
ERYTHROCYTE [DISTWIDTH] IN BLOOD BY AUTOMATED COUNT: 13.9 % (ref 11.5–14.5)
GLUCOSE SERPL-MCNC: 241 MG/DL (ref 74–99)
HCT VFR BLD AUTO: 38.1 % (ref 41–52)
HGB BLD-MCNC: 13 G/DL (ref 13.5–17.5)
IMM GRANULOCYTES # BLD AUTO: 0.01 X10*3/UL (ref 0–0.7)
IMM GRANULOCYTES NFR BLD AUTO: 0.1 % (ref 0–0.9)
LYMPHOCYTES # BLD AUTO: 1.96 X10*3/UL (ref 1.2–4.8)
LYMPHOCYTES NFR BLD AUTO: 27.5 %
MCH RBC QN AUTO: 30.1 PG (ref 26–34)
MCHC RBC AUTO-ENTMCNC: 34.1 G/DL (ref 32–36)
MCV RBC AUTO: 88 FL (ref 80–100)
MONOCYTES # BLD AUTO: 0.59 X10*3/UL (ref 0.1–1)
MONOCYTES NFR BLD AUTO: 8.3 %
NEUTROPHILS # BLD AUTO: 4.38 X10*3/UL (ref 1.2–7.7)
NEUTROPHILS NFR BLD AUTO: 61.3 %
PLATELET # BLD AUTO: 208 X10*3/UL (ref 150–450)
POTASSIUM SERPL-SCNC: 4.1 MMOL/L (ref 3.5–5.3)
PROT SERPL-MCNC: 6.3 G/DL (ref 6.4–8.2)
RBC # BLD AUTO: 4.32 X10*6/UL (ref 4.5–5.9)
SODIUM SERPL-SCNC: 138 MMOL/L (ref 136–145)
WBC # BLD AUTO: 7.1 X10*3/UL (ref 4.4–11.3)

## 2024-06-24 PROCEDURE — 2500000004 HC RX 250 GENERAL PHARMACY W/ HCPCS (ALT 636 FOR OP/ED): Performed by: NURSE PRACTITIONER

## 2024-06-24 PROCEDURE — 36591 DRAW BLOOD OFF VENOUS DEVICE: CPT

## 2024-06-24 PROCEDURE — 85025 COMPLETE CBC W/AUTO DIFF WBC: CPT

## 2024-06-24 PROCEDURE — 84075 ASSAY ALKALINE PHOSPHATASE: CPT

## 2024-06-24 RX ORDER — HEPARIN 100 UNIT/ML
500 SYRINGE INTRAVENOUS AS NEEDED
Status: DISCONTINUED | OUTPATIENT
Start: 2024-06-24 | End: 2024-06-24 | Stop reason: HOSPADM

## 2024-06-24 RX ORDER — HEPARIN SODIUM,PORCINE/PF 10 UNIT/ML
50 SYRINGE (ML) INTRAVENOUS AS NEEDED
Status: CANCELLED | OUTPATIENT
Start: 2024-06-24

## 2024-06-24 RX ORDER — HEPARIN 100 UNIT/ML
500 SYRINGE INTRAVENOUS AS NEEDED
Status: CANCELLED | OUTPATIENT
Start: 2024-06-24

## 2024-06-24 ASSESSMENT — PAIN SCALES - GENERAL: PAINLEVEL: 0-NO PAIN

## 2024-06-24 NOTE — PROGRESS NOTES
Pt here for [facility] flush and labs prior to treatment. Tolerated procedure well. Discharged in stable condition, no further needs at this time. Has schedule for follow up.

## 2024-06-25 ENCOUNTER — INFUSION (OUTPATIENT)
Dept: HEMATOLOGY/ONCOLOGY | Facility: CLINIC | Age: 54
End: 2024-06-25
Payer: COMMERCIAL

## 2024-06-25 ENCOUNTER — DOCUMENTATION (OUTPATIENT)
Dept: CASE MANAGEMENT | Facility: HOSPITAL | Age: 54
End: 2024-06-25
Payer: COMMERCIAL

## 2024-06-25 ENCOUNTER — OFFICE VISIT (OUTPATIENT)
Dept: HEMATOLOGY/ONCOLOGY | Facility: CLINIC | Age: 54
End: 2024-06-25
Payer: COMMERCIAL

## 2024-06-25 VITALS
DIASTOLIC BLOOD PRESSURE: 73 MMHG | RESPIRATION RATE: 18 BRPM | SYSTOLIC BLOOD PRESSURE: 119 MMHG | WEIGHT: 233.69 LBS | BODY MASS INDEX: 29.96 KG/M2 | OXYGEN SATURATION: 96 % | HEART RATE: 57 BPM | TEMPERATURE: 97.3 F

## 2024-06-25 DIAGNOSIS — C25.0 MALIGNANT NEOPLASM OF HEAD OF PANCREAS (MULTI): Primary | ICD-10-CM

## 2024-06-25 PROCEDURE — 2500000004 HC RX 250 GENERAL PHARMACY W/ HCPCS (ALT 636 FOR OP/ED): Performed by: NURSE PRACTITIONER

## 2024-06-25 PROCEDURE — 3046F HEMOGLOBIN A1C LEVEL >9.0%: CPT | Performed by: NURSE PRACTITIONER

## 2024-06-25 PROCEDURE — 99215 OFFICE O/P EST HI 40 MIN: CPT | Performed by: NURSE PRACTITIONER

## 2024-06-25 PROCEDURE — 96416 CHEMO PROLONG INFUSE W/PUMP: CPT

## 2024-06-25 PROCEDURE — 96417 CHEMO IV INFUS EACH ADDL SEQ: CPT

## 2024-06-25 PROCEDURE — 4010F ACE/ARB THERAPY RXD/TAKEN: CPT | Performed by: NURSE PRACTITIONER

## 2024-06-25 PROCEDURE — 96413 CHEMO IV INFUSION 1 HR: CPT

## 2024-06-25 PROCEDURE — 96376 TX/PRO/DX INJ SAME DRUG ADON: CPT

## 2024-06-25 PROCEDURE — 3048F LDL-C <100 MG/DL: CPT | Performed by: NURSE PRACTITIONER

## 2024-06-25 PROCEDURE — 96411 CHEMO IV PUSH ADDL DRUG: CPT

## 2024-06-25 PROCEDURE — 96375 TX/PRO/DX INJ NEW DRUG ADDON: CPT | Mod: INF

## 2024-06-25 PROCEDURE — 3078F DIAST BP <80 MM HG: CPT | Performed by: NURSE PRACTITIONER

## 2024-06-25 PROCEDURE — 96415 CHEMO IV INFUSION ADDL HR: CPT

## 2024-06-25 PROCEDURE — 96367 TX/PROPH/DG ADDL SEQ IV INF: CPT

## 2024-06-25 PROCEDURE — 3008F BODY MASS INDEX DOCD: CPT | Performed by: NURSE PRACTITIONER

## 2024-06-25 PROCEDURE — 3074F SYST BP LT 130 MM HG: CPT | Performed by: NURSE PRACTITIONER

## 2024-06-25 RX ORDER — FAMOTIDINE 10 MG/ML
20 INJECTION INTRAVENOUS ONCE AS NEEDED
Status: CANCELLED | OUTPATIENT
Start: 2024-06-25

## 2024-06-25 RX ORDER — HEPARIN 100 UNIT/ML
500 SYRINGE INTRAVENOUS AS NEEDED
Status: CANCELLED | OUTPATIENT
Start: 2024-06-25

## 2024-06-25 RX ORDER — LORAZEPAM 1 MG/1
1 TABLET ORAL NIGHTLY PRN
Qty: 30 TABLET | Refills: 2 | Status: SHIPPED | OUTPATIENT
Start: 2024-06-25 | End: 2024-09-23

## 2024-06-25 RX ORDER — HEPARIN 100 UNIT/ML
500 SYRINGE INTRAVENOUS AS NEEDED
Status: DISCONTINUED | OUTPATIENT
Start: 2024-06-25 | End: 2024-06-25 | Stop reason: HOSPADM

## 2024-06-25 RX ORDER — PROCHLORPERAZINE MALEATE 10 MG
10 TABLET ORAL EVERY 6 HOURS PRN
Status: CANCELLED | OUTPATIENT
Start: 2024-06-25

## 2024-06-25 RX ORDER — ATROPINE SULFATE 0.4 MG/ML
0.4 INJECTION, SOLUTION ENDOTRACHEAL; INTRAMEDULLARY; INTRAMUSCULAR; INTRAVENOUS; SUBCUTANEOUS
Status: COMPLETED | OUTPATIENT
Start: 2024-06-25 | End: 2024-06-25

## 2024-06-25 RX ORDER — LORAZEPAM 2 MG/ML
1 INJECTION INTRAMUSCULAR AS NEEDED
Status: DISCONTINUED | OUTPATIENT
Start: 2024-06-25 | End: 2024-06-25 | Stop reason: HOSPADM

## 2024-06-25 RX ORDER — EPINEPHRINE 0.3 MG/.3ML
0.3 INJECTION SUBCUTANEOUS EVERY 5 MIN PRN
Status: DISCONTINUED | OUTPATIENT
Start: 2024-06-25 | End: 2024-06-25 | Stop reason: HOSPADM

## 2024-06-25 RX ORDER — DIPHENHYDRAMINE HYDROCHLORIDE 50 MG/ML
50 INJECTION INTRAMUSCULAR; INTRAVENOUS AS NEEDED
Status: DISCONTINUED | OUTPATIENT
Start: 2024-06-25 | End: 2024-06-25 | Stop reason: HOSPADM

## 2024-06-25 RX ORDER — HEPARIN SODIUM,PORCINE/PF 10 UNIT/ML
50 SYRINGE (ML) INTRAVENOUS AS NEEDED
Status: CANCELLED | OUTPATIENT
Start: 2024-06-25

## 2024-06-25 RX ORDER — PROCHLORPERAZINE MALEATE 10 MG
10 TABLET ORAL EVERY 6 HOURS PRN
Status: DISCONTINUED | OUTPATIENT
Start: 2024-06-25 | End: 2024-06-25 | Stop reason: HOSPADM

## 2024-06-25 RX ORDER — DIPHENHYDRAMINE HYDROCHLORIDE 50 MG/ML
50 INJECTION INTRAMUSCULAR; INTRAVENOUS AS NEEDED
Status: CANCELLED | OUTPATIENT
Start: 2024-06-25

## 2024-06-25 RX ORDER — ATROPINE SULFATE 0.4 MG/ML
0.4 INJECTION, SOLUTION ENDOTRACHEAL; INTRAMEDULLARY; INTRAMUSCULAR; INTRAVENOUS; SUBCUTANEOUS
Status: CANCELLED | OUTPATIENT
Start: 2024-06-25

## 2024-06-25 RX ORDER — ALBUTEROL SULFATE 0.83 MG/ML
3 SOLUTION RESPIRATORY (INHALATION) AS NEEDED
Status: CANCELLED | OUTPATIENT
Start: 2024-06-27

## 2024-06-25 RX ORDER — PALONOSETRON 0.05 MG/ML
0.25 INJECTION, SOLUTION INTRAVENOUS ONCE
Status: CANCELLED | OUTPATIENT
Start: 2024-06-25

## 2024-06-25 RX ORDER — PROCHLORPERAZINE EDISYLATE 5 MG/ML
10 INJECTION INTRAMUSCULAR; INTRAVENOUS EVERY 6 HOURS PRN
Status: DISCONTINUED | OUTPATIENT
Start: 2024-06-25 | End: 2024-06-25 | Stop reason: HOSPADM

## 2024-06-25 RX ORDER — EPINEPHRINE 0.3 MG/.3ML
0.3 INJECTION SUBCUTANEOUS EVERY 5 MIN PRN
Status: CANCELLED | OUTPATIENT
Start: 2024-06-25

## 2024-06-25 RX ORDER — FAMOTIDINE 10 MG/ML
20 INJECTION INTRAVENOUS ONCE AS NEEDED
Status: CANCELLED | OUTPATIENT
Start: 2024-06-27

## 2024-06-25 RX ORDER — ALBUTEROL SULFATE 0.83 MG/ML
3 SOLUTION RESPIRATORY (INHALATION) AS NEEDED
Status: DISCONTINUED | OUTPATIENT
Start: 2024-06-25 | End: 2024-06-25 | Stop reason: HOSPADM

## 2024-06-25 RX ORDER — LORAZEPAM 2 MG/ML
1 INJECTION INTRAMUSCULAR AS NEEDED
Status: CANCELLED | OUTPATIENT
Start: 2024-06-25

## 2024-06-25 RX ORDER — ALBUTEROL SULFATE 0.83 MG/ML
3 SOLUTION RESPIRATORY (INHALATION) AS NEEDED
Status: CANCELLED | OUTPATIENT
Start: 2024-06-25

## 2024-06-25 RX ORDER — EPINEPHRINE 0.3 MG/.3ML
0.3 INJECTION SUBCUTANEOUS EVERY 5 MIN PRN
Status: CANCELLED | OUTPATIENT
Start: 2024-06-27

## 2024-06-25 RX ORDER — OXYCODONE HYDROCHLORIDE 10 MG/1
10 TABLET ORAL EVERY 6 HOURS PRN
Qty: 90 TABLET | Refills: 0 | Status: SHIPPED | OUTPATIENT
Start: 2024-06-25 | End: 2024-07-25

## 2024-06-25 RX ORDER — DIPHENHYDRAMINE HYDROCHLORIDE 50 MG/ML
50 INJECTION INTRAMUSCULAR; INTRAVENOUS AS NEEDED
Status: CANCELLED | OUTPATIENT
Start: 2024-06-27

## 2024-06-25 RX ORDER — PALONOSETRON 0.05 MG/ML
0.25 INJECTION, SOLUTION INTRAVENOUS ONCE
Status: COMPLETED | OUTPATIENT
Start: 2024-06-25 | End: 2024-06-25

## 2024-06-25 RX ORDER — FAMOTIDINE 10 MG/ML
20 INJECTION INTRAVENOUS ONCE AS NEEDED
Status: DISCONTINUED | OUTPATIENT
Start: 2024-06-25 | End: 2024-06-25 | Stop reason: HOSPADM

## 2024-06-25 RX ORDER — PROCHLORPERAZINE EDISYLATE 5 MG/ML
10 INJECTION INTRAMUSCULAR; INTRAVENOUS EVERY 6 HOURS PRN
Status: CANCELLED | OUTPATIENT
Start: 2024-06-25

## 2024-06-25 ASSESSMENT — PAIN SCALES - GENERAL: PAINLEVEL: 2

## 2024-06-25 NOTE — PROGRESS NOTES
Patient ID: Nate Alvarez is a 54 y.o. male from [place], [place].     Referring Physician: Shaw You MD  [id number] [place] [address]    Primary Care Provider: Hazel Medeiros MD    Diagnosis:   Pancreatic cancer 5/2024    Primary Oncologic Surgeon:   Omar Gamble MD    Primary Medical Oncologist:  Borderline Resectable    Primary Radiation Oncologist:  MARSHALL     Current Therapy:  Planned neoadjuvant FOLFIRINOX    Oncologic Surgery History:  Pending    Oncologic Therapy History:  N/A    Molecular Genetics:  Pending    Current Sites of Disease:  Head of the pancreas involving the gastroduodenal artery celiac axis and 180 degree involvement of the SMV    Oncologic Problem List:  Localized but borderline resectable pancreatic adenocarcinoma  New onset diabetes with hyperglycemia  Cancer cachexia      Oncologic Narrative:  54 y.o.  man from [place] who presented in May 2024 with difficult DM control (A1c 9.1 this month)and some ED visits for fatigue/weakness. Most recently at [facility] May 16-18. 70 lb wt loss mentioned (note some of the new DM meds) .  Tumor markers checked:  CEA: 4.9  Ca 19-9: >700    CT A/P Showed:   Edemasurrounding the pancreatic tail compatible with acute pancreatitis. Dilatation of the pancreatic duct which measures 6 mm in diameter and an ill marginated area of hypodensity in the  pancreatic head highly concerning for pancreatic malignancy, and pancreatic protocol MRI is recommended for further evaluation. An  area of necrotizing pancreatitis/pancreatic necrosis is other consideration, however there is no surrounding edema in this region and given the ductal dilatation, findings highly concerning for an underlying malignancy.  Small nonobstructive left renal calculi.     MRI:  IMPRESSION:  1. Pancreatic head/uncinate process mass measuring 2.9 x 2.5 cm with upstream pancreatic duct dilatation and parenchymal atrophy highly  concerning for primary pancreatic neoplasm  (specifically adenocarcinoma). Surrounding changes of mild superimposed pancreatitis. The mass is in contact with the adjacent 3rd part of the duodenum but no upstream dilatation. No biliary obstruction. The  mass has 180 degree contact with the adjacent superior mesenteric vein and likely encasing the 1st right lateral branch. The mass is  likely encasing the gastroduodenal artery distal component. The  celiac axis, superior mesenteric artery and main portal vein are intact.  2. Few subcentimeter peripancreatic indeterminate lymph nodes noted.  3. Hepatic steatosis with segment 4A lesion measuring 0.7 cm with  imaging characteristics favoring hemangioma .     Chest CT:  IMPRESSION:  1. No suspicious pulmonary nodules. Few small calcific granulomas noted in the lingula.  2. No enlarged intrathoracic lymphadenopathy.  3. Left thyroid nodule measuring 1.1 cm. This could be further assessed by dedicated nonemergent thyroid ultrasound if clinically desired.  4. Subdiaphragmatic findings regarding the known pancreatic mass and duct dilatation as well as the hepatic observations are better evaluated in prior MRI dated 05/16/2024     EUS:    Result Text   Impression  Limited endoscopic views of the esophagus, stomach and duodenum were obtained. There was heme and gastritis noted in the stomach. The rest of the exam was unremarkable   A 27 mm by 23 mm irregular and hypoechoic T2N0 mass with poorly defined margins was visualized in the uncinate process of the head with apparent involvement of the superior mesenteric vein; fine needle biopsy was performed, final pathology is pending but preliminary evaluation was positive for malignancy. The pancreas upstream to the mass appeared atrophic with dilated pancreatic duct.   The bile duct appeared normal.  Visualized portions of the liver, spleen, left adrenal gland, left kidney and celiac axis were normal on ultrasound examination.    No abnormal-appearing lymph nodes were  identified in the abdomen.            FINAL DIAGNOSIS   A. PANCREAS HEAD MASS, BIOPSY:     -- Invasive adenocarcinoma, moderately differentiated      6/11/24 - Exp / lap  -no carcinomatosis     Past Medical History: Nate has a past medical history of Diabetes mellitus (Multi), Gastroesophageal reflux disease without esophagitis (10/05/2023), Hemorrhoids (11/03/2023), HTN (hypertension), Leg cramps (10/05/2023), Oropharyngeal dysphagia (10/05/2023), RADHA (obstructive sleep apnea), Pancreatic cancer (Multi), and Personal history of other malignant neoplasm of skin.  Surgical History:  Nate has a past surgical history that includes Tonsillectomy; Vasectomy; Testicle surgery; Esophagogastroduodenoscopy; Colonoscopy; Eyelid carcinoma excision; Rotator cuff repair (Bilateral); and Knee arthroscopy w/ debridement.  Social History:  Nate reports that he has never smoked. He has quit using smokeless tobacco.  His smokeless tobacco use included chew. He reports that he does not currently use alcohol. He reports that he does not use drugs.  Family History:    Family History   Problem Relation Name Age of Onset    Diabetes Mother      Ovarian cancer Mother      Liver cancer Father      Lung cancer Father      Colon cancer Father      Hypertension Father      Stroke Maternal Grandmother      Diabetes Maternal Grandmother      Liver cancer Maternal Grandfather      Lung cancer Paternal Grandfather       Family Oncology History:  Cancer-related family history includes Colon cancer in his father; Liver cancer in his father and maternal grandfather; Lung cancer in his father and paternal grandfather; Ovarian cancer in his mother.        SUBJECTIVE:    History of Present Illness:  Nate Alvarez is a 54 y.o. male who was referred by Shaw You MD and presents with Follow-up    S/p 1st cycle of mFOLFIRINOX on 6/11   Due for C2 today     - had n/v for a few times in the morning    + jaw pain with first bites of food for 5 -  7 days    + itching of hands   No cold sensitivity   + pain in RUQ - taking oxycodone 10mg  tid prn   No fevers or chills.   Appt waxes & wanes    - trouble sleeping - only sleeping 2 - 3 hrs per night         OBJECTIVE:    VS / Pain:  /73   Pulse 57   Temp 36.3 °C (97.3 °F)   Resp 18   Wt 106 kg (233 lb 11 oz)   SpO2 96%   BMI 29.96 kg/m²   BSA: 2.35 meters squared     Pain Scale: 0    Daily Weight  06/25/24 : 106 kg (233 lb 11 oz)  06/24/24 : 104 kg (229 lb 4.5 oz)  06/11/24 : 104 kg (229 lb 3.2 oz)      Physical Exam  Constitutional:       Appearance: Normal appearance.   HENT:      Head: Normocephalic.      Mouth/Throat:      Mouth: Mucous membranes are moist.      Pharynx: Oropharynx is clear.   Eyes:      Pupils: Pupils are equal, round, and reactive to light.   Cardiovascular:      Rate and Rhythm: Normal rate.      Pulses: Normal pulses.   Pulmonary:      Effort: Pulmonary effort is normal.   Abdominal:      General: Abdomen is flat. Bowel sounds are normal.   Musculoskeletal:      Cervical back: Neck supple.   Skin:     General: Skin is warm and dry.      Capillary Refill: Capillary refill takes less than 2 seconds.   Neurological:      General: No focal deficit present.      Mental Status: He is alert.   Psychiatric:         Mood and Affect: Mood normal.         Performance Status:       Diagnostic Results         WBC   Date/Time Value Ref Range Status   06/24/2024 01:50 PM 7.1 4.4 - 11.3 x10*3/uL Final   05/31/2024 10:01 AM 6.7 4.4 - 11.3 x10*3/uL Final   05/18/2024 07:11 AM 9.9 4.4 - 11.3 x10*3/uL Final     Hemoglobin   Date Value Ref Range Status   06/24/2024 13.0 (L) 13.5 - 17.5 g/dL Final   05/31/2024 13.1 (L) 13.5 - 17.5 g/dL Final   05/18/2024 13.9 13.5 - 17.5 g/dL Final     MCV   Date/Time Value Ref Range Status   06/24/2024 01:50 PM 88 80 - 100 fL Final   05/31/2024 10:01 AM 91 80 - 100 fL Final   05/18/2024 07:11 AM 89 80 - 100 fL Final     Platelets   Date/Time Value Ref Range Status    06/24/2024 01:50  150 - 450 x10*3/uL Final   05/31/2024 10:01  150 - 450 x10*3/uL Final   05/18/2024 07:11  150 - 450 x10*3/uL Final     Neutrophils Absolute   Date/Time Value Ref Range Status   06/24/2024 01:50 PM 4.38 1.20 - 7.70 x10*3/uL Final     Comment:     Percent differential counts (%) should be interpreted in the context of the absolute cell counts (cells/uL).   05/31/2024 10:01 AM 4.13 1.20 - 7.70 x10*3/uL Final     Comment:     Percent differential counts (%) should be interpreted in the context of the absolute cell counts (cells/uL).   05/15/2024 07:16 PM 7.37 1.20 - 7.70 x10*3/uL Final     Comment:     Percent differential counts (%) should be interpreted in the context of the absolute cell counts (cells/uL).     Bilirubin, Total   Date/Time Value Ref Range Status   06/24/2024 01:50 PM 0.9 0.0 - 1.2 mg/dL Final   05/31/2024 10:01 AM 0.7 0.0 - 1.2 mg/dL Final   05/17/2024 07:25 AM 1.1 0.0 - 1.2 mg/dL Final     AST   Date/Time Value Ref Range Status   06/24/2024 01:50 PM 12 9 - 39 U/L Final   05/31/2024 10:01 AM 11 9 - 39 U/L Final   05/17/2024 07:25 AM 24 9 - 39 U/L Final     Comment:     MILD HEMOLYSIS DETECTED. The result may be falsely elevated due to hemolysis or other interferents. Clinical correlation is recommended. Repeat testing may be considered.     ALT   Date/Time Value Ref Range Status   06/24/2024 01:50 PM 17 10 - 52 U/L Final     Comment:     Patients treated with Sulfasalazine may generate falsely decreased results for ALT.   05/31/2024 10:01 AM 19 10 - 52 U/L Final     Comment:     Patients treated with Sulfasalazine may generate falsely decreased results for ALT.   05/17/2024 07:25 AM 23 10 - 52 U/L Final     Comment:     Patients treated with Sulfasalazine may generate falsely decreased results for ALT.     Creatinine   Date/Time Value Ref Range Status   06/24/2024 01:50 PM 0.84 0.50 - 1.30 mg/dL Final   05/31/2024 10:01 AM 0.88 0.50 - 1.30 mg/dL Final   05/18/2024  07:11 AM 0.95 0.50 - 1.30 mg/dL Final     Urea Nitrogen   Date/Time Value Ref Range Status   06/24/2024 01:50 PM 17 6 - 23 mg/dL Final   05/31/2024 10:01 AM 22 6 - 23 mg/dL Final   05/18/2024 07:11 AM 22 6 - 23 mg/dL Final     Albumin   Date/Time Value Ref Range Status   06/24/2024 01:50 PM 4.0 3.4 - 5.0 g/dL Final   05/31/2024 10:01 AM 4.0 3.4 - 5.0 g/dL Final   05/17/2024 07:25 AM 3.8 3.4 - 5.0 g/dL Final     Cancer AG 19-9   Date/Time Value Ref Range Status   05/31/2024 10:01 AM 1,779.76 (H) <35.00 U/mL Final   05/17/2024 09:28 AM >700.00 (H) <35.00 U/mL Final     Carcinoembryonic AG   Date/Time Value Ref Range Status   05/17/2024 09:28 AM 4.9 ug/L Final     Assessment/Plan   This is a 53-year-old man with borderline resectable pancreatic adenocarcinoma.  He presented in May 2024 with greater than 50 pound weight loss and new onset diabetes.  Imaging, EUS and biopsy confirmed adenocarcinoma of the pancreas involving the GDA, celiac axis, SMV.    Borderline resectable Pancreatic cancer:   - Consented for mFOLFIRINOX - started 6/3/24 - tolerated with jaw pain / nausea   C2 today - add fosaprepitant pre-med,                    - may need to decrease oxaliplatin dose if jaw pain worsens                 - ativan at home prn nausea / insomnia                RTC in 3 weeks prior to C3   - scans after C4   -Follow Cancer Antigen 19-9 at least every other cycle  -Obtain complete genomic profiling from pancreatic biopsy.    Pain - renewed oxycodone      Diabetes:  Continue long and short acting insulin as managed by  Dr. Brandon Malloy, [facility]  Office: [phone]  Fax: [phone]

## 2024-06-25 NOTE — PROGRESS NOTES
MARY JANE met briefly with patient and his wife. He is managing well and in good spirits. MARY JANE suggested he apply for short term disability. He stopped working at the end of April. He will contact his employer for paperwork. MARY JANE faxed [employer] life coverage a couple of weeks ago for patient-it is not for disability. To follow and assist

## 2024-06-27 ENCOUNTER — INFUSION (OUTPATIENT)
Dept: HEMATOLOGY/ONCOLOGY | Facility: CLINIC | Age: 54
End: 2024-06-27
Payer: COMMERCIAL

## 2024-06-27 VITALS
SYSTOLIC BLOOD PRESSURE: 118 MMHG | BODY MASS INDEX: 29.9 KG/M2 | OXYGEN SATURATION: 98 % | WEIGHT: 233 LBS | HEIGHT: 74 IN | DIASTOLIC BLOOD PRESSURE: 76 MMHG | RESPIRATION RATE: 16 BRPM | TEMPERATURE: 97.2 F | HEART RATE: 76 BPM

## 2024-06-27 DIAGNOSIS — C25.0 MALIGNANT NEOPLASM OF HEAD OF PANCREAS (MULTI): ICD-10-CM

## 2024-06-27 PROCEDURE — 2500000004 HC RX 250 GENERAL PHARMACY W/ HCPCS (ALT 636 FOR OP/ED): Performed by: NURSE PRACTITIONER

## 2024-06-27 RX ORDER — HEPARIN 100 UNIT/ML
500 SYRINGE INTRAVENOUS AS NEEDED
OUTPATIENT
Start: 2024-06-27

## 2024-06-27 RX ORDER — HEPARIN SODIUM,PORCINE/PF 10 UNIT/ML
50 SYRINGE (ML) INTRAVENOUS AS NEEDED
OUTPATIENT
Start: 2024-06-27

## 2024-06-27 RX ORDER — HEPARIN 100 UNIT/ML
500 SYRINGE INTRAVENOUS AS NEEDED
Status: DISCONTINUED | OUTPATIENT
Start: 2024-06-27 | End: 2024-06-27 | Stop reason: HOSPADM

## 2024-06-27 ASSESSMENT — PAIN SCALES - GENERAL: PAINLEVEL: 2

## 2024-06-27 NOTE — PROGRESS NOTES
Patient is here for pump disconnect. His infusion was complete on arrival. Patient c/o cold sensitivity. His port was flushed with heparin and de accessed. His pump was given to medical record personnel to be sent back to [facility] infusion. Patient was discharged in good condition.

## 2024-07-04 NOTE — PATIENT INSTRUCTIONS
Thank you for choosing [facility]  for your health care needs.  If you have any questions, concerns or medical needs, please feel free to contact our office at [phone].    Please ensure you complete your blood work one week before the next scheduled appointment.    To continue Lantus 22 units subcutaneous bedtime  To continue Humalog 15 units three times daily before meals   Please continue an insulin sliding scale with Humalog before meals as follows:   150-200mg/dL - 2 units   201-250mg/dL - 4 units   251-300 mg/dL - 6 untis   301-350mg/dL - 8 units   >351mg/dL - 10 units    SLIDING SCALE ON THE DAY OF CHEMO IF STEROIDS ARE GIVEN  Please commence an insulin sliding scale with Humalog before meals as follows:   150-200mg/dL - 4 units   201-250mg/dL - 8 units   251-300 mg/dL - 12 untis   301-350mg/dL - 15 units   >351mg/dL - 15 units  Lantus 20 units if steroids are given     To continue the use of your CGM for the intensive glucose monitoring due to the dynamic nature of insulin requirements, the narrow therapeutic index of insulin and the potentially fatal consequences of treatment  Counseled that the time in range should be >70%  For a diabetic dilated eye examination  For follow up in 2 months

## 2024-07-04 NOTE — PROGRESS NOTES
"Subjective   Nate Alvarez is a 54 y.o. male who presents for follow up for Type 2 diabetes mellitus. The initial diagnosis of diabetes was made in October 2023 .  He is accompanied by his wife today.      He was diagnosed in April 2024 with invasive adenocarcinoma moderately differentiated after having a biopsy of a pancreatic mass to the head.  He did start chemotherapy and admits to feeling well.      His insulin requirements have decreased since we last met.      Known complications due to diabetes included peripheral neuropathy    Cardiovascular risk factors include diabetes mellitus. The patient is on an ACE inhibitor or angiotensin II receptor blocker.  The patient has not been previously hospitalized due to diabetic ketoacidosis.     Current symptoms/problems include paresthesia of the feet. His clinical course has been stable.     Current diabetes regimen is as follows:   Lantus 22 units subcutaneous bedtime   Humalog 15 units TID AC     The patient is currently checking the blood glucose multiple times per day.    Patient is using: continuous glucose monitor                                                                Hypoglycemia frequency: 0%  Hypoglycemia awareness: No       MEALS:  lower appeitte   Breakfast -  can omit  Lunch - soup, ahm sandwich, fruit   Dinner - steak, salad, watermelon   Sncakas - cookies   Beverages - water, iced tea, body armo    Review of Systems   Constitutional:  Positive for fatigue.   HENT:  Positive for tinnitus.    Gastrointestinal:  Positive for abdominal pain and nausea.   Musculoskeletal:  Positive for arthralgias (hips) and back pain.   Skin:         Dry skin    All other systems reviewed and are negative.      Objective   /70 (BP Location: Left arm, Patient Position: Sitting, BP Cuff Size: Adult)   Pulse 63   Ht 1.905 m (6' 3\")   Wt 106 kg (234 lb)   BMI 29.25 kg/m² continue  Physical Exam  Vitals and nursing note reviewed.   Constitutional:       " General: He is not in acute distress.     Appearance: Normal appearance. He is normal weight.   HENT:      Head: Normocephalic and atraumatic.      Nose: Nose normal.      Mouth/Throat:      Mouth: Mucous membranes are moist.   Eyes:      Extraocular Movements: Extraocular movements intact.   Cardiovascular:      Rate and Rhythm: Normal rate and regular rhythm.   Pulmonary:      Effort: Pulmonary effort is normal.      Breath sounds: Normal breath sounds.   Musculoskeletal:         General: Normal range of motion.   Skin:     General: Skin is warm.   Neurological:      Mental Status: He is alert and oriented to person, place, and time.   Psychiatric:         Mood and Affect: Mood normal.         Lab Review  Glucose (mg/dL)   Date Value   06/24/2024 241 (H)   05/31/2024 188 (H)   05/18/2024 176 (H)     POC HEMOGLOBIN A1c (%)   Date Value   04/17/2024 9.3 (A)   01/11/2024 7.4 (A)   10/05/2023 8.8 (A)     Hemoglobin A1C (%)   Date Value   05/10/2024 9.1 (H)     Bicarbonate (mmol/L)   Date Value   06/24/2024 26   05/31/2024 26   05/18/2024 24     Urea Nitrogen (mg/dL)   Date Value   06/24/2024 17   05/31/2024 22   05/18/2024 22     Creatinine (mg/dL)   Date Value   06/24/2024 0.84   05/31/2024 0.88   05/18/2024 0.95     Lab Results   Component Value Date    CHOL 121 04/16/2024    CHOL 131 08/09/2022    CHOL 193 09/28/2020     Lab Results   Component Value Date    HDL 34.2 04/16/2024    HDL 31.2 (A) 08/09/2022    HDL 42.4 09/28/2020     Lab Results   Component Value Date    LDLCALC 59 04/16/2024     Lab Results   Component Value Date    TRIG 141 04/16/2024    TRIG 167 (H) 08/09/2022    TRIG 126 09/28/2020     Lab Results   Component Value Date    TSH 3.53 04/16/2024       Health Maintenance:   Foot Exam:  May 31, 2024  Eye Exam:  June 2022  Urine Albumin: April 17, 2024    CGM Interpretation/Plan   14 day CGM download was reviewed in detail as documented above and will be attached to chart.  A minimum of 72 hours of  glucose data was used to inform the management plan outlined below.  68% of values is within target range.         Assessment/Plan   54 year old male presents for follow up for new onset diabetes mellitus.  He was diagnosed in April 2024 with invasive adenocarcinoma moderately differentiated after having a biopsy of a pancreatic mass to the head.  He did start on chemotherapy.      Type 2 diabetes mellitus with hyperglycemia, with long-term current use of insulin (Multi)  To continue Lantus 22 units subcutaneous bedtime  To continue Humalog 15 units three times daily before meals   Please continue an insulin sliding scale with Humalog before meals as follows:   150-200mg/dL - 2 units   201-250mg/dL - 4 units   251-300 mg/dL - 6 untis   301-350mg/dL - 8 units   >351mg/dL - 10 units    SLIDING SCALE ON THE DAY OF CHEMO IF STEROIDS ARE GIVEN  Please commence an insulin sliding scale with Humalog before meals as follows:   150-200mg/dL - 4 units   201-250mg/dL - 8 units   251-300 mg/dL - 12 untis   301-350mg/dL - 15 units   >351mg/dL - 15 units    To continue the use of your CGM for the intensive glucose monitoring due to the dynamic nature of insulin requirements, the narrow therapeutic index of insulin and the potentially fatal consequences of treatment  Counseled that the time in range should be >70%  For a diabetic dilated eye examination  For follow up in 2 months

## 2024-07-05 ENCOUNTER — APPOINTMENT (OUTPATIENT)
Dept: ENDOCRINOLOGY | Facility: CLINIC | Age: 54
End: 2024-07-05
Payer: COMMERCIAL

## 2024-07-05 VITALS
HEART RATE: 63 BPM | HEIGHT: 75 IN | WEIGHT: 234 LBS | SYSTOLIC BLOOD PRESSURE: 102 MMHG | BODY MASS INDEX: 29.09 KG/M2 | DIASTOLIC BLOOD PRESSURE: 70 MMHG

## 2024-07-05 DIAGNOSIS — E13.9 SECONDARY DIABETES MELLITUS (MULTI): ICD-10-CM

## 2024-07-05 PROCEDURE — 95251 CONT GLUC MNTR ANALYSIS I&R: CPT | Performed by: INTERNAL MEDICINE

## 2024-07-05 PROCEDURE — 3048F LDL-C <100 MG/DL: CPT | Performed by: INTERNAL MEDICINE

## 2024-07-05 PROCEDURE — 99214 OFFICE O/P EST MOD 30 MIN: CPT | Performed by: INTERNAL MEDICINE

## 2024-07-05 PROCEDURE — 3046F HEMOGLOBIN A1C LEVEL >9.0%: CPT | Performed by: INTERNAL MEDICINE

## 2024-07-05 PROCEDURE — 3078F DIAST BP <80 MM HG: CPT | Performed by: INTERNAL MEDICINE

## 2024-07-05 PROCEDURE — 4010F ACE/ARB THERAPY RXD/TAKEN: CPT | Performed by: INTERNAL MEDICINE

## 2024-07-05 PROCEDURE — 3074F SYST BP LT 130 MM HG: CPT | Performed by: INTERNAL MEDICINE

## 2024-07-05 PROCEDURE — 3008F BODY MASS INDEX DOCD: CPT | Performed by: INTERNAL MEDICINE

## 2024-07-05 ASSESSMENT — ENCOUNTER SYMPTOMS
BACK PAIN: 1
ROS SKIN COMMENTS: DRY SKIN
ARTHRALGIAS: 1
NAUSEA: 1
ABDOMINAL PAIN: 1
FATIGUE: 1

## 2024-07-07 NOTE — ASSESSMENT & PLAN NOTE
To continue Lantus 22 units subcutaneous bedtime  To continue Humalog 15 units three times daily before meals   Please continue an insulin sliding scale with Humalog before meals as follows:   150-200mg/dL - 2 units   201-250mg/dL - 4 units   251-300 mg/dL - 6 untis   301-350mg/dL - 8 units   >351mg/dL - 10 units    SLIDING SCALE ON THE DAY OF CHEMO IF STEROIDS ARE GIVEN  Please commence an insulin sliding scale with Humalog before meals as follows:   150-200mg/dL - 4 units   201-250mg/dL - 8 units   251-300 mg/dL - 12 untis   301-350mg/dL - 15 units   >351mg/dL - 15 units    To continue the use of your CGM for the intensive glucose monitoring due to the dynamic nature of insulin requirements, the narrow therapeutic index of insulin and the potentially fatal consequences of treatment  Counseled that the time in range should be >70%  For a diabetic dilated eye examination  For follow up in 2 months

## 2024-07-09 ENCOUNTER — DOCUMENTATION (OUTPATIENT)
Dept: CASE MANAGEMENT | Facility: HOSPITAL | Age: 54
End: 2024-07-09

## 2024-07-09 ENCOUNTER — OFFICE VISIT (OUTPATIENT)
Dept: HEMATOLOGY/ONCOLOGY | Facility: CLINIC | Age: 54
End: 2024-07-09
Payer: COMMERCIAL

## 2024-07-09 ENCOUNTER — LAB (OUTPATIENT)
Dept: HEMATOLOGY/ONCOLOGY | Facility: CLINIC | Age: 54
End: 2024-07-09
Payer: COMMERCIAL

## 2024-07-09 VITALS — WEIGHT: 236.99 LBS | BODY MASS INDEX: 29.62 KG/M2

## 2024-07-09 VITALS
RESPIRATION RATE: 16 BRPM | TEMPERATURE: 96.4 F | SYSTOLIC BLOOD PRESSURE: 124 MMHG | HEART RATE: 66 BPM | DIASTOLIC BLOOD PRESSURE: 86 MMHG

## 2024-07-09 DIAGNOSIS — C25.0 MALIGNANT NEOPLASM OF HEAD OF PANCREAS (MULTI): ICD-10-CM

## 2024-07-09 DIAGNOSIS — C25.0 MALIGNANT NEOPLASM OF HEAD OF PANCREAS (MULTI): Primary | ICD-10-CM

## 2024-07-09 LAB
ALBUMIN SERPL BCP-MCNC: 4.1 G/DL (ref 3.4–5)
ALP SERPL-CCNC: 73 U/L (ref 33–120)
ALT SERPL W P-5'-P-CCNC: 19 U/L (ref 10–52)
ANION GAP SERPL CALC-SCNC: 13 MMOL/L (ref 10–20)
AST SERPL W P-5'-P-CCNC: 13 U/L (ref 9–39)
BASOPHILS # BLD AUTO: 0.05 X10*3/UL (ref 0–0.1)
BASOPHILS NFR BLD AUTO: 0.7 %
BILIRUB SERPL-MCNC: 0.5 MG/DL (ref 0–1.2)
BUN SERPL-MCNC: 20 MG/DL (ref 6–23)
CALCIUM SERPL-MCNC: 9.1 MG/DL (ref 8.6–10.6)
CANCER AG19-9 SERPL-ACNC: 250.31 U/ML
CHLORIDE SERPL-SCNC: 106 MMOL/L (ref 98–107)
CO2 SERPL-SCNC: 24 MMOL/L (ref 21–32)
CREAT SERPL-MCNC: 0.69 MG/DL (ref 0.5–1.3)
EGFRCR SERPLBLD CKD-EPI 2021: >90 ML/MIN/1.73M*2
EOSINOPHIL # BLD AUTO: 0.21 X10*3/UL (ref 0–0.7)
EOSINOPHIL NFR BLD AUTO: 2.8 %
ERYTHROCYTE [DISTWIDTH] IN BLOOD BY AUTOMATED COUNT: 14.3 % (ref 11.5–14.5)
GLUCOSE SERPL-MCNC: 177 MG/DL (ref 74–99)
HCT VFR BLD AUTO: 38.8 % (ref 41–52)
HGB BLD-MCNC: 13.2 G/DL (ref 13.5–17.5)
IMM GRANULOCYTES # BLD AUTO: 0.02 X10*3/UL (ref 0–0.7)
IMM GRANULOCYTES NFR BLD AUTO: 0.3 % (ref 0–0.9)
LYMPHOCYTES # BLD AUTO: 1.52 X10*3/UL (ref 1.2–4.8)
LYMPHOCYTES NFR BLD AUTO: 19.9 %
MCH RBC QN AUTO: 30.1 PG (ref 26–34)
MCHC RBC AUTO-ENTMCNC: 34 G/DL (ref 32–36)
MCV RBC AUTO: 89 FL (ref 80–100)
MONOCYTES # BLD AUTO: 0.85 X10*3/UL (ref 0.1–1)
MONOCYTES NFR BLD AUTO: 11.2 %
NEUTROPHILS # BLD AUTO: 4.97 X10*3/UL (ref 1.2–7.7)
NEUTROPHILS NFR BLD AUTO: 65.1 %
NRBC BLD-RTO: ABNORMAL /100{WBCS}
PLATELET # BLD AUTO: 129 X10*3/UL (ref 150–450)
POTASSIUM SERPL-SCNC: 3.9 MMOL/L (ref 3.5–5.3)
PROT SERPL-MCNC: 6 G/DL (ref 6.4–8.2)
RBC # BLD AUTO: 4.38 X10*6/UL (ref 4.5–5.9)
SODIUM SERPL-SCNC: 139 MMOL/L (ref 136–145)
WBC # BLD AUTO: 7.6 X10*3/UL (ref 4.4–11.3)

## 2024-07-09 PROCEDURE — 99214 OFFICE O/P EST MOD 30 MIN: CPT | Performed by: NURSE PRACTITIONER

## 2024-07-09 PROCEDURE — 1036F TOBACCO NON-USER: CPT | Performed by: NURSE PRACTITIONER

## 2024-07-09 PROCEDURE — 85025 COMPLETE CBC W/AUTO DIFF WBC: CPT

## 2024-07-09 PROCEDURE — 80053 COMPREHEN METABOLIC PANEL: CPT

## 2024-07-09 PROCEDURE — 2500000004 HC RX 250 GENERAL PHARMACY W/ HCPCS (ALT 636 FOR OP/ED): Performed by: NURSE PRACTITIONER

## 2024-07-09 PROCEDURE — 3046F HEMOGLOBIN A1C LEVEL >9.0%: CPT | Performed by: NURSE PRACTITIONER

## 2024-07-09 PROCEDURE — 36591 DRAW BLOOD OFF VENOUS DEVICE: CPT

## 2024-07-09 PROCEDURE — 86301 IMMUNOASSAY TUMOR CA 19-9: CPT

## 2024-07-09 PROCEDURE — 3048F LDL-C <100 MG/DL: CPT | Performed by: NURSE PRACTITIONER

## 2024-07-09 PROCEDURE — 4010F ACE/ARB THERAPY RXD/TAKEN: CPT | Performed by: NURSE PRACTITIONER

## 2024-07-09 PROCEDURE — 3008F BODY MASS INDEX DOCD: CPT | Performed by: NURSE PRACTITIONER

## 2024-07-09 RX ORDER — ALBUTEROL SULFATE 0.83 MG/ML
3 SOLUTION RESPIRATORY (INHALATION) AS NEEDED
OUTPATIENT
Start: 2024-07-25

## 2024-07-09 RX ORDER — DIPHENHYDRAMINE HYDROCHLORIDE 50 MG/ML
50 INJECTION INTRAMUSCULAR; INTRAVENOUS AS NEEDED
OUTPATIENT
Start: 2024-07-11

## 2024-07-09 RX ORDER — DIPHENHYDRAMINE HYDROCHLORIDE 50 MG/ML
50 INJECTION INTRAMUSCULAR; INTRAVENOUS AS NEEDED
OUTPATIENT
Start: 2024-08-08

## 2024-07-09 RX ORDER — DIPHENHYDRAMINE HYDROCHLORIDE 50 MG/ML
50 INJECTION INTRAMUSCULAR; INTRAVENOUS AS NEEDED
OUTPATIENT
Start: 2024-07-25

## 2024-07-09 RX ORDER — EPINEPHRINE 0.3 MG/.3ML
0.3 INJECTION SUBCUTANEOUS EVERY 5 MIN PRN
OUTPATIENT
Start: 2024-08-08

## 2024-07-09 RX ORDER — PALONOSETRON 0.05 MG/ML
0.25 INJECTION, SOLUTION INTRAVENOUS ONCE
OUTPATIENT
Start: 2024-07-23

## 2024-07-09 RX ORDER — LORAZEPAM 0.5 MG/1
1 TABLET ORAL ONCE
OUTPATIENT
Start: 2024-07-23 | End: 2024-07-23

## 2024-07-09 RX ORDER — DIPHENHYDRAMINE HYDROCHLORIDE 50 MG/ML
50 INJECTION INTRAMUSCULAR; INTRAVENOUS AS NEEDED
OUTPATIENT
Start: 2024-07-23

## 2024-07-09 RX ORDER — FAMOTIDINE 10 MG/ML
20 INJECTION INTRAVENOUS ONCE AS NEEDED
OUTPATIENT
Start: 2024-07-11

## 2024-07-09 RX ORDER — FAMOTIDINE 10 MG/ML
20 INJECTION INTRAVENOUS ONCE AS NEEDED
OUTPATIENT
Start: 2024-07-23

## 2024-07-09 RX ORDER — ATROPINE SULFATE 0.4 MG/ML
0.4 INJECTION, SOLUTION ENDOTRACHEAL; INTRAMEDULLARY; INTRAMUSCULAR; INTRAVENOUS; SUBCUTANEOUS
OUTPATIENT
Start: 2024-07-23

## 2024-07-09 RX ORDER — EPINEPHRINE 0.3 MG/.3ML
0.3 INJECTION SUBCUTANEOUS EVERY 5 MIN PRN
OUTPATIENT
Start: 2024-07-25

## 2024-07-09 RX ORDER — LORAZEPAM 2 MG/ML
1 INJECTION INTRAMUSCULAR AS NEEDED
OUTPATIENT
Start: 2024-07-23

## 2024-07-09 RX ORDER — EPINEPHRINE 0.3 MG/.3ML
0.3 INJECTION SUBCUTANEOUS EVERY 5 MIN PRN
OUTPATIENT
Start: 2024-07-11

## 2024-07-09 RX ORDER — FAMOTIDINE 10 MG/ML
20 INJECTION INTRAVENOUS ONCE AS NEEDED
OUTPATIENT
Start: 2024-08-08

## 2024-07-09 RX ORDER — FAMOTIDINE 10 MG/ML
20 INJECTION INTRAVENOUS ONCE AS NEEDED
OUTPATIENT
Start: 2024-07-25

## 2024-07-09 RX ORDER — HEPARIN 100 UNIT/ML
500 SYRINGE INTRAVENOUS AS NEEDED
Status: CANCELLED | OUTPATIENT
Start: 2024-07-09

## 2024-07-09 RX ORDER — PROCHLORPERAZINE EDISYLATE 5 MG/ML
10 INJECTION INTRAMUSCULAR; INTRAVENOUS EVERY 6 HOURS PRN
OUTPATIENT
Start: 2024-07-23

## 2024-07-09 RX ORDER — HEPARIN SODIUM,PORCINE/PF 10 UNIT/ML
50 SYRINGE (ML) INTRAVENOUS AS NEEDED
Status: CANCELLED | OUTPATIENT
Start: 2024-07-09

## 2024-07-09 RX ORDER — ALBUTEROL SULFATE 0.83 MG/ML
3 SOLUTION RESPIRATORY (INHALATION) AS NEEDED
OUTPATIENT
Start: 2024-07-23

## 2024-07-09 RX ORDER — HEPARIN 100 UNIT/ML
500 SYRINGE INTRAVENOUS AS NEEDED
Status: DISCONTINUED | OUTPATIENT
Start: 2024-07-09 | End: 2024-07-09 | Stop reason: HOSPADM

## 2024-07-09 RX ORDER — ALBUTEROL SULFATE 0.83 MG/ML
3 SOLUTION RESPIRATORY (INHALATION) AS NEEDED
OUTPATIENT
Start: 2024-08-08

## 2024-07-09 RX ORDER — EPINEPHRINE 0.3 MG/.3ML
0.3 INJECTION SUBCUTANEOUS EVERY 5 MIN PRN
OUTPATIENT
Start: 2024-07-23

## 2024-07-09 RX ORDER — ALBUTEROL SULFATE 0.83 MG/ML
3 SOLUTION RESPIRATORY (INHALATION) AS NEEDED
OUTPATIENT
Start: 2024-07-11

## 2024-07-09 RX ORDER — PROCHLORPERAZINE MALEATE 10 MG
10 TABLET ORAL EVERY 6 HOURS PRN
OUTPATIENT
Start: 2024-07-23

## 2024-07-09 ASSESSMENT — PAIN SCALES - GENERAL: PAINLEVEL: 0-NO PAIN

## 2024-07-09 NOTE — PROGRESS NOTES
Patient ID: Nate Alvarez is a 54 y.o. male from [place], [place].     Referring Physician: Shaw You MD  [id number] [place] [address]    Primary Care Provider: Hazel Medeiros MD    Diagnosis:   Pancreatic cancer 5/2024    Primary Oncologic Surgeon:   Omar Gamble MD    Primary Medical Oncologist:  Borderline Resectable    Primary Radiation Oncologist:  MARSHALL     Current Therapy:  Neoadjuvant FOLFIRINOX    Oncologic Surgery History:  Pending    Oncologic Therapy History:  N/A    Molecular Genetics:  Pending    Current Sites of Disease:  Head of the pancreas involving the gastroduodenal artery celiac axis and 180 degree involvement of the SMV    Oncologic Problem List:  Localized but borderline resectable pancreatic adenocarcinoma  New onset diabetes with hyperglycemia  Cancer cachexia      Oncologic Narrative:  54 y.o.  man from [place] who presented in May 2024 with difficult DM control (A1c 9.1 this month)and some ED visits for fatigue/weakness. Most recently at [facility] May 16-18. 70 lb wt loss mentioned (note some of the new DM meds) .  Tumor markers checked:  CEA: 4.9  Ca 19-9: >700    CT A/P Showed:   Edema surrounding the pancreatic tail compatible with acute pancreatitis. Dilatation of the pancreatic duct which measures 6 mm in diameter and an ill marginated area of hypodensity in the  pancreatic head highly concerning for pancreatic malignancy, and pancreatic protocol MRI is recommended for further evaluation. An  area of necrotizing pancreatitis/pancreatic necrosis is other consideration, however there is no surrounding edema in this region and given the ductal dilatation, findings highly concerning for an underlying malignancy.  Small nonobstructive left renal calculi.     MRI:  IMPRESSION:  1. Pancreatic head/uncinate process mass measuring 2.9 x 2.5 cm with upstream pancreatic duct dilatation and parenchymal atrophy highly  concerning for primary pancreatic neoplasm (specifically  adenocarcinoma). Surrounding changes of mild superimposed pancreatitis. The mass is in contact with the adjacent 3rd part of the duodenum but no upstream dilatation. No biliary obstruction. The  mass has 180 degree contact with the adjacent superior mesenteric vein and likely encasing the 1st right lateral branch. The mass is  likely encasing the gastroduodenal artery distal component. The  celiac axis, superior mesenteric artery and main portal vein are intact.  2. Few subcentimeter peripancreatic indeterminate lymph nodes noted.  3. Hepatic steatosis with segment 4A lesion measuring 0.7 cm with  imaging characteristics favoring hemangioma .     Chest CT:  IMPRESSION:  1. No suspicious pulmonary nodules. Few small calcific granulomas noted in the lingula.  2. No enlarged intrathoracic lymphadenopathy.  3. Left thyroid nodule measuring 1.1 cm. This could be further assessed by dedicated nonemergent thyroid ultrasound if clinically desired.  4. Subdiaphragmatic findings regarding the known pancreatic mass and duct dilatation as well as the hepatic observations are better evaluated in prior MRI dated 05/16/2024     EUS:    Result Text   Impression  Limited endoscopic views of the esophagus, stomach and duodenum were obtained. There was heme and gastritis noted in the stomach. The rest of the exam was unremarkable   A 27 mm by 23 mm irregular and hypoechoic T2N0 mass with poorly defined margins was visualized in the uncinate process of the head with apparent involvement of the superior mesenteric vein; fine needle biopsy was performed, final pathology is pending but preliminary evaluation was positive for malignancy. The pancreas upstream to the mass appeared atrophic with dilated pancreatic duct.   The bile duct appeared normal.  Visualized portions of the liver, spleen, left adrenal gland, left kidney and celiac axis were normal on ultrasound examination.    No abnormal-appearing lymph nodes were identified in the  abdomen.            FINAL DIAGNOSIS   A. PANCREAS HEAD MASS, BIOPSY:     -- Invasive adenocarcinoma, moderately differentiated      6/11/24 - Exp / lap  -no carcinomatosis     Past Medical History: Nate has a past medical history of Diabetes mellitus (Multi), Gastroesophageal reflux disease without esophagitis (10/05/2023), Hemorrhoids (11/03/2023), HTN (hypertension), Leg cramps (10/05/2023), Oropharyngeal dysphagia (10/05/2023), RADHA (obstructive sleep apnea), Pancreatic cancer (Multi), and Personal history of other malignant neoplasm of skin.  Surgical History:  Nate has a past surgical history that includes Tonsillectomy; Vasectomy; Testicle surgery; Esophagogastroduodenoscopy; Colonoscopy; Eyelid carcinoma excision; Rotator cuff repair (Bilateral); and Knee arthroscopy w/ debridement.  Social History:  Nate reports that he has never smoked. He has quit using smokeless tobacco.  His smokeless tobacco use included chew. He reports that he does not currently use alcohol after a past usage of about 7.0 standard drinks of alcohol per week. He reports that he does not use drugs.  Family History:    Family History   Problem Relation Name Age of Onset    Diabetes Mother      Ovarian cancer Mother      Liver cancer Father      Lung cancer Father      Colon cancer Father      Hypertension Father      Stroke Maternal Grandmother      Diabetes Maternal Grandmother      Liver cancer Maternal Grandfather      Lung cancer Paternal Grandfather       Family Oncology History:  Cancer-related family history includes Colon cancer in his father; Liver cancer in his father and maternal grandfather; Lung cancer in his father and paternal grandfather; Ovarian cancer in his mother.        SUBJECTIVE:    History of Present Illness:  Nate Alvarez is a 54 y.o. male who was referred by Shaw You MD and presents with chemotherapy follow up.     S/p 2nd cycle of mFOLFIRINOX on 6/25  Due for C3 today     - added fosaprepitant  to C2 for N/V - nausea improved - still using compazine as well.    + jaw pain with first bites of food for 5 - 7 days  - worse this last cycle, lasts for about 1 week.    + cold sensitivity x 1 week     + pain in RUQ - taking oxycodone 10mg  tid prn   No fevers or chills.   Appt waxes & wanes    - trouble sleeping - only sleeping 2 - 3 hrs per night  - tried ativan 1 mg for 10 days didn't help that much for sleep    -trying high dose melatoning - 60mg tid OTC supplement.    Trouble sleeping for years - had sleep study 3 yrs ago  (Previously worked night shift for 20 yrs).          OBJECTIVE:    VS / Pain:  There were no vitals taken for this visit.  BSA: There is no height or weight on file to calculate BSA.     Pain Scale: 0    Daily Weight  07/05/24 : 106 kg (234 lb)  06/27/24 : 106 kg (233 lb)  06/25/24 : 106 kg (233 lb 11 oz)      Physical Exam  Constitutional:       Appearance: Normal appearance.   HENT:      Head: Normocephalic.      Mouth/Throat:      Mouth: Mucous membranes are moist.      Pharynx: Oropharynx is clear.   Eyes:      Pupils: Pupils are equal, round, and reactive to light.   Cardiovascular:      Rate and Rhythm: Normal rate.      Pulses: Normal pulses.   Pulmonary:      Effort: Pulmonary effort is normal.   Abdominal:      General: Abdomen is flat. Bowel sounds are normal.   Musculoskeletal:      Cervical back: Neck supple.   Skin:     General: Skin is warm and dry.      Capillary Refill: Capillary refill takes less than 2 seconds.   Neurological:      General: No focal deficit present.      Mental Status: He is alert.   Psychiatric:         Mood and Affect: Mood normal.       Performance Status: 1       Diagnostic Results         WBC   Date/Time Value Ref Range Status   06/24/2024 01:50 PM 7.1 4.4 - 11.3 x10*3/uL Final   05/31/2024 10:01 AM 6.7 4.4 - 11.3 x10*3/uL Final   05/18/2024 07:11 AM 9.9 4.4 - 11.3 x10*3/uL Final     Hemoglobin   Date Value Ref Range Status   06/24/2024 13.0 (L) 13.5  - 17.5 g/dL Final   05/31/2024 13.1 (L) 13.5 - 17.5 g/dL Final   05/18/2024 13.9 13.5 - 17.5 g/dL Final     MCV   Date/Time Value Ref Range Status   06/24/2024 01:50 PM 88 80 - 100 fL Final   05/31/2024 10:01 AM 91 80 - 100 fL Final   05/18/2024 07:11 AM 89 80 - 100 fL Final     Platelets   Date/Time Value Ref Range Status   06/24/2024 01:50  150 - 450 x10*3/uL Final   05/31/2024 10:01  150 - 450 x10*3/uL Final   05/18/2024 07:11  150 - 450 x10*3/uL Final     Neutrophils Absolute   Date/Time Value Ref Range Status   06/24/2024 01:50 PM 4.38 1.20 - 7.70 x10*3/uL Final     Comment:     Percent differential counts (%) should be interpreted in the context of the absolute cell counts (cells/uL).   05/31/2024 10:01 AM 4.13 1.20 - 7.70 x10*3/uL Final     Comment:     Percent differential counts (%) should be interpreted in the context of the absolute cell counts (cells/uL).   05/15/2024 07:16 PM 7.37 1.20 - 7.70 x10*3/uL Final     Comment:     Percent differential counts (%) should be interpreted in the context of the absolute cell counts (cells/uL).     Bilirubin, Total   Date/Time Value Ref Range Status   06/24/2024 01:50 PM 0.9 0.0 - 1.2 mg/dL Final   05/31/2024 10:01 AM 0.7 0.0 - 1.2 mg/dL Final   05/17/2024 07:25 AM 1.1 0.0 - 1.2 mg/dL Final     AST   Date/Time Value Ref Range Status   06/24/2024 01:50 PM 12 9 - 39 U/L Final   05/31/2024 10:01 AM 11 9 - 39 U/L Final   05/17/2024 07:25 AM 24 9 - 39 U/L Final     Comment:     MILD HEMOLYSIS DETECTED. The result may be falsely elevated due to hemolysis or other interferents. Clinical correlation is recommended. Repeat testing may be considered.     ALT   Date/Time Value Ref Range Status   06/24/2024 01:50 PM 17 10 - 52 U/L Final     Comment:     Patients treated with Sulfasalazine may generate falsely decreased results for ALT.   05/31/2024 10:01 AM 19 10 - 52 U/L Final     Comment:     Patients treated with Sulfasalazine may generate falsely decreased  results for ALT.   05/17/2024 07:25 AM 23 10 - 52 U/L Final     Comment:     Patients treated with Sulfasalazine may generate falsely decreased results for ALT.     Creatinine   Date/Time Value Ref Range Status   06/24/2024 01:50 PM 0.84 0.50 - 1.30 mg/dL Final   05/31/2024 10:01 AM 0.88 0.50 - 1.30 mg/dL Final   05/18/2024 07:11 AM 0.95 0.50 - 1.30 mg/dL Final     Urea Nitrogen   Date/Time Value Ref Range Status   06/24/2024 01:50 PM 17 6 - 23 mg/dL Final   05/31/2024 10:01 AM 22 6 - 23 mg/dL Final   05/18/2024 07:11 AM 22 6 - 23 mg/dL Final     Albumin   Date/Time Value Ref Range Status   06/24/2024 01:50 PM 4.0 3.4 - 5.0 g/dL Final   05/31/2024 10:01 AM 4.0 3.4 - 5.0 g/dL Final   05/17/2024 07:25 AM 3.8 3.4 - 5.0 g/dL Final     Cancer AG 19-9   Date/Time Value Ref Range Status   05/31/2024 10:01 AM 1,779.76 (H) <35.00 U/mL Final   05/17/2024 09:28 AM >700.00 (H) <35.00 U/mL Final     Carcinoembryonic AG   Date/Time Value Ref Range Status   05/17/2024 09:28 AM 4.9 ug/L Final     Assessment/Plan   This is a 53-year-old man with borderline resectable pancreatic adenocarcinoma.  He presented in May 2024 with greater than 50 pound weight loss and new onset diabetes.  Imaging, EUS and biopsy confirmed adenocarcinoma of the pancreas involving the GDA, celiac axis, SMV.    Borderline resectable Pancreatic cancer:   - Consented for mFOLFIRINOX - started 6/3/24 - tolerated with jaw pain / nausea  - added fosaprepitant for C2   -C2  -still with jaw pain, but manageable, we discussed possible dose reduction, he does not feel this is necessary yet.   C3 tomorrow at same doses - will try ativan pre-med  C4 in 2 weeks   RTC after C4 with scans.                -Follow Cancer Antigen 19-9 at least every other cycle  -Obtain complete genomic profiling from pancreatic biopsy.    Pain - renewed oxycodone      Diabetes:  Continue long and short acting insulin as managed by  Dr. Brandon Vasquez    Insomnia - long standing, had sleep  study a few years ago & needed additional follow up.  Consider repeating sleep study as he has lost weight.  He is taking 60mg melatonin tid at home as supplement for anti-cancer activity.  Recommended holding off on supplements while on chemo.     [facility]/ [facility]  Office: [phone]  Fax: [phone]

## 2024-07-09 NOTE — PROGRESS NOTES
MARY JANE met with patient and spouse for assistance with Cancer claim forms. They are very pleasant an communicative.  Forms have been completed and will be faxed to agency today.

## 2024-07-10 ENCOUNTER — INFUSION (OUTPATIENT)
Dept: HEMATOLOGY/ONCOLOGY | Facility: CLINIC | Age: 54
End: 2024-07-10
Payer: COMMERCIAL

## 2024-07-10 VITALS
RESPIRATION RATE: 16 BRPM | BODY MASS INDEX: 30.13 KG/M2 | WEIGHT: 234.79 LBS | OXYGEN SATURATION: 97 % | HEART RATE: 79 BPM | TEMPERATURE: 98.1 F | SYSTOLIC BLOOD PRESSURE: 114 MMHG | HEIGHT: 74 IN | DIASTOLIC BLOOD PRESSURE: 83 MMHG

## 2024-07-10 DIAGNOSIS — C25.0 MALIGNANT NEOPLASM OF HEAD OF PANCREAS (MULTI): ICD-10-CM

## 2024-07-10 PROCEDURE — 2500000004 HC RX 250 GENERAL PHARMACY W/ HCPCS (ALT 636 FOR OP/ED): Performed by: NURSE PRACTITIONER

## 2024-07-10 PROCEDURE — 2500000001 HC RX 250 WO HCPCS SELF ADMINISTERED DRUGS (ALT 637 FOR MEDICARE OP): Performed by: NURSE PRACTITIONER

## 2024-07-10 PROCEDURE — 96415 CHEMO IV INFUSION ADDL HR: CPT

## 2024-07-10 PROCEDURE — 96416 CHEMO PROLONG INFUSE W/PUMP: CPT

## 2024-07-10 PROCEDURE — 96413 CHEMO IV INFUSION 1 HR: CPT

## 2024-07-10 PROCEDURE — 96367 TX/PROPH/DG ADDL SEQ IV INF: CPT

## 2024-07-10 PROCEDURE — 96417 CHEMO IV INFUS EACH ADDL SEQ: CPT

## 2024-07-10 PROCEDURE — 96375 TX/PRO/DX INJ NEW DRUG ADDON: CPT | Mod: INF

## 2024-07-10 RX ORDER — ALBUTEROL SULFATE 0.83 MG/ML
3 SOLUTION RESPIRATORY (INHALATION) AS NEEDED
Status: DISCONTINUED | OUTPATIENT
Start: 2024-07-10 | End: 2024-07-10 | Stop reason: HOSPADM

## 2024-07-10 RX ORDER — DIPHENHYDRAMINE HYDROCHLORIDE 50 MG/ML
50 INJECTION INTRAMUSCULAR; INTRAVENOUS AS NEEDED
Status: DISCONTINUED | OUTPATIENT
Start: 2024-07-10 | End: 2024-07-10 | Stop reason: HOSPADM

## 2024-07-10 RX ORDER — HEPARIN 100 UNIT/ML
500 SYRINGE INTRAVENOUS AS NEEDED
Status: CANCELLED | OUTPATIENT
Start: 2024-07-10

## 2024-07-10 RX ORDER — PROCHLORPERAZINE MALEATE 10 MG
10 TABLET ORAL EVERY 6 HOURS PRN
Status: DISCONTINUED | OUTPATIENT
Start: 2024-07-10 | End: 2024-07-10 | Stop reason: HOSPADM

## 2024-07-10 RX ORDER — EPINEPHRINE 0.3 MG/.3ML
0.3 INJECTION SUBCUTANEOUS EVERY 5 MIN PRN
Status: DISCONTINUED | OUTPATIENT
Start: 2024-07-10 | End: 2024-07-10 | Stop reason: HOSPADM

## 2024-07-10 RX ORDER — PROCHLORPERAZINE EDISYLATE 5 MG/ML
10 INJECTION INTRAMUSCULAR; INTRAVENOUS EVERY 6 HOURS PRN
Status: DISCONTINUED | OUTPATIENT
Start: 2024-07-10 | End: 2024-07-10 | Stop reason: HOSPADM

## 2024-07-10 RX ORDER — PALONOSETRON 0.05 MG/ML
0.25 INJECTION, SOLUTION INTRAVENOUS ONCE
Status: COMPLETED | OUTPATIENT
Start: 2024-07-10 | End: 2024-07-10

## 2024-07-10 RX ORDER — ATROPINE SULFATE 0.4 MG/ML
0.4 INJECTION, SOLUTION ENDOTRACHEAL; INTRAMEDULLARY; INTRAMUSCULAR; INTRAVENOUS; SUBCUTANEOUS
Status: DISCONTINUED | OUTPATIENT
Start: 2024-07-10 | End: 2024-07-10 | Stop reason: HOSPADM

## 2024-07-10 RX ORDER — HEPARIN SODIUM,PORCINE/PF 10 UNIT/ML
50 SYRINGE (ML) INTRAVENOUS AS NEEDED
Status: CANCELLED | OUTPATIENT
Start: 2024-07-10

## 2024-07-10 RX ORDER — LORAZEPAM 0.5 MG/1
1 TABLET ORAL ONCE
Status: COMPLETED | OUTPATIENT
Start: 2024-07-10 | End: 2024-07-10

## 2024-07-10 RX ORDER — FAMOTIDINE 10 MG/ML
20 INJECTION INTRAVENOUS ONCE AS NEEDED
Status: DISCONTINUED | OUTPATIENT
Start: 2024-07-10 | End: 2024-07-10 | Stop reason: HOSPADM

## 2024-07-10 RX ORDER — LORAZEPAM 2 MG/ML
1 INJECTION INTRAMUSCULAR AS NEEDED
Status: DISCONTINUED | OUTPATIENT
Start: 2024-07-10 | End: 2024-07-10 | Stop reason: HOSPADM

## 2024-07-10 ASSESSMENT — PAIN SCALES - GENERAL: PAINLEVEL: 2

## 2024-07-10 NOTE — PROGRESS NOTES
Patient mentioned his nose would run and his tongue would feel swollen during the previous Irinotecan infusions. Atropine was administered today prior to Irinotecan infusion and patient did not experience any of these symptoms.

## 2024-07-10 NOTE — SIGNIFICANT EVENT

## 2024-07-11 ENCOUNTER — APPOINTMENT (OUTPATIENT)
Dept: PRIMARY CARE | Facility: CLINIC | Age: 54
End: 2024-07-11
Payer: COMMERCIAL

## 2024-07-11 VITALS
WEIGHT: 236 LBS | HEART RATE: 60 BPM | BODY MASS INDEX: 30.29 KG/M2 | SYSTOLIC BLOOD PRESSURE: 116 MMHG | RESPIRATION RATE: 16 BRPM | OXYGEN SATURATION: 100 % | HEIGHT: 74 IN | DIASTOLIC BLOOD PRESSURE: 74 MMHG

## 2024-07-11 DIAGNOSIS — E11.65 TYPE 2 DIABETES MELLITUS WITH HYPERGLYCEMIA, WITH LONG-TERM CURRENT USE OF INSULIN (MULTI): Primary | ICD-10-CM

## 2024-07-11 DIAGNOSIS — Z79.4 TYPE 2 DIABETES MELLITUS WITH HYPERGLYCEMIA, WITH LONG-TERM CURRENT USE OF INSULIN (MULTI): Primary | ICD-10-CM

## 2024-07-11 DIAGNOSIS — C25.0 MALIGNANT NEOPLASM OF HEAD OF PANCREAS (MULTI): ICD-10-CM

## 2024-07-11 DIAGNOSIS — I10 ESSENTIAL HYPERTENSION: ICD-10-CM

## 2024-07-11 DIAGNOSIS — G47.31 COMPLEX SLEEP APNEA SYNDROME: ICD-10-CM

## 2024-07-11 DIAGNOSIS — E13.9 SECONDARY DIABETES MELLITUS (MULTI): ICD-10-CM

## 2024-07-11 PROBLEM — R63.4 WEIGHT LOSS: Status: RESOLVED | Noted: 2024-04-17 | Resolved: 2024-07-11

## 2024-07-11 PROBLEM — R11.0 NAUSEA: Status: RESOLVED | Noted: 2024-01-11 | Resolved: 2024-07-11

## 2024-07-11 PROBLEM — D72.829 LEUKOCYTOSIS: Status: RESOLVED | Noted: 2024-05-13 | Resolved: 2024-07-11

## 2024-07-11 PROBLEM — K86.89 PANCREATIC DUCT DILATED (HHS-HCC): Status: RESOLVED | Noted: 2024-05-15 | Resolved: 2024-07-11

## 2024-07-11 PROBLEM — K92.1 MELENA: Status: RESOLVED | Noted: 2024-05-13 | Resolved: 2024-07-11

## 2024-07-11 PROCEDURE — 3046F HEMOGLOBIN A1C LEVEL >9.0%: CPT | Performed by: FAMILY MEDICINE

## 2024-07-11 PROCEDURE — 3008F BODY MASS INDEX DOCD: CPT | Performed by: FAMILY MEDICINE

## 2024-07-11 PROCEDURE — 99213 OFFICE O/P EST LOW 20 MIN: CPT | Performed by: FAMILY MEDICINE

## 2024-07-11 PROCEDURE — 4010F ACE/ARB THERAPY RXD/TAKEN: CPT | Performed by: FAMILY MEDICINE

## 2024-07-11 PROCEDURE — 3048F LDL-C <100 MG/DL: CPT | Performed by: FAMILY MEDICINE

## 2024-07-11 PROCEDURE — 1036F TOBACCO NON-USER: CPT | Performed by: FAMILY MEDICINE

## 2024-07-11 PROCEDURE — 3078F DIAST BP <80 MM HG: CPT | Performed by: FAMILY MEDICINE

## 2024-07-11 PROCEDURE — 3074F SYST BP LT 130 MM HG: CPT | Performed by: FAMILY MEDICINE

## 2024-07-11 RX ORDER — PEN NEEDLE, DIABETIC 29 G X1/2"
1 NEEDLE, DISPOSABLE MISCELLANEOUS
Qty: 200 EACH | Refills: 11 | Status: SHIPPED | OUTPATIENT
Start: 2024-07-11 | End: 2025-07-11

## 2024-07-11 ASSESSMENT — ENCOUNTER SYMPTOMS
DIZZINESS: 0
CHILLS: 0
CHEST TIGHTNESS: 0
SINUS PAIN: 0
LIGHT-HEADEDNESS: 0
NERVOUS/ANXIOUS: 0
WHEEZING: 0
SORE THROAT: 0
POLYDIPSIA: 0
HEADACHES: 0
POLYPHAGIA: 0
NAUSEA: 0
FREQUENCY: 0
VOMITING: 0
DIARRHEA: 0
NUMBNESS: 0
ADENOPATHY: 0
SHORTNESS OF BREATH: 0
COUGH: 0
DYSPHORIC MOOD: 0
CONSTIPATION: 0
UNEXPECTED WEIGHT CHANGE: 0
HEMATURIA: 0
DYSURIA: 0
CONFUSION: 0
SINUS PRESSURE: 0
ABDOMINAL PAIN: 0
FEVER: 0
PALPITATIONS: 0
DIAPHORESIS: 0

## 2024-07-11 ASSESSMENT — PATIENT HEALTH QUESTIONNAIRE - PHQ9
2. FEELING DOWN, DEPRESSED OR HOPELESS: NOT AT ALL
SUM OF ALL RESPONSES TO PHQ9 QUESTIONS 1 AND 2: 0
1. LITTLE INTEREST OR PLEASURE IN DOING THINGS: NOT AT ALL

## 2024-07-11 NOTE — PATIENT INSTRUCTIONS
Nate Alvarez ,    Thank you for coming in today. We at [facility] appreciate your trust in our care. If you have any questions or concerns about the care you received today, please do not hesitate to contact us at [phone].    The following instructions were discussed today:    - Follow up in 6 months

## 2024-07-11 NOTE — PROGRESS NOTES
"Subjective   Patient ID: Nate Alvarez is a 54 y.o. male who presents for follow up.    [facility]   routine follow up. chronic issues as per assessment and plan.     Following with oncology for pancreatic cancer. Recent notes reviewed. He is receiving chemotherapy.     Seeing Dr. Vasquez for diabetes. Notes reviewed. He is on lantus and humalog    Review of Systems   Constitutional:  Negative for chills, diaphoresis, fever and unexpected weight change.   HENT:  Negative for congestion, sinus pressure, sinus pain, sneezing and sore throat.    Respiratory:  Negative for cough, chest tightness, shortness of breath and wheezing.    Cardiovascular:  Negative for chest pain, palpitations and leg swelling.   Gastrointestinal:  Negative for abdominal pain, constipation, diarrhea, nausea and vomiting.   Endocrine: Negative for cold intolerance, heat intolerance, polydipsia, polyphagia and polyuria.   Genitourinary:  Negative for dysuria, frequency, hematuria and urgency.   Neurological:  Negative for dizziness, syncope, light-headedness, numbness and headaches.   Hematological:  Negative for adenopathy.   Psychiatric/Behavioral:  Negative for confusion and dysphoric mood. The patient is not nervous/anxious.        Objective   /74 (BP Location: Right arm, Patient Position: Sitting, BP Cuff Size: Large adult long)   Pulse 60   Resp 16   Ht 1.88 m (6' 2\")   Wt 107 kg (236 lb)   SpO2 100%   BMI 30.30 kg/m²     Physical Exam  Vitals and nursing note reviewed.   Constitutional:       General: He is not in acute distress.     Appearance: Normal appearance.   HENT:      Head: Normocephalic and atraumatic.      Nose: Nose normal.   Eyes:      Extraocular Movements: Extraocular movements intact.      Conjunctiva/sclera: Conjunctivae normal.      Pupils: Pupils are equal, round, and reactive to light.   Cardiovascular:      Rate and Rhythm: Normal rate and regular rhythm.      Heart sounds: No murmur heard.     No friction rub. " "No gallop.   Pulmonary:      Effort: Pulmonary effort is normal.      Breath sounds: Normal breath sounds. No wheezing, rhonchi or rales.   Abdominal:      General: Bowel sounds are normal. There is no distension.      Palpations: Abdomen is soft.      Tenderness: There is no abdominal tenderness.   Musculoskeletal:         General: Normal range of motion.      Cervical back: Normal range of motion and neck supple.   Skin:     General: Skin is warm and dry.   Neurological:      General: No focal deficit present.      Mental Status: He is alert and oriented to person, place, and time.      Deep Tendon Reflexes: Reflexes normal.   Psychiatric:         Mood and Affect: Mood normal.         Behavior: Behavior normal.         Thought Content: Thought content normal.         Judgment: Judgment normal.         Assessment/Plan   Problem List Items Addressed This Visit             ICD-10-CM    Complex sleep apnea syndrome G47.31     - follows with sleep medicine          Essential hypertension I10     - controlled  - is on lisinopril 5 mg daily          Malignant neoplasm of head of pancreas (Multi) C25.0     - following with oncology          Type 2 diabetes mellitus with hyperglycemia, with long-term current use of insulin (Multi) - Primary E11.65, Z79.4     - following with endocrinology           Other Visit Diagnoses         Codes    Secondary diabetes mellitus (Multi)     E13.9    Relevant Medications    BD Insulin Syringe Ultra-Fine 0.3 mL 31 gauge x 5/16\" syringe               "

## 2024-07-12 ENCOUNTER — INFUSION (OUTPATIENT)
Dept: HEMATOLOGY/ONCOLOGY | Facility: CLINIC | Age: 54
End: 2024-07-12
Payer: COMMERCIAL

## 2024-07-12 VITALS
BODY MASS INDEX: 30.51 KG/M2 | SYSTOLIC BLOOD PRESSURE: 117 MMHG | WEIGHT: 237.66 LBS | HEART RATE: 55 BPM | TEMPERATURE: 97 F | RESPIRATION RATE: 18 BRPM | OXYGEN SATURATION: 98 % | DIASTOLIC BLOOD PRESSURE: 80 MMHG

## 2024-07-12 DIAGNOSIS — C25.0 MALIGNANT NEOPLASM OF HEAD OF PANCREAS (MULTI): ICD-10-CM

## 2024-07-12 PROCEDURE — 2500000004 HC RX 250 GENERAL PHARMACY W/ HCPCS (ALT 636 FOR OP/ED): Performed by: NURSE PRACTITIONER

## 2024-07-12 PROCEDURE — 96523 IRRIG DRUG DELIVERY DEVICE: CPT

## 2024-07-12 RX ORDER — HEPARIN SODIUM,PORCINE/PF 10 UNIT/ML
50 SYRINGE (ML) INTRAVENOUS AS NEEDED
Status: DISCONTINUED | OUTPATIENT
Start: 2024-07-12 | End: 2024-07-12 | Stop reason: HOSPADM

## 2024-07-12 RX ORDER — HEPARIN 100 UNIT/ML
500 SYRINGE INTRAVENOUS AS NEEDED
OUTPATIENT
Start: 2024-07-12

## 2024-07-12 RX ORDER — HEPARIN SODIUM,PORCINE/PF 10 UNIT/ML
50 SYRINGE (ML) INTRAVENOUS AS NEEDED
OUTPATIENT
Start: 2024-07-12

## 2024-07-12 RX ORDER — HEPARIN 100 UNIT/ML
500 SYRINGE INTRAVENOUS AS NEEDED
Status: DISCONTINUED | OUTPATIENT
Start: 2024-07-12 | End: 2024-07-12 | Stop reason: HOSPADM

## 2024-07-12 ASSESSMENT — PAIN SCALES - GENERAL: PAINLEVEL: 2

## 2024-07-16 ENCOUNTER — TELEPHONE (OUTPATIENT)
Dept: PRIMARY CARE | Facility: CLINIC | Age: 54
End: 2024-07-16
Payer: COMMERCIAL

## 2024-07-16 NOTE — TELEPHONE ENCOUNTER
Please call the patient with the following questions:    1) will this be intermittent leave or continuous leave? Will he be going into work at all?    2) how often is he having chemo and how many days does he have it when he has it?     This helps me fill the forms out correctly.

## 2024-07-16 NOTE — TELEPHONE ENCOUNTER
Patient dropped off leave of absence forms that he would like filled out. Forms were placed in your bin.

## 2024-07-17 NOTE — TELEPHONE ENCOUNTER
1.) This will be continuous leave, he would like to try and go back to work the first week of September.     2.) He does chemo every other week. The weeks he does chemo, it's on Tuesday, Wednesday and Thursday for 8 hours. Tuesday after he finishes chemo they sent him home with a pump and he has to go back Thursday evening to have it removed.  Chemo is completed in [place] at [facility]

## 2024-07-22 ENCOUNTER — LAB (OUTPATIENT)
Dept: HEMATOLOGY/ONCOLOGY | Facility: CLINIC | Age: 54
End: 2024-07-22
Payer: COMMERCIAL

## 2024-07-22 VITALS
HEIGHT: 74 IN | TEMPERATURE: 97.7 F | WEIGHT: 237 LBS | OXYGEN SATURATION: 95 % | BODY MASS INDEX: 30.42 KG/M2 | RESPIRATION RATE: 16 BRPM | DIASTOLIC BLOOD PRESSURE: 79 MMHG | SYSTOLIC BLOOD PRESSURE: 122 MMHG | HEART RATE: 66 BPM

## 2024-07-22 DIAGNOSIS — C25.0 MALIGNANT NEOPLASM OF HEAD OF PANCREAS (MULTI): ICD-10-CM

## 2024-07-22 LAB
ALBUMIN SERPL BCP-MCNC: 3.7 G/DL (ref 3.4–5)
ALP SERPL-CCNC: 66 U/L (ref 33–120)
ALT SERPL W P-5'-P-CCNC: 20 U/L (ref 10–52)
ANION GAP SERPL CALC-SCNC: 9 MMOL/L (ref 10–20)
AST SERPL W P-5'-P-CCNC: 16 U/L (ref 9–39)
BASOPHILS # BLD AUTO: 0.04 X10*3/UL (ref 0–0.1)
BASOPHILS NFR BLD AUTO: 0.8 %
BILIRUB SERPL-MCNC: 0.3 MG/DL (ref 0–1.2)
BUN SERPL-MCNC: 17 MG/DL (ref 6–23)
CALCIUM SERPL-MCNC: 8.6 MG/DL (ref 8.6–10.3)
CANCER AG19-9 SERPL-ACNC: 161.92 U/ML
CHLORIDE SERPL-SCNC: 107 MMOL/L (ref 98–107)
CO2 SERPL-SCNC: 25 MMOL/L (ref 21–32)
CREAT SERPL-MCNC: 0.67 MG/DL (ref 0.5–1.3)
EGFRCR SERPLBLD CKD-EPI 2021: >90 ML/MIN/1.73M*2
EOSINOPHIL # BLD AUTO: 0.13 X10*3/UL (ref 0–0.7)
EOSINOPHIL NFR BLD AUTO: 2.6 %
ERYTHROCYTE [DISTWIDTH] IN BLOOD BY AUTOMATED COUNT: 15.1 % (ref 11.5–14.5)
GLUCOSE SERPL-MCNC: 189 MG/DL (ref 74–99)
HCT VFR BLD AUTO: 36.8 % (ref 41–52)
HGB BLD-MCNC: 12.8 G/DL (ref 13.5–17.5)
IMM GRANULOCYTES # BLD AUTO: 0.01 X10*3/UL (ref 0–0.7)
IMM GRANULOCYTES NFR BLD AUTO: 0.2 % (ref 0–0.9)
LYMPHOCYTES # BLD AUTO: 1.26 X10*3/UL (ref 1.2–4.8)
LYMPHOCYTES NFR BLD AUTO: 25.6 %
MCH RBC QN AUTO: 30.9 PG (ref 26–34)
MCHC RBC AUTO-ENTMCNC: 34.8 G/DL (ref 32–36)
MCV RBC AUTO: 89 FL (ref 80–100)
MONOCYTES # BLD AUTO: 0.54 X10*3/UL (ref 0.1–1)
MONOCYTES NFR BLD AUTO: 11 %
NEUTROPHILS # BLD AUTO: 2.94 X10*3/UL (ref 1.2–7.7)
NEUTROPHILS NFR BLD AUTO: 59.8 %
PLATELET # BLD AUTO: 153 X10*3/UL (ref 150–450)
POTASSIUM SERPL-SCNC: 4.3 MMOL/L (ref 3.5–5.3)
PROT SERPL-MCNC: 5.9 G/DL (ref 6.4–8.2)
RBC # BLD AUTO: 4.14 X10*6/UL (ref 4.5–5.9)
SODIUM SERPL-SCNC: 137 MMOL/L (ref 136–145)
WBC # BLD AUTO: 4.9 X10*3/UL (ref 4.4–11.3)

## 2024-07-22 PROCEDURE — 85025 COMPLETE CBC W/AUTO DIFF WBC: CPT

## 2024-07-22 PROCEDURE — 86301 IMMUNOASSAY TUMOR CA 19-9: CPT | Mod: PORLAB | Performed by: NURSE PRACTITIONER

## 2024-07-22 PROCEDURE — 2500000004 HC RX 250 GENERAL PHARMACY W/ HCPCS (ALT 636 FOR OP/ED): Performed by: NURSE PRACTITIONER

## 2024-07-22 PROCEDURE — 36591 DRAW BLOOD OFF VENOUS DEVICE: CPT

## 2024-07-22 PROCEDURE — 80053 COMPREHEN METABOLIC PANEL: CPT

## 2024-07-22 RX ORDER — HEPARIN 100 UNIT/ML
500 SYRINGE INTRAVENOUS AS NEEDED
Status: CANCELLED | OUTPATIENT
Start: 2024-07-22

## 2024-07-22 RX ORDER — HEPARIN SODIUM,PORCINE/PF 10 UNIT/ML
50 SYRINGE (ML) INTRAVENOUS AS NEEDED
Status: CANCELLED | OUTPATIENT
Start: 2024-07-22

## 2024-07-22 RX ORDER — HEPARIN 100 UNIT/ML
500 SYRINGE INTRAVENOUS AS NEEDED
Status: DISCONTINUED | OUTPATIENT
Start: 2024-07-22 | End: 2024-07-22 | Stop reason: HOSPADM

## 2024-07-22 ASSESSMENT — PAIN SCALES - GENERAL: PAINLEVEL: 0-NO PAIN

## 2024-07-22 NOTE — PROGRESS NOTES
Pt arrived awake, alert, oriented, no apparent distress with unlabored breaths. Pt reports feeling well today, without s/sx of illness. Pt Is here for medi port lab draw. Port site without s/sx of infiltration/infection, accessed without difficulty, flushed easily with + blood return. Blood samples obtained and sent per order, port flushed well with saline/heparin prior to de-accessing. Pt is set for infusion appointment tomorrow 7/23/24 at [facility], verbalized understanding without further questions or concerns, discharged in stable condition.

## 2024-07-23 ENCOUNTER — NUTRITION (OUTPATIENT)
Dept: HEMATOLOGY/ONCOLOGY | Facility: CLINIC | Age: 54
End: 2024-07-23
Payer: COMMERCIAL

## 2024-07-23 ENCOUNTER — INFUSION (OUTPATIENT)
Dept: HEMATOLOGY/ONCOLOGY | Facility: CLINIC | Age: 54
End: 2024-07-23
Payer: COMMERCIAL

## 2024-07-23 ENCOUNTER — NUTRITION (OUTPATIENT)
Dept: HEMATOLOGY/ONCOLOGY | Facility: CLINIC | Age: 54
End: 2024-07-23

## 2024-07-23 VITALS — HEIGHT: 74 IN | BODY MASS INDEX: 31.09 KG/M2 | WEIGHT: 242.29 LBS

## 2024-07-23 VITALS
RESPIRATION RATE: 17 BRPM | SYSTOLIC BLOOD PRESSURE: 124 MMHG | HEART RATE: 58 BPM | WEIGHT: 242.29 LBS | TEMPERATURE: 97 F | DIASTOLIC BLOOD PRESSURE: 82 MMHG | OXYGEN SATURATION: 97 % | BODY MASS INDEX: 31.09 KG/M2

## 2024-07-23 DIAGNOSIS — C25.0 MALIGNANT NEOPLASM OF HEAD OF PANCREAS (MULTI): ICD-10-CM

## 2024-07-23 DIAGNOSIS — C25.0 MALIGNANT NEOPLASM OF HEAD OF PANCREAS (MULTI): Primary | ICD-10-CM

## 2024-07-23 PROCEDURE — 2500000004 HC RX 250 GENERAL PHARMACY W/ HCPCS (ALT 636 FOR OP/ED): Performed by: NURSE PRACTITIONER

## 2024-07-23 PROCEDURE — 96417 CHEMO IV INFUS EACH ADDL SEQ: CPT

## 2024-07-23 PROCEDURE — 96367 TX/PROPH/DG ADDL SEQ IV INF: CPT

## 2024-07-23 PROCEDURE — 96413 CHEMO IV INFUSION 1 HR: CPT

## 2024-07-23 PROCEDURE — 96415 CHEMO IV INFUSION ADDL HR: CPT

## 2024-07-23 PROCEDURE — 96375 TX/PRO/DX INJ NEW DRUG ADDON: CPT | Mod: INF

## 2024-07-23 PROCEDURE — 2500000001 HC RX 250 WO HCPCS SELF ADMINISTERED DRUGS (ALT 637 FOR MEDICARE OP): Performed by: NURSE PRACTITIONER

## 2024-07-23 PROCEDURE — 96416 CHEMO PROLONG INFUSE W/PUMP: CPT

## 2024-07-23 RX ORDER — HEPARIN SODIUM,PORCINE/PF 10 UNIT/ML
50 SYRINGE (ML) INTRAVENOUS AS NEEDED
Status: CANCELLED | OUTPATIENT
Start: 2024-07-23

## 2024-07-23 RX ORDER — PANCRELIPASE 36000; 180000; 114000 [USP'U]/1; [USP'U]/1; [USP'U]/1
CAPSULE, DELAYED RELEASE PELLETS ORAL
Qty: 400 CAPSULE | Refills: 3 | Status: SHIPPED | OUTPATIENT
Start: 2024-07-23

## 2024-07-23 RX ORDER — FAMOTIDINE 10 MG/ML
20 INJECTION INTRAVENOUS ONCE AS NEEDED
Status: DISCONTINUED | OUTPATIENT
Start: 2024-07-23 | End: 2024-07-23 | Stop reason: HOSPADM

## 2024-07-23 RX ORDER — EPINEPHRINE 0.3 MG/.3ML
0.3 INJECTION SUBCUTANEOUS EVERY 5 MIN PRN
Status: DISCONTINUED | OUTPATIENT
Start: 2024-07-23 | End: 2024-07-23 | Stop reason: HOSPADM

## 2024-07-23 RX ORDER — HEPARIN SODIUM,PORCINE/PF 10 UNIT/ML
50 SYRINGE (ML) INTRAVENOUS AS NEEDED
Status: DISCONTINUED | OUTPATIENT
Start: 2024-07-23 | End: 2024-07-23 | Stop reason: HOSPADM

## 2024-07-23 RX ORDER — PALONOSETRON 0.05 MG/ML
0.25 INJECTION, SOLUTION INTRAVENOUS ONCE
Status: COMPLETED | OUTPATIENT
Start: 2024-07-23 | End: 2024-07-23

## 2024-07-23 RX ORDER — HEPARIN 100 UNIT/ML
500 SYRINGE INTRAVENOUS AS NEEDED
Status: CANCELLED | OUTPATIENT
Start: 2024-07-23

## 2024-07-23 RX ORDER — ATROPINE SULFATE 0.4 MG/ML
0.4 INJECTION, SOLUTION ENDOTRACHEAL; INTRAMEDULLARY; INTRAMUSCULAR; INTRAVENOUS; SUBCUTANEOUS
Status: DISCONTINUED | OUTPATIENT
Start: 2024-07-23 | End: 2024-07-23 | Stop reason: HOSPADM

## 2024-07-23 RX ORDER — PROCHLORPERAZINE MALEATE 10 MG
10 TABLET ORAL EVERY 6 HOURS PRN
Status: DISCONTINUED | OUTPATIENT
Start: 2024-07-23 | End: 2024-07-23 | Stop reason: HOSPADM

## 2024-07-23 RX ORDER — HEPARIN 100 UNIT/ML
500 SYRINGE INTRAVENOUS AS NEEDED
Status: DISCONTINUED | OUTPATIENT
Start: 2024-07-23 | End: 2024-07-23 | Stop reason: HOSPADM

## 2024-07-23 RX ORDER — ALBUTEROL SULFATE 0.83 MG/ML
3 SOLUTION RESPIRATORY (INHALATION) AS NEEDED
Status: DISCONTINUED | OUTPATIENT
Start: 2024-07-23 | End: 2024-07-23 | Stop reason: HOSPADM

## 2024-07-23 RX ORDER — LORAZEPAM 2 MG/ML
1 INJECTION INTRAMUSCULAR AS NEEDED
Status: DISCONTINUED | OUTPATIENT
Start: 2024-07-23 | End: 2024-07-23 | Stop reason: HOSPADM

## 2024-07-23 RX ORDER — LORAZEPAM 0.5 MG/1
1 TABLET ORAL ONCE
Status: COMPLETED | OUTPATIENT
Start: 2024-07-23 | End: 2024-07-23

## 2024-07-23 RX ORDER — PROCHLORPERAZINE EDISYLATE 5 MG/ML
10 INJECTION INTRAMUSCULAR; INTRAVENOUS EVERY 6 HOURS PRN
Status: DISCONTINUED | OUTPATIENT
Start: 2024-07-23 | End: 2024-07-23 | Stop reason: HOSPADM

## 2024-07-23 RX ORDER — OXYCODONE HYDROCHLORIDE 10 MG/1
10 TABLET ORAL EVERY 6 HOURS PRN
Qty: 90 TABLET | Refills: 0 | Status: SHIPPED | OUTPATIENT
Start: 2024-07-23 | End: 2024-08-22

## 2024-07-23 RX ORDER — DIPHENHYDRAMINE HYDROCHLORIDE 50 MG/ML
50 INJECTION INTRAMUSCULAR; INTRAVENOUS AS NEEDED
Status: DISCONTINUED | OUTPATIENT
Start: 2024-07-23 | End: 2024-07-23 | Stop reason: HOSPADM

## 2024-07-23 ASSESSMENT — PAIN SCALES - GENERAL: PAINLEVEL: 1

## 2024-07-23 NOTE — PROGRESS NOTES
Patient met with dietitian for nutrition consult today.  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     Rx sent to [facility] on 7/23/24:     Pancrelipase (Creon) [id number] unit capsules, delayed release  Take 2-3 capsules by mouth with meals and 1-2 capsules with snacks daily; for an average of 13 capsules per day.     400 capsules/30 days supply  1 + 3 refills    Radha BrookeD

## 2024-07-23 NOTE — PROGRESS NOTES
"NUTRITION Assessment NOTE    Nutrition Assessment     Reason for Visit:  Nate Alvarez is a 54 y.o. male with Borderline Resectable Pancreatic cancer dx'd 5/2024     He presented with acute pancreatitis, Cancer cachexia and New onset diabetes with hyperglycemia. He is taking both long and short acting insulin as managed by Dr. Brandon Vasquez.    EUS: heme and gastritis noted in the stomach.     Current Therapy:  Neoadjuvant mFOLFIRINOX    Current Sites of Disease:  Head of the pancreas /uncinate process involving the gastroduodenal artery celiac axis and 180 degree involvement of the SMV    Referred to this service for high risk dx and assessed today during infusion in the presence of his wife.    PMH noted: GERD, HTN, oropharyngeal dysphagia (from fractured neck x 3), RADHA    Lab Results   Component Value Date/Time    GLUCOSE 189 (H) 07/22/2024 1115     07/22/2024 1115    K 4.3 07/22/2024 1115     07/22/2024 1115    CO2 25 07/22/2024 1115    ANIONGAP 9 (L) 07/22/2024 1115    BUN 17 07/22/2024 1115    CREATININE 0.67 07/22/2024 1115    EGFR >90 07/22/2024 1115    CALCIUM 8.6 07/22/2024 1115    ALBUMIN 3.7 07/22/2024 1115    ALKPHOS 66 07/22/2024 1115    PROT 5.9 (L) 07/22/2024 1115    AST 16 07/22/2024 1115    BILITOT 0.3 07/22/2024 1115    ALT 20 07/22/2024 1115     Lab Results   Component Value Date/Time    VITD25 13 (L) 04/16/2024 0740   Pt is on 5,000 international units of D3 daily    Anthropometrics:  Anthropometrics  Height: 188 cm (6' 2.02\")  Weight: 110 kg (242 lb 4.6 oz)  BMI (Calculated): 31.09  IBW/kg (Dietitian Calculated): 86.4 kg  Percent of IBW: 127.2 %  Adjusted Body Weight (kg): 92.3 kg  Weight Change  Weight History / % Weight Change: wt gain trend of 10.2 kg in 2 months  Significant Weight Loss: Yes (recent h/o)  Interpretation of Weight Loss: >20% in 1 year  Significant Weight Gain: Non-fluid related    Reports a UBW of ~300 #  States he has lost a lot of muscle mass.  Wt " Readings from Last 25 Encounters:   07/23/24 110 kg (242 lb 4.6 oz)   07/22/24 108 kg (237 lb)   07/12/24 108 kg (237 lb 10.5 oz)   07/11/24 107 kg (236 lb)   07/10/24 107 kg (234 lb 12.6 oz)   07/09/24 107 kg (236 lb 15.9 oz)   07/05/24 106 kg (234 lb)   06/27/24 106 kg (233 lb)   06/25/24 106 kg (233 lb 11 oz)   06/24/24 104 kg (229 lb 4.5 oz)   06/11/24 104 kg (229 lb 3.2 oz)   06/03/24 106 kg (232 lb 12.9 oz)   05/31/24 106 kg (234 lb)   05/29/24 99.8 kg (220 lb 0.3 oz)- Loss of 29.2 kg (22.6%) in 8 months- significant   05/28/24 105 kg (231 lb)   05/22/24 102 kg (224 lb)   05/17/24 102 kg (224 lb 13.9 oz)   05/13/24 104 kg (230 lb)   05/10/24 102 kg (225 lb)   04/17/24 117 kg (257 lb)   01/11/24 122 kg (268 lb)     10/05/23 127 kg (281 lb)   10/04/23 129 kg (285 lb)   04/13/23 131 kg (287 lb 14.4 oz)                Food And Nutrient Intake:  Food and Nutrient History  Food and Nutrient History: He is hungry- corie. over the last 4 days, but feels horrible after eating, so he doesn't like to eat.  He cannot eat until he is full- only eats one plate of food, otherwise he will feel miserable. So he eats smaller meals. Normally eats 2 meals a day.  Notes that it took a long time to get his BS under control on insulin regimens. (Note wife has a h/o IDDM)  Energy Intake: Good > 75 %  Fluid Intake: body armour, water 50-60 oz (at least a total of 80 oz of fliuds daily)  Food Intolerance: scrambled eggs d/t texture; PB d/t feeling extremely bloated with PB sandwich eaten in the past during chemo.  GI Symptoms: nausea  Oral Problems: dysphagia (h/o Intermittent issue)  Nausea is somewhat constant but using compazine for the first 3 days post tx; then alternates the compazine with zofran  Has vomited 5 times over the period of tx- yellow colored bile.  Usually occurs in the morning.  Reports he has pain after eating, loose and greasy light-colored stools which float, and increased gas.        Food Intake  Meal 1: egg  "McMuffin with sausage, solomon and cheese  Meal 2: 12\" BMT sub from [employer]  Meal 3: stuffed shells with cheese and ground meat with tomato sauce and applesauce  Snacks: grapes                                            Food Supplement Intake  Oral Nutrition Supplements: Ensure  Unsure of which Ensure product- but it has a lot of sugar.  He has not been drinking them.  Wife notes that he drinks homemade smoothies    Medication and Complementary/Alternative Medicine Use  Prescription Medication Use: Takes his PPI twice daily- before breakfast and at bedtime      Nutrition Focused Physical Exam Findings:      Subcutaneous Fat Loss  Orbital Fat Pads: Well nourished (slightly bulging fat pads)  Buccal Fat Pads: Well nourished (full, rounded cheeks)  Triceps: Defer  Ribs: Defer    Muscle Wasting  Temporalis: Mild-Moderate (slight depression) (mild)  Pectoralis (Clavicular Region): Defer  Deltoid/Trapezius: Defer  Interosseous: Defer  Trapezius/Infraspinatus/Supraspinatus (Scapular Region): Defer  Quadriceps: Defer  Gastrocnemius: Defer              Energy Needs  Calculated Energy Needs Using Equations  Height: 188 cm (6' 2.02\")  Estimated Energy Needs  Total Energy Estimated Needs (kCal): 2585 kCal  Total Estimated Energy Need per Day (kCal/kg): 28 kCal/kg  Method for Estimating Needs: Based on adjusted BW  Estimated Fluid Needs  Total Fluid Estimated Needs (mL): 2770 mL  Total Fluid Estimated Needs (mL/kg): 30 mL/kg  Method for Estimating Needs: Based on adjusted BW  Estimated Fat Needs  Total Fat Estimated Needs (g): 120 g  Total Fat Estimated Needs (g/kg): 1.3 g  Method for Estimating Needs: Based on adjusted BW        Nutrition Diagnosis   Malnutrition Diagnosis  Patient has Malnutrition Diagnosis: Yes  Diagnosis Status: New  Malnutrition Diagnosis: Moderate malnutrition related to chronic disease or condition  As Evidenced by: intake of < 75% of  estimated energy  requirement for  > 1 month with a recent h/o " significant wt loss oas documented now with some repletion on wt gain trend.    Nutrition Diagnosis  Patient has Nutrition Diagnosis: Yes  Diagnosis Status (1): New  Nutrition Diagnosis 1: Altered GI function  Related to (1): pathophysiology of ca ds  As Evidenced by (1): pt erport of signs and symptoms of EPI  Additional Assessment Information (1): Pt with noted Vit D deficiency per serum value.  On repletion dose of Vit D3.    Nutrition Interventions/Recommendations   Nutrition Prescription  Individualized Nutrition Prescription Provided for : Regular MAURA with moderate intake of concentrated sweets (limited concentrated sweets x 2-3 days post chemo with steroid administration)    Food and Nutrition Delivery  Food and Nutrition Delivery  Meals & Snacks: Modify schedule of foods/fluids, Specific foods/beverages or groups:, Fiber-modified diet  Goals: 5-6 small frequent meals and snacks- each paired with a protein. (Low fiber as needed with malabsorption after chemo)  Goals: q  Medical Food Supplement:  (Consider with decreased intake and/or wt loss)  Vitamin Supplement Therapy: D  Goals: Continue D3 supplement as prescribed (Recheck serum level next month)    Nutrition Education  Nutrition Education  Nutrition Education Content: Content related nutrition education  Goals: Implement PERT to address EPI    Coordination of Care  Coordination of Nutrition Care by a Nutrition Professional  Collaboration and referral of nutrition care: Collaboration by nutrition professional with other providers  Goals: Prescribe PERT: Creon 36,000 units of lipase (2-3 each with meals and 1-2 each with snacks for a total of 13 each per day and 400 each monthly)    Patient Instructions   Low-Fiber (8 grams) Nutrition Therapy (AND)  Using Pancreatic Enzymes (SCC)    Nutrition Monitoring and Evaluation   Food/Nutrient Related History Monitoring  Monitoring and Evaluation Plan: Fluid intake, Amount of food, Meal/snack pattern  Fluid Intake:  Estimated fluid intake  Criteria: Continue to meet daily needs  Amount of Food: Estimated amout of food  Criteria: Meet energy and protein needs  Meal/Snack Pattern: Estimated meal and snack pattern  Criteria: as above  Body Composition/Growth/Weight History  Monitoring and Evaluation Plan: Weight  Weight: Weight change  Criteria: maintanence or loss of inactive adipose  Biochemical Data, Medical Tests and Procedures  Monitoring and Evaluation Plan: Electrolyte/renal panel, Glucose/endocrine profile  Criteria: WNL  Glucose/Endocrine Profile: Glucose, casual  Criteria: < 250  Nutrition Focused Physical Findings  Monitoring and Evaluation Plan: Adipose, Digestive System, Muscles  Adipose: Loss of subcutaneous fat  Criteria: inactive loss  Digestive System: Diarrhea, Nausea  Criteria: Appropriate use of PERT (Continued use of anti N meds as needed)  Muscles: Muscle atrophy  Criteria: No further loss          Time Spent  Prep time on day of patient encounter: 5 minutes  Time spent directly with patient, family or caregiver: 50 minutes  Additional Time Spent on Patient Care Activities: 0 minutes  Documentation Time: 20 minutes  Other Time Spent: 0 minutes  Total: 75 minutes

## 2024-07-25 ENCOUNTER — INFUSION (OUTPATIENT)
Dept: HEMATOLOGY/ONCOLOGY | Facility: CLINIC | Age: 54
End: 2024-07-25
Payer: COMMERCIAL

## 2024-07-25 VITALS
HEART RATE: 70 BPM | BODY MASS INDEX: 31.09 KG/M2 | TEMPERATURE: 97.7 F | SYSTOLIC BLOOD PRESSURE: 121 MMHG | DIASTOLIC BLOOD PRESSURE: 77 MMHG | HEIGHT: 74 IN | OXYGEN SATURATION: 94 % | RESPIRATION RATE: 16 BRPM

## 2024-07-25 DIAGNOSIS — C25.0 MALIGNANT NEOPLASM OF HEAD OF PANCREAS (MULTI): ICD-10-CM

## 2024-07-25 RX ORDER — HEPARIN SODIUM,PORCINE/PF 10 UNIT/ML
50 SYRINGE (ML) INTRAVENOUS AS NEEDED
OUTPATIENT
Start: 2024-07-25

## 2024-07-25 RX ORDER — HEPARIN 100 UNIT/ML
500 SYRINGE INTRAVENOUS AS NEEDED
OUTPATIENT
Start: 2024-07-25

## 2024-07-25 ASSESSMENT — PAIN SCALES - GENERAL: PAINLEVEL: 0-NO PAIN

## 2024-07-25 NOTE — PROGRESS NOTES
Patient presents for pump disconnect, has no complaints alert and oriented x 4, CADD pump disconnected, verified total volume infused.  + blood return noted, flushed with 10cc normal saline and 5cc 10 units heparin.  John needle removed and site covered with gauze and tape, tolerated procedure well. Patient feels well and has no complaints, vital signs stable. Patient verbalized understanding of all teaching and education. Patient discharged in stable condition with no further needs.

## 2024-07-29 ENCOUNTER — HOSPITAL ENCOUNTER (OUTPATIENT)
Dept: RADIOLOGY | Facility: HOSPITAL | Age: 54
Discharge: HOME | End: 2024-07-29
Payer: COMMERCIAL

## 2024-07-29 PROCEDURE — 74177 CT ABD & PELVIS W/CONTRAST: CPT

## 2024-07-29 PROCEDURE — 2550000001 HC RX 255 CONTRASTS: Performed by: NURSE PRACTITIONER

## 2024-07-29 PROCEDURE — 71260 CT THORAX DX C+: CPT | Performed by: RADIOLOGY

## 2024-07-29 PROCEDURE — 74177 CT ABD & PELVIS W/CONTRAST: CPT | Performed by: RADIOLOGY

## 2024-08-02 ENCOUNTER — APPOINTMENT (OUTPATIENT)
Dept: HEMATOLOGY/ONCOLOGY | Facility: CLINIC | Age: 54
End: 2024-08-02
Payer: COMMERCIAL

## 2024-08-05 ENCOUNTER — NUTRITION (OUTPATIENT)
Dept: HEMATOLOGY/ONCOLOGY | Facility: CLINIC | Age: 54
End: 2024-08-05

## 2024-08-05 ENCOUNTER — INFUSION (OUTPATIENT)
Dept: HEMATOLOGY/ONCOLOGY | Facility: CLINIC | Age: 54
End: 2024-08-05
Payer: COMMERCIAL

## 2024-08-05 ENCOUNTER — NUTRITION (OUTPATIENT)
Dept: HEMATOLOGY/ONCOLOGY | Facility: CLINIC | Age: 54
End: 2024-08-05
Payer: COMMERCIAL

## 2024-08-05 ENCOUNTER — OFFICE VISIT (OUTPATIENT)
Dept: HEMATOLOGY/ONCOLOGY | Facility: CLINIC | Age: 54
End: 2024-08-05
Payer: COMMERCIAL

## 2024-08-05 VITALS
WEIGHT: 239.64 LBS | RESPIRATION RATE: 18 BRPM | SYSTOLIC BLOOD PRESSURE: 122 MMHG | OXYGEN SATURATION: 97 % | TEMPERATURE: 98.2 F | HEART RATE: 63 BPM | BODY MASS INDEX: 30.75 KG/M2 | DIASTOLIC BLOOD PRESSURE: 86 MMHG

## 2024-08-05 VITALS — BODY MASS INDEX: 30.75 KG/M2 | WEIGHT: 239.64 LBS | HEIGHT: 74 IN

## 2024-08-05 DIAGNOSIS — C25.0 MALIGNANT NEOPLASM OF HEAD OF PANCREAS (MULTI): ICD-10-CM

## 2024-08-05 DIAGNOSIS — C25.0 MALIGNANT NEOPLASM OF HEAD OF PANCREAS (MULTI): Primary | ICD-10-CM

## 2024-08-05 LAB
ALBUMIN SERPL BCP-MCNC: 4.2 G/DL (ref 3.4–5)
ALP SERPL-CCNC: 75 U/L (ref 33–120)
ALT SERPL W P-5'-P-CCNC: 24 U/L (ref 10–52)
ANION GAP SERPL CALC-SCNC: 13 MMOL/L (ref 10–20)
AST SERPL W P-5'-P-CCNC: 15 U/L (ref 9–39)
BASOPHILS # BLD AUTO: 0.06 X10*3/UL (ref 0–0.1)
BASOPHILS NFR BLD AUTO: 0.9 %
BILIRUB SERPL-MCNC: 0.4 MG/DL (ref 0–1.2)
BUN SERPL-MCNC: 15 MG/DL (ref 6–23)
CALCIUM SERPL-MCNC: 9.3 MG/DL (ref 8.6–10.6)
CANCER AG19-9 SERPL-ACNC: 134.38 U/ML
CHLORIDE SERPL-SCNC: 107 MMOL/L (ref 98–107)
CO2 SERPL-SCNC: 22 MMOL/L (ref 21–32)
CREAT SERPL-MCNC: 0.67 MG/DL (ref 0.5–1.3)
EGFRCR SERPLBLD CKD-EPI 2021: >90 ML/MIN/1.73M*2
EOSINOPHIL # BLD AUTO: 0.14 X10*3/UL (ref 0–0.7)
EOSINOPHIL NFR BLD AUTO: 2 %
ERYTHROCYTE [DISTWIDTH] IN BLOOD BY AUTOMATED COUNT: 14.9 % (ref 11.5–14.5)
GLUCOSE SERPL-MCNC: 215 MG/DL (ref 74–99)
HCT VFR BLD AUTO: 41.5 % (ref 41–52)
HGB BLD-MCNC: 14.4 G/DL (ref 13.5–17.5)
IMM GRANULOCYTES # BLD AUTO: 0.01 X10*3/UL (ref 0–0.7)
IMM GRANULOCYTES NFR BLD AUTO: 0.1 % (ref 0–0.9)
LYMPHOCYTES # BLD AUTO: 2.07 X10*3/UL (ref 1.2–4.8)
LYMPHOCYTES NFR BLD AUTO: 30.1 %
MCH RBC QN AUTO: 30.3 PG (ref 26–34)
MCHC RBC AUTO-ENTMCNC: 34.7 G/DL (ref 32–36)
MCV RBC AUTO: 87 FL (ref 80–100)
MONOCYTES # BLD AUTO: 0.79 X10*3/UL (ref 0.1–1)
MONOCYTES NFR BLD AUTO: 11.5 %
NEUTROPHILS # BLD AUTO: 3.81 X10*3/UL (ref 1.2–7.7)
NEUTROPHILS NFR BLD AUTO: 55.4 %
NRBC BLD-RTO: ABNORMAL /100{WBCS}
PLATELET # BLD AUTO: 137 X10*3/UL (ref 150–450)
POTASSIUM SERPL-SCNC: 4 MMOL/L (ref 3.5–5.3)
PROT SERPL-MCNC: 6.4 G/DL (ref 6.4–8.2)
RBC # BLD AUTO: 4.75 X10*6/UL (ref 4.5–5.9)
SODIUM SERPL-SCNC: 138 MMOL/L (ref 136–145)
WBC # BLD AUTO: 6.9 X10*3/UL (ref 4.4–11.3)

## 2024-08-05 PROCEDURE — 86301 IMMUNOASSAY TUMOR CA 19-9: CPT

## 2024-08-05 PROCEDURE — 36591 DRAW BLOOD OFF VENOUS DEVICE: CPT

## 2024-08-05 PROCEDURE — 2500000004 HC RX 250 GENERAL PHARMACY W/ HCPCS (ALT 636 FOR OP/ED): Performed by: NURSE PRACTITIONER

## 2024-08-05 PROCEDURE — 3074F SYST BP LT 130 MM HG: CPT | Performed by: INTERNAL MEDICINE

## 2024-08-05 PROCEDURE — 99214 OFFICE O/P EST MOD 30 MIN: CPT | Performed by: INTERNAL MEDICINE

## 2024-08-05 PROCEDURE — 80053 COMPREHEN METABOLIC PANEL: CPT

## 2024-08-05 PROCEDURE — 3079F DIAST BP 80-89 MM HG: CPT | Performed by: INTERNAL MEDICINE

## 2024-08-05 PROCEDURE — 3046F HEMOGLOBIN A1C LEVEL >9.0%: CPT | Performed by: INTERNAL MEDICINE

## 2024-08-05 PROCEDURE — 4010F ACE/ARB THERAPY RXD/TAKEN: CPT | Performed by: INTERNAL MEDICINE

## 2024-08-05 PROCEDURE — 1036F TOBACCO NON-USER: CPT | Performed by: INTERNAL MEDICINE

## 2024-08-05 PROCEDURE — 85025 COMPLETE CBC W/AUTO DIFF WBC: CPT

## 2024-08-05 PROCEDURE — 3048F LDL-C <100 MG/DL: CPT | Performed by: INTERNAL MEDICINE

## 2024-08-05 RX ORDER — HEPARIN 100 UNIT/ML
500 SYRINGE INTRAVENOUS AS NEEDED
Status: ACTIVE | OUTPATIENT
Start: 2024-08-05

## 2024-08-05 RX ORDER — FAMOTIDINE 10 MG/ML
20 INJECTION INTRAVENOUS ONCE AS NEEDED
Status: CANCELLED | OUTPATIENT
Start: 2024-08-06

## 2024-08-05 RX ORDER — ATROPINE SULFATE 0.4 MG/ML
0.4 INJECTION, SOLUTION ENDOTRACHEAL; INTRAMEDULLARY; INTRAMUSCULAR; INTRAVENOUS; SUBCUTANEOUS
Status: CANCELLED | OUTPATIENT
Start: 2024-08-06

## 2024-08-05 RX ORDER — LORAZEPAM 0.5 MG/1
1 TABLET ORAL ONCE
Status: CANCELLED | OUTPATIENT
Start: 2024-08-06 | End: 2024-08-06

## 2024-08-05 RX ORDER — PROCHLORPERAZINE MALEATE 10 MG
10 TABLET ORAL EVERY 6 HOURS PRN
Status: CANCELLED | OUTPATIENT
Start: 2024-08-06

## 2024-08-05 RX ORDER — HEPARIN 100 UNIT/ML
500 SYRINGE INTRAVENOUS AS NEEDED
Status: CANCELLED | OUTPATIENT
Start: 2024-08-05

## 2024-08-05 RX ORDER — DIPHENHYDRAMINE HYDROCHLORIDE 50 MG/ML
50 INJECTION INTRAMUSCULAR; INTRAVENOUS AS NEEDED
Status: CANCELLED | OUTPATIENT
Start: 2024-08-06

## 2024-08-05 RX ORDER — PROCHLORPERAZINE EDISYLATE 5 MG/ML
10 INJECTION INTRAMUSCULAR; INTRAVENOUS EVERY 6 HOURS PRN
Status: CANCELLED | OUTPATIENT
Start: 2024-08-06

## 2024-08-05 RX ORDER — HEPARIN SODIUM,PORCINE/PF 10 UNIT/ML
50 SYRINGE (ML) INTRAVENOUS AS NEEDED
Status: CANCELLED | OUTPATIENT
Start: 2024-08-05

## 2024-08-05 RX ORDER — PALONOSETRON 0.05 MG/ML
0.25 INJECTION, SOLUTION INTRAVENOUS ONCE
Status: CANCELLED | OUTPATIENT
Start: 2024-08-06

## 2024-08-05 RX ORDER — EPINEPHRINE 0.3 MG/.3ML
0.3 INJECTION SUBCUTANEOUS EVERY 5 MIN PRN
Status: CANCELLED | OUTPATIENT
Start: 2024-08-06

## 2024-08-05 RX ORDER — ALBUTEROL SULFATE 0.83 MG/ML
3 SOLUTION RESPIRATORY (INHALATION) AS NEEDED
Status: CANCELLED | OUTPATIENT
Start: 2024-08-06

## 2024-08-05 RX ORDER — LORAZEPAM 2 MG/ML
1 INJECTION INTRAMUSCULAR AS NEEDED
Status: CANCELLED | OUTPATIENT
Start: 2024-08-06

## 2024-08-05 ASSESSMENT — ENCOUNTER SYMPTOMS
LOSS OF SENSATION IN FEET: 1
OCCASIONAL FEELINGS OF UNSTEADINESS: 0
DEPRESSION: 0

## 2024-08-05 ASSESSMENT — PAIN SCALES - GENERAL: PAINLEVEL: 2

## 2024-08-05 NOTE — PROGRESS NOTES
Patient ID: Nate Alvarez is a 54 y.o. male from [place], [place].     Referring Physician: Shaw You MD  [address] [place] [address]    Primary Care Provider: Hazel Medeiros MD    Diagnosis:   Pancreatic cancer 5/2024    Primary Oncologic Surgeon:   Omar Gamble MD    Primary Medical Oncologist:  Borderline Resectable    Primary Radiation Oncologist:  MARSHALL     Current Therapy:  Neoadjuvant FOLFIRINOX    Oncologic Surgery History:  Pending    Oncologic Therapy History:  N/A    Molecular Genetics:  Pending    Current Sites of Disease:  Head of the pancreas involving the gastroduodenal artery celiac axis and 180 degree involvement of the SMV    Oncologic Problem List:  Localized but borderline resectable pancreatic adenocarcinoma  New onset diabetes with hyperglycemia  Cancer cachexia      Oncologic Narrative:  54 y.o.  man from [place] who presented in May 2024 with difficult DM control (A1c 9.1 this month)and some ED visits for fatigue/weakness. Most recently at [facility] May 16-18. 70 lb wt loss mentioned (note some of the new DM meds) .  Tumor markers checked:  CEA: 4.9  Ca 19-9: >700    CT A/P Showed:   Edema surrounding the pancreatic tail compatible with acute pancreatitis. Dilatation of the pancreatic duct which measures 6 mm in diameter and an ill marginated area of hypodensity in the  pancreatic head highly concerning for pancreatic malignancy, and pancreatic protocol MRI is recommended for further evaluation. An  area of necrotizing pancreatitis/pancreatic necrosis is other consideration, however there is no surrounding edema in this region and given the ductal dilatation, findings highly concerning for an underlying malignancy.  Small nonobstructive left renal calculi.     MRI:  IMPRESSION:  1. Pancreatic head/uncinate process mass measuring 2.9 x 2.5 cm with upstream pancreatic duct dilatation and parenchymal atrophy highly  concerning for primary pancreatic neoplasm (specifically  adenocarcinoma). Surrounding changes of mild superimposed pancreatitis. The mass is in contact with the adjacent 3rd part of the duodenum but no upstream dilatation. No biliary obstruction. The  mass has 180 degree contact with the adjacent superior mesenteric vein and likely encasing the 1st right lateral branch. The mass is  likely encasing the gastroduodenal artery distal component. The  celiac axis, superior mesenteric artery and main portal vein are intact.  2. Few subcentimeter peripancreatic indeterminate lymph nodes noted.  3. Hepatic steatosis with segment 4A lesion measuring 0.7 cm with  imaging characteristics favoring hemangioma .     Chest CT:  IMPRESSION:  1. No suspicious pulmonary nodules. Few small calcific granulomas noted in the lingula.  2. No enlarged intrathoracic lymphadenopathy.  3. Left thyroid nodule measuring 1.1 cm. This could be further assessed by dedicated nonemergent thyroid ultrasound if clinically desired.  4. Subdiaphragmatic findings regarding the known pancreatic mass and duct dilatation as well as the hepatic observations are better evaluated in prior MRI dated 05/16/2024     EUS:    Result Text   Impression  Limited endoscopic views of the esophagus, stomach and duodenum were obtained. There was heme and gastritis noted in the stomach. The rest of the exam was unremarkable   A 27 mm by 23 mm irregular and hypoechoic T2N0 mass with poorly defined margins was visualized in the uncinate process of the head with apparent involvement of the superior mesenteric vein; fine needle biopsy was performed, final pathology is pending but preliminary evaluation was positive for malignancy. The pancreas upstream to the mass appeared atrophic with dilated pancreatic duct.   The bile duct appeared normal.  Visualized portions of the liver, spleen, left adrenal gland, left kidney and celiac axis were normal on ultrasound examination.    No abnormal-appearing lymph nodes were identified in the  abdomen.            FINAL DIAGNOSIS   A. PANCREAS HEAD MASS, BIOPSY:     -- Invasive adenocarcinoma, moderately differentiated      6/11/24 - Exp / lap  -no carcinomatosis     Past Medical History: Nate has a past medical history of Diabetes mellitus (Multi), Gastroesophageal reflux disease without esophagitis (10/05/2023), Hemorrhoids (11/03/2023), HTN (hypertension), Leg cramps (10/05/2023), Oropharyngeal dysphagia (10/05/2023), RADHA (obstructive sleep apnea), Pancreatic cancer (Multi), and Personal history of other malignant neoplasm of skin.  Surgical History:  Nate has a past surgical history that includes Tonsillectomy; Vasectomy; Testicle surgery; Esophagogastroduodenoscopy; Colonoscopy; Eyelid carcinoma excision; Rotator cuff repair (Bilateral); and Knee arthroscopy w/ debridement.  Social History:  Nate reports that he has never smoked. He has quit using smokeless tobacco.  His smokeless tobacco use included chew. He reports that he does not currently use alcohol after a past usage of about 7.0 standard drinks of alcohol per week. He reports that he does not use drugs.  Family History:    Family History   Problem Relation Name Age of Onset    Diabetes Mother      Ovarian cancer Mother      Liver cancer Father      Lung cancer Father      Colon cancer Father      Hypertension Father      Stroke Maternal Grandmother      Diabetes Maternal Grandmother      Liver cancer Maternal Grandfather      Lung cancer Paternal Grandfather       Family Oncology History:  Cancer-related family history includes Colon cancer in his father; Liver cancer in his father and maternal grandfather; Lung cancer in his father and paternal grandfather; Ovarian cancer in his mother.        SUBJECTIVE:    History of Present Illness:  Nate Alvarez is a 54 y.o. male who was referred by Shaw You MD and presents with chemotherapy follow up.     S/p 2nd cycle of mFOLFIRINOX on 6/25  Due for C3 today     - added fosaprepitant  to C2 for N/V - nausea improved - still using compazine as well.    + jaw pain with first bites of food for 5 - 7 days  - worse this last cycle, lasts for about 1 week.    + cold sensitivity x 1 week     + pain in RUQ - taking oxycodone 10mg  tid prn   No fevers or chills.   Appt waxes & wanes    - trouble sleeping - only sleeping 2 - 3 hrs per night  - tried ativan 1 mg for 10 days didn't help that much for sleep    -trying high dose melatoning - 60mg tid OTC supplement.    Trouble sleeping for years - had sleep study 3 yrs ago  (Previously worked night shift for 20 yrs).        OBJECTIVE:    VS / Pain:  /86   Pulse 63   Temp 36.8 °C (98.2 °F) (Core)   Resp 18   Wt 109 kg (239 lb 10.2 oz)   SpO2 97%   BMI 30.75 kg/m²   BSA: 2.39 meters squared     Pain Scale: 0    Daily Weight  08/05/24 : 109 kg (239 lb 10.2 oz)  08/05/24 : 109 kg (239 lb 10.2 oz)  07/23/24 : 110 kg (242 lb 4.6 oz)      Physical Exam  Constitutional:       Appearance: Normal appearance.   HENT:      Head: Normocephalic.      Mouth/Throat:      Mouth: Mucous membranes are moist.      Pharynx: Oropharynx is clear.   Eyes:      Pupils: Pupils are equal, round, and reactive to light.   Cardiovascular:      Rate and Rhythm: Normal rate.      Pulses: Normal pulses.   Pulmonary:      Effort: Pulmonary effort is normal.   Abdominal:      General: Abdomen is flat. Bowel sounds are normal.   Musculoskeletal:      Cervical back: Neck supple.   Skin:     General: Skin is warm and dry.      Capillary Refill: Capillary refill takes less than 2 seconds.   Neurological:      General: No focal deficit present.      Mental Status: He is alert.   Psychiatric:         Mood and Affect: Mood normal.       Performance Status: 1     Diagnostic Results         WBC   Date/Time Value Ref Range Status   07/22/2024 11:15 AM 4.9 4.4 - 11.3 x10*3/uL Final   07/09/2024 08:44 AM 7.6 4.4 - 11.3 x10*3/uL Final   06/24/2024 01:50 PM 7.1 4.4 - 11.3 x10*3/uL Final      Hemoglobin   Date Value Ref Range Status   07/22/2024 12.8 (L) 13.5 - 17.5 g/dL Final   07/09/2024 13.2 (L) 13.5 - 17.5 g/dL Final   06/24/2024 13.0 (L) 13.5 - 17.5 g/dL Final     MCV   Date/Time Value Ref Range Status   07/22/2024 11:15 AM 89 80 - 100 fL Final   07/09/2024 08:44 AM 89 80 - 100 fL Final   06/24/2024 01:50 PM 88 80 - 100 fL Final     Platelets   Date/Time Value Ref Range Status   07/22/2024 11:15  150 - 450 x10*3/uL Final   07/09/2024 08:44  (L) 150 - 450 x10*3/uL Final   06/24/2024 01:50  150 - 450 x10*3/uL Final     Neutrophils Absolute   Date/Time Value Ref Range Status   07/22/2024 11:15 AM 2.94 1.20 - 7.70 x10*3/uL Final     Comment:     Percent differential counts (%) should be interpreted in the context of the absolute cell counts (cells/uL).   07/09/2024 08:44 AM 4.97 1.20 - 7.70 x10*3/uL Final     Comment:     Percent differential counts (%) should be interpreted in the context of the absolute cell counts (cells/uL).   06/24/2024 01:50 PM 4.38 1.20 - 7.70 x10*3/uL Final     Comment:     Percent differential counts (%) should be interpreted in the context of the absolute cell counts (cells/uL).     Bilirubin, Total   Date/Time Value Ref Range Status   07/22/2024 11:15 AM 0.3 0.0 - 1.2 mg/dL Final   07/09/2024 08:44 AM 0.5 0.0 - 1.2 mg/dL Final   06/24/2024 01:50 PM 0.9 0.0 - 1.2 mg/dL Final     AST   Date/Time Value Ref Range Status   07/22/2024 11:15 AM 16 9 - 39 U/L Final   07/09/2024 08:44 AM 13 9 - 39 U/L Final   06/24/2024 01:50 PM 12 9 - 39 U/L Final     ALT   Date/Time Value Ref Range Status   07/22/2024 11:15 AM 20 10 - 52 U/L Final     Comment:     Patients treated with Sulfasalazine may generate falsely decreased results for ALT.   07/09/2024 08:44 AM 19 10 - 52 U/L Final     Comment:     Patients treated with Sulfasalazine may generate falsely decreased results for ALT.   06/24/2024 01:50 PM 17 10 - 52 U/L Final     Comment:     Patients treated with  Sulfasalazine may generate falsely decreased results for ALT.     Creatinine   Date/Time Value Ref Range Status   07/22/2024 11:15 AM 0.67 0.50 - 1.30 mg/dL Final   07/09/2024 08:44 AM 0.69 0.50 - 1.30 mg/dL Final   06/24/2024 01:50 PM 0.84 0.50 - 1.30 mg/dL Final     Urea Nitrogen   Date/Time Value Ref Range Status   07/22/2024 11:15 AM 17 6 - 23 mg/dL Final   07/09/2024 08:44 AM 20 6 - 23 mg/dL Final   06/24/2024 01:50 PM 17 6 - 23 mg/dL Final     Albumin   Date/Time Value Ref Range Status   07/22/2024 11:15 AM 3.7 3.4 - 5.0 g/dL Final   07/09/2024 08:44 AM 4.1 3.4 - 5.0 g/dL Final   06/24/2024 01:50 PM 4.0 3.4 - 5.0 g/dL Final     Cancer AG 19-9   Date/Time Value Ref Range Status   07/22/2024 11:15 .92 (H) <35.00 U/mL Final   07/09/2024 08:44 .31 (H) <35.00 U/mL Final   05/31/2024 10:01 AM 1,779.76 (H) <35.00 U/mL Final     Carcinoembryonic AG   Date/Time Value Ref Range Status   05/17/2024 09:28 AM 4.9 ug/L Final       === 07/09/24 ===    CT CHEST ABDOMEN PELVIS W IV CONTRAST    - Impression -  Slight interval reduction in the size of the patient's known  pancreatic head/uncinate process mass. Additional unchanged findings  as above.    MACRO:  None.    Signed by: Wallace Mohan 7/31/2024 4:49 PM  Dictation workstation:   [id number]    Assessment/Plan   This is a 53-year-old man with borderline resectable pancreatic adenocarcinoma.  He presented in May 2024 with greater than 50 pound weight loss and new onset diabetes.  Imaging, EUS and biopsy confirmed adenocarcinoma of the pancreas involving the GDA, celiac axis, SMV.    Borderline resectable Pancreatic cancer:   - Consented for mFOLFIRINOX - started 6/3/24 - tolerated with jaw pain / nausea  - added fosaprepitant for C2   -C2  -still with jaw pain, but manageable, we discussed possible dose reduction, he does not feel this is necessary yet.   C5 Wednesday at same doses - will try ativan pre-med  C6 in 2 weeks try to move to [place].                 Follow Cancer Antigen 19-9 at least every other cycle  Obtain complete genomic profiling from pancreatic biopsy.    Pain - renewed oxycodone      Diabetes:  Continue long and short acting insulin as managed by  Dr. Brandon Vasquez    Insomnia - long standing, had sleep study a few years ago & needed additional follow up.  Consider repeating sleep study as he has lost weight.  He is taking 60mg melatonin tid at home as supplement for anti-cancer activity.  Recommended holding off on supplements while on chemo.     Shaw You MD    [facility]  Office: [phone]  Fax: [phone]

## 2024-08-05 NOTE — PROGRESS NOTES
"NUTRITION Follow Up NOTE    Nutrition Assessment     Reason for Visit:  Nate Alvarez is a 54 y.o. male with Borderline Resectable Pancreatic cancer dx'd 5/2024     He presented with acute pancreatitis, Cancer cachexia and New onset diabetes with hyperglycemia. He is taking both long and short acting insulin as managed by Dr. Brandon Vasquez.    EUS: heme and gastritis noted in the stomach.     Current Sites of Disease:  Head of the pancreas /uncinate process involving the gastroduodenal artery celiac axis and 180 degree involvement of the SMV    Current Therapy:  Neoadjuvant mFOLFIRINOX    Referred to this service for high risk dx and assessed 7/23.  He was ordered PERT.  Seen for follow up today during clinic visit in the presence of his wife.    PMH noted: GERD, HTN, oropharyngeal dysphagia (from fractured neck x 3), RADHA    NO NEW LABS    Anthropometrics:  Anthropometrics  Height: 188 cm (6' 2.02\")  Weight: 109 kg (239 lb 10.2 oz)  BMI (Calculated): 30.75  IBW/kg (Dietitian Calculated): 86.4 kg  Adjusted Body Weight (kg): 92.3 kg  Weight Change  Significant Weight Loss: Yes (recent h/o)    Reported a UBW of ~300 #  Stated he has lost a lot of muscle mass.    Today pt reports his wt varies 10 # between 235# and 245# on his home scale.  Wt Readings from Last 25 Encounters:   8/5/24 109 kg    07/23/24 110 kg (242 lb 4.6 oz)   07/22/24 108 kg (237 lb)   07/12/24 108 kg (237 lb 10.5 oz)   07/11/24 107 kg (236 lb)   07/10/24 107 kg (234 lb 12.6 oz)   07/09/24 107 kg (236 lb 15.9 oz)   07/05/24 106 kg (234 lb)   06/27/24 106 kg (233 lb)   06/25/24 106 kg (233 lb 11 oz)   06/24/24 104 kg (229 lb 4.5 oz)   06/11/24 104 kg (229 lb 3.2 oz)   06/03/24 106 kg (232 lb 12.9 oz)   05/31/24 106 kg (234 lb)   05/29/24 99.8 kg (220 lb 0.3 oz)- Loss of 29.2 kg (22.6%) in 8 months- significant   05/28/24 105 kg (231 lb)   05/22/24 102 kg (224 lb)   05/17/24 102 kg (224 lb 13.9 oz)   05/13/24 104 kg (230 lb)   05/10/24 102 kg (225 " "lb)   04/17/24 117 kg (257 lb)   01/11/24 122 kg (268 lb)     10/05/23 127 kg (281 lb)   10/04/23 129 kg (285 lb)   04/13/23 131 kg (287 lb 14.4 oz)                Food And Nutrient Intake:  Food and Nutrient History  GI Symptoms: nausea (dry heaving)  Nausea is somewhat constant but using compazine for the first 3 days post tx; then alternates the compazine with zofran  Reports bowels are normal.  Has no constipation on narcotic regimen- never has historically.     Previously reports he has pain after eating, loose and greasy light-colored stools which float, and increased gas.  Unfortunately he just picked up the Creon 2 days ago and only used it for 1/2 day.                                                            Medication and Complementary/Alternative Medicine Use  Prescription Medication Use: Pt was put on 5,000 international units of D3 daily in April- hasn't been taking it over the last month.      Nutrition Focused Physical Exam Findings:      Subcutaneous Fat Loss  Orbital Fat Pads: Well nourished (slightly bulging fat pads)  Buccal Fat Pads: Well nourished (full, rounded cheeks)  Triceps: Defer  Ribs: Defer    Muscle Wasting  Temporalis: Mild-Moderate (slight depression)  Pectoralis (Clavicular Region): Defer  Deltoid/Trapezius: Defer  Interosseous: Defer  Trapezius/Infraspinatus/Supraspinatus (Scapular Region): Defer  Quadriceps: Defer  Gastrocnemius: Defer              Energy Needs  Calculated Energy Needs Using Equations  Height: 188 cm (6' 2.02\")  Estimated Energy Needs  Total Energy Estimated Needs (kCal): 2585 kCal  Total Estimated Energy Need per Day (kCal/kg): 28 kCal/kg  Method for Estimating Needs: Based on adjusted BW  Estimated Fluid Needs  Total Fluid Estimated Needs (mL): 2770 mL  Total Fluid Estimated Needs (mL/kg): 30 mL/kg  Method for Estimating Needs: Based on adjusted BW  Estimated Protein Needs  Total Protein Estimated Needs (g): 120 g  Total Protein Estimated Needs (g/kg): 1.3 " g/kg  Method for Estimating Needs: Based on adjusted BW        Nutrition Diagnosis   Malnutrition Diagnosis  Patient has Malnutrition Diagnosis: Yes  Diagnosis Status: Ongoing  Malnutrition Diagnosis: Moderate malnutrition related to chronic disease or condition  As Evidenced by: intake of < 75% of  estimated energy  requirement for  > 1 month with a recent h/o significant wt loss oas documented now with some repletion on wt gain trend.    Nutrition Diagnosis  Patient has Nutrition Diagnosis: Yes  Diagnosis Status (1): Ongoing  Nutrition Diagnosis 1: Altered GI function  Related to (1): pathophysiology of ca ds  As Evidenced by (1): pt erport of signs and symptoms of EPI  Additional Assessment Information (1): Pt would benefit from DMV to provide DV of fat soluble vitamins.  Note he had been repleting VIt D- on 5,000 IU's for ~2.5 months and states he has been outside a lot this summer. (Would benefit from maintnenace dose inthe fall and winter)    Nutrition Interventions/Recommendations   Nutrition Prescription  Individualized Nutrition Prescription Provided for : Regular MAURA with moderate intake of concentrated sweets    Food and Nutrition Delivery  Food and Nutrition Delivery  Meals & Snacks: Modify schedule of foods/fluids, Specific foods/beverages or groups:, Fiber-modified diet  Goals: 5-6 small frequent meals and snacks- each paired with a protein. (Low fiber as needed with malabsorption after chemo)  Vitamin Supplement Therapy: D  Goals: 2,000 IU's in the fall and winter  Goals: DMV (Chewable- not gummy)  Other:: PERT  Goals: Consistent and appropriate use daily    Nutrition Education       Coordination of Care       There are no Patient Instructions on file for this visit.    Nutrition Monitoring and Evaluation   Food/Nutrient Related History Monitoring  Monitoring and Evaluation Plan: Fluid intake, Amount of food, Meal/snack pattern  Fluid Intake: Estimated fluid intake  Criteria: Continue to meet daily  needs  Amount of Food: Estimated amout of food  Criteria: Meet energy and protein needs  Meal/Snack Pattern: Estimated meal and snack pattern  Criteria: as above  Body Composition/Growth/Weight History  Monitoring and Evaluation Plan: Weight  Weight: Weight change  Criteria: maintanence or loss of inactive adipose  Biochemical Data, Medical Tests and Procedures  Monitoring and Evaluation Plan: Electrolyte/renal panel, Glucose/endocrine profile  Criteria: WNL  Glucose/Endocrine Profile: Glucose, casual  Criteria: < 250  Nutrition Focused Physical Findings  Monitoring and Evaluation Plan: Adipose, Digestive System, Muscles  Adipose: Loss of subcutaneous fat  Criteria: inactive loss  Digestive System: Nausea (EPI symptoms)  Criteria: Appropriate use of PERT (Continue anti-N regimen per MD)  Muscles: Muscle atrophy  Criteria: No further loss          Time Spent  Prep time on day of patient encounter: 5 minutes  Time spent directly with patient, family or caregiver: 25 minutes  Additional Time Spent on Patient Care Activities: 0 minutes  Documentation Time: 10 minutes  Other Time Spent: 0 minutes  Total: 40 minutes

## 2024-08-07 ENCOUNTER — INFUSION (OUTPATIENT)
Dept: HEMATOLOGY/ONCOLOGY | Facility: CLINIC | Age: 54
End: 2024-08-07
Payer: COMMERCIAL

## 2024-08-07 VITALS
WEIGHT: 241.4 LBS | SYSTOLIC BLOOD PRESSURE: 124 MMHG | HEIGHT: 74 IN | DIASTOLIC BLOOD PRESSURE: 76 MMHG | TEMPERATURE: 97.5 F | BODY MASS INDEX: 30.98 KG/M2 | OXYGEN SATURATION: 94 % | RESPIRATION RATE: 16 BRPM | HEART RATE: 60 BPM

## 2024-08-07 DIAGNOSIS — C25.0 MALIGNANT NEOPLASM OF HEAD OF PANCREAS (MULTI): ICD-10-CM

## 2024-08-07 PROCEDURE — 96415 CHEMO IV INFUSION ADDL HR: CPT

## 2024-08-07 PROCEDURE — 96413 CHEMO IV INFUSION 1 HR: CPT

## 2024-08-07 PROCEDURE — 96376 TX/PRO/DX INJ SAME DRUG ADON: CPT

## 2024-08-07 PROCEDURE — 2500000001 HC RX 250 WO HCPCS SELF ADMINISTERED DRUGS (ALT 637 FOR MEDICARE OP): Performed by: INTERNAL MEDICINE

## 2024-08-07 PROCEDURE — 96417 CHEMO IV INFUS EACH ADDL SEQ: CPT

## 2024-08-07 PROCEDURE — 2500000004 HC RX 250 GENERAL PHARMACY W/ HCPCS (ALT 636 FOR OP/ED): Performed by: INTERNAL MEDICINE

## 2024-08-07 PROCEDURE — 96367 TX/PROPH/DG ADDL SEQ IV INF: CPT

## 2024-08-07 PROCEDURE — 96375 TX/PRO/DX INJ NEW DRUG ADDON: CPT | Mod: INF

## 2024-08-07 RX ORDER — LORAZEPAM 1 MG/1
1 TABLET ORAL ONCE
Status: COMPLETED | OUTPATIENT
Start: 2024-08-07 | End: 2024-08-07

## 2024-08-07 RX ORDER — PROCHLORPERAZINE MALEATE 10 MG
10 TABLET ORAL EVERY 6 HOURS PRN
Status: DISCONTINUED | OUTPATIENT
Start: 2024-08-07 | End: 2024-08-07 | Stop reason: HOSPADM

## 2024-08-07 RX ORDER — EPINEPHRINE 0.3 MG/.3ML
0.3 INJECTION SUBCUTANEOUS EVERY 5 MIN PRN
Status: DISCONTINUED | OUTPATIENT
Start: 2024-08-07 | End: 2024-08-07 | Stop reason: HOSPADM

## 2024-08-07 RX ORDER — LORAZEPAM 2 MG/ML
1 INJECTION INTRAMUSCULAR AS NEEDED
Status: DISCONTINUED | OUTPATIENT
Start: 2024-08-07 | End: 2024-08-07 | Stop reason: HOSPADM

## 2024-08-07 RX ORDER — PROCHLORPERAZINE EDISYLATE 5 MG/ML
10 INJECTION INTRAMUSCULAR; INTRAVENOUS EVERY 6 HOURS PRN
Status: DISCONTINUED | OUTPATIENT
Start: 2024-08-07 | End: 2024-08-07 | Stop reason: HOSPADM

## 2024-08-07 RX ORDER — ATROPINE SULFATE 0.4 MG/ML
0.4 INJECTION, SOLUTION ENDOTRACHEAL; INTRAMEDULLARY; INTRAMUSCULAR; INTRAVENOUS; SUBCUTANEOUS
Status: COMPLETED | OUTPATIENT
Start: 2024-08-07 | End: 2024-08-07

## 2024-08-07 RX ORDER — DIPHENHYDRAMINE HYDROCHLORIDE 50 MG/ML
50 INJECTION INTRAMUSCULAR; INTRAVENOUS AS NEEDED
Status: DISCONTINUED | OUTPATIENT
Start: 2024-08-07 | End: 2024-08-07 | Stop reason: HOSPADM

## 2024-08-07 RX ORDER — FAMOTIDINE 10 MG/ML
20 INJECTION INTRAVENOUS ONCE AS NEEDED
Status: DISCONTINUED | OUTPATIENT
Start: 2024-08-07 | End: 2024-08-07 | Stop reason: HOSPADM

## 2024-08-07 RX ORDER — PALONOSETRON 0.05 MG/ML
0.25 INJECTION, SOLUTION INTRAVENOUS ONCE
Status: COMPLETED | OUTPATIENT
Start: 2024-08-07 | End: 2024-08-07

## 2024-08-07 RX ORDER — ALBUTEROL SULFATE 0.83 MG/ML
3 SOLUTION RESPIRATORY (INHALATION) AS NEEDED
Status: DISCONTINUED | OUTPATIENT
Start: 2024-08-07 | End: 2024-08-07 | Stop reason: HOSPADM

## 2024-08-07 ASSESSMENT — PAIN SCALES - GENERAL: PAINLEVEL: 0-NO PAIN

## 2024-08-07 NOTE — SIGNIFICANT EVENT

## 2024-08-07 NOTE — PROGRESS NOTES
Patient here for cycle 5 day 1 folfirinox. Patient tolerating well. He will return 8/9 for pump disconnect at 1330. AVS given to patient. Discharged in stable condition.

## 2024-08-09 ENCOUNTER — APPOINTMENT (OUTPATIENT)
Dept: HEMATOLOGY/ONCOLOGY | Facility: CLINIC | Age: 54
End: 2024-08-09
Payer: COMMERCIAL

## 2024-08-09 ENCOUNTER — INFUSION (OUTPATIENT)
Dept: HEMATOLOGY/ONCOLOGY | Facility: CLINIC | Age: 54
End: 2024-08-09
Payer: COMMERCIAL

## 2024-08-09 VITALS
RESPIRATION RATE: 16 BRPM | HEIGHT: 74 IN | SYSTOLIC BLOOD PRESSURE: 122 MMHG | DIASTOLIC BLOOD PRESSURE: 75 MMHG | HEART RATE: 65 BPM | TEMPERATURE: 97.2 F | OXYGEN SATURATION: 96 % | BODY MASS INDEX: 30.95 KG/M2

## 2024-08-09 DIAGNOSIS — C25.0 MALIGNANT NEOPLASM OF HEAD OF PANCREAS (MULTI): ICD-10-CM

## 2024-08-09 PROCEDURE — 2500000004 HC RX 250 GENERAL PHARMACY W/ HCPCS (ALT 636 FOR OP/ED): Performed by: NURSE PRACTITIONER

## 2024-08-09 RX ORDER — EPINEPHRINE 0.3 MG/.3ML
0.3 INJECTION SUBCUTANEOUS EVERY 5 MIN PRN
Status: CANCELLED | OUTPATIENT
Start: 2024-08-20

## 2024-08-09 RX ORDER — ATROPINE SULFATE 0.4 MG/ML
0.4 INJECTION, SOLUTION ENDOTRACHEAL; INTRAMEDULLARY; INTRAMUSCULAR; INTRAVENOUS; SUBCUTANEOUS
Status: CANCELLED | OUTPATIENT
Start: 2024-08-20

## 2024-08-09 RX ORDER — ALBUTEROL SULFATE 0.83 MG/ML
3 SOLUTION RESPIRATORY (INHALATION) AS NEEDED
Status: CANCELLED | OUTPATIENT
Start: 2024-08-22

## 2024-08-09 RX ORDER — LORAZEPAM 2 MG/ML
1 INJECTION INTRAMUSCULAR AS NEEDED
Status: CANCELLED | OUTPATIENT
Start: 2024-08-20

## 2024-08-09 RX ORDER — DIPHENHYDRAMINE HYDROCHLORIDE 50 MG/ML
50 INJECTION INTRAMUSCULAR; INTRAVENOUS AS NEEDED
Status: CANCELLED | OUTPATIENT
Start: 2024-08-20

## 2024-08-09 RX ORDER — FAMOTIDINE 10 MG/ML
20 INJECTION INTRAVENOUS ONCE AS NEEDED
Status: CANCELLED | OUTPATIENT
Start: 2024-08-22

## 2024-08-09 RX ORDER — EPINEPHRINE 0.3 MG/.3ML
0.3 INJECTION SUBCUTANEOUS EVERY 5 MIN PRN
Status: CANCELLED | OUTPATIENT
Start: 2024-08-22

## 2024-08-09 RX ORDER — DIPHENHYDRAMINE HYDROCHLORIDE 50 MG/ML
50 INJECTION INTRAMUSCULAR; INTRAVENOUS AS NEEDED
Status: CANCELLED | OUTPATIENT
Start: 2024-08-22

## 2024-08-09 RX ORDER — PALONOSETRON 0.05 MG/ML
0.25 INJECTION, SOLUTION INTRAVENOUS ONCE
Status: CANCELLED | OUTPATIENT
Start: 2024-08-20

## 2024-08-09 RX ORDER — PROCHLORPERAZINE EDISYLATE 5 MG/ML
10 INJECTION INTRAMUSCULAR; INTRAVENOUS EVERY 6 HOURS PRN
Status: CANCELLED | OUTPATIENT
Start: 2024-08-20

## 2024-08-09 RX ORDER — HEPARIN SODIUM,PORCINE/PF 10 UNIT/ML
50 SYRINGE (ML) INTRAVENOUS AS NEEDED
OUTPATIENT
Start: 2024-08-09

## 2024-08-09 RX ORDER — LORAZEPAM 1 MG/1
1 TABLET ORAL ONCE
Status: CANCELLED | OUTPATIENT
Start: 2024-08-20 | End: 2024-08-20

## 2024-08-09 RX ORDER — FAMOTIDINE 10 MG/ML
20 INJECTION INTRAVENOUS ONCE AS NEEDED
Status: CANCELLED | OUTPATIENT
Start: 2024-08-20

## 2024-08-09 RX ORDER — PROCHLORPERAZINE MALEATE 10 MG
10 TABLET ORAL EVERY 6 HOURS PRN
Status: CANCELLED | OUTPATIENT
Start: 2024-08-20

## 2024-08-09 RX ORDER — HEPARIN 100 UNIT/ML
500 SYRINGE INTRAVENOUS AS NEEDED
OUTPATIENT
Start: 2024-08-09

## 2024-08-09 RX ORDER — HEPARIN 100 UNIT/ML
500 SYRINGE INTRAVENOUS AS NEEDED
Status: DISCONTINUED | OUTPATIENT
Start: 2024-08-09 | End: 2024-08-09 | Stop reason: HOSPADM

## 2024-08-09 RX ORDER — ALBUTEROL SULFATE 0.83 MG/ML
3 SOLUTION RESPIRATORY (INHALATION) AS NEEDED
Status: CANCELLED | OUTPATIENT
Start: 2024-08-20

## 2024-08-09 ASSESSMENT — PAIN SCALES - GENERAL: PAINLEVEL: 0-NO PAIN

## 2024-08-09 NOTE — PROGRESS NOTES
Pt arrived awake, alert, oriented, no apparent distress with unlabored breaths. Pt here for home infusion pump disconnect. Medication bag was infused in entirety, pump completed. Medi port site with no s/sx of infection/infiltration, dressing dry and intact, flushed easily with + blood return. Port flushed well with saline/heparin locked prior to de-accessing, bleeding controlled and bandage applied to site. Pt to set up next appointment at [facility] on 8/20/24. Infusion pump to be sent to [facility] for next hook up.

## 2024-08-15 ENCOUNTER — PATIENT OUTREACH (OUTPATIENT)
Dept: PRIMARY CARE | Facility: CLINIC | Age: 54
End: 2024-08-15
Payer: COMMERCIAL

## 2024-08-15 NOTE — PROGRESS NOTES
90 day outreach complete. Pt questions and concerns addressed. Pt states they are doing well. Pt has graduated from [facility] program.

## 2024-08-19 ENCOUNTER — LAB (OUTPATIENT)
Dept: HEMATOLOGY/ONCOLOGY | Facility: CLINIC | Age: 54
End: 2024-08-19
Payer: COMMERCIAL

## 2024-08-19 ENCOUNTER — TELEPHONE (OUTPATIENT)
Dept: ADMISSION | Facility: HOSPITAL | Age: 54
End: 2024-08-19

## 2024-08-19 VITALS
SYSTOLIC BLOOD PRESSURE: 140 MMHG | RESPIRATION RATE: 16 BRPM | HEART RATE: 81 BPM | TEMPERATURE: 97.9 F | BODY MASS INDEX: 30.93 KG/M2 | OXYGEN SATURATION: 94 % | DIASTOLIC BLOOD PRESSURE: 85 MMHG | HEIGHT: 74 IN | WEIGHT: 241 LBS

## 2024-08-19 DIAGNOSIS — C25.0 MALIGNANT NEOPLASM OF HEAD OF PANCREAS (MULTI): Primary | ICD-10-CM

## 2024-08-19 DIAGNOSIS — C25.0 MALIGNANT NEOPLASM OF HEAD OF PANCREAS (MULTI): ICD-10-CM

## 2024-08-19 LAB
ALBUMIN SERPL BCP-MCNC: 4.2 G/DL (ref 3.4–5)
ALP SERPL-CCNC: 73 U/L (ref 33–120)
ALT SERPL W P-5'-P-CCNC: 24 U/L (ref 10–52)
ANION GAP SERPL CALC-SCNC: 8 MMOL/L (ref 10–20)
AST SERPL W P-5'-P-CCNC: 20 U/L (ref 9–39)
BASOPHILS # BLD AUTO: 0.05 X10*3/UL (ref 0–0.1)
BASOPHILS NFR BLD AUTO: 0.8 %
BILIRUB SERPL-MCNC: 0.4 MG/DL (ref 0–1.2)
BUN SERPL-MCNC: 13 MG/DL (ref 6–23)
CALCIUM SERPL-MCNC: 9.4 MG/DL (ref 8.6–10.3)
CHLORIDE SERPL-SCNC: 110 MMOL/L (ref 98–107)
CO2 SERPL-SCNC: 24 MMOL/L (ref 21–32)
CREAT SERPL-MCNC: 0.84 MG/DL (ref 0.5–1.3)
EGFRCR SERPLBLD CKD-EPI 2021: >90 ML/MIN/1.73M*2
EOSINOPHIL # BLD AUTO: 0.16 X10*3/UL (ref 0–0.7)
EOSINOPHIL NFR BLD AUTO: 2.6 %
ERYTHROCYTE [DISTWIDTH] IN BLOOD BY AUTOMATED COUNT: 15.3 % (ref 11.5–14.5)
GLUCOSE SERPL-MCNC: 166 MG/DL (ref 74–99)
HCT VFR BLD AUTO: 40.8 % (ref 41–52)
HGB BLD-MCNC: 14.5 G/DL (ref 13.5–17.5)
IMM GRANULOCYTES # BLD AUTO: 0.01 X10*3/UL (ref 0–0.7)
IMM GRANULOCYTES NFR BLD AUTO: 0.2 % (ref 0–0.9)
LYMPHOCYTES # BLD AUTO: 2.09 X10*3/UL (ref 1.2–4.8)
LYMPHOCYTES NFR BLD AUTO: 33.8 %
MCH RBC QN AUTO: 30.4 PG (ref 26–34)
MCHC RBC AUTO-ENTMCNC: 35.5 G/DL (ref 32–36)
MCV RBC AUTO: 86 FL (ref 80–100)
MONOCYTES # BLD AUTO: 0.75 X10*3/UL (ref 0.1–1)
MONOCYTES NFR BLD AUTO: 12.1 %
NEUTROPHILS # BLD AUTO: 3.13 X10*3/UL (ref 1.2–7.7)
NEUTROPHILS NFR BLD AUTO: 50.5 %
PLATELET # BLD AUTO: 132 X10*3/UL (ref 150–450)
POTASSIUM SERPL-SCNC: 4.2 MMOL/L (ref 3.5–5.3)
PROT SERPL-MCNC: 6.8 G/DL (ref 6.4–8.2)
RBC # BLD AUTO: 4.77 X10*6/UL (ref 4.5–5.9)
SODIUM SERPL-SCNC: 138 MMOL/L (ref 136–145)
WBC # BLD AUTO: 6.2 X10*3/UL (ref 4.4–11.3)

## 2024-08-19 PROCEDURE — 85025 COMPLETE CBC W/AUTO DIFF WBC: CPT

## 2024-08-19 PROCEDURE — 2500000004 HC RX 250 GENERAL PHARMACY W/ HCPCS (ALT 636 FOR OP/ED): Performed by: NURSE PRACTITIONER

## 2024-08-19 PROCEDURE — 36591 DRAW BLOOD OFF VENOUS DEVICE: CPT

## 2024-08-19 PROCEDURE — 80053 COMPREHEN METABOLIC PANEL: CPT

## 2024-08-19 RX ORDER — NALOXONE HYDROCHLORIDE 4 MG/.1ML
1 SPRAY NASAL AS NEEDED
Qty: 2 EACH | Refills: 0 | Status: SHIPPED | OUTPATIENT
Start: 2024-08-19

## 2024-08-19 RX ORDER — OXYCODONE HYDROCHLORIDE 10 MG/1
10 TABLET ORAL EVERY 6 HOURS PRN
Qty: 120 TABLET | Refills: 0 | Status: SHIPPED | OUTPATIENT
Start: 2024-08-19 | End: 2024-09-18

## 2024-08-19 RX ORDER — HEPARIN 100 UNIT/ML
500 SYRINGE INTRAVENOUS AS NEEDED
Status: DISCONTINUED | OUTPATIENT
Start: 2024-08-19 | End: 2024-08-19 | Stop reason: HOSPADM

## 2024-08-19 RX ORDER — HEPARIN SODIUM,PORCINE/PF 10 UNIT/ML
50 SYRINGE (ML) INTRAVENOUS AS NEEDED
Status: CANCELLED | OUTPATIENT
Start: 2024-08-19

## 2024-08-19 RX ORDER — HEPARIN 100 UNIT/ML
500 SYRINGE INTRAVENOUS AS NEEDED
Status: CANCELLED | OUTPATIENT
Start: 2024-08-19

## 2024-08-19 ASSESSMENT — PAIN SCALES - GENERAL: PAINLEVEL: 0-NO PAIN

## 2024-08-19 NOTE — PROGRESS NOTES
Pt here for [facility] flush and labs prior to treatment tomorrow. Tolerated procedure well, discharged in stable condition, no further needs at this time.

## 2024-08-19 NOTE — TELEPHONE ENCOUNTER
Nate Alvarez called the refill line for Oxycodone 10mg. Requesting refills be sent to [facility] pharmacy; message sent to team to submit.

## 2024-08-20 ENCOUNTER — INFUSION (OUTPATIENT)
Dept: HEMATOLOGY/ONCOLOGY | Facility: CLINIC | Age: 54
End: 2024-08-20
Payer: COMMERCIAL

## 2024-08-20 VITALS
RESPIRATION RATE: 16 BRPM | HEIGHT: 74 IN | SYSTOLIC BLOOD PRESSURE: 146 MMHG | WEIGHT: 240 LBS | HEART RATE: 90 BPM | BODY MASS INDEX: 30.8 KG/M2 | OXYGEN SATURATION: 97 % | DIASTOLIC BLOOD PRESSURE: 89 MMHG | TEMPERATURE: 97.7 F

## 2024-08-20 DIAGNOSIS — C25.0 MALIGNANT NEOPLASM OF HEAD OF PANCREAS (MULTI): ICD-10-CM

## 2024-08-20 PROCEDURE — 96411 CHEMO IV PUSH ADDL DRUG: CPT

## 2024-08-20 PROCEDURE — 2500000004 HC RX 250 GENERAL PHARMACY W/ HCPCS (ALT 636 FOR OP/ED): Performed by: NURSE PRACTITIONER

## 2024-08-20 PROCEDURE — 96415 CHEMO IV INFUSION ADDL HR: CPT

## 2024-08-20 PROCEDURE — 96417 CHEMO IV INFUS EACH ADDL SEQ: CPT

## 2024-08-20 PROCEDURE — 96376 TX/PRO/DX INJ SAME DRUG ADON: CPT

## 2024-08-20 PROCEDURE — 2500000001 HC RX 250 WO HCPCS SELF ADMINISTERED DRUGS (ALT 637 FOR MEDICARE OP): Performed by: NURSE PRACTITIONER

## 2024-08-20 PROCEDURE — 96367 TX/PROPH/DG ADDL SEQ IV INF: CPT

## 2024-08-20 PROCEDURE — 96375 TX/PRO/DX INJ NEW DRUG ADDON: CPT | Mod: INF

## 2024-08-20 PROCEDURE — 96413 CHEMO IV INFUSION 1 HR: CPT

## 2024-08-20 RX ORDER — ATROPINE SULFATE 0.4 MG/ML
0.4 INJECTION, SOLUTION ENDOTRACHEAL; INTRAMEDULLARY; INTRAMUSCULAR; INTRAVENOUS; SUBCUTANEOUS
Status: COMPLETED | OUTPATIENT
Start: 2024-08-20 | End: 2024-08-20

## 2024-08-20 RX ORDER — PROCHLORPERAZINE MALEATE 10 MG
10 TABLET ORAL EVERY 6 HOURS PRN
Status: DISCONTINUED | OUTPATIENT
Start: 2024-08-20 | End: 2024-08-20 | Stop reason: HOSPADM

## 2024-08-20 RX ORDER — EPINEPHRINE 0.3 MG/.3ML
0.3 INJECTION SUBCUTANEOUS EVERY 5 MIN PRN
Status: DISCONTINUED | OUTPATIENT
Start: 2024-08-20 | End: 2024-08-20 | Stop reason: HOSPADM

## 2024-08-20 RX ORDER — LORAZEPAM 2 MG/ML
1 INJECTION INTRAMUSCULAR AS NEEDED
Status: DISCONTINUED | OUTPATIENT
Start: 2024-08-20 | End: 2024-08-20 | Stop reason: HOSPADM

## 2024-08-20 RX ORDER — LORAZEPAM 1 MG/1
1 TABLET ORAL ONCE
Status: COMPLETED | OUTPATIENT
Start: 2024-08-20 | End: 2024-08-20

## 2024-08-20 RX ORDER — PALONOSETRON 0.05 MG/ML
0.25 INJECTION, SOLUTION INTRAVENOUS ONCE
Status: COMPLETED | OUTPATIENT
Start: 2024-08-20 | End: 2024-08-20

## 2024-08-20 RX ORDER — FAMOTIDINE 10 MG/ML
20 INJECTION INTRAVENOUS ONCE AS NEEDED
Status: DISCONTINUED | OUTPATIENT
Start: 2024-08-20 | End: 2024-08-20 | Stop reason: HOSPADM

## 2024-08-20 RX ORDER — PROCHLORPERAZINE EDISYLATE 5 MG/ML
10 INJECTION INTRAMUSCULAR; INTRAVENOUS EVERY 6 HOURS PRN
Status: DISCONTINUED | OUTPATIENT
Start: 2024-08-20 | End: 2024-08-20 | Stop reason: HOSPADM

## 2024-08-20 RX ORDER — ALBUTEROL SULFATE 0.83 MG/ML
3 SOLUTION RESPIRATORY (INHALATION) AS NEEDED
Status: DISCONTINUED | OUTPATIENT
Start: 2024-08-20 | End: 2024-08-20 | Stop reason: HOSPADM

## 2024-08-20 RX ORDER — DIPHENHYDRAMINE HYDROCHLORIDE 50 MG/ML
50 INJECTION INTRAMUSCULAR; INTRAVENOUS AS NEEDED
Status: DISCONTINUED | OUTPATIENT
Start: 2024-08-20 | End: 2024-08-20 | Stop reason: HOSPADM

## 2024-08-20 ASSESSMENT — PAIN SCALES - GENERAL: PAINLEVEL: 2

## 2024-08-20 NOTE — PROGRESS NOTES
Patient here for Oxaliplatin/Irinotecan/fluorouracil pump, tolerated well. Patient to return 08/22 to have pump disconnected. Patient denies any questions at this time. Patient discharged in stable condition.

## 2024-08-22 ENCOUNTER — NUTRITION (OUTPATIENT)
Dept: HEMATOLOGY/ONCOLOGY | Facility: CLINIC | Age: 54
End: 2024-08-22

## 2024-08-22 ENCOUNTER — APPOINTMENT (OUTPATIENT)
Dept: HEMATOLOGY/ONCOLOGY | Facility: CLINIC | Age: 54
End: 2024-08-22
Payer: COMMERCIAL

## 2024-08-22 ENCOUNTER — INFUSION (OUTPATIENT)
Dept: HEMATOLOGY/ONCOLOGY | Facility: CLINIC | Age: 54
End: 2024-08-22
Payer: COMMERCIAL

## 2024-08-22 VITALS
HEART RATE: 65 BPM | HEIGHT: 74 IN | TEMPERATURE: 97.3 F | DIASTOLIC BLOOD PRESSURE: 82 MMHG | OXYGEN SATURATION: 92 % | BODY MASS INDEX: 30.8 KG/M2 | SYSTOLIC BLOOD PRESSURE: 126 MMHG | WEIGHT: 240 LBS | RESPIRATION RATE: 16 BRPM

## 2024-08-22 VITALS — BODY MASS INDEX: 30.8 KG/M2 | HEIGHT: 74 IN | WEIGHT: 240 LBS

## 2024-08-22 DIAGNOSIS — C25.0 MALIGNANT NEOPLASM OF HEAD OF PANCREAS (MULTI): ICD-10-CM

## 2024-08-22 PROCEDURE — 2500000004 HC RX 250 GENERAL PHARMACY W/ HCPCS (ALT 636 FOR OP/ED): Performed by: NURSE PRACTITIONER

## 2024-08-22 PROCEDURE — 96523 IRRIG DRUG DELIVERY DEVICE: CPT

## 2024-08-22 RX ORDER — HEPARIN SODIUM,PORCINE/PF 10 UNIT/ML
50 SYRINGE (ML) INTRAVENOUS AS NEEDED
OUTPATIENT
Start: 2024-08-22

## 2024-08-22 RX ORDER — HEPARIN 100 UNIT/ML
500 SYRINGE INTRAVENOUS AS NEEDED
Status: DISCONTINUED | OUTPATIENT
Start: 2024-08-22 | End: 2024-08-22 | Stop reason: HOSPADM

## 2024-08-22 RX ORDER — HEPARIN 100 UNIT/ML
500 SYRINGE INTRAVENOUS AS NEEDED
OUTPATIENT
Start: 2024-08-22

## 2024-08-22 ASSESSMENT — PAIN SCALES - GENERAL: PAINLEVEL: 0-NO PAIN

## 2024-08-22 NOTE — PROGRESS NOTES
CADD pump disconnected, verified total volume infused.  + blood return noted, flushed with 10cc normal saline and 5cc 10 units heparin.  John needle removed and site covered with bandaid.  Pump stored in clinic and bag sent home with patient.  Patient tolerated procedure well.

## 2024-08-22 NOTE — PROGRESS NOTES
NUTRITION Follow Up NOTE    Nutrition Assessment     Reason for Visit:  Nate Alvarez is a 54 y.o. male with Borderline Resectable Pancreatic cancer dx'd 5/2024     He presented with acute pancreatitis, Cancer cachexia and New onset diabetes with hyperglycemia. He is taking both long and short acting insulin as managed by Dr. Brandon Vasquez.    EUS: heme and gastritis noted in the stomach.     Current Sites of Disease:  Head of the pancreas /uncinate process involving the gastroduodenal artery celiac axis and 180 degree involvement of the SMV    Current Therapy:  Neoadjuvant mFOLFIRINOX    Referred to this service for high risk dx and assessed 7/23.  He was ordered PERT.  Seen again for follow up today at pump disconnect.    PMH noted: GERD, HTN, oropharyngeal dysphagia (from fractured neck x 3), RADHA    Comprehensive metabolic panel            Component  Ref Range & Units 3 d ago  (8/19/24) 2 wk ago  (8/5/24) 1 mo ago  (7/22/24) 1 mo ago  (7/9/24) 1 mo ago  (6/24/24) 2 mo ago  (5/31/24) 3 mo ago  (5/18/24)   Glucose  74 - 99 mg/dL 166 High  215 High  189 High  177 High  241 High  188 High  176 High    Sodium  136 - 145 mmol/L 138 138 137 139 138 140 135 Low    Potassium  3.5 - 5.3 mmol/L 4.2 4.0 4.3 3.9 4.1 4.2 4.0   Chloride  98 - 107 mmol/L 110 High  107 107 106 107 105 103   Bicarbonate  21 - 32 mmol/L 24 22 25 24 26 26 24   Anion Gap  10 - 20 mmol/L 8 Low  13 9 Low  13 9 Low  13 12   Urea Nitrogen  6 - 23 mg/dL 13 15 17 20 17 22 22   Creatinine  0.50 - 1.30 mg/dL 0.84 0.67 0.67 0.69 0.84 0.88 0.95   eGFR  >60 mL/min/1.73m*2 >90 >90 CM >90 CM >90 CM >90 CM >90 CM >90 CM   Comment: Calculations of estimated GFR are performed using the 2021 CKD-EPI Study Refit equation without the race variable for the IDMS-Traceable creatinine methods.  https://jasn.asnjournals.org/content/early/2021/09/22/ASN.8792901074   Calcium  8.6 - 10.3 mg/dL 9.4 9.3 R 8.6 9.1 R 9.1 9.4 9.3   Albumin  3.4 - 5.0 g/dL 4.2 4.2 3.7 4.1 4.0  "4.0    Alkaline Phosphatase  33 - 120 U/L 73 75 66 73 64 68    Total Protein  6.4 - 8.2 g/dL 6.8 6.4 5.9 Low  6.0 Low  6.3 Low  6.0 Low     AST  9 - 39 U/L 20 15 16 13 12 11    Bilirubin, Total  0.0 - 1.2 mg/dL 0.4 0.4 0.3 0.5 0.9 0.7    ALT  10 - 52 U/L 24 24 CM 20 CM 19 CM 17 CM 19 CM    Comment: Patients treated with Sulfasalazine may generate falsely decreased results for ALT.   Resulting Agency [facility]        Anthropometrics:  Anthropometrics  Height: 188.1 cm (6' 2.06\")  Weight: 109 kg (240 lb)  BMI (Calculated): 30.77  IBW/kg (Dietitian Calculated): 86.4 kg  Adjusted Body Weight (kg): 92.3 kg  Weight Change  Weight History / % Weight Change: Overall wt gain trend since significant loss with stable wt over the last month    Reported a UBW of ~300 #  Stated he has lost a lot of muscle mass.    Wt Readings from Last 25 Encounters:   8/20/24 109 kg (240 lb)   8/5/24 109 kg - Today pt reports his wt varies 10 # between 235# and 245# on his home scale.   07/23/24 110 kg (242 lb 4.6 oz)   07/22/24 108 kg (237 lb)   07/12/24 108 kg (237 lb 10.5 oz)   07/11/24 107 kg (236 lb)   07/10/24 107 kg (234 lb 12.6 oz)   07/09/24 107 kg (236 lb 15.9 oz)   07/05/24 106 kg (234 lb)   06/27/24 106 kg (233 lb)   06/25/24 106 kg (233 lb 11 oz)   06/24/24 104 kg (229 lb 4.5 oz)   06/11/24 104 kg (229 lb 3.2 oz)   06/03/24 106 kg (232 lb 12.9 oz)   05/31/24 106 kg (234 lb)   05/29/24 99.8 kg (220 lb 0.3 oz)- Loss of 29.2 kg (22.6%) in 8 months- significant   05/28/24 105 kg (231 lb)   05/22/24 102 kg (224 lb)   05/17/24 102 kg (224 lb 13.9 oz)   05/13/24 104 kg (230 lb)   05/10/24 102 kg (225 lb)   04/17/24 117 kg (257 lb)   01/11/24 122 kg (268 lb)     10/05/23 127 kg (281 lb)   10/04/23 129 kg (285 lb)   04/13/23 131 kg (287 lb 14.4 oz)                Food And Nutrient Intake:  Food and Nutrient History  Food and Nutrient History: He has been craving tuna fish- eating 15 cans weekly.  Energy Intake: " "Good > 75 %  Fluid Intake: body armour, water 50-60 oz  GI Symptoms: nausea, abdominal pain  GI Symptoms greater than 2 weeks: yes  Nausea seems to hold on longer now.  He does try to stay on top of his medication regimen.  He is using compazine for the first 3 days post tx; then alternates the compazine with zofran.  States it does not interfere with his QOL.  Vomited the last 2 times during his off week- but not this week.  He does not over eat- does not want to be miserable.  Denies diarrhea. Reports bowels are normal.  Has no constipation on narcotic regimen- never has historically. Notes that when he does use the Creon his stools are darker and sink.  Reports his \"digestive pain\" is improved in addition.    Previously reported he has pain after eating, loose and greasy light-colored stools which float, and increased gas.                                                           Medication and Complementary/Alternative Medicine Use  Prescription Medication Use: He is being a little more consistent with Creon- he does forget to take it at least every other day.  He takes his PPI twice daily as directed.      Nutrition Focused Physical Exam Findings:      Subcutaneous Fat Loss  Orbital Fat Pads: Well nourished (slightly bulging fat pads)  Buccal Fat Pads: Well nourished (full, rounded cheeks)  Triceps: Defer  Ribs: Defer    Muscle Wasting  Temporalis: Mild-Moderate (slight depression)  Pectoralis (Clavicular Region): Defer  Deltoid/Trapezius: Defer  Interosseous: Defer  Trapezius/Infraspinatus/Supraspinatus (Scapular Region): Defer  Quadriceps: Defer  Gastrocnemius: Defer              Energy Needs  Calculated Energy Needs Using Equations  Height: 188.1 cm (6' 2.06\")  Estimated Energy Needs  Total Energy Estimated Needs (kCal): 2585 kCal  Total Estimated Energy Need per Day (kCal/kg): 28 kCal/kg  Method for Estimating Needs: Based on adjusted BW  Estimated Fluid Needs  Total Fluid Estimated Needs (mL): 2770 " mL  Total Fluid Estimated Needs (mL/kg): 30 mL/kg  Method for Estimating Needs: Based on adjusted BW  Estimated Protein Needs  Total Protein Estimated Needs (g): 120 g  Total Protein Estimated Needs (g/kg): 1.3 g/kg  Method for Estimating Needs: Based on adjusted BW        Nutrition Diagnosis   Malnutrition Diagnosis  Patient has Malnutrition Diagnosis: Yes  Diagnosis Status: Declining  Malnutrition Diagnosis: Moderate malnutrition related to chronic disease or condition  As Evidenced by: h/o intake of < 75% of  estimated energy  requirement for  > 1 month with a h/o significant wt loss as documented now with some repletion on wt gain trend.  Additional Assessment Information: Pt with an excessive intake of mercury from the high intake of tuna fish reported. Per the EPA, the maximum safe dose of mercury is 0.045 mcg of mercury per pound (0.1 mcg per kg) of body weight per day.  Pt's safe mercury dose is 10.9 mcg per day or 76.3 mcg weekly. (15 each 5 oz cans of light tuna has ~300 mcg of mercury; 15 each 5 oz cans of albacore tuna has ~750 mcg of mercury. These amounts are 4 to 9.8 times the safe limit respectively for the pt in 1 week- depending on what kind of tuna he is buying.)    Nutrition Diagnosis  Patient has Nutrition Diagnosis: Yes  Diagnosis Status (1): Ongoing  Nutrition Diagnosis 1: Altered GI function  Related to (1): pathophysiology of ca ds  As Evidenced by (1): pt erport of signs and symptoms of EPI  Additional Assessment Information (1): Pt would benefit from DMV to provide DV of fat soluble vitamins.  Note he had been repleting VIt D- on 5,000 IU's for ~2.5 months and states he has been outside a lot this summer.    Nutrition Interventions/Recommendations   Nutrition Prescription  Individualized Nutrition Prescription Provided for : Regular MAURA with moderate intake of concentrated sweets    Food and Nutrition Delivery  Food and Nutrition Delivery  Meals & Snacks: Diets modified for specific foods  or ingredients  Goals: Decrease tuna intake to 3.8 each 5 oz cans of light tuna a week or 1.5 each 5 oz cans of albacore tuna a week.  Vitamin Supplement Therapy: D  Goals: 2,000 IU's in the fall and winter  Goals: DMV  Other:: PERT  Goals: Consistent and appropriate use daily    Nutrition Education       Coordination of Care       There are no Patient Instructions on file for this visit.    Nutrition Monitoring and Evaluation   Food/Nutrient Related History Monitoring  Monitoring and Evaluation Plan: Fluid intake, Amount of food  Fluid Intake: Estimated fluid intake  Criteria: Continue to meet daily needs  Amount of Food: Estimated amout of food  Criteria: Meet energy and protein needs  Body Composition/Growth/Weight History  Monitoring and Evaluation Plan: Weight  Weight: Weight change  Criteria: maintanence or loss of inactive adipose  Biochemical Data, Medical Tests and Procedures  Monitoring and Evaluation Plan: Electrolyte/renal panel, Glucose/endocrine profile  Criteria: WNL  Glucose/Endocrine Profile: Glucose, casual  Criteria: < 250  Nutrition Focused Physical Findings  Monitoring and Evaluation Plan: Adipose, Digestive System, Muscles  Adipose: Loss of subcutaneous fat  Criteria: inactive loss  Digestive System: Nausea  Criteria: Continue anti nausea meds per MD  Muscles: Muscle atrophy  Criteria: No further loss          Time Spent  Prep time on day of patient encounter: 5 minutes  Time spent directly with patient, family or caregiver: 20 minutes  Additional Time Spent on Patient Care Activities: 25 minutes  Documentation Time: 20 minutes  Other Time Spent: 5 minutes  Total: 75 minutes

## 2024-08-30 ENCOUNTER — LAB (OUTPATIENT)
Dept: HEMATOLOGY/ONCOLOGY | Facility: CLINIC | Age: 54
End: 2024-08-30
Payer: COMMERCIAL

## 2024-08-30 VITALS
WEIGHT: 240.3 LBS | DIASTOLIC BLOOD PRESSURE: 90 MMHG | HEIGHT: 74 IN | RESPIRATION RATE: 16 BRPM | SYSTOLIC BLOOD PRESSURE: 128 MMHG | HEART RATE: 106 BPM | BODY MASS INDEX: 30.84 KG/M2 | OXYGEN SATURATION: 98 %

## 2024-08-30 DIAGNOSIS — C25.0 MALIGNANT NEOPLASM OF HEAD OF PANCREAS (MULTI): ICD-10-CM

## 2024-08-30 LAB
ALBUMIN SERPL BCP-MCNC: 4.2 G/DL (ref 3.4–5)
ALP SERPL-CCNC: 77 U/L (ref 33–120)
ALT SERPL W P-5'-P-CCNC: 33 U/L (ref 10–52)
ANION GAP SERPL CALC-SCNC: 12 MMOL/L (ref 10–20)
AST SERPL W P-5'-P-CCNC: 27 U/L (ref 9–39)
BASOPHILS # BLD AUTO: 0.05 X10*3/UL (ref 0–0.1)
BASOPHILS NFR BLD AUTO: 0.9 %
BILIRUB SERPL-MCNC: 0.6 MG/DL (ref 0–1.2)
BUN SERPL-MCNC: 17 MG/DL (ref 6–23)
CALCIUM SERPL-MCNC: 9.2 MG/DL (ref 8.6–10.3)
CANCER AG19-9 SERPL-ACNC: 59.14 U/ML
CHLORIDE SERPL-SCNC: 106 MMOL/L (ref 98–107)
CO2 SERPL-SCNC: 24 MMOL/L (ref 21–32)
CREAT SERPL-MCNC: 0.99 MG/DL (ref 0.5–1.3)
EGFRCR SERPLBLD CKD-EPI 2021: 90 ML/MIN/1.73M*2
EOSINOPHIL # BLD AUTO: 0.1 X10*3/UL (ref 0–0.7)
EOSINOPHIL NFR BLD AUTO: 1.8 %
ERYTHROCYTE [DISTWIDTH] IN BLOOD BY AUTOMATED COUNT: 15.5 % (ref 11.5–14.5)
GLUCOSE SERPL-MCNC: 163 MG/DL (ref 74–99)
HCT VFR BLD AUTO: 38.9 % (ref 41–52)
HGB BLD-MCNC: 13.6 G/DL (ref 13.5–17.5)
IMM GRANULOCYTES # BLD AUTO: 0.01 X10*3/UL (ref 0–0.7)
IMM GRANULOCYTES NFR BLD AUTO: 0.2 % (ref 0–0.9)
LYMPHOCYTES # BLD AUTO: 1.48 X10*3/UL (ref 1.2–4.8)
LYMPHOCYTES NFR BLD AUTO: 26.5 %
MCH RBC QN AUTO: 30 PG (ref 26–34)
MCHC RBC AUTO-ENTMCNC: 35 G/DL (ref 32–36)
MCV RBC AUTO: 86 FL (ref 80–100)
MONOCYTES # BLD AUTO: 0.82 X10*3/UL (ref 0.1–1)
MONOCYTES NFR BLD AUTO: 14.7 %
NEUTROPHILS # BLD AUTO: 3.13 X10*3/UL (ref 1.2–7.7)
NEUTROPHILS NFR BLD AUTO: 55.9 %
PLATELET # BLD AUTO: 126 X10*3/UL (ref 150–450)
POTASSIUM SERPL-SCNC: 3.9 MMOL/L (ref 3.5–5.3)
PROT SERPL-MCNC: 6.7 G/DL (ref 6.4–8.2)
RBC # BLD AUTO: 4.54 X10*6/UL (ref 4.5–5.9)
SODIUM SERPL-SCNC: 138 MMOL/L (ref 136–145)
WBC # BLD AUTO: 5.6 X10*3/UL (ref 4.4–11.3)

## 2024-08-30 PROCEDURE — 85025 COMPLETE CBC W/AUTO DIFF WBC: CPT

## 2024-08-30 PROCEDURE — 86301 IMMUNOASSAY TUMOR CA 19-9: CPT | Mod: PORLAB

## 2024-08-30 PROCEDURE — 80053 COMPREHEN METABOLIC PANEL: CPT

## 2024-08-30 PROCEDURE — 36591 DRAW BLOOD OFF VENOUS DEVICE: CPT

## 2024-08-30 RX ORDER — HEPARIN 100 UNIT/ML
500 SYRINGE INTRAVENOUS AS NEEDED
OUTPATIENT
Start: 2024-08-30

## 2024-08-30 RX ORDER — HEPARIN 100 UNIT/ML
500 SYRINGE INTRAVENOUS AS NEEDED
Status: DISCONTINUED | OUTPATIENT
Start: 2024-08-30 | End: 2024-08-30 | Stop reason: HOSPADM

## 2024-08-30 RX ORDER — HEPARIN SODIUM,PORCINE/PF 10 UNIT/ML
50 SYRINGE (ML) INTRAVENOUS AS NEEDED
OUTPATIENT
Start: 2024-08-30

## 2024-08-30 ASSESSMENT — PAIN SCALES - GENERAL: PAINLEVEL: 0-NO PAIN

## 2024-09-03 ENCOUNTER — APPOINTMENT (OUTPATIENT)
Dept: HEMATOLOGY/ONCOLOGY | Facility: CLINIC | Age: 54
End: 2024-09-03
Payer: COMMERCIAL

## 2024-09-03 ENCOUNTER — OFFICE VISIT (OUTPATIENT)
Dept: HEMATOLOGY/ONCOLOGY | Facility: CLINIC | Age: 54
End: 2024-09-03
Payer: COMMERCIAL

## 2024-09-03 VITALS
SYSTOLIC BLOOD PRESSURE: 133 MMHG | RESPIRATION RATE: 18 BRPM | WEIGHT: 245.15 LBS | TEMPERATURE: 97.2 F | HEART RATE: 61 BPM | BODY MASS INDEX: 31.43 KG/M2 | OXYGEN SATURATION: 98 % | DIASTOLIC BLOOD PRESSURE: 91 MMHG

## 2024-09-03 DIAGNOSIS — C25.0 MALIGNANT NEOPLASM OF HEAD OF PANCREAS (MULTI): Primary | ICD-10-CM

## 2024-09-03 PROCEDURE — 3080F DIAST BP >= 90 MM HG: CPT | Performed by: NURSE PRACTITIONER

## 2024-09-03 PROCEDURE — 3048F LDL-C <100 MG/DL: CPT | Performed by: NURSE PRACTITIONER

## 2024-09-03 PROCEDURE — 3046F HEMOGLOBIN A1C LEVEL >9.0%: CPT | Performed by: NURSE PRACTITIONER

## 2024-09-03 PROCEDURE — 99214 OFFICE O/P EST MOD 30 MIN: CPT | Performed by: NURSE PRACTITIONER

## 2024-09-03 PROCEDURE — 1036F TOBACCO NON-USER: CPT | Performed by: NURSE PRACTITIONER

## 2024-09-03 PROCEDURE — 4010F ACE/ARB THERAPY RXD/TAKEN: CPT | Performed by: NURSE PRACTITIONER

## 2024-09-03 PROCEDURE — 3075F SYST BP GE 130 - 139MM HG: CPT | Performed by: NURSE PRACTITIONER

## 2024-09-03 RX ORDER — FAMOTIDINE 10 MG/ML
20 INJECTION INTRAVENOUS ONCE AS NEEDED
OUTPATIENT
Start: 2024-09-17

## 2024-09-03 RX ORDER — DIPHENHYDRAMINE HYDROCHLORIDE 50 MG/ML
50 INJECTION INTRAMUSCULAR; INTRAVENOUS AS NEEDED
OUTPATIENT
Start: 2024-09-19

## 2024-09-03 RX ORDER — PALONOSETRON 0.05 MG/ML
0.25 INJECTION, SOLUTION INTRAVENOUS ONCE
OUTPATIENT
Start: 2024-09-17

## 2024-09-03 RX ORDER — LORAZEPAM 2 MG/ML
1 INJECTION INTRAMUSCULAR AS NEEDED
OUTPATIENT
Start: 2024-09-17

## 2024-09-03 RX ORDER — FAMOTIDINE 10 MG/ML
20 INJECTION INTRAVENOUS ONCE AS NEEDED
OUTPATIENT
Start: 2024-09-05

## 2024-09-03 RX ORDER — FAMOTIDINE 10 MG/ML
20 INJECTION INTRAVENOUS ONCE AS NEEDED
OUTPATIENT
Start: 2024-09-19

## 2024-09-03 RX ORDER — ALBUTEROL SULFATE 0.83 MG/ML
3 SOLUTION RESPIRATORY (INHALATION) AS NEEDED
OUTPATIENT
Start: 2024-09-17

## 2024-09-03 RX ORDER — ATROPINE SULFATE 0.4 MG/ML
0.4 INJECTION, SOLUTION ENDOTRACHEAL; INTRAMEDULLARY; INTRAMUSCULAR; INTRAVENOUS; SUBCUTANEOUS
OUTPATIENT
Start: 2024-09-17

## 2024-09-03 RX ORDER — ALBUTEROL SULFATE 0.83 MG/ML
3 SOLUTION RESPIRATORY (INHALATION) AS NEEDED
OUTPATIENT
Start: 2024-09-19

## 2024-09-03 RX ORDER — PROCHLORPERAZINE EDISYLATE 5 MG/ML
10 INJECTION INTRAMUSCULAR; INTRAVENOUS EVERY 6 HOURS PRN
OUTPATIENT
Start: 2024-09-17

## 2024-09-03 RX ORDER — PROCHLORPERAZINE MALEATE 10 MG
10 TABLET ORAL EVERY 6 HOURS PRN
OUTPATIENT
Start: 2024-09-17

## 2024-09-03 RX ORDER — EPINEPHRINE 0.3 MG/.3ML
0.3 INJECTION SUBCUTANEOUS EVERY 5 MIN PRN
OUTPATIENT
Start: 2024-09-05

## 2024-09-03 RX ORDER — EPINEPHRINE 0.3 MG/.3ML
0.3 INJECTION SUBCUTANEOUS EVERY 5 MIN PRN
OUTPATIENT
Start: 2024-09-19

## 2024-09-03 RX ORDER — ALBUTEROL SULFATE 0.83 MG/ML
3 SOLUTION RESPIRATORY (INHALATION) AS NEEDED
OUTPATIENT
Start: 2024-09-05

## 2024-09-03 RX ORDER — EPINEPHRINE 0.3 MG/.3ML
0.3 INJECTION SUBCUTANEOUS EVERY 5 MIN PRN
OUTPATIENT
Start: 2024-09-17

## 2024-09-03 RX ORDER — DIPHENHYDRAMINE HYDROCHLORIDE 50 MG/ML
50 INJECTION INTRAMUSCULAR; INTRAVENOUS AS NEEDED
OUTPATIENT
Start: 2024-09-05

## 2024-09-03 RX ORDER — LORAZEPAM 0.5 MG/1
1 TABLET ORAL ONCE
OUTPATIENT
Start: 2024-09-17 | End: 2024-09-17

## 2024-09-03 RX ORDER — DIPHENHYDRAMINE HYDROCHLORIDE 50 MG/ML
50 INJECTION INTRAMUSCULAR; INTRAVENOUS AS NEEDED
OUTPATIENT
Start: 2024-09-17

## 2024-09-03 ASSESSMENT — PAIN SCALES - GENERAL: PAINLEVEL: 0-NO PAIN

## 2024-09-03 ASSESSMENT — ENCOUNTER SYMPTOMS
LOSS OF SENSATION IN FEET: 0
DEPRESSION: 0
OCCASIONAL FEELINGS OF UNSTEADINESS: 0

## 2024-09-03 NOTE — PROGRESS NOTES
Patient ID: Nate Alvarez is a 54 y.o. male from [place], [place].     Referring Physician: Shaw You MD  [id number] [place] [address]    Primary Care Provider: Hazel Medeiros MD    Diagnosis:   Pancreatic cancer 5/2024    Primary Oncologic Surgeon:   Omar Gamble MD    Primary Medical Oncologist:  Borderline Resectable    Primary Radiation Oncologist:  MARSHALL     Current Therapy:  Neoadjuvant FOLFIRINOX    Oncologic Surgery History:  Pending    Oncologic Therapy History:  N/A    Molecular Genetics:  Pending    Current Sites of Disease:  Head of the pancreas involving the gastroduodenal artery celiac axis and 180 degree involvement of the SMV    Oncologic Problem List:  Localized but borderline resectable pancreatic adenocarcinoma  New onset diabetes with hyperglycemia  Cancer cachexia      Oncologic Narrative:  54 y.o.  man from [place] who presented in May 2024 with difficult DM control (A1c 9.1 this month)and some ED visits for fatigue/weakness. Most recently at [facility] May 16-18. 70 lb wt loss mentioned (note some of the new DM meds) .  Tumor markers checked:  CEA: 4.9  Ca 19-9: >700    CT A/P Showed:   Edema surrounding the pancreatic tail compatible with acute pancreatitis. Dilatation of the pancreatic duct which measures 6 mm in diameter and an ill marginated area of hypodensity in the  pancreatic head highly concerning for pancreatic malignancy, and pancreatic protocol MRI is recommended for further evaluation. An  area of necrotizing pancreatitis/pancreatic necrosis is other consideration, however there is no surrounding edema in this region and given the ductal dilatation, findings highly concerning for an underlying malignancy.  Small nonobstructive left renal calculi.     MRI:  IMPRESSION:  1. Pancreatic head/uncinate process mass measuring 2.9 x 2.5 cm with upstream pancreatic duct dilatation and parenchymal atrophy highly  concerning for primary pancreatic neoplasm (specifically  adenocarcinoma). Surrounding changes of mild superimposed pancreatitis. The mass is in contact with the adjacent 3rd part of the duodenum but no upstream dilatation. No biliary obstruction. The  mass has 180 degree contact with the adjacent superior mesenteric vein and likely encasing the 1st right lateral branch. The mass is  likely encasing the gastroduodenal artery distal component. The  celiac axis, superior mesenteric artery and main portal vein are intact.  2. Few subcentimeter peripancreatic indeterminate lymph nodes noted.  3. Hepatic steatosis with segment 4A lesion measuring 0.7 cm with  imaging characteristics favoring hemangioma .     Chest CT:  IMPRESSION:  1. No suspicious pulmonary nodules. Few small calcific granulomas noted in the lingula.  2. No enlarged intrathoracic lymphadenopathy.  3. Left thyroid nodule measuring 1.1 cm. This could be further assessed by dedicated nonemergent thyroid ultrasound if clinically desired.  4. Subdiaphragmatic findings regarding the known pancreatic mass and duct dilatation as well as the hepatic observations are better evaluated in prior MRI dated 05/16/2024     EUS:    Result Text   Impression  Limited endoscopic views of the esophagus, stomach and duodenum were obtained. There was heme and gastritis noted in the stomach. The rest of the exam was unremarkable   A 27 mm by 23 mm irregular and hypoechoic T2N0 mass with poorly defined margins was visualized in the uncinate process of the head with apparent involvement of the superior mesenteric vein; fine needle biopsy was performed, final pathology is pending but preliminary evaluation was positive for malignancy. The pancreas upstream to the mass appeared atrophic with dilated pancreatic duct.   The bile duct appeared normal.  Visualized portions of the liver, spleen, left adrenal gland, left kidney and celiac axis were normal on ultrasound examination.    No abnormal-appearing lymph nodes were identified in the  abdomen.            FINAL DIAGNOSIS   A. PANCREAS HEAD MASS, BIOPSY:     -- Invasive adenocarcinoma, moderately differentiated      6/11/24 - Exp / lap  -no carcinomatosis     Past Medical History: Nate has a past medical history of Diabetes mellitus (Multi), Gastroesophageal reflux disease without esophagitis (10/05/2023), Hemorrhoids (11/03/2023), HTN (hypertension), Leg cramps (10/05/2023), Oropharyngeal dysphagia (10/05/2023), RADHA (obstructive sleep apnea), Pancreatic cancer (Multi), and Personal history of other malignant neoplasm of skin.  Surgical History:  Nate has a past surgical history that includes Tonsillectomy; Vasectomy; Testicle surgery; Esophagogastroduodenoscopy; Colonoscopy; Eyelid carcinoma excision; Rotator cuff repair (Bilateral); and Knee arthroscopy w/ debridement.  Social History:  Nate reports that he has never smoked. He has quit using smokeless tobacco.  His smokeless tobacco use included chew. He reports that he does not currently use alcohol after a past usage of about 7.0 standard drinks of alcohol per week. He reports that he does not use drugs.  Family History:    Family History   Problem Relation Name Age of Onset    Diabetes Mother      Ovarian cancer Mother      Liver cancer Father      Lung cancer Father      Colon cancer Father      Hypertension Father      Stroke Maternal Grandmother      Diabetes Maternal Grandmother      Liver cancer Maternal Grandfather      Lung cancer Paternal Grandfather       Family Oncology History:  Cancer-related family history includes Colon cancer in his father; Liver cancer in his father and maternal grandfather; Lung cancer in his father and paternal grandfather; Ovarian cancer in his mother.    Mom with uterine caner - early 30s   Wife with bolton syndrome  -     - daughter with Bolton Syndrome - age 28     - son 21, has not followed up with genetic testing    SUBJECTIVE:  History of Present Illness:  Nate Alvarez is a 54 y.o. male who  was referred by Shaw oYu MD and presents with chemotherapy follow up.     S/p 6th cycle of mFOLFIRINOX   Due for C7  tomorrow     - added fosaprepitant to C2 for N/V - nausea improved - still using compazine as well.    + jaw pain with first bites of food for 5 - 7 days, but tolerable    + cold sensitivity x 1 week    -erythematous rash on arms   No fevers or chills    - + back pain, hip pain on oxycodone    - occ fatigue, but staying active       OBJECTIVE:    VS / Pain:  BP (!) 133/91   Pulse 61   Temp 36.2 °C (97.2 °F)   Resp 18   Wt 111 kg (245 lb 2.4 oz)   SpO2 98%   BMI 31.43 kg/m²   BSA: 2.41 meters squared     Pain Scale: 0    Daily Weight  09/03/24 : 111 kg (245 lb 2.4 oz)  08/30/24 : 109 kg (240 lb 4.8 oz)  08/22/24 : 109 kg (240 lb)      Physical Exam  Constitutional:       Appearance: Normal appearance.   HENT:      Head: Normocephalic.      Mouth/Throat:      Mouth: Mucous membranes are moist.      Pharynx: Oropharynx is clear.   Eyes:      Pupils: Pupils are equal, round, and reactive to light.   Cardiovascular:      Rate and Rhythm: Normal rate.      Pulses: Normal pulses.   Pulmonary:      Effort: Pulmonary effort is normal.   Abdominal:      General: Abdomen is flat. Bowel sounds are normal.   Musculoskeletal:      Cervical back: Neck supple.   Skin:     General: Skin is warm and dry.      Capillary Refill: Capillary refill takes less than 2 seconds.      Comments: Macular papular rash on bilateral arms   Neurological:      General: No focal deficit present.      Mental Status: He is alert.   Psychiatric:         Mood and Affect: Mood normal.         Performance Status: 1     Diagnostic Results         WBC   Date/Time Value Ref Range Status   08/30/2024 02:08 PM 5.6 4.4 - 11.3 x10*3/uL Final   08/19/2024 01:11 PM 6.2 4.4 - 11.3 x10*3/uL Final   08/05/2024 01:34 PM 6.9 4.4 - 11.3 x10*3/uL Final     Hemoglobin   Date Value Ref Range Status   08/30/2024 13.6 13.5 - 17.5 g/dL Final    08/19/2024 14.5 13.5 - 17.5 g/dL Final   08/05/2024 14.4 13.5 - 17.5 g/dL Final     MCV   Date/Time Value Ref Range Status   08/30/2024 02:08 PM 86 80 - 100 fL Final   08/19/2024 01:11 PM 86 80 - 100 fL Final   08/05/2024 01:34 PM 87 80 - 100 fL Final     Platelets   Date/Time Value Ref Range Status   08/30/2024 02:08  (L) 150 - 450 x10*3/uL Final   08/19/2024 01:11  (L) 150 - 450 x10*3/uL Final   08/05/2024 01:34  (L) 150 - 450 x10*3/uL Final     Neutrophils Absolute   Date/Time Value Ref Range Status   08/30/2024 02:08 PM 3.13 1.20 - 7.70 x10*3/uL Final     Comment:     Percent differential counts (%) should be interpreted in the context of the absolute cell counts (cells/uL).   08/19/2024 01:11 PM 3.13 1.20 - 7.70 x10*3/uL Final     Comment:     Percent differential counts (%) should be interpreted in the context of the absolute cell counts (cells/uL).   08/05/2024 01:34 PM 3.81 1.20 - 7.70 x10*3/uL Final     Comment:     Percent differential counts (%) should be interpreted in the context of the absolute cell counts (cells/uL).     Bilirubin, Total   Date/Time Value Ref Range Status   08/30/2024 02:08 PM 0.6 0.0 - 1.2 mg/dL Final   08/19/2024 01:11 PM 0.4 0.0 - 1.2 mg/dL Final   08/05/2024 01:34 PM 0.4 0.0 - 1.2 mg/dL Final     AST   Date/Time Value Ref Range Status   08/30/2024 02:08 PM 27 9 - 39 U/L Final   08/19/2024 01:11 PM 20 9 - 39 U/L Final   08/05/2024 01:34 PM 15 9 - 39 U/L Final     ALT   Date/Time Value Ref Range Status   08/30/2024 02:08 PM 33 10 - 52 U/L Final     Comment:     Patients treated with Sulfasalazine may generate falsely decreased results for ALT.   08/19/2024 01:11 PM 24 10 - 52 U/L Final     Comment:     Patients treated with Sulfasalazine may generate falsely decreased results for ALT.   08/05/2024 01:34 PM 24 10 - 52 U/L Final     Comment:     Patients treated with Sulfasalazine may generate falsely decreased results for ALT.     Creatinine   Date/Time Value Ref  Range Status   08/30/2024 02:08 PM 0.99 0.50 - 1.30 mg/dL Final   08/19/2024 01:11 PM 0.84 0.50 - 1.30 mg/dL Final   08/05/2024 01:34 PM 0.67 0.50 - 1.30 mg/dL Final     Urea Nitrogen   Date/Time Value Ref Range Status   08/30/2024 02:08 PM 17 6 - 23 mg/dL Final   08/19/2024 01:11 PM 13 6 - 23 mg/dL Final   08/05/2024 01:34 PM 15 6 - 23 mg/dL Final     Albumin   Date/Time Value Ref Range Status   08/30/2024 02:08 PM 4.2 3.4 - 5.0 g/dL Final   08/19/2024 01:11 PM 4.2 3.4 - 5.0 g/dL Final   08/05/2024 01:34 PM 4.2 3.4 - 5.0 g/dL Final     Cancer AG 19-9   Date/Time Value Ref Range Status   08/30/2024 02:08 PM 59.14 (H) <35.00 U/mL Final   08/05/2024 01:34 .38 (H) <35.00 U/mL Final   07/22/2024 11:15 .92 (H) <35.00 U/mL Final     Carcinoembryonic AG   Date/Time Value Ref Range Status   05/17/2024 09:28 AM 4.9 ug/L Final       Assessment/Plan   This is a 54-year-old man with borderline resectable pancreatic adenocarcinoma.  He presented in May 2024 with greater than 50 pound weight loss and new onset diabetes.  Imaging, EUS and biopsy confirmed adenocarcinoma of the pancreas involving the GDA, celiac axis, SMV.    Borderline resectable Pancreatic cancer:   - Consented for mFOLFIRINOX - started 6/3/24 - tolerated with jaw pain / nausea  - added fosaprepitant for C2   -C2  -still with jaw pain, but manageable, we discussed possible dose reduction, he does not feel this is necessary yet.    - improved dz after 4 cycles   Plan for C7 tomorrow               -C8 in 2 weeks   RTC in 4 weeks with scans                  Follow Cancer Antigen 19-9 at least every other cycle  Obtain complete genomic profiling from pancreatic biopsy.    Pain - continue oxycodone      Diabetes:  Continue long and short acting insulin as managed by  Dr. Brandon Malloy, [facility]  Office: [phone]  Fax: [phone]

## 2024-09-04 ENCOUNTER — INFUSION (OUTPATIENT)
Dept: HEMATOLOGY/ONCOLOGY | Facility: CLINIC | Age: 54
End: 2024-09-04
Payer: COMMERCIAL

## 2024-09-04 VITALS
HEART RATE: 88 BPM | SYSTOLIC BLOOD PRESSURE: 142 MMHG | TEMPERATURE: 96.8 F | WEIGHT: 241.4 LBS | BODY MASS INDEX: 30.95 KG/M2 | DIASTOLIC BLOOD PRESSURE: 94 MMHG | RESPIRATION RATE: 16 BRPM

## 2024-09-04 DIAGNOSIS — C25.0 MALIGNANT NEOPLASM OF HEAD OF PANCREAS (MULTI): Primary | ICD-10-CM

## 2024-09-04 PROCEDURE — 96413 CHEMO IV INFUSION 1 HR: CPT

## 2024-09-04 PROCEDURE — 2500000004 HC RX 250 GENERAL PHARMACY W/ HCPCS (ALT 636 FOR OP/ED): Performed by: NURSE PRACTITIONER

## 2024-09-04 PROCEDURE — 96376 TX/PRO/DX INJ SAME DRUG ADON: CPT

## 2024-09-04 PROCEDURE — 96415 CHEMO IV INFUSION ADDL HR: CPT

## 2024-09-04 PROCEDURE — 96417 CHEMO IV INFUS EACH ADDL SEQ: CPT

## 2024-09-04 PROCEDURE — 96375 TX/PRO/DX INJ NEW DRUG ADDON: CPT | Mod: INF

## 2024-09-04 PROCEDURE — 96367 TX/PROPH/DG ADDL SEQ IV INF: CPT

## 2024-09-04 PROCEDURE — 2500000001 HC RX 250 WO HCPCS SELF ADMINISTERED DRUGS (ALT 637 FOR MEDICARE OP): Performed by: NURSE PRACTITIONER

## 2024-09-04 RX ORDER — HEPARIN SODIUM,PORCINE/PF 10 UNIT/ML
50 SYRINGE (ML) INTRAVENOUS AS NEEDED
Status: DISCONTINUED | OUTPATIENT
Start: 2024-09-04 | End: 2024-09-04 | Stop reason: HOSPADM

## 2024-09-04 RX ORDER — LORAZEPAM 2 MG/ML
1 INJECTION INTRAMUSCULAR AS NEEDED
Status: DISCONTINUED | OUTPATIENT
Start: 2024-09-04 | End: 2024-09-04 | Stop reason: HOSPADM

## 2024-09-04 RX ORDER — ATROPINE SULFATE 0.4 MG/ML
0.4 INJECTION, SOLUTION ENDOTRACHEAL; INTRAMEDULLARY; INTRAMUSCULAR; INTRAVENOUS; SUBCUTANEOUS
Status: COMPLETED | OUTPATIENT
Start: 2024-09-04 | End: 2024-09-04

## 2024-09-04 RX ORDER — PROCHLORPERAZINE MALEATE 10 MG
10 TABLET ORAL EVERY 6 HOURS PRN
Status: DISCONTINUED | OUTPATIENT
Start: 2024-09-04 | End: 2024-09-04 | Stop reason: HOSPADM

## 2024-09-04 RX ORDER — ALBUTEROL SULFATE 0.83 MG/ML
3 SOLUTION RESPIRATORY (INHALATION) AS NEEDED
Status: DISCONTINUED | OUTPATIENT
Start: 2024-09-04 | End: 2024-09-04 | Stop reason: HOSPADM

## 2024-09-04 RX ORDER — PALONOSETRON 0.05 MG/ML
0.25 INJECTION, SOLUTION INTRAVENOUS ONCE
Status: COMPLETED | OUTPATIENT
Start: 2024-09-04 | End: 2024-09-04

## 2024-09-04 RX ORDER — EPINEPHRINE 0.3 MG/.3ML
0.3 INJECTION SUBCUTANEOUS EVERY 5 MIN PRN
Status: DISCONTINUED | OUTPATIENT
Start: 2024-09-04 | End: 2024-09-04 | Stop reason: HOSPADM

## 2024-09-04 RX ORDER — HEPARIN 100 UNIT/ML
500 SYRINGE INTRAVENOUS AS NEEDED
Status: DISCONTINUED | OUTPATIENT
Start: 2024-09-04 | End: 2024-09-04 | Stop reason: HOSPADM

## 2024-09-04 RX ORDER — FAMOTIDINE 10 MG/ML
20 INJECTION INTRAVENOUS ONCE AS NEEDED
Status: DISCONTINUED | OUTPATIENT
Start: 2024-09-04 | End: 2024-09-04 | Stop reason: HOSPADM

## 2024-09-04 RX ORDER — PROCHLORPERAZINE EDISYLATE 5 MG/ML
10 INJECTION INTRAMUSCULAR; INTRAVENOUS EVERY 6 HOURS PRN
Status: DISCONTINUED | OUTPATIENT
Start: 2024-09-04 | End: 2024-09-04 | Stop reason: HOSPADM

## 2024-09-04 RX ORDER — DIPHENHYDRAMINE HYDROCHLORIDE 50 MG/ML
50 INJECTION INTRAMUSCULAR; INTRAVENOUS AS NEEDED
Status: DISCONTINUED | OUTPATIENT
Start: 2024-09-04 | End: 2024-09-04 | Stop reason: HOSPADM

## 2024-09-04 RX ORDER — HEPARIN 100 UNIT/ML
500 SYRINGE INTRAVENOUS AS NEEDED
Status: CANCELLED | OUTPATIENT
Start: 2024-09-04

## 2024-09-04 RX ORDER — HEPARIN SODIUM,PORCINE/PF 10 UNIT/ML
50 SYRINGE (ML) INTRAVENOUS AS NEEDED
Status: CANCELLED | OUTPATIENT
Start: 2024-09-04

## 2024-09-04 RX ORDER — LORAZEPAM 0.5 MG/1
1 TABLET ORAL ONCE
Status: COMPLETED | OUTPATIENT
Start: 2024-09-04 | End: 2024-09-04

## 2024-09-04 ASSESSMENT — PAIN SCALES - GENERAL: PAINLEVEL: 1

## 2024-09-04 NOTE — SIGNIFICANT EVENT

## 2024-09-06 ENCOUNTER — INFUSION (OUTPATIENT)
Dept: HEMATOLOGY/ONCOLOGY | Facility: CLINIC | Age: 54
End: 2024-09-06
Payer: COMMERCIAL

## 2024-09-06 VITALS
RESPIRATION RATE: 16 BRPM | TEMPERATURE: 97.5 F | DIASTOLIC BLOOD PRESSURE: 78 MMHG | HEART RATE: 86 BPM | SYSTOLIC BLOOD PRESSURE: 123 MMHG | HEIGHT: 74 IN | WEIGHT: 241 LBS | BODY MASS INDEX: 30.93 KG/M2 | OXYGEN SATURATION: 95 %

## 2024-09-06 DIAGNOSIS — C25.0 MALIGNANT NEOPLASM OF HEAD OF PANCREAS (MULTI): ICD-10-CM

## 2024-09-06 PROCEDURE — 2500000004 HC RX 250 GENERAL PHARMACY W/ HCPCS (ALT 636 FOR OP/ED): Performed by: NURSE PRACTITIONER

## 2024-09-06 RX ORDER — HEPARIN 100 UNIT/ML
500 SYRINGE INTRAVENOUS AS NEEDED
Status: DISCONTINUED | OUTPATIENT
Start: 2024-09-06 | End: 2024-09-06 | Stop reason: HOSPADM

## 2024-09-06 RX ORDER — HEPARIN SODIUM,PORCINE/PF 10 UNIT/ML
50 SYRINGE (ML) INTRAVENOUS AS NEEDED
OUTPATIENT
Start: 2024-09-06

## 2024-09-06 RX ORDER — HEPARIN 100 UNIT/ML
500 SYRINGE INTRAVENOUS AS NEEDED
OUTPATIENT
Start: 2024-09-06

## 2024-09-06 ASSESSMENT — PAIN SCALES - GENERAL: PAINLEVEL: 0-NO PAIN

## 2024-09-06 NOTE — PROGRESS NOTES
"Patient is here for pump disconnect. Patient c/o new abdomen pain and advised to call provider if it persists. He states that his port felt \"crooked\" this time and was uncomfortable. Port was flushed with normal saline and heparin, blood return was present. Dried blood was present when port was removed. No active bleeding noted. Patient was discharged in stable condition.   "

## 2024-09-13 ENCOUNTER — LAB (OUTPATIENT)
Dept: HEMATOLOGY/ONCOLOGY | Facility: CLINIC | Age: 54
End: 2024-09-13
Payer: COMMERCIAL

## 2024-09-13 VITALS
OXYGEN SATURATION: 96 % | HEIGHT: 74 IN | BODY MASS INDEX: 31.42 KG/M2 | SYSTOLIC BLOOD PRESSURE: 125 MMHG | HEART RATE: 99 BPM | WEIGHT: 244.8 LBS | TEMPERATURE: 97.7 F | DIASTOLIC BLOOD PRESSURE: 89 MMHG | RESPIRATION RATE: 16 BRPM

## 2024-09-13 DIAGNOSIS — C25.0 MALIGNANT NEOPLASM OF HEAD OF PANCREAS (MULTI): ICD-10-CM

## 2024-09-13 LAB
ALBUMIN SERPL BCP-MCNC: 4 G/DL (ref 3.4–5)
ALP SERPL-CCNC: 82 U/L (ref 33–120)
ALT SERPL W P-5'-P-CCNC: 34 U/L (ref 10–52)
ANION GAP SERPL CALC-SCNC: 8 MMOL/L (ref 10–20)
AST SERPL W P-5'-P-CCNC: 22 U/L (ref 9–39)
BASOPHILS # BLD AUTO: 0.03 X10*3/UL (ref 0–0.1)
BASOPHILS NFR BLD AUTO: 0.5 %
BILIRUB SERPL-MCNC: 0.4 MG/DL (ref 0–1.2)
BUN SERPL-MCNC: 23 MG/DL (ref 6–23)
CALCIUM SERPL-MCNC: 8.8 MG/DL (ref 8.6–10.3)
CHLORIDE SERPL-SCNC: 111 MMOL/L (ref 98–107)
CO2 SERPL-SCNC: 24 MMOL/L (ref 21–32)
CREAT SERPL-MCNC: 0.9 MG/DL (ref 0.5–1.3)
EGFRCR SERPLBLD CKD-EPI 2021: >90 ML/MIN/1.73M*2
EOSINOPHIL # BLD AUTO: 0.13 X10*3/UL (ref 0–0.7)
EOSINOPHIL NFR BLD AUTO: 2 %
ERYTHROCYTE [DISTWIDTH] IN BLOOD BY AUTOMATED COUNT: 15.6 % (ref 11.5–14.5)
GLUCOSE SERPL-MCNC: 186 MG/DL (ref 74–99)
HCT VFR BLD AUTO: 39.5 % (ref 41–52)
HGB BLD-MCNC: 13.6 G/DL (ref 13.5–17.5)
IMM GRANULOCYTES # BLD AUTO: 0.06 X10*3/UL (ref 0–0.7)
IMM GRANULOCYTES NFR BLD AUTO: 0.9 % (ref 0–0.9)
LYMPHOCYTES # BLD AUTO: 1.91 X10*3/UL (ref 1.2–4.8)
LYMPHOCYTES NFR BLD AUTO: 29.8 %
MCH RBC QN AUTO: 30.2 PG (ref 26–34)
MCHC RBC AUTO-ENTMCNC: 34.4 G/DL (ref 32–36)
MCV RBC AUTO: 88 FL (ref 80–100)
MONOCYTES # BLD AUTO: 0.86 X10*3/UL (ref 0.1–1)
MONOCYTES NFR BLD AUTO: 13.4 %
NEUTROPHILS # BLD AUTO: 3.42 X10*3/UL (ref 1.2–7.7)
NEUTROPHILS NFR BLD AUTO: 53.4 %
PLATELET # BLD AUTO: 147 X10*3/UL (ref 150–450)
POTASSIUM SERPL-SCNC: 4 MMOL/L (ref 3.5–5.3)
PROT SERPL-MCNC: 6.5 G/DL (ref 6.4–8.2)
RBC # BLD AUTO: 4.5 X10*6/UL (ref 4.5–5.9)
SODIUM SERPL-SCNC: 139 MMOL/L (ref 136–145)
WBC # BLD AUTO: 6.4 X10*3/UL (ref 4.4–11.3)

## 2024-09-13 PROCEDURE — 36591 DRAW BLOOD OFF VENOUS DEVICE: CPT

## 2024-09-13 PROCEDURE — 2500000004 HC RX 250 GENERAL PHARMACY W/ HCPCS (ALT 636 FOR OP/ED): Performed by: NURSE PRACTITIONER

## 2024-09-13 PROCEDURE — 80053 COMPREHEN METABOLIC PANEL: CPT

## 2024-09-13 PROCEDURE — 85025 COMPLETE CBC W/AUTO DIFF WBC: CPT

## 2024-09-13 RX ORDER — HEPARIN SODIUM,PORCINE/PF 10 UNIT/ML
50 SYRINGE (ML) INTRAVENOUS AS NEEDED
OUTPATIENT
Start: 2024-09-13

## 2024-09-13 RX ORDER — HEPARIN 100 UNIT/ML
500 SYRINGE INTRAVENOUS AS NEEDED
Status: DISCONTINUED | OUTPATIENT
Start: 2024-09-13 | End: 2024-09-13 | Stop reason: HOSPADM

## 2024-09-13 RX ORDER — HEPARIN 100 UNIT/ML
500 SYRINGE INTRAVENOUS AS NEEDED
OUTPATIENT
Start: 2024-09-13

## 2024-09-13 ASSESSMENT — PAIN SCALES - GENERAL: PAINLEVEL: 2

## 2024-09-13 NOTE — PROGRESS NOTES
Pt arrived awake, alert, oriented, no apparent distress, Pt reports feeling well today without s/sx of illness. Pt here for medi port lab draw. Port site without s/sx of infection/infiltration, flushed easily with + blood return. Blood samples obtained and sent to lab as ordered. Port flushed well with saline/heparin  prior to de- accessing, bleeding controlled and bandage applied to site. Pt with next appointment set for 9/17/24 at [facility]

## 2024-09-16 ENCOUNTER — TELEPHONE (OUTPATIENT)
Dept: ADMISSION | Facility: HOSPITAL | Age: 54
End: 2024-09-16
Payer: COMMERCIAL

## 2024-09-16 NOTE — TELEPHONE ENCOUNTER
Pt requesting refill  Oxycodone 10mg. 1 tablet every 6 hrs prn  Pharmacy: [facility] in [place]  Last FUV 8/19 with next FUV 10/1.

## 2024-09-17 ENCOUNTER — INFUSION (OUTPATIENT)
Dept: HEMATOLOGY/ONCOLOGY | Facility: CLINIC | Age: 54
End: 2024-09-17
Payer: COMMERCIAL

## 2024-09-17 VITALS
HEIGHT: 74 IN | RESPIRATION RATE: 16 BRPM | HEART RATE: 76 BPM | OXYGEN SATURATION: 95 % | SYSTOLIC BLOOD PRESSURE: 136 MMHG | WEIGHT: 246.7 LBS | BODY MASS INDEX: 31.66 KG/M2 | DIASTOLIC BLOOD PRESSURE: 86 MMHG | TEMPERATURE: 97.2 F

## 2024-09-17 DIAGNOSIS — C25.0 MALIGNANT NEOPLASM OF HEAD OF PANCREAS (MULTI): ICD-10-CM

## 2024-09-17 PROCEDURE — 2500000004 HC RX 250 GENERAL PHARMACY W/ HCPCS (ALT 636 FOR OP/ED): Performed by: NURSE PRACTITIONER

## 2024-09-17 PROCEDURE — 96415 CHEMO IV INFUSION ADDL HR: CPT

## 2024-09-17 PROCEDURE — 96367 TX/PROPH/DG ADDL SEQ IV INF: CPT

## 2024-09-17 PROCEDURE — 96417 CHEMO IV INFUS EACH ADDL SEQ: CPT

## 2024-09-17 PROCEDURE — 96375 TX/PRO/DX INJ NEW DRUG ADDON: CPT | Mod: INF

## 2024-09-17 PROCEDURE — 96416 CHEMO PROLONG INFUSE W/PUMP: CPT

## 2024-09-17 PROCEDURE — 2500000001 HC RX 250 WO HCPCS SELF ADMINISTERED DRUGS (ALT 637 FOR MEDICARE OP): Performed by: NURSE PRACTITIONER

## 2024-09-17 PROCEDURE — 96413 CHEMO IV INFUSION 1 HR: CPT

## 2024-09-17 PROCEDURE — 96411 CHEMO IV PUSH ADDL DRUG: CPT

## 2024-09-17 RX ORDER — ATROPINE SULFATE 0.4 MG/ML
0.4 INJECTION, SOLUTION ENDOTRACHEAL; INTRAMEDULLARY; INTRAMUSCULAR; INTRAVENOUS; SUBCUTANEOUS
Status: DISCONTINUED | OUTPATIENT
Start: 2024-09-17 | End: 2024-09-17 | Stop reason: HOSPADM

## 2024-09-17 RX ORDER — PROCHLORPERAZINE EDISYLATE 5 MG/ML
10 INJECTION INTRAMUSCULAR; INTRAVENOUS EVERY 6 HOURS PRN
Status: DISCONTINUED | OUTPATIENT
Start: 2024-09-17 | End: 2024-09-17 | Stop reason: HOSPADM

## 2024-09-17 RX ORDER — ALBUTEROL SULFATE 0.83 MG/ML
3 SOLUTION RESPIRATORY (INHALATION) AS NEEDED
Status: DISCONTINUED | OUTPATIENT
Start: 2024-09-17 | End: 2024-09-17 | Stop reason: HOSPADM

## 2024-09-17 RX ORDER — OXYCODONE HYDROCHLORIDE 10 MG/1
10 TABLET ORAL EVERY 6 HOURS PRN
Qty: 120 TABLET | Refills: 0 | Status: SHIPPED | OUTPATIENT
Start: 2024-09-17 | End: 2024-10-17

## 2024-09-17 RX ORDER — LORAZEPAM 2 MG/ML
1 INJECTION INTRAMUSCULAR AS NEEDED
Status: DISCONTINUED | OUTPATIENT
Start: 2024-09-17 | End: 2024-09-17 | Stop reason: HOSPADM

## 2024-09-17 RX ORDER — DIPHENHYDRAMINE HYDROCHLORIDE 50 MG/ML
50 INJECTION INTRAMUSCULAR; INTRAVENOUS AS NEEDED
Status: DISCONTINUED | OUTPATIENT
Start: 2024-09-17 | End: 2024-09-17 | Stop reason: HOSPADM

## 2024-09-17 RX ORDER — EPINEPHRINE 0.3 MG/.3ML
0.3 INJECTION SUBCUTANEOUS EVERY 5 MIN PRN
Status: DISCONTINUED | OUTPATIENT
Start: 2024-09-17 | End: 2024-09-17 | Stop reason: HOSPADM

## 2024-09-17 RX ORDER — PALONOSETRON 0.05 MG/ML
0.25 INJECTION, SOLUTION INTRAVENOUS ONCE
Status: COMPLETED | OUTPATIENT
Start: 2024-09-17 | End: 2024-09-17

## 2024-09-17 RX ORDER — PROCHLORPERAZINE MALEATE 10 MG
10 TABLET ORAL EVERY 6 HOURS PRN
Status: DISCONTINUED | OUTPATIENT
Start: 2024-09-17 | End: 2024-09-17 | Stop reason: HOSPADM

## 2024-09-17 RX ORDER — FAMOTIDINE 10 MG/ML
20 INJECTION INTRAVENOUS ONCE AS NEEDED
Status: DISCONTINUED | OUTPATIENT
Start: 2024-09-17 | End: 2024-09-17 | Stop reason: HOSPADM

## 2024-09-17 RX ORDER — LORAZEPAM 1 MG/1
1 TABLET ORAL ONCE
Status: COMPLETED | OUTPATIENT
Start: 2024-09-17 | End: 2024-09-17

## 2024-09-17 ASSESSMENT — PAIN SCALES - GENERAL: PAINLEVEL: 2

## 2024-09-17 NOTE — PROGRESS NOTES
Patient here for FOLFIRINOX, tolerated well. Patient to return 09/19 for pump disconnect. Patient denies any questions at this time. Patient discharged in stable condition.

## 2024-09-18 DIAGNOSIS — E11.65 TYPE 2 DIABETES MELLITUS WITH HYPERGLYCEMIA, WITH LONG-TERM CURRENT USE OF INSULIN: Primary | ICD-10-CM

## 2024-09-18 DIAGNOSIS — Z79.4 TYPE 2 DIABETES MELLITUS WITH HYPERGLYCEMIA, WITH LONG-TERM CURRENT USE OF INSULIN: Primary | ICD-10-CM

## 2024-09-18 RX ORDER — BLOOD-GLUCOSE SENSOR
EACH MISCELLANEOUS
COMMUNITY
End: 2024-09-18 | Stop reason: SDUPTHER

## 2024-09-18 RX ORDER — BLOOD-GLUCOSE SENSOR
EACH MISCELLANEOUS
Qty: 2 EACH | Refills: 11 | Status: SHIPPED | OUTPATIENT
Start: 2024-09-18

## 2024-09-18 NOTE — TELEPHONE ENCOUNTER
Pharmacy called to get a new rx for the Freestyle Jef Plus, the other freestyle jef is on back order and thinks its not gonna be made anymore.

## 2024-09-19 ENCOUNTER — INFUSION (OUTPATIENT)
Dept: HEMATOLOGY/ONCOLOGY | Facility: CLINIC | Age: 54
End: 2024-09-19
Payer: COMMERCIAL

## 2024-09-19 VITALS
DIASTOLIC BLOOD PRESSURE: 83 MMHG | HEIGHT: 74 IN | RESPIRATION RATE: 16 BRPM | OXYGEN SATURATION: 93 % | BODY MASS INDEX: 31.63 KG/M2 | HEART RATE: 68 BPM | TEMPERATURE: 97.7 F | SYSTOLIC BLOOD PRESSURE: 131 MMHG

## 2024-09-19 DIAGNOSIS — C25.0 MALIGNANT NEOPLASM OF HEAD OF PANCREAS (MULTI): ICD-10-CM

## 2024-09-19 PROCEDURE — 2500000004 HC RX 250 GENERAL PHARMACY W/ HCPCS (ALT 636 FOR OP/ED): Performed by: NURSE PRACTITIONER

## 2024-09-19 RX ORDER — HEPARIN 100 UNIT/ML
500 SYRINGE INTRAVENOUS AS NEEDED
OUTPATIENT
Start: 2024-09-19

## 2024-09-19 RX ORDER — HEPARIN 100 UNIT/ML
500 SYRINGE INTRAVENOUS AS NEEDED
Status: DISCONTINUED | OUTPATIENT
Start: 2024-09-19 | End: 2024-09-19 | Stop reason: HOSPADM

## 2024-09-19 RX ORDER — HEPARIN SODIUM,PORCINE/PF 10 UNIT/ML
50 SYRINGE (ML) INTRAVENOUS AS NEEDED
OUTPATIENT
Start: 2024-09-19

## 2024-09-19 ASSESSMENT — PAIN SCALES - GENERAL: PAINLEVEL: 0-NO PAIN

## 2024-09-24 NOTE — PATIENT INSTRUCTIONS
Thank you for choosing [facility]  for your health care needs.  If you have any questions, concerns or medical needs, please feel free to contact our office at [phone].    Please ensure you complete your blood work one week before the next scheduled appointment.    To continue Lantus 25 units subcutaneous bedtime; 32 units when you have steroids x 4 days  To continue Humalog 12-18 units three times daily before meals   Please continue an insulin sliding scale with Humalog before meals as follows:   150-200mg/dL - 2 units   201-250mg/dL - 4 units   251-300 mg/dL - 6 untis   301-350mg/dL - 8 units   >351mg/dL - 10 units  To continue the use of your CGM for the intensive glucose monitoring due to the dynamic nature of insulin requirements, the narrow therapeutic index of insulin and the potentially fatal consequences of treatment  Counseled that the time in range should be >70%  For a diabetic dilated eye examination  For follow up in 3 months

## 2024-09-24 NOTE — PROGRESS NOTES
"Subjective   Nate Alvarez is a 54 y.o. male who presents for follow up for Type 2 diabetes mellitus. The initial diagnosis of diabetes was made in October 2023 .      He was diagnosed in April 2024 with invasive adenocarcinoma moderately differentiated after having a biopsy of a pancreatic mass to the head.  He has been getting chemotherapy every two weeks.  He has compelted 8 out of 12 cycles.  He will be getting a scan tomorrow.  He will need to undergo a Whipple once chemotherapy is completed.      He will be going back to work on 10/7.  He works with maintenance at [employer].    Known complications due to diabetes included peripheral neuropathy    Cardiovascular risk factors include diabetes mellitus. The patient is on an ACE inhibitor or angiotensin II receptor blocker.  The patient has not been previously hospitalized due to diabetic ketoacidosis.     Current symptoms/problems include paresthesia of the feet. His clinical course has been stable.     Current diabetes regimen is as follows:   Lantus 25 units subcutaneous bedtime   Humalog 12-18 units TID AC     The patient is currently checking the blood glucose multiple times per day.    Patient is using: continuous glucose monitor                                                              Hypoglycemia frequency: 0%  Hypoglycemia awareness: No     MEALS:  early satiety  Breakfast -  eggs, toast  Lunch - omits   Dinner - spaghetti, steak, chicken   Beverages - water, iced tea, 2 sodas per week, minute maid fruit punch       Review of Systems   Constitutional:  Positive for fatigue.   HENT:  Positive for tinnitus.    Gastrointestinal:  Positive for abdominal pain and nausea.   Musculoskeletal:  Positive for arthralgias and back pain.   All other systems reviewed and are negative.      Objective   /82 (BP Location: Left arm, Patient Position: Sitting, BP Cuff Size: Adult)   Pulse 72   Ht 1.905 m (6' 3\")   Wt 111 kg (244 lb)   BMI 30.50 " kg/m²   Physical Exam  Vitals and nursing note reviewed.   Constitutional:       General: He is not in acute distress.     Appearance: Normal appearance. He is normal weight.   HENT:      Head: Normocephalic and atraumatic.      Nose: Nose normal.      Mouth/Throat:      Mouth: Mucous membranes are moist.   Eyes:      Extraocular Movements: Extraocular movements intact.   Cardiovascular:      Rate and Rhythm: Normal rate and regular rhythm.   Pulmonary:      Effort: Pulmonary effort is normal.      Breath sounds: Normal breath sounds.   Musculoskeletal:         General: Normal range of motion.   Skin:     General: Skin is warm.   Neurological:      Mental Status: He is alert and oriented to person, place, and time.   Psychiatric:         Mood and Affect: Mood normal.       Lab Review  Lab Results   Component Value Date    HGBA1C 7.0 (A) 09/25/2024     Glucose (mg/dL)   Date Value   09/13/2024 186 (H)   08/30/2024 163 (H)   08/19/2024 166 (H)     POC HEMOGLOBIN A1c (%)   Date Value   04/17/2024 9.3 (A)   01/11/2024 7.4 (A)   10/05/2023 8.8 (A)     Hemoglobin A1C (%)   Date Value   05/10/2024 9.1 (H)     Bicarbonate (mmol/L)   Date Value   09/13/2024 24   08/30/2024 24   08/19/2024 24     Urea Nitrogen (mg/dL)   Date Value   09/13/2024 23   08/30/2024 17   08/19/2024 13     Creatinine (mg/dL)   Date Value   09/13/2024 0.90   08/30/2024 0.99   08/19/2024 0.84     Lab Results   Component Value Date    CHOL 121 04/16/2024    CHOL 131 08/09/2022    CHOL 193 09/28/2020     Lab Results   Component Value Date    HDL 34.2 04/16/2024    HDL 31.2 (A) 08/09/2022    HDL 42.4 09/28/2020     Lab Results   Component Value Date    LDLCALC 59 04/16/2024     Lab Results   Component Value Date    TRIG 141 04/16/2024    TRIG 167 (H) 08/09/2022    TRIG 126 09/28/2020     Lab Results   Component Value Date    TSH 3.53 04/16/2024       Health Maintenance:   Foot Exam:  May 31, 2024  Eye Exam:  June 2022  Urine Albumin: April 17, 2024    CGM  Interpretation/Plan   14 day CGM download was reviewed in detail as documented above and will be attached to chart.  A minimum of 72 hours of glucose data was used to inform the management plan outlined below.  64% of values is within target range.   He has hypoglycemia after taking insulin and then throwing up.      Assessment/Plan   54 year old male presents for follow up for new onset diabetes mellitus.  He was diagnosed in April 2024 with invasive adenocarcinoma moderately differentiated after having a biopsy of a pancreatic mass to the head.  His blood pressure is at goal.    Secondary diabetes mellitus (Multi)  To continue Lantus 25 units subcutaneous bedtime; 32 units when you have steroids x 4 days  To continue Humalog 12-18 units three times daily before meals   Please continue an insulin sliding scale with Humalog before meals as follows:   150-200mg/dL - 2 units   201-250mg/dL - 4 units   251-300 mg/dL - 6 untis   301-350mg/dL - 8 units   >351mg/dL - 10 units  To continue the use of your CGM for the intensive glucose monitoring due to the dynamic nature of insulin requirements, the narrow therapeutic index of insulin and the potentially fatal consequences of treatment  Counseled that the time in range should be >70%  Counseled that the goal A1C should be 7% or less  Counseled glycemic control is warranted to prevent microvascular complications  For a diabetic dilated eye examination      Long-term insulin use (Multi)  Please rotate insulin injection sites    For follow up in 3 months

## 2024-09-25 ENCOUNTER — APPOINTMENT (OUTPATIENT)
Dept: ENDOCRINOLOGY | Facility: CLINIC | Age: 54
End: 2024-09-25
Payer: COMMERCIAL

## 2024-09-25 VITALS
SYSTOLIC BLOOD PRESSURE: 118 MMHG | DIASTOLIC BLOOD PRESSURE: 82 MMHG | WEIGHT: 244 LBS | HEIGHT: 75 IN | BODY MASS INDEX: 30.34 KG/M2 | HEART RATE: 72 BPM

## 2024-09-25 DIAGNOSIS — E13.9 SECONDARY DIABETES MELLITUS (MULTI): Primary | ICD-10-CM

## 2024-09-25 DIAGNOSIS — Z79.4 LONG-TERM INSULIN USE (MULTI): ICD-10-CM

## 2024-09-25 LAB — POC HEMOGLOBIN A1C: 7 % (ref 4.2–6.5)

## 2024-09-25 PROCEDURE — 3046F HEMOGLOBIN A1C LEVEL >9.0%: CPT | Performed by: INTERNAL MEDICINE

## 2024-09-25 PROCEDURE — 3048F LDL-C <100 MG/DL: CPT | Performed by: INTERNAL MEDICINE

## 2024-09-25 PROCEDURE — 4010F ACE/ARB THERAPY RXD/TAKEN: CPT | Performed by: INTERNAL MEDICINE

## 2024-09-25 PROCEDURE — 3079F DIAST BP 80-89 MM HG: CPT | Performed by: INTERNAL MEDICINE

## 2024-09-25 PROCEDURE — 95251 CONT GLUC MNTR ANALYSIS I&R: CPT | Performed by: INTERNAL MEDICINE

## 2024-09-25 PROCEDURE — 83036 HEMOGLOBIN GLYCOSYLATED A1C: CPT | Performed by: INTERNAL MEDICINE

## 2024-09-25 PROCEDURE — 3074F SYST BP LT 130 MM HG: CPT | Performed by: INTERNAL MEDICINE

## 2024-09-25 PROCEDURE — 99214 OFFICE O/P EST MOD 30 MIN: CPT | Performed by: INTERNAL MEDICINE

## 2024-09-25 PROCEDURE — 3008F BODY MASS INDEX DOCD: CPT | Performed by: INTERNAL MEDICINE

## 2024-09-25 ASSESSMENT — ENCOUNTER SYMPTOMS
ARTHRALGIAS: 1
FATIGUE: 1
BACK PAIN: 1
ABDOMINAL PAIN: 1
NAUSEA: 1

## 2024-09-26 ENCOUNTER — HOSPITAL ENCOUNTER (OUTPATIENT)
Dept: RADIOLOGY | Facility: HOSPITAL | Age: 54
Discharge: HOME | End: 2024-09-26
Payer: COMMERCIAL

## 2024-09-26 DIAGNOSIS — C25.0 MALIGNANT NEOPLASM OF HEAD OF PANCREAS (MULTI): ICD-10-CM

## 2024-09-26 PROCEDURE — 74177 CT ABD & PELVIS W/CONTRAST: CPT

## 2024-09-26 PROCEDURE — 71260 CT THORAX DX C+: CPT | Performed by: STUDENT IN AN ORGANIZED HEALTH CARE EDUCATION/TRAINING PROGRAM

## 2024-09-26 PROCEDURE — 74177 CT ABD & PELVIS W/CONTRAST: CPT | Performed by: STUDENT IN AN ORGANIZED HEALTH CARE EDUCATION/TRAINING PROGRAM

## 2024-09-26 PROCEDURE — 2550000001 HC RX 255 CONTRASTS: Performed by: NURSE PRACTITIONER

## 2024-09-28 PROBLEM — E13.9 SECONDARY DIABETES MELLITUS (MULTI): Status: ACTIVE | Noted: 2024-09-28

## 2024-09-28 PROBLEM — Z79.4 LONG-TERM INSULIN USE (MULTI): Status: ACTIVE | Noted: 2024-09-28

## 2024-09-29 NOTE — ASSESSMENT & PLAN NOTE
To continue Lantus 25 units subcutaneous bedtime; 32 units when you have steroids x 4 days  To continue Humalog 12-18 units three times daily before meals   Please continue an insulin sliding scale with Humalog before meals as follows:   150-200mg/dL - 2 units   201-250mg/dL - 4 units   251-300 mg/dL - 6 untis   301-350mg/dL - 8 units   >351mg/dL - 10 units  To continue the use of your CGM for the intensive glucose monitoring due to the dynamic nature of insulin requirements, the narrow therapeutic index of insulin and the potentially fatal consequences of treatment  Counseled that the time in range should be >70%  Counseled that the goal A1C should be 7% or less  Counseled glycemic control is warranted to prevent microvascular complications  For a diabetic dilated eye examination

## 2024-09-30 ENCOUNTER — LAB (OUTPATIENT)
Dept: HEMATOLOGY/ONCOLOGY | Facility: CLINIC | Age: 54
End: 2024-09-30
Payer: COMMERCIAL

## 2024-09-30 VITALS
OXYGEN SATURATION: 98 % | BODY MASS INDEX: 31.32 KG/M2 | HEIGHT: 74 IN | RESPIRATION RATE: 16 BRPM | WEIGHT: 244 LBS | DIASTOLIC BLOOD PRESSURE: 86 MMHG | SYSTOLIC BLOOD PRESSURE: 139 MMHG | HEART RATE: 70 BPM | TEMPERATURE: 97.2 F

## 2024-09-30 DIAGNOSIS — C25.0 MALIGNANT NEOPLASM OF HEAD OF PANCREAS (MULTI): ICD-10-CM

## 2024-09-30 LAB
ALBUMIN SERPL BCP-MCNC: 3.9 G/DL (ref 3.4–5)
ALP SERPL-CCNC: 71 U/L (ref 33–120)
ALT SERPL W P-5'-P-CCNC: 32 U/L (ref 10–52)
ANION GAP SERPL CALC-SCNC: 8 MMOL/L (ref 10–20)
AST SERPL W P-5'-P-CCNC: 22 U/L (ref 9–39)
BASOPHILS # BLD AUTO: 0.05 X10*3/UL (ref 0–0.1)
BASOPHILS NFR BLD AUTO: 0.9 %
BILIRUB SERPL-MCNC: 0.5 MG/DL (ref 0–1.2)
BUN SERPL-MCNC: 17 MG/DL (ref 6–23)
CALCIUM SERPL-MCNC: 8.7 MG/DL (ref 8.6–10.3)
CHLORIDE SERPL-SCNC: 114 MMOL/L (ref 98–107)
CO2 SERPL-SCNC: 24 MMOL/L (ref 21–32)
CREAT SERPL-MCNC: 0.68 MG/DL (ref 0.5–1.3)
EGFRCR SERPLBLD CKD-EPI 2021: >90 ML/MIN/1.73M*2
EOSINOPHIL # BLD AUTO: 0.16 X10*3/UL (ref 0–0.7)
EOSINOPHIL NFR BLD AUTO: 2.9 %
ERYTHROCYTE [DISTWIDTH] IN BLOOD BY AUTOMATED COUNT: 15.5 % (ref 11.5–14.5)
GLUCOSE SERPL-MCNC: 194 MG/DL (ref 74–99)
HCT VFR BLD AUTO: 36.4 % (ref 41–52)
HGB BLD-MCNC: 12.5 G/DL (ref 13.5–17.5)
IMM GRANULOCYTES # BLD AUTO: 0.02 X10*3/UL (ref 0–0.7)
IMM GRANULOCYTES NFR BLD AUTO: 0.4 % (ref 0–0.9)
LYMPHOCYTES # BLD AUTO: 1.7 X10*3/UL (ref 1.2–4.8)
LYMPHOCYTES NFR BLD AUTO: 30.5 %
MCH RBC QN AUTO: 30.8 PG (ref 26–34)
MCHC RBC AUTO-ENTMCNC: 34.3 G/DL (ref 32–36)
MCV RBC AUTO: 90 FL (ref 80–100)
MONOCYTES # BLD AUTO: 0.66 X10*3/UL (ref 0.1–1)
MONOCYTES NFR BLD AUTO: 11.8 %
NEUTROPHILS # BLD AUTO: 2.98 X10*3/UL (ref 1.2–7.7)
NEUTROPHILS NFR BLD AUTO: 53.5 %
PLATELET # BLD AUTO: 112 X10*3/UL (ref 150–450)
POTASSIUM SERPL-SCNC: 4 MMOL/L (ref 3.5–5.3)
PROT SERPL-MCNC: 6.3 G/DL (ref 6.4–8.2)
RBC # BLD AUTO: 4.06 X10*6/UL (ref 4.5–5.9)
SODIUM SERPL-SCNC: 142 MMOL/L (ref 136–145)
WBC # BLD AUTO: 5.6 X10*3/UL (ref 4.4–11.3)

## 2024-09-30 PROCEDURE — 2500000004 HC RX 250 GENERAL PHARMACY W/ HCPCS (ALT 636 FOR OP/ED): Performed by: NURSE PRACTITIONER

## 2024-09-30 PROCEDURE — 84075 ASSAY ALKALINE PHOSPHATASE: CPT

## 2024-09-30 PROCEDURE — 85025 COMPLETE CBC W/AUTO DIFF WBC: CPT

## 2024-09-30 PROCEDURE — 36591 DRAW BLOOD OFF VENOUS DEVICE: CPT

## 2024-09-30 PROCEDURE — 86301 IMMUNOASSAY TUMOR CA 19-9: CPT | Mod: PORLAB

## 2024-09-30 RX ORDER — HEPARIN 100 UNIT/ML
500 SYRINGE INTRAVENOUS AS NEEDED
Status: DISCONTINUED | OUTPATIENT
Start: 2024-09-30 | End: 2024-09-30 | Stop reason: HOSPADM

## 2024-09-30 RX ORDER — HEPARIN 100 UNIT/ML
500 SYRINGE INTRAVENOUS AS NEEDED
Status: CANCELLED | OUTPATIENT
Start: 2024-09-30

## 2024-09-30 RX ORDER — HEPARIN SODIUM,PORCINE/PF 10 UNIT/ML
50 SYRINGE (ML) INTRAVENOUS AS NEEDED
Status: CANCELLED | OUTPATIENT
Start: 2024-09-30

## 2024-09-30 ASSESSMENT — PAIN SCALES - GENERAL: PAINLEVEL: 0-NO PAIN

## 2024-10-01 ENCOUNTER — NUTRITION (OUTPATIENT)
Dept: HEMATOLOGY/ONCOLOGY | Facility: CLINIC | Age: 54
End: 2024-10-01
Payer: COMMERCIAL

## 2024-10-01 ENCOUNTER — INFUSION (OUTPATIENT)
Dept: HEMATOLOGY/ONCOLOGY | Facility: CLINIC | Age: 54
End: 2024-10-01
Payer: COMMERCIAL

## 2024-10-01 ENCOUNTER — DOCUMENTATION (OUTPATIENT)
Dept: CASE MANAGEMENT | Facility: HOSPITAL | Age: 54
End: 2024-10-01

## 2024-10-01 ENCOUNTER — OFFICE VISIT (OUTPATIENT)
Dept: HEMATOLOGY/ONCOLOGY | Facility: CLINIC | Age: 54
End: 2024-10-01
Payer: COMMERCIAL

## 2024-10-01 VITALS
WEIGHT: 246.03 LBS | BODY MASS INDEX: 31.54 KG/M2 | TEMPERATURE: 97.5 F | RESPIRATION RATE: 18 BRPM | DIASTOLIC BLOOD PRESSURE: 87 MMHG | SYSTOLIC BLOOD PRESSURE: 135 MMHG | HEART RATE: 65 BPM | OXYGEN SATURATION: 96 %

## 2024-10-01 VITALS — HEIGHT: 74 IN | WEIGHT: 246.03 LBS | BODY MASS INDEX: 31.58 KG/M2

## 2024-10-01 DIAGNOSIS — C25.0 MALIGNANT NEOPLASM OF HEAD OF PANCREAS (MULTI): ICD-10-CM

## 2024-10-01 DIAGNOSIS — C25.0 MALIGNANT NEOPLASM OF HEAD OF PANCREAS (MULTI): Primary | ICD-10-CM

## 2024-10-01 LAB — CANCER AG19-9 SERPL-ACNC: 49.59 U/ML

## 2024-10-01 PROCEDURE — 96376 TX/PRO/DX INJ SAME DRUG ADON: CPT

## 2024-10-01 PROCEDURE — 2500000004 HC RX 250 GENERAL PHARMACY W/ HCPCS (ALT 636 FOR OP/ED): Performed by: INTERNAL MEDICINE

## 2024-10-01 PROCEDURE — 96375 TX/PRO/DX INJ NEW DRUG ADDON: CPT | Mod: INF

## 2024-10-01 PROCEDURE — 96417 CHEMO IV INFUS EACH ADDL SEQ: CPT

## 2024-10-01 PROCEDURE — 2500000001 HC RX 250 WO HCPCS SELF ADMINISTERED DRUGS (ALT 637 FOR MEDICARE OP): Performed by: INTERNAL MEDICINE

## 2024-10-01 PROCEDURE — 3048F LDL-C <100 MG/DL: CPT | Performed by: INTERNAL MEDICINE

## 2024-10-01 PROCEDURE — 99215 OFFICE O/P EST HI 40 MIN: CPT | Mod: 25 | Performed by: INTERNAL MEDICINE

## 2024-10-01 PROCEDURE — 96416 CHEMO PROLONG INFUSE W/PUMP: CPT

## 2024-10-01 PROCEDURE — 4010F ACE/ARB THERAPY RXD/TAKEN: CPT | Performed by: INTERNAL MEDICINE

## 2024-10-01 PROCEDURE — 96367 TX/PROPH/DG ADDL SEQ IV INF: CPT

## 2024-10-01 PROCEDURE — 96413 CHEMO IV INFUSION 1 HR: CPT

## 2024-10-01 PROCEDURE — 3046F HEMOGLOBIN A1C LEVEL >9.0%: CPT | Performed by: INTERNAL MEDICINE

## 2024-10-01 PROCEDURE — 96415 CHEMO IV INFUSION ADDL HR: CPT

## 2024-10-01 PROCEDURE — 99215 OFFICE O/P EST HI 40 MIN: CPT | Performed by: INTERNAL MEDICINE

## 2024-10-01 PROCEDURE — 1036F TOBACCO NON-USER: CPT | Performed by: INTERNAL MEDICINE

## 2024-10-01 RX ORDER — PROCHLORPERAZINE EDISYLATE 5 MG/ML
10 INJECTION INTRAMUSCULAR; INTRAVENOUS EVERY 6 HOURS PRN
OUTPATIENT
Start: 2024-10-15

## 2024-10-01 RX ORDER — LORAZEPAM 0.5 MG/1
1 TABLET ORAL ONCE
Status: COMPLETED | OUTPATIENT
Start: 2024-10-01 | End: 2024-10-01

## 2024-10-01 RX ORDER — DIPHENHYDRAMINE HYDROCHLORIDE 50 MG/ML
50 INJECTION INTRAMUSCULAR; INTRAVENOUS AS NEEDED
Status: DISCONTINUED | OUTPATIENT
Start: 2024-10-01 | End: 2024-10-01 | Stop reason: HOSPADM

## 2024-10-01 RX ORDER — ALBUTEROL SULFATE 0.83 MG/ML
3 SOLUTION RESPIRATORY (INHALATION) AS NEEDED
Status: DISCONTINUED | OUTPATIENT
Start: 2024-10-01 | End: 2024-10-01 | Stop reason: HOSPADM

## 2024-10-01 RX ORDER — PROCHLORPERAZINE MALEATE 10 MG
10 TABLET ORAL EVERY 6 HOURS PRN
Status: DISCONTINUED | OUTPATIENT
Start: 2024-10-01 | End: 2024-10-01 | Stop reason: HOSPADM

## 2024-10-01 RX ORDER — LORAZEPAM 2 MG/ML
1 INJECTION INTRAMUSCULAR AS NEEDED
OUTPATIENT
Start: 2024-10-15

## 2024-10-01 RX ORDER — ALBUTEROL SULFATE 0.83 MG/ML
3 SOLUTION RESPIRATORY (INHALATION) AS NEEDED
OUTPATIENT
Start: 2024-10-15

## 2024-10-01 RX ORDER — LORAZEPAM 2 MG/ML
1 INJECTION INTRAMUSCULAR AS NEEDED
Status: CANCELLED | OUTPATIENT
Start: 2024-10-01

## 2024-10-01 RX ORDER — FAMOTIDINE 10 MG/ML
20 INJECTION INTRAVENOUS ONCE AS NEEDED
OUTPATIENT
Start: 2024-10-17

## 2024-10-01 RX ORDER — LORAZEPAM 0.5 MG/1
1 TABLET ORAL ONCE
OUTPATIENT
Start: 2024-10-15 | End: 2024-10-15

## 2024-10-01 RX ORDER — FAMOTIDINE 10 MG/ML
20 INJECTION INTRAVENOUS ONCE AS NEEDED
Status: CANCELLED | OUTPATIENT
Start: 2024-10-01

## 2024-10-01 RX ORDER — EPINEPHRINE 0.3 MG/.3ML
0.3 INJECTION SUBCUTANEOUS EVERY 5 MIN PRN
OUTPATIENT
Start: 2024-10-17

## 2024-10-01 RX ORDER — HEPARIN 100 UNIT/ML
500 SYRINGE INTRAVENOUS AS NEEDED
Status: DISCONTINUED | OUTPATIENT
Start: 2024-10-01 | End: 2024-10-01 | Stop reason: HOSPADM

## 2024-10-01 RX ORDER — ALBUTEROL SULFATE 0.83 MG/ML
3 SOLUTION RESPIRATORY (INHALATION) AS NEEDED
Status: CANCELLED | OUTPATIENT
Start: 2024-10-01

## 2024-10-01 RX ORDER — DIPHENHYDRAMINE HYDROCHLORIDE 50 MG/ML
50 INJECTION INTRAMUSCULAR; INTRAVENOUS AS NEEDED
Status: CANCELLED | OUTPATIENT
Start: 2024-10-03

## 2024-10-01 RX ORDER — ATROPINE SULFATE 0.4 MG/ML
0.4 INJECTION, SOLUTION ENDOTRACHEAL; INTRAMEDULLARY; INTRAMUSCULAR; INTRAVENOUS; SUBCUTANEOUS
Status: CANCELLED | OUTPATIENT
Start: 2024-10-01

## 2024-10-01 RX ORDER — FAMOTIDINE 10 MG/ML
20 INJECTION INTRAVENOUS ONCE AS NEEDED
Status: DISCONTINUED | OUTPATIENT
Start: 2024-10-01 | End: 2024-10-01 | Stop reason: HOSPADM

## 2024-10-01 RX ORDER — PROCHLORPERAZINE EDISYLATE 5 MG/ML
10 INJECTION INTRAMUSCULAR; INTRAVENOUS EVERY 6 HOURS PRN
Status: CANCELLED | OUTPATIENT
Start: 2024-10-01

## 2024-10-01 RX ORDER — PROCHLORPERAZINE MALEATE 10 MG
10 TABLET ORAL EVERY 6 HOURS PRN
OUTPATIENT
Start: 2024-10-15

## 2024-10-01 RX ORDER — ALBUTEROL SULFATE 0.83 MG/ML
3 SOLUTION RESPIRATORY (INHALATION) AS NEEDED
OUTPATIENT
Start: 2024-10-17

## 2024-10-01 RX ORDER — ATROPINE SULFATE 0.4 MG/ML
0.4 INJECTION, SOLUTION ENDOTRACHEAL; INTRAMEDULLARY; INTRAMUSCULAR; INTRAVENOUS; SUBCUTANEOUS
OUTPATIENT
Start: 2024-10-15

## 2024-10-01 RX ORDER — EPINEPHRINE 0.3 MG/.3ML
0.3 INJECTION SUBCUTANEOUS EVERY 5 MIN PRN
Status: DISCONTINUED | OUTPATIENT
Start: 2024-10-01 | End: 2024-10-01 | Stop reason: HOSPADM

## 2024-10-01 RX ORDER — FAMOTIDINE 10 MG/ML
20 INJECTION INTRAVENOUS ONCE AS NEEDED
OUTPATIENT
Start: 2024-10-15

## 2024-10-01 RX ORDER — LORAZEPAM 2 MG/ML
1 INJECTION INTRAMUSCULAR AS NEEDED
Status: DISCONTINUED | OUTPATIENT
Start: 2024-10-01 | End: 2024-10-01 | Stop reason: HOSPADM

## 2024-10-01 RX ORDER — DIPHENHYDRAMINE HYDROCHLORIDE 50 MG/ML
50 INJECTION INTRAMUSCULAR; INTRAVENOUS AS NEEDED
OUTPATIENT
Start: 2024-10-15

## 2024-10-01 RX ORDER — LORAZEPAM 0.5 MG/1
1 TABLET ORAL ONCE
Status: CANCELLED | OUTPATIENT
Start: 2024-10-01 | End: 2024-10-01

## 2024-10-01 RX ORDER — DIPHENHYDRAMINE HYDROCHLORIDE 50 MG/ML
50 INJECTION INTRAMUSCULAR; INTRAVENOUS AS NEEDED
Status: CANCELLED | OUTPATIENT
Start: 2024-10-01

## 2024-10-01 RX ORDER — PALONOSETRON 0.05 MG/ML
0.25 INJECTION, SOLUTION INTRAVENOUS ONCE
Status: CANCELLED | OUTPATIENT
Start: 2024-10-01

## 2024-10-01 RX ORDER — PALONOSETRON 0.05 MG/ML
0.25 INJECTION, SOLUTION INTRAVENOUS ONCE
OUTPATIENT
Start: 2024-10-15

## 2024-10-01 RX ORDER — FAMOTIDINE 10 MG/ML
20 INJECTION INTRAVENOUS ONCE AS NEEDED
Status: CANCELLED | OUTPATIENT
Start: 2024-10-03

## 2024-10-01 RX ORDER — HEPARIN SODIUM,PORCINE/PF 10 UNIT/ML
50 SYRINGE (ML) INTRAVENOUS AS NEEDED
Status: DISCONTINUED | OUTPATIENT
Start: 2024-10-01 | End: 2024-10-01 | Stop reason: HOSPADM

## 2024-10-01 RX ORDER — PROCHLORPERAZINE EDISYLATE 5 MG/ML
10 INJECTION INTRAMUSCULAR; INTRAVENOUS EVERY 6 HOURS PRN
Status: DISCONTINUED | OUTPATIENT
Start: 2024-10-01 | End: 2024-10-01 | Stop reason: HOSPADM

## 2024-10-01 RX ORDER — EPINEPHRINE 0.3 MG/.3ML
0.3 INJECTION SUBCUTANEOUS EVERY 5 MIN PRN
OUTPATIENT
Start: 2024-10-15

## 2024-10-01 RX ORDER — EPINEPHRINE 0.3 MG/.3ML
0.3 INJECTION SUBCUTANEOUS EVERY 5 MIN PRN
Status: CANCELLED | OUTPATIENT
Start: 2024-10-03

## 2024-10-01 RX ORDER — ATROPINE SULFATE 0.4 MG/ML
0.4 INJECTION, SOLUTION ENDOTRACHEAL; INTRAMEDULLARY; INTRAMUSCULAR; INTRAVENOUS; SUBCUTANEOUS
Status: COMPLETED | OUTPATIENT
Start: 2024-10-01 | End: 2024-10-01

## 2024-10-01 RX ORDER — PROCHLORPERAZINE MALEATE 10 MG
10 TABLET ORAL EVERY 6 HOURS PRN
Status: CANCELLED | OUTPATIENT
Start: 2024-10-01

## 2024-10-01 RX ORDER — PALONOSETRON 0.05 MG/ML
0.25 INJECTION, SOLUTION INTRAVENOUS ONCE
Status: COMPLETED | OUTPATIENT
Start: 2024-10-01 | End: 2024-10-01

## 2024-10-01 RX ORDER — HEPARIN 100 UNIT/ML
500 SYRINGE INTRAVENOUS AS NEEDED
Status: CANCELLED | OUTPATIENT
Start: 2024-10-01

## 2024-10-01 RX ORDER — EPINEPHRINE 0.3 MG/.3ML
0.3 INJECTION SUBCUTANEOUS EVERY 5 MIN PRN
Status: CANCELLED | OUTPATIENT
Start: 2024-10-01

## 2024-10-01 RX ORDER — DIPHENHYDRAMINE HYDROCHLORIDE 50 MG/ML
50 INJECTION INTRAMUSCULAR; INTRAVENOUS AS NEEDED
OUTPATIENT
Start: 2024-10-17

## 2024-10-01 RX ORDER — ALBUTEROL SULFATE 0.83 MG/ML
3 SOLUTION RESPIRATORY (INHALATION) AS NEEDED
Status: CANCELLED | OUTPATIENT
Start: 2024-10-03

## 2024-10-01 RX ORDER — HEPARIN SODIUM,PORCINE/PF 10 UNIT/ML
50 SYRINGE (ML) INTRAVENOUS AS NEEDED
Status: CANCELLED | OUTPATIENT
Start: 2024-10-01

## 2024-10-01 ASSESSMENT — PAIN SCALES - GENERAL: PAINLEVEL: 0-NO PAIN

## 2024-10-01 NOTE — PROGRESS NOTES
NUTRITION Follow Up NOTE    Nutrition Assessment     Reason for Visit:  Nate Alvarez is a 54 y.o. male with Borderline Resectable Pancreatic cancer dx'd 5/2024     He presented with acute pancreatitis, Cancer cachexia and New onset diabetes with hyperglycemia. He is taking both long and short acting insulin as managed by Dr. Brandon Vasquez.    EUS: heme and gastritis noted in the stomach.     Current Sites of Disease:  Head of the pancreas /uncinate process involving the gastroduodenal artery celiac axis and 180 degree involvement of the SMV    Current Therapy:  Neoadjuvant mFOLFIRINOX    Referred to this service for high risk dx and assessed 7/23.    He was ordered PERT.  Seen again for follow up today in the presence of his wife.    PMH noted: GERD, HTN, oropharyngeal dysphagia (from fractured neck x 3), RADHA    Comprehensive metabolic panel              Component  Ref Range & Units 1 d ago  (9/30/24) 2 wk ago  (9/13/24) 1 mo ago  (8/30/24) 1 mo ago  (8/19/24) 1 mo ago  (8/5/24) 2 mo ago  (7/22/24) 2 mo ago  (7/9/24)   Glucose  74 - 99 mg/dL 194 High  186 High  163 High  166 High  215 High  189 High  177 High    Sodium  136 - 145 mmol/L 142 139 138 138 138 137 139   Potassium  3.5 - 5.3 mmol/L 4.0 4.0 3.9 4.2 4.0 4.3 3.9   Chloride  98 - 107 mmol/L 114 High  111 High  106 110 High  107 107 106   Bicarbonate  21 - 32 mmol/L 24 24 24 24 22 25 24   Anion Gap  10 - 20 mmol/L 8 Low  8 Low  12 8 Low  13 9 Low  13   Urea Nitrogen  6 - 23 mg/dL 17 23 17 13 15 17 20   Creatinine  0.50 - 1.30 mg/dL 0.68 0.90 0.99 0.84 0.67 0.67 0.69   eGFR  >60 mL/min/1.73m*2 >90 >90 CM 90 CM >90 CM >90 CM >90 CM >90 CM   Comment: Calculations of estimated GFR are performed using the 2021 CKD-EPI Study Refit equation without the race variable for the IDMS-Traceable creatinine methods.  https://jasn.asnjournals.org/content/early/2021/09/22/ASN.9442267605   Calcium  8.6 - 10.3 mg/dL 8.7 8.8 9.2 9.4 9.3 R 8.6 9.1 R   Albumin  3.4 - 5.0  "g/dL 3.9 4.0 4.2 4.2 4.2 3.7 4.1   Alkaline Phosphatase  33 - 120 U/L 71 82 77 73 75 66 73   Total Protein  6.4 - 8.2 g/dL 6.3 Low  6.5 6.7 6.8 6.4 5.9 Low  6.0 Low    AST  9 - 39 U/L 22 22 27 20 15 16 13   Bilirubin, Total  0.0 - 1.2 mg/dL 0.5 0.4 0.6 0.4 0.4 0.3 0.5   ALT  10 - 52 U/L 32 34 CM 33 CM 24 CM 24 CM 20 CM 19 CM   Comment: Patients treated with Sulfasalazine may generate falsely decreased results for ALT.   [facility]ing [facility]            POCT glycosylated hemoglobin (Hb A1C) manually resulted           Component  Ref Range & Units 6 d ago 5 mo ago 8 mo ago 12 mo ago   POC HEMOGLOBIN A1c  4.2 - 6.5 % 7.0 Abnormal  9.3 Abnormal  7.4 Abnormal  8.8 Abnormal                 Back to Top  Diabetes: Hemoglobin A1C (Every 3 Months)  Ordered on 9/28/2024          Anthropometrics:  Anthropometrics  Height: 188.1 cm (6' 2.06\")  Weight: 112 kg (246 lb 0.5 oz)  BMI (Calculated): 31.54  IBW/kg (Dietitian Calculated): 86.4 kg  Weight Change  Weight History / % Weight Change: Overall stable wt after gain  Significant Weight Loss: Yes (h/o)    Reported a UBW of ~300 #  Stated he has lost a lot of muscle mass.    Wt Readings from Last 25 Encounters:   10/1/24 111.6 kg   9/19/24 111.9 kg- gain of 2.9 kg in 1 month   8/20/24 109 kg (240 lb)- Overall wt gain trend since significant loss with    8/5/24 109 kg - Today pt reports his wt varies 10 # between 235# and 245# on his home scale.   07/23/24 110 kg (242 lb 4.6 oz)   07/22/24 108 kg (237 lb)   07/12/24 108 kg (237 lb 10.5 oz)   07/11/24 107 kg (236 lb)   07/10/24 107 kg (234 lb 12.6 oz)   07/09/24 107 kg (236 lb 15.9 oz)   07/05/24 106 kg (234 lb)   06/27/24 106 kg (233 lb)   06/25/24 106 kg (233 lb 11 oz)   06/24/24 104 kg (229 lb 4.5 oz)   06/11/24 104 kg (229 lb 3.2 oz)   06/03/24 106 kg (232 lb 12.9 oz)   05/31/24 106 kg (234 lb)   05/29/24 99.8 kg (220 lb 0.3 oz)- Loss of 29.2 kg (22.6%) in 8 months- significant   05/28/24 105 " "kg (231 lb)   05/22/24 102 kg (224 lb)   05/17/24 102 kg (224 lb 13.9 oz)   05/13/24 104 kg (230 lb)   05/10/24 102 kg (225 lb)   04/17/24 117 kg (257 lb)   01/11/24 122 kg (268 lb)     10/05/23 127 kg (281 lb)   10/04/23 129 kg (285 lb)   04/13/23 131 kg (287 lb 14.4 oz)                Food And Nutrient Intake:  Food and Nutrient History  Food and Nutrient History: His BS has been pretty well controlled- except for when he gets the steroid- he will be up to 300.  He does have  [facility] to refer to. Wife agrees he is eating better than at presentation. He gets really hungry at least 2 nights in the cycle- will even cook for himself late at night.  He has decreased his intake of tuna to 2 servings weekly- occasionally 3/ week.  Energy Intake: Good > 75 %          Food Intake  Fluid Intake: Adequate                                                     Medication and Complementary/Alternative Medicine Use  Prescription Medication Use: He goes in spurts with the Creon- sometimes he does consistently well and other times not.  He does not take them with him  He ran out of his PPI and didn't have it for 4 days- he experienced some discomfort with enzymes/digestion.   Recall that he previously reported that when he does use the Creon his stools are darker and sink.  Reported his \"digestive pain\" is improved in addition.       Nutrition Focused Physical Exam Findings:      Subcutaneous Fat Loss  Orbital Fat Pads: Well nourished (slightly bulging fat pads)  Buccal Fat Pads: Well nourished (full, rounded cheeks)  Triceps: Defer  Ribs: Defer    Muscle Wasting  Temporalis: Mild-Moderate (slight depression)  Pectoralis (Clavicular Region): Defer  Deltoid/Trapezius: Defer  Interosseous: Defer  Trapezius/Infraspinatus/Supraspinatus (Scapular Region): Defer  Quadriceps: Defer  Gastrocnemius: Defer                Energy Needs  Calculated Energy Needs Using Equations  Height: 188.1 cm (6' 2.06\")  Estimated Energy Needs  Total Energy " Estimated Needs in 24 hours (kCal): 2585 kCal  Energy Estimated Needs per kg Body Weight in 24 hours (kCal/kg): 28 kCal/kg  Method for Estimating Needs: Based on adjusted BW  Estimated Fluid Needs  Total Fluid Estimated Needs in 24 Hours (mL): 2770 mL  Total Fluid Estimated Needs in 24 hours (mL/kg): 30 mL/kg  Method for Estimating 24 Hour Fluid Needs: Based on adjusted BW  Estimated Protein Needs  Total Protein Estimated Needs in 24 Hours (g): 120 g  Protein Estimated Needs per kg Body Weight in 24 Hours (g/kg): 1.3 g/kg  Method for Estimating 24 Hour Protein Needs: Based on adjusted BW        Nutrition Diagnosis   Malnutrition Diagnosis  Patient has Malnutrition Diagnosis: Yes  Diagnosis Status: Resolved  Malnutrition Diagnosis: Moderate malnutrition related to chronic disease or condition  As Evidenced by: h/o intake of < 75% of  estimated energy  requirement for  > 1 month with a h/o significant wt loss as documented now with some repletion on wt gain trend.  Additional Assessment Information: Pt with an excessive intake of mercury from the high intake of tuna fish reported. Per the EPA, the maximum safe dose of mercury is 0.045 mcg of mercury per pound (0.1 mcg per kg) of body weight per day.  Pt's safe mercury dose is 10.9 mcg per day or 76.3 mcg weekly.    Nutrition Diagnosis  Patient has Nutrition Diagnosis: Yes  Diagnosis Status (1): Ongoing  Nutrition Diagnosis 1: Altered GI function  Related to (1): pathophysiology of ca ds  As Evidenced by (1): pt erport of signs and symptoms of EPI    Nutrition Interventions/Recommendations   Nutrition Prescription  Individualized Nutrition Prescription Provided for : Limited concentrated sweets MAURA    Food and Nutrition Delivery  Food and Nutrition Delivery  Meals & Snacks: Fiber-modified diet  Goals: Low fiber as needed wtih malabsorption on tx  Medical Food Supplement: Commercial beverage medical food supplement therapy  Goals: as needed with decreased intake/wt  loss  Goals: DMV  Other:: PERT  Goals: Consistent use with all meals and snacks.  Encouraged to carry with him wherever he goes. (Consisitent us eof PPI in addition; increases efficacy of PERT)    Nutrition Education       Coordination of Care       There are no Patient Instructions on file for this visit.    Nutrition Monitoring and Evaluation   Food and Nutrient Intake  Monitoring and Evaluation Plan: Fluid intake, Amount of food, Fiber intake  Fluid Intake: Estimated fluid intake  Criteria: Continue to meet daily needs  Amount of Food: Estimated amout of food  Criteria: Meet energy and protein needs  Estimated fiber intake: Estimated fiber intake  Criteria: 8-13 g as needed with malabsorption from chemo  Anthropometric measurements  Monitoring and Evaluation Plan: Weight  Body Weight: Body weight  Criteria: maintanence  Biochemical Data, Medical Tests and Procedures  Monitoring and Evaluation Plan: Electrolyte/renal panel, Glucose/endocrine profile  Criteria: WNL  Criteria: < 200  Nutrition Focused Physical Findings  Monitoring and Evaluation Plan: Adipose, Digestive System, Muscle  Adipose Finding: Loss of subcutaneous fat  Criteria: Loss of inactive adipose  Digestive System Finding: Abdominal pain, Abdominal bloating  Criteria: Improve symptoms with PPI as directed and PERT regimen  Muscle Finding: Muscle atrophy  Criteria: No further loss          Time Spent  Prep time on day of patient encounter: 5 minutes  Time spent directly with patient, family or caregiver: 30 minutes  Additional Time Spent on Patient Care Activities: 0 minutes  Documentation Time: 15 minutes  Other Time Spent: 0 minutes  Total: 50 minutes

## 2024-10-01 NOTE — PROGRESS NOTES
Patient ID: Nate Alvarez is a 54 y.o. male from [place], [place].     Referring Physician: Shaw You MD  [id number] [place] [address]    Primary Care Provider: Hazel Medeiros MD    Diagnosis:   Pancreatic cancer 5/2024    Primary Oncologic Surgeon:   Omar Gamble MD    Primary Medical Oncologist:  Borderline Resectable    Primary Radiation Oncologist:  MARSHALL     Current Therapy:  Neoadjuvant FOLFIRINOX    Oncologic Surgery History:  Pending    Oncologic Therapy History:  N/A    Molecular Genetics:  Pending    Current Sites of Disease:  Head of the pancreas involving the gastroduodenal artery celiac axis and 180 degree involvement of the SMV    Oncologic Problem List:  Localized but borderline resectable pancreatic adenocarcinoma  New onset diabetes with hyperglycemia  Cancer cachexia      Oncologic Narrative:  54 y.o.  man from [place] who presented in May 2024 with difficult DM control (A1c 9.1 this month)and some ED visits for fatigue/weakness. Most recently at [facility] May 16-18. 70 lb wt loss mentioned (note some of the new DM meds) .  Tumor markers checked:  CEA: 4.9  Ca 19-9: >700    CT A/P Showed:   Edema surrounding the pancreatic tail compatible with acute pancreatitis. Dilatation of the pancreatic duct which measures 6 mm in diameter and an ill marginated area of hypodensity in the  pancreatic head highly concerning for pancreatic malignancy, and pancreatic protocol MRI is recommended for further evaluation. An  area of necrotizing pancreatitis/pancreatic necrosis is other consideration, however there is no surrounding edema in this region and given the ductal dilatation, findings highly concerning for an underlying malignancy.  Small nonobstructive left renal calculi.     MRI:  IMPRESSION:  1. Pancreatic head/uncinate process mass measuring 2.9 x 2.5 cm with upstream pancreatic duct dilatation and parenchymal atrophy highly  concerning for primary pancreatic neoplasm (specifically  adenocarcinoma). Surrounding changes of mild superimposed pancreatitis. The mass is in contact with the adjacent 3rd part of the duodenum but no upstream dilatation. No biliary obstruction. The  mass has 180 degree contact with the adjacent superior mesenteric vein and likely encasing the 1st right lateral branch. The mass is  likely encasing the gastroduodenal artery distal component. The  celiac axis, superior mesenteric artery and main portal vein are intact.  2. Few subcentimeter peripancreatic indeterminate lymph nodes noted.  3. Hepatic steatosis with segment 4A lesion measuring 0.7 cm with  imaging characteristics favoring hemangioma .     Chest CT:  IMPRESSION:  1. No suspicious pulmonary nodules. Few small calcific granulomas noted in the lingula.  2. No enlarged intrathoracic lymphadenopathy.  3. Left thyroid nodule measuring 1.1 cm. This could be further assessed by dedicated nonemergent thyroid ultrasound if clinically desired.  4. Subdiaphragmatic findings regarding the known pancreatic mass and duct dilatation as well as the hepatic observations are better evaluated in prior MRI dated 05/16/2024     EUS:    Result Text   Impression  Limited endoscopic views of the esophagus, stomach and duodenum were obtained. There was heme and gastritis noted in the stomach. The rest of the exam was unremarkable   A 27 mm by 23 mm irregular and hypoechoic T2N0 mass with poorly defined margins was visualized in the uncinate process of the head with apparent involvement of the superior mesenteric vein; fine needle biopsy was performed, final pathology is pending but preliminary evaluation was positive for malignancy. The pancreas upstream to the mass appeared atrophic with dilated pancreatic duct.   The bile duct appeared normal.  Visualized portions of the liver, spleen, left adrenal gland, left kidney and celiac axis were normal on ultrasound examination.    No abnormal-appearing lymph nodes were identified in the  abdomen.            FINAL DIAGNOSIS   A. PANCREAS HEAD MASS, BIOPSY:     -- Invasive adenocarcinoma, moderately differentiated      6/11/24 - Exp / lap  -no carcinomatosis     Past Medical History: Nate has a past medical history of Diabetes mellitus (Multi), Gastroesophageal reflux disease without esophagitis (10/05/2023), Hemorrhoids (11/03/2023), HTN (hypertension), Leg cramps (10/05/2023), Oropharyngeal dysphagia (10/05/2023), RADHA (obstructive sleep apnea), Pancreatic cancer (Multi), and Personal history of other malignant neoplasm of skin.  Surgical History:  Nate has a past surgical history that includes Tonsillectomy; Vasectomy; Testicle surgery; Esophagogastroduodenoscopy; Colonoscopy; Eyelid carcinoma excision; Rotator cuff repair (Bilateral); and Knee arthroscopy w/ debridement.  Social History:  Nate reports that he has never smoked. He has quit using smokeless tobacco.  His smokeless tobacco use included chew. He reports that he does not currently use alcohol after a past usage of about 7.0 standard drinks of alcohol per week. He reports that he does not use drugs.  Family History:    Family History   Problem Relation Name Age of Onset    Diabetes Mother      Ovarian cancer Mother      Liver cancer Father      Lung cancer Father      Colon cancer Father      Hypertension Father      Stroke Maternal Grandmother      Diabetes Maternal Grandmother      Liver cancer Maternal Grandfather      Lung cancer Paternal Grandfather       Family Oncology History:  Cancer-related family history includes Colon cancer in his father; Liver cancer in his father and maternal grandfather; Lung cancer in his father and paternal grandfather; Ovarian cancer in his mother.    Mom with uterine caner - early 30s   Wife with bolton syndrome  -     - daughter with Bolton Syndrome - age 28     - son 21, has not followed up with genetic testing    SUBJECTIVE:  History of Present Illness:  Nate Alvarez is a 54 y.o. male who  was referred by Shaw You MD and presents with chemotherapy follow up.     S/p 6th cycle of mFOLFIRINOX   Due for C7  tomorrow     - added fosaprepitant to C2 for N/V - nausea improved - still using compazine as well.    + jaw pain with first bites of food for 5 - 7 days, but tolerable    + cold sensitivity x 1 week    -erythematous rash on arms   No fevers or chills    - + back pain, hip pain on oxycodone    - occ fatigue, but staying active       OBJECTIVE:    VS / Pain:  There were no vitals taken for this visit.  BSA: There is no height or weight on file to calculate BSA.     Pain Scale: 0    Daily Weight  09/30/24 : 111 kg (244 lb)  09/25/24 : 111 kg (244 lb)  09/17/24 : 112 kg (246 lb 11.2 oz)      Physical Exam  Constitutional:       Appearance: Normal appearance.   HENT:      Head: Normocephalic.      Mouth/Throat:      Mouth: Mucous membranes are moist.      Pharynx: Oropharynx is clear.   Eyes:      Pupils: Pupils are equal, round, and reactive to light.   Cardiovascular:      Rate and Rhythm: Normal rate.      Pulses: Normal pulses.   Pulmonary:      Effort: Pulmonary effort is normal.   Abdominal:      General: Abdomen is flat. Bowel sounds are normal.   Musculoskeletal:      Cervical back: Neck supple.   Skin:     General: Skin is warm and dry.      Capillary Refill: Capillary refill takes less than 2 seconds.      Comments: Macular papular rash on bilateral arms   Neurological:      General: No focal deficit present.      Mental Status: He is alert.   Psychiatric:         Mood and Affect: Mood normal.         Performance Status: 1     Diagnostic Results     WBC   Date/Time Value Ref Range Status   09/30/2024 02:55 PM 5.6 4.4 - 11.3 x10*3/uL Final   09/13/2024 11:38 AM 6.4 4.4 - 11.3 x10*3/uL Final   08/30/2024 02:08 PM 5.6 4.4 - 11.3 x10*3/uL Final     Hemoglobin   Date Value Ref Range Status   09/30/2024 12.5 (L) 13.5 - 17.5 g/dL Final   09/13/2024 13.6 13.5 - 17.5 g/dL Final   08/30/2024 13.6  13.5 - 17.5 g/dL Final     MCV   Date/Time Value Ref Range Status   09/30/2024 02:55 PM 90 80 - 100 fL Final   09/13/2024 11:38 AM 88 80 - 100 fL Final   08/30/2024 02:08 PM 86 80 - 100 fL Final     Platelets   Date/Time Value Ref Range Status   09/30/2024 02:55  (L) 150 - 450 x10*3/uL Final   09/13/2024 11:38  (L) 150 - 450 x10*3/uL Final   08/30/2024 02:08  (L) 150 - 450 x10*3/uL Final     Neutrophils Absolute   Date/Time Value Ref Range Status   09/30/2024 02:55 PM 2.98 1.20 - 7.70 x10*3/uL Final     Comment:     Percent differential counts (%) should be interpreted in the context of the absolute cell counts (cells/uL).   09/13/2024 11:38 AM 3.42 1.20 - 7.70 x10*3/uL Final     Comment:     Percent differential counts (%) should be interpreted in the context of the absolute cell counts (cells/uL).   08/30/2024 02:08 PM 3.13 1.20 - 7.70 x10*3/uL Final     Comment:     Percent differential counts (%) should be interpreted in the context of the absolute cell counts (cells/uL).     Bilirubin, Total   Date/Time Value Ref Range Status   09/30/2024 02:55 PM 0.5 0.0 - 1.2 mg/dL Final   09/13/2024 11:38 AM 0.4 0.0 - 1.2 mg/dL Final   08/30/2024 02:08 PM 0.6 0.0 - 1.2 mg/dL Final     AST   Date/Time Value Ref Range Status   09/30/2024 02:55 PM 22 9 - 39 U/L Final   09/13/2024 11:38 AM 22 9 - 39 U/L Final   08/30/2024 02:08 PM 27 9 - 39 U/L Final     ALT   Date/Time Value Ref Range Status   09/30/2024 02:55 PM 32 10 - 52 U/L Final     Comment:     Patients treated with Sulfasalazine may generate falsely decreased results for ALT.   09/13/2024 11:38 AM 34 10 - 52 U/L Final     Comment:     Patients treated with Sulfasalazine may generate falsely decreased results for ALT.   08/30/2024 02:08 PM 33 10 - 52 U/L Final     Comment:     Patients treated with Sulfasalazine may generate falsely decreased results for ALT.     Creatinine   Date/Time Value Ref Range Status   09/30/2024 02:55 PM 0.68 0.50 - 1.30 mg/dL  Final   09/13/2024 11:38 AM 0.90 0.50 - 1.30 mg/dL Final   08/30/2024 02:08 PM 0.99 0.50 - 1.30 mg/dL Final     Urea Nitrogen   Date/Time Value Ref Range Status   09/30/2024 02:55 PM 17 6 - 23 mg/dL Final   09/13/2024 11:38 AM 23 6 - 23 mg/dL Final   08/30/2024 02:08 PM 17 6 - 23 mg/dL Final     Albumin   Date/Time Value Ref Range Status   09/30/2024 02:55 PM 3.9 3.4 - 5.0 g/dL Final   09/13/2024 11:38 AM 4.0 3.4 - 5.0 g/dL Final   08/30/2024 02:08 PM 4.2 3.4 - 5.0 g/dL Final     Cancer AG 19-9   Date/Time Value Ref Range Status   09/30/2024 02:55 PM 49.59 (H) <35.00 U/mL Final   08/30/2024 02:08 PM 59.14 (H) <35.00 U/mL Final   08/05/2024 01:34 .38 (H) <35.00 U/mL Final     Carcinoembryonic AG   Date/Time Value Ref Range Status   05/17/2024 09:28 AM 4.9 ug/L Final     === 09/26/24 ===    CT CHEST ABDOMEN PELVIS W IV CONTRAST    - Impression -  Pancreatic cancer restaging scan as compared to prior CT from  07/29/2024:  1. Continued interval decrease in size of known pancreatic  head/uncinate process mass.  2. Stable to slightly decreased size of periportal lymph node.  Otherwise, no new abdominopelvic lymphadenopathy or evidence of  metastatic disease within the chest, abdomen, and pelvis.  3. Additional chronic findings as described above.    MACRO:  None    Signed by: Florencio Dennis 9/28/2024 8:34 AM  Dictation workstation:   [id number]      Assessment/Plan   This is a 54-year-old man with borderline resectable pancreatic adenocarcinoma.  He presented in May 2024 with greater than 50 pound weight loss and new onset diabetes.  Imaging, EUS and biopsy confirmed adenocarcinoma of the pancreas involving the GDA, celiac axis, SMV.    Borderline resectable Pancreatic cancer:   - Consented for mFOLFIRINOX - started 6/3/24 - tolerated with jaw pain / nausea  - added fosaprepitant for C2   -C2  -still with jaw pain, but manageable, we discussed possible dose reduction, he does not feel this is necessary yet.    -  improved dz after 4 cycles   Plan for C9 today               -C10 in 2 weeks   Surgical referral in about 2 weeks.    RTC in 4 weeks with scans                  Follow Cancer Antigen 19-9 at least every other cycle  Obtain complete genomic profiling from pancreatic biopsy.    Pain - continue oxycodone      Diabetes:  Continue long and short acting insulin as managed by  Dr. Brandon You MD    [facility] [place]  Office: [phone]  Fax: [phone]

## 2024-10-01 NOTE — PROGRESS NOTES
SW stop for a short follow up with patient and wife. They are doing well under the circumstances and had no concerns today. Both are very pleasant and communicative. Will continue to follow.

## 2024-10-01 NOTE — LETTER
Date: 2024  RE:  Nate Alvarez  :  1970      To Whom It May Concern:    Our patient, Nate, has been under our care and now may return back to work without restrictions. Per his oncologist Dr You.     Their return to work date is:  10/7/2024    If you have questions concerning this patient's immediate care, please feel free to contact our office at [phone].    Sincerely,    Cris Blanton RN

## 2024-10-01 NOTE — SIGNIFICANT EVENT

## 2024-10-03 ENCOUNTER — INFUSION (OUTPATIENT)
Dept: HEMATOLOGY/ONCOLOGY | Facility: CLINIC | Age: 54
End: 2024-10-03
Payer: COMMERCIAL

## 2024-10-03 VITALS
DIASTOLIC BLOOD PRESSURE: 75 MMHG | OXYGEN SATURATION: 96 % | BODY MASS INDEX: 31.57 KG/M2 | HEIGHT: 74 IN | HEART RATE: 62 BPM | SYSTOLIC BLOOD PRESSURE: 122 MMHG | TEMPERATURE: 97.2 F | WEIGHT: 246 LBS | RESPIRATION RATE: 16 BRPM

## 2024-10-03 DIAGNOSIS — C25.0 MALIGNANT NEOPLASM OF HEAD OF PANCREAS (MULTI): ICD-10-CM

## 2024-10-03 PROCEDURE — 96377 APPLICATON ON-BODY INJECTOR: CPT

## 2024-10-03 PROCEDURE — 2500000004 HC RX 250 GENERAL PHARMACY W/ HCPCS (ALT 636 FOR OP/ED): Performed by: NURSE PRACTITIONER

## 2024-10-03 RX ORDER — HEPARIN 100 UNIT/ML
500 SYRINGE INTRAVENOUS AS NEEDED
OUTPATIENT
Start: 2024-10-03

## 2024-10-03 RX ORDER — HEPARIN SODIUM,PORCINE/PF 10 UNIT/ML
50 SYRINGE (ML) INTRAVENOUS AS NEEDED
OUTPATIENT
Start: 2024-10-03

## 2024-10-03 RX ORDER — HEPARIN 100 UNIT/ML
500 SYRINGE INTRAVENOUS AS NEEDED
Status: DISCONTINUED | OUTPATIENT
Start: 2024-10-03 | End: 2024-10-03 | Stop reason: HOSPADM

## 2024-10-03 ASSESSMENT — PAIN SCALES - GENERAL: PAINLEVEL: 0-NO PAIN

## 2024-10-11 ENCOUNTER — TELEPHONE (OUTPATIENT)
Dept: ADMISSION | Facility: HOSPITAL | Age: 54
End: 2024-10-11
Payer: COMMERCIAL

## 2024-10-11 DIAGNOSIS — C25.0 MALIGNANT NEOPLASM OF HEAD OF PANCREAS (MULTI): ICD-10-CM

## 2024-10-11 RX ORDER — OXYCODONE HYDROCHLORIDE 10 MG/1
10 TABLET ORAL EVERY 6 HOURS PRN
Qty: 120 TABLET | Refills: 0 | Status: SHIPPED | OUTPATIENT
Start: 2024-10-11 | End: 2024-11-10

## 2024-10-11 NOTE — TELEPHONE ENCOUNTER
Medication Refill-  oxycodone      [facility]  [facility] #[id number]

## 2024-10-14 ENCOUNTER — LAB (OUTPATIENT)
Dept: HEMATOLOGY/ONCOLOGY | Facility: CLINIC | Age: 54
End: 2024-10-14
Payer: COMMERCIAL

## 2024-10-14 VITALS
WEIGHT: 246 LBS | OXYGEN SATURATION: 96 % | TEMPERATURE: 97.7 F | DIASTOLIC BLOOD PRESSURE: 83 MMHG | RESPIRATION RATE: 16 BRPM | HEIGHT: 74 IN | BODY MASS INDEX: 31.57 KG/M2 | SYSTOLIC BLOOD PRESSURE: 137 MMHG | HEART RATE: 82 BPM

## 2024-10-14 DIAGNOSIS — C25.0 MALIGNANT NEOPLASM OF HEAD OF PANCREAS (MULTI): ICD-10-CM

## 2024-10-14 LAB
ALBUMIN SERPL BCP-MCNC: 4.1 G/DL (ref 3.4–5)
ALP SERPL-CCNC: 80 U/L (ref 33–120)
ALT SERPL W P-5'-P-CCNC: 37 U/L (ref 10–52)
ANION GAP SERPL CALC-SCNC: 10 MMOL/L (ref 10–20)
AST SERPL W P-5'-P-CCNC: 29 U/L (ref 9–39)
BASOPHILS # BLD AUTO: 0.03 X10*3/UL (ref 0–0.1)
BASOPHILS NFR BLD AUTO: 0.5 %
BILIRUB SERPL-MCNC: 0.7 MG/DL (ref 0–1.2)
BUN SERPL-MCNC: 14 MG/DL (ref 6–23)
CALCIUM SERPL-MCNC: 9.2 MG/DL (ref 8.6–10.3)
CHLORIDE SERPL-SCNC: 108 MMOL/L (ref 98–107)
CO2 SERPL-SCNC: 26 MMOL/L (ref 21–32)
CREAT SERPL-MCNC: 0.75 MG/DL (ref 0.5–1.3)
EGFRCR SERPLBLD CKD-EPI 2021: >90 ML/MIN/1.73M*2
EOSINOPHIL # BLD AUTO: 0.18 X10*3/UL (ref 0–0.7)
EOSINOPHIL NFR BLD AUTO: 3.2 %
ERYTHROCYTE [DISTWIDTH] IN BLOOD BY AUTOMATED COUNT: 15 % (ref 11.5–14.5)
GLUCOSE SERPL-MCNC: 155 MG/DL (ref 74–99)
HCT VFR BLD AUTO: 38.2 % (ref 41–52)
HGB BLD-MCNC: 13.3 G/DL (ref 13.5–17.5)
IMM GRANULOCYTES # BLD AUTO: 0.01 X10*3/UL (ref 0–0.7)
IMM GRANULOCYTES NFR BLD AUTO: 0.2 % (ref 0–0.9)
LYMPHOCYTES # BLD AUTO: 1.49 X10*3/UL (ref 1.2–4.8)
LYMPHOCYTES NFR BLD AUTO: 26.2 %
MCH RBC QN AUTO: 31.4 PG (ref 26–34)
MCHC RBC AUTO-ENTMCNC: 34.8 G/DL (ref 32–36)
MCV RBC AUTO: 90 FL (ref 80–100)
MONOCYTES # BLD AUTO: 0.82 X10*3/UL (ref 0.1–1)
MONOCYTES NFR BLD AUTO: 14.4 %
NEUTROPHILS # BLD AUTO: 3.16 X10*3/UL (ref 1.2–7.7)
NEUTROPHILS NFR BLD AUTO: 55.5 %
PLATELET # BLD AUTO: 106 X10*3/UL (ref 150–450)
POTASSIUM SERPL-SCNC: 3.8 MMOL/L (ref 3.5–5.3)
PROT SERPL-MCNC: 6.6 G/DL (ref 6.4–8.2)
RBC # BLD AUTO: 4.23 X10*6/UL (ref 4.5–5.9)
SODIUM SERPL-SCNC: 140 MMOL/L (ref 136–145)
WBC # BLD AUTO: 5.7 X10*3/UL (ref 4.4–11.3)

## 2024-10-14 PROCEDURE — 36591 DRAW BLOOD OFF VENOUS DEVICE: CPT

## 2024-10-14 PROCEDURE — 85025 COMPLETE CBC W/AUTO DIFF WBC: CPT

## 2024-10-14 PROCEDURE — 2500000004 HC RX 250 GENERAL PHARMACY W/ HCPCS (ALT 636 FOR OP/ED): Performed by: NURSE PRACTITIONER

## 2024-10-14 PROCEDURE — 80053 COMPREHEN METABOLIC PANEL: CPT

## 2024-10-14 RX ORDER — HEPARIN SODIUM,PORCINE/PF 10 UNIT/ML
50 SYRINGE (ML) INTRAVENOUS AS NEEDED
Status: CANCELLED | OUTPATIENT
Start: 2024-10-14

## 2024-10-14 RX ORDER — HEPARIN 100 UNIT/ML
500 SYRINGE INTRAVENOUS AS NEEDED
Status: DISCONTINUED | OUTPATIENT
Start: 2024-10-14 | End: 2024-10-14 | Stop reason: HOSPADM

## 2024-10-14 RX ORDER — HEPARIN 100 UNIT/ML
500 SYRINGE INTRAVENOUS AS NEEDED
Status: CANCELLED | OUTPATIENT
Start: 2024-10-14

## 2024-10-14 ASSESSMENT — PAIN SCALES - GENERAL: PAINLEVEL: 0-NO PAIN

## 2024-10-15 ENCOUNTER — INFUSION (OUTPATIENT)
Dept: HEMATOLOGY/ONCOLOGY | Facility: CLINIC | Age: 54
End: 2024-10-15
Payer: COMMERCIAL

## 2024-10-15 VITALS
RESPIRATION RATE: 16 BRPM | TEMPERATURE: 97.3 F | WEIGHT: 239.9 LBS | SYSTOLIC BLOOD PRESSURE: 143 MMHG | BODY MASS INDEX: 30.79 KG/M2 | HEIGHT: 74 IN | HEART RATE: 77 BPM | OXYGEN SATURATION: 99 % | DIASTOLIC BLOOD PRESSURE: 93 MMHG

## 2024-10-15 DIAGNOSIS — C25.0 MALIGNANT NEOPLASM OF HEAD OF PANCREAS (MULTI): ICD-10-CM

## 2024-10-15 PROCEDURE — 2500000004 HC RX 250 GENERAL PHARMACY W/ HCPCS (ALT 636 FOR OP/ED): Performed by: INTERNAL MEDICINE

## 2024-10-15 PROCEDURE — 2500000001 HC RX 250 WO HCPCS SELF ADMINISTERED DRUGS (ALT 637 FOR MEDICARE OP): Performed by: INTERNAL MEDICINE

## 2024-10-15 PROCEDURE — 96417 CHEMO IV INFUS EACH ADDL SEQ: CPT

## 2024-10-15 PROCEDURE — 96415 CHEMO IV INFUSION ADDL HR: CPT

## 2024-10-15 PROCEDURE — 96375 TX/PRO/DX INJ NEW DRUG ADDON: CPT | Mod: INF

## 2024-10-15 PROCEDURE — 96413 CHEMO IV INFUSION 1 HR: CPT

## 2024-10-15 PROCEDURE — 96376 TX/PRO/DX INJ SAME DRUG ADON: CPT

## 2024-10-15 PROCEDURE — 96367 TX/PROPH/DG ADDL SEQ IV INF: CPT

## 2024-10-15 RX ORDER — DIPHENHYDRAMINE HYDROCHLORIDE 50 MG/ML
50 INJECTION INTRAMUSCULAR; INTRAVENOUS AS NEEDED
Status: DISCONTINUED | OUTPATIENT
Start: 2024-10-15 | End: 2024-10-15 | Stop reason: HOSPADM

## 2024-10-15 RX ORDER — FAMOTIDINE 10 MG/ML
20 INJECTION INTRAVENOUS ONCE AS NEEDED
Status: DISCONTINUED | OUTPATIENT
Start: 2024-10-15 | End: 2024-10-15 | Stop reason: HOSPADM

## 2024-10-15 RX ORDER — HEPARIN SODIUM,PORCINE/PF 10 UNIT/ML
50 SYRINGE (ML) INTRAVENOUS AS NEEDED
OUTPATIENT
Start: 2024-10-15

## 2024-10-15 RX ORDER — PALONOSETRON 0.05 MG/ML
0.25 INJECTION, SOLUTION INTRAVENOUS ONCE
Status: COMPLETED | OUTPATIENT
Start: 2024-10-15 | End: 2024-10-15

## 2024-10-15 RX ORDER — HEPARIN 100 UNIT/ML
500 SYRINGE INTRAVENOUS AS NEEDED
Status: DISCONTINUED | OUTPATIENT
Start: 2024-10-15 | End: 2024-10-15 | Stop reason: HOSPADM

## 2024-10-15 RX ORDER — LORAZEPAM 1 MG/1
1 TABLET ORAL ONCE
Status: COMPLETED | OUTPATIENT
Start: 2024-10-15 | End: 2024-10-15

## 2024-10-15 RX ORDER — LORAZEPAM 2 MG/ML
1 INJECTION INTRAMUSCULAR AS NEEDED
Status: DISCONTINUED | OUTPATIENT
Start: 2024-10-15 | End: 2024-10-15 | Stop reason: HOSPADM

## 2024-10-15 RX ORDER — PROCHLORPERAZINE EDISYLATE 5 MG/ML
10 INJECTION INTRAMUSCULAR; INTRAVENOUS EVERY 6 HOURS PRN
Status: DISCONTINUED | OUTPATIENT
Start: 2024-10-15 | End: 2024-10-15 | Stop reason: HOSPADM

## 2024-10-15 RX ORDER — EPINEPHRINE 0.3 MG/.3ML
0.3 INJECTION SUBCUTANEOUS EVERY 5 MIN PRN
Status: DISCONTINUED | OUTPATIENT
Start: 2024-10-15 | End: 2024-10-15 | Stop reason: HOSPADM

## 2024-10-15 RX ORDER — HEPARIN 100 UNIT/ML
500 SYRINGE INTRAVENOUS AS NEEDED
OUTPATIENT
Start: 2024-10-15

## 2024-10-15 RX ORDER — ALBUTEROL SULFATE 0.83 MG/ML
3 SOLUTION RESPIRATORY (INHALATION) AS NEEDED
Status: DISCONTINUED | OUTPATIENT
Start: 2024-10-15 | End: 2024-10-15 | Stop reason: HOSPADM

## 2024-10-15 RX ORDER — PROCHLORPERAZINE MALEATE 10 MG
10 TABLET ORAL EVERY 6 HOURS PRN
Status: DISCONTINUED | OUTPATIENT
Start: 2024-10-15 | End: 2024-10-15 | Stop reason: HOSPADM

## 2024-10-15 RX ORDER — ATROPINE SULFATE 0.4 MG/ML
0.4 INJECTION, SOLUTION ENDOTRACHEAL; INTRAMEDULLARY; INTRAMUSCULAR; INTRAVENOUS; SUBCUTANEOUS
Status: COMPLETED | OUTPATIENT
Start: 2024-10-15 | End: 2024-10-15

## 2024-10-15 ASSESSMENT — PAIN SCALES - GENERAL: PAINLEVEL: 0-NO PAIN

## 2024-10-15 NOTE — PROGRESS NOTES
Patient is here for Cycle 10. Tolerated treatment well. Patient was connected to CADD pump. Blood return present at time of connection and pump infusing at discharge. Patient is aware of when to return for disconnect on 10/17/24. Discharged in stable condition.

## 2024-10-15 NOTE — SIGNIFICANT EVENT

## 2024-10-17 ENCOUNTER — INFUSION (OUTPATIENT)
Dept: HEMATOLOGY/ONCOLOGY | Facility: CLINIC | Age: 54
End: 2024-10-17
Payer: COMMERCIAL

## 2024-10-17 VITALS
TEMPERATURE: 97.2 F | HEIGHT: 74 IN | DIASTOLIC BLOOD PRESSURE: 80 MMHG | SYSTOLIC BLOOD PRESSURE: 117 MMHG | RESPIRATION RATE: 16 BRPM | OXYGEN SATURATION: 97 % | BODY MASS INDEX: 30.76 KG/M2 | HEART RATE: 66 BPM

## 2024-10-17 DIAGNOSIS — C25.0 MALIGNANT NEOPLASM OF HEAD OF PANCREAS (MULTI): ICD-10-CM

## 2024-10-17 ASSESSMENT — PAIN SCALES - GENERAL: PAINLEVEL_OUTOF10: 0-NO PAIN

## 2024-10-17 NOTE — PROGRESS NOTES
Patient here for pump disconnect, tolerated well. Patient to return to [place] 10/29 for next treatment. Patient denies any questions at this time. Patient discharged in stable condition.

## 2024-10-24 ENCOUNTER — NURSE TRIAGE (OUTPATIENT)
Dept: ADMISSION | Facility: HOSPITAL | Age: 54
End: 2024-10-24
Payer: COMMERCIAL

## 2024-10-24 DIAGNOSIS — E11.65 TYPE 2 DIABETES MELLITUS WITH HYPERGLYCEMIA, WITH LONG-TERM CURRENT USE OF INSULIN: ICD-10-CM

## 2024-10-24 DIAGNOSIS — Z79.4 TYPE 2 DIABETES MELLITUS WITH HYPERGLYCEMIA, WITH LONG-TERM CURRENT USE OF INSULIN: ICD-10-CM

## 2024-10-24 RX ORDER — INSULIN GLARGINE 100 [IU]/ML
20 INJECTION, SOLUTION SUBCUTANEOUS EVERY 24 HOURS
Qty: 18 ML | Refills: 1 | Status: SHIPPED | OUTPATIENT
Start: 2024-10-24 | End: 2025-04-22

## 2024-10-24 NOTE — TELEPHONE ENCOUNTER
Patient called and states he has a rash, similar to having jock itch.  Tried Lotrimin OTC but not going away.  Requesting RX.     Rash started 6 days ago. Rash in groin and inside legs and under testicle area.  Itching. No pain.  Red raised rash. Slight clear/yellow tinge drainage from rash.  Denies sore inside mouth.  No fever, no chest pain, no SOB. Eating/drinking normally. Denies any new exposure to soaps, lotion, detergents.  Patient recently started going back to work about 3 weeks ago.      Additional Information   Commented on: When and where did the rash start? How long have you had the rash?     Groin, inside legs, under testicle area.   Commented on: How does the rash look? What color and size is it?     Red raised rash    Protocols used: Rash

## 2024-10-24 NOTE — TELEPHONE ENCOUNTER
RN called patient and per recommendations of PharmD - advised to apply OTC miconazole 2% powder applied to the affected area twice daily until resolution.  If no improvement advised patient to follow up with PCP.  Patient agreeable with plan. No future needs at this time.

## 2024-10-28 ENCOUNTER — INFUSION (OUTPATIENT)
Dept: HEMATOLOGY/ONCOLOGY | Facility: CLINIC | Age: 54
End: 2024-10-28
Payer: COMMERCIAL

## 2024-10-28 ENCOUNTER — TELEPHONE (OUTPATIENT)
Dept: HEMATOLOGY/ONCOLOGY | Facility: CLINIC | Age: 54
End: 2024-10-28

## 2024-10-28 VITALS
HEIGHT: 74 IN | WEIGHT: 239 LBS | BODY MASS INDEX: 30.67 KG/M2 | RESPIRATION RATE: 16 BRPM | DIASTOLIC BLOOD PRESSURE: 84 MMHG | SYSTOLIC BLOOD PRESSURE: 126 MMHG | TEMPERATURE: 97.3 F

## 2024-10-28 DIAGNOSIS — C25.0 MALIGNANT NEOPLASM OF HEAD OF PANCREAS (MULTI): ICD-10-CM

## 2024-10-28 DIAGNOSIS — C25.0 MALIGNANT NEOPLASM OF HEAD OF PANCREAS (MULTI): Primary | ICD-10-CM

## 2024-10-28 LAB
ALBUMIN SERPL BCP-MCNC: 4 G/DL (ref 3.4–5)
ALP SERPL-CCNC: 91 U/L (ref 33–120)
ALT SERPL W P-5'-P-CCNC: 38 U/L (ref 10–52)
ANION GAP SERPL CALC-SCNC: 9 MMOL/L (ref 10–20)
AST SERPL W P-5'-P-CCNC: 26 U/L (ref 9–39)
BASOPHILS # BLD AUTO: 0.04 X10*3/UL (ref 0–0.1)
BASOPHILS NFR BLD AUTO: 0.7 %
BILIRUB SERPL-MCNC: 0.5 MG/DL (ref 0–1.2)
BUN SERPL-MCNC: 18 MG/DL (ref 6–23)
CALCIUM SERPL-MCNC: 9 MG/DL (ref 8.6–10.3)
CHLORIDE SERPL-SCNC: 111 MMOL/L (ref 98–107)
CO2 SERPL-SCNC: 27 MMOL/L (ref 21–32)
CREAT SERPL-MCNC: 0.7 MG/DL (ref 0.5–1.3)
EGFRCR SERPLBLD CKD-EPI 2021: >90 ML/MIN/1.73M*2
EOSINOPHIL # BLD AUTO: 0.15 X10*3/UL (ref 0–0.7)
EOSINOPHIL NFR BLD AUTO: 2.5 %
ERYTHROCYTE [DISTWIDTH] IN BLOOD BY AUTOMATED COUNT: 14.3 % (ref 11.5–14.5)
GLUCOSE SERPL-MCNC: 137 MG/DL (ref 74–99)
HCT VFR BLD AUTO: 36.5 % (ref 41–52)
HGB BLD-MCNC: 12.7 G/DL (ref 13.5–17.5)
IMM GRANULOCYTES # BLD AUTO: 0.01 X10*3/UL (ref 0–0.7)
IMM GRANULOCYTES NFR BLD AUTO: 0.2 % (ref 0–0.9)
LYMPHOCYTES # BLD AUTO: 1.97 X10*3/UL (ref 1.2–4.8)
LYMPHOCYTES NFR BLD AUTO: 32.9 %
MCH RBC QN AUTO: 31.6 PG (ref 26–34)
MCHC RBC AUTO-ENTMCNC: 34.8 G/DL (ref 32–36)
MCV RBC AUTO: 91 FL (ref 80–100)
MONOCYTES # BLD AUTO: 0.68 X10*3/UL (ref 0.1–1)
MONOCYTES NFR BLD AUTO: 11.4 %
NEUTROPHILS # BLD AUTO: 3.13 X10*3/UL (ref 1.2–7.7)
NEUTROPHILS NFR BLD AUTO: 52.3 %
PLATELET # BLD AUTO: 136 X10*3/UL (ref 150–450)
POTASSIUM SERPL-SCNC: 3.8 MMOL/L (ref 3.5–5.3)
PROT SERPL-MCNC: 6.5 G/DL (ref 6.4–8.2)
RBC # BLD AUTO: 4.02 X10*6/UL (ref 4.5–5.9)
SODIUM SERPL-SCNC: 143 MMOL/L (ref 136–145)
WBC # BLD AUTO: 6 X10*3/UL (ref 4.4–11.3)

## 2024-10-28 PROCEDURE — 80053 COMPREHEN METABOLIC PANEL: CPT

## 2024-10-28 PROCEDURE — 36591 DRAW BLOOD OFF VENOUS DEVICE: CPT

## 2024-10-28 PROCEDURE — 85025 COMPLETE CBC W/AUTO DIFF WBC: CPT

## 2024-10-28 ASSESSMENT — PAIN SCALES - GENERAL: PAINLEVEL_OUTOF10: 2

## 2024-10-29 ENCOUNTER — INFUSION (OUTPATIENT)
Dept: HEMATOLOGY/ONCOLOGY | Facility: CLINIC | Age: 54
End: 2024-10-29
Payer: COMMERCIAL

## 2024-10-29 VITALS
RESPIRATION RATE: 16 BRPM | DIASTOLIC BLOOD PRESSURE: 84 MMHG | SYSTOLIC BLOOD PRESSURE: 139 MMHG | BODY MASS INDEX: 31.6 KG/M2 | OXYGEN SATURATION: 97 % | HEART RATE: 80 BPM | TEMPERATURE: 97.7 F | WEIGHT: 246.2 LBS | HEIGHT: 74 IN

## 2024-10-29 DIAGNOSIS — C25.0 MALIGNANT NEOPLASM OF HEAD OF PANCREAS (MULTI): Primary | ICD-10-CM

## 2024-10-29 LAB — CANCER AG19-9 SERPL-ACNC: 45.15 U/ML

## 2024-10-29 PROCEDURE — 86301 IMMUNOASSAY TUMOR CA 19-9: CPT | Mod: PORLAB

## 2024-10-29 PROCEDURE — 2500000001 HC RX 250 WO HCPCS SELF ADMINISTERED DRUGS (ALT 637 FOR MEDICARE OP): Performed by: INTERNAL MEDICINE

## 2024-10-29 PROCEDURE — 96415 CHEMO IV INFUSION ADDL HR: CPT

## 2024-10-29 PROCEDURE — 96367 TX/PROPH/DG ADDL SEQ IV INF: CPT

## 2024-10-29 PROCEDURE — 96417 CHEMO IV INFUS EACH ADDL SEQ: CPT

## 2024-10-29 PROCEDURE — 96376 TX/PRO/DX INJ SAME DRUG ADON: CPT

## 2024-10-29 PROCEDURE — 96413 CHEMO IV INFUSION 1 HR: CPT

## 2024-10-29 PROCEDURE — 96375 TX/PRO/DX INJ NEW DRUG ADDON: CPT | Mod: INF

## 2024-10-29 PROCEDURE — 2500000004 HC RX 250 GENERAL PHARMACY W/ HCPCS (ALT 636 FOR OP/ED): Performed by: INTERNAL MEDICINE

## 2024-10-29 RX ORDER — HEPARIN 100 UNIT/ML
500 SYRINGE INTRAVENOUS AS NEEDED
Status: CANCELLED | OUTPATIENT
Start: 2024-10-29

## 2024-10-29 RX ORDER — PROCHLORPERAZINE EDISYLATE 5 MG/ML
10 INJECTION INTRAMUSCULAR; INTRAVENOUS EVERY 6 HOURS PRN
Status: CANCELLED | OUTPATIENT
Start: 2024-10-29

## 2024-10-29 RX ORDER — LORAZEPAM 1 MG/1
1 TABLET ORAL ONCE
Status: CANCELLED | OUTPATIENT
Start: 2024-10-29 | End: 2024-10-29

## 2024-10-29 RX ORDER — EPINEPHRINE 0.3 MG/.3ML
0.3 INJECTION SUBCUTANEOUS EVERY 5 MIN PRN
Status: CANCELLED | OUTPATIENT
Start: 2024-10-29

## 2024-10-29 RX ORDER — PALONOSETRON 0.05 MG/ML
0.25 INJECTION, SOLUTION INTRAVENOUS ONCE
Status: CANCELLED | OUTPATIENT
Start: 2024-10-29

## 2024-10-29 RX ORDER — PALONOSETRON 0.05 MG/ML
0.25 INJECTION, SOLUTION INTRAVENOUS ONCE
Status: COMPLETED | OUTPATIENT
Start: 2024-10-29 | End: 2024-10-29

## 2024-10-29 RX ORDER — LORAZEPAM 2 MG/ML
1 INJECTION INTRAMUSCULAR AS NEEDED
Status: DISCONTINUED | OUTPATIENT
Start: 2024-10-29 | End: 2024-10-29 | Stop reason: HOSPADM

## 2024-10-29 RX ORDER — PROCHLORPERAZINE MALEATE 10 MG
10 TABLET ORAL EVERY 6 HOURS PRN
Status: CANCELLED | OUTPATIENT
Start: 2024-10-29

## 2024-10-29 RX ORDER — ALBUTEROL SULFATE 0.83 MG/ML
3 SOLUTION RESPIRATORY (INHALATION) AS NEEDED
Status: CANCELLED | OUTPATIENT
Start: 2024-10-29

## 2024-10-29 RX ORDER — DIPHENHYDRAMINE HYDROCHLORIDE 50 MG/ML
50 INJECTION INTRAMUSCULAR; INTRAVENOUS AS NEEDED
Status: CANCELLED | OUTPATIENT
Start: 2024-10-31

## 2024-10-29 RX ORDER — EPINEPHRINE 0.3 MG/.3ML
0.3 INJECTION SUBCUTANEOUS EVERY 5 MIN PRN
Status: DISCONTINUED | OUTPATIENT
Start: 2024-10-29 | End: 2024-10-29 | Stop reason: HOSPADM

## 2024-10-29 RX ORDER — FAMOTIDINE 10 MG/ML
20 INJECTION INTRAVENOUS ONCE AS NEEDED
Status: CANCELLED | OUTPATIENT
Start: 2024-10-31

## 2024-10-29 RX ORDER — FAMOTIDINE 10 MG/ML
20 INJECTION INTRAVENOUS ONCE AS NEEDED
Status: DISCONTINUED | OUTPATIENT
Start: 2024-10-29 | End: 2024-10-29 | Stop reason: HOSPADM

## 2024-10-29 RX ORDER — ATROPINE SULFATE 0.4 MG/ML
0.4 INJECTION, SOLUTION ENDOTRACHEAL; INTRAMEDULLARY; INTRAMUSCULAR; INTRAVENOUS; SUBCUTANEOUS
Status: COMPLETED | OUTPATIENT
Start: 2024-10-29 | End: 2024-10-29

## 2024-10-29 RX ORDER — PROCHLORPERAZINE EDISYLATE 5 MG/ML
10 INJECTION INTRAMUSCULAR; INTRAVENOUS EVERY 6 HOURS PRN
Status: DISCONTINUED | OUTPATIENT
Start: 2024-10-29 | End: 2024-10-29 | Stop reason: HOSPADM

## 2024-10-29 RX ORDER — DEXAMETHASONE 6 MG/1
12 TABLET ORAL ONCE
Status: COMPLETED | OUTPATIENT
Start: 2024-10-29 | End: 2024-10-29

## 2024-10-29 RX ORDER — ALBUTEROL SULFATE 0.83 MG/ML
3 SOLUTION RESPIRATORY (INHALATION) AS NEEDED
Status: CANCELLED | OUTPATIENT
Start: 2024-10-31

## 2024-10-29 RX ORDER — LORAZEPAM 1 MG/1
1 TABLET ORAL ONCE
Status: COMPLETED | OUTPATIENT
Start: 2024-10-29 | End: 2024-10-29

## 2024-10-29 RX ORDER — HEPARIN 100 UNIT/ML
500 SYRINGE INTRAVENOUS AS NEEDED
Status: DISCONTINUED | OUTPATIENT
Start: 2024-10-29 | End: 2024-10-29 | Stop reason: HOSPADM

## 2024-10-29 RX ORDER — LORAZEPAM 2 MG/ML
1 INJECTION INTRAMUSCULAR AS NEEDED
Status: CANCELLED | OUTPATIENT
Start: 2024-10-29

## 2024-10-29 RX ORDER — DIPHENHYDRAMINE HYDROCHLORIDE 50 MG/ML
50 INJECTION INTRAMUSCULAR; INTRAVENOUS AS NEEDED
Status: CANCELLED | OUTPATIENT
Start: 2024-10-29

## 2024-10-29 RX ORDER — FAMOTIDINE 10 MG/ML
20 INJECTION INTRAVENOUS ONCE AS NEEDED
Status: CANCELLED | OUTPATIENT
Start: 2024-10-29

## 2024-10-29 RX ORDER — PROCHLORPERAZINE MALEATE 10 MG
10 TABLET ORAL EVERY 6 HOURS PRN
Status: DISCONTINUED | OUTPATIENT
Start: 2024-10-29 | End: 2024-10-29 | Stop reason: HOSPADM

## 2024-10-29 RX ORDER — DIPHENHYDRAMINE HYDROCHLORIDE 50 MG/ML
50 INJECTION INTRAMUSCULAR; INTRAVENOUS AS NEEDED
Status: DISCONTINUED | OUTPATIENT
Start: 2024-10-29 | End: 2024-10-29 | Stop reason: HOSPADM

## 2024-10-29 RX ORDER — HEPARIN SODIUM,PORCINE/PF 10 UNIT/ML
50 SYRINGE (ML) INTRAVENOUS AS NEEDED
Status: CANCELLED | OUTPATIENT
Start: 2024-10-29

## 2024-10-29 RX ORDER — EPINEPHRINE 0.3 MG/.3ML
0.3 INJECTION SUBCUTANEOUS EVERY 5 MIN PRN
Status: CANCELLED | OUTPATIENT
Start: 2024-10-31

## 2024-10-29 RX ORDER — ATROPINE SULFATE 0.4 MG/ML
0.4 INJECTION, SOLUTION ENDOTRACHEAL; INTRAMEDULLARY; INTRAMUSCULAR; INTRAVENOUS; SUBCUTANEOUS
Status: CANCELLED | OUTPATIENT
Start: 2024-10-29

## 2024-10-29 RX ORDER — ALBUTEROL SULFATE 0.83 MG/ML
3 SOLUTION RESPIRATORY (INHALATION) AS NEEDED
Status: DISCONTINUED | OUTPATIENT
Start: 2024-10-29 | End: 2024-10-29 | Stop reason: HOSPADM

## 2024-10-29 ASSESSMENT — PAIN SCALES - GENERAL: PAINLEVEL_OUTOF10: 0-NO PAIN

## 2024-10-31 ENCOUNTER — INFUSION (OUTPATIENT)
Dept: HEMATOLOGY/ONCOLOGY | Facility: CLINIC | Age: 54
End: 2024-10-31
Payer: COMMERCIAL

## 2024-10-31 VITALS
BODY MASS INDEX: 31.57 KG/M2 | DIASTOLIC BLOOD PRESSURE: 73 MMHG | HEART RATE: 77 BPM | OXYGEN SATURATION: 97 % | SYSTOLIC BLOOD PRESSURE: 105 MMHG | HEIGHT: 74 IN | TEMPERATURE: 97.2 F | WEIGHT: 246 LBS | RESPIRATION RATE: 16 BRPM

## 2024-10-31 DIAGNOSIS — C25.0 MALIGNANT NEOPLASM OF HEAD OF PANCREAS (MULTI): ICD-10-CM

## 2024-10-31 PROCEDURE — 96523 IRRIG DRUG DELIVERY DEVICE: CPT

## 2024-10-31 PROCEDURE — 2500000004 HC RX 250 GENERAL PHARMACY W/ HCPCS (ALT 636 FOR OP/ED): Performed by: NURSE PRACTITIONER

## 2024-10-31 RX ORDER — HEPARIN SODIUM,PORCINE/PF 10 UNIT/ML
50 SYRINGE (ML) INTRAVENOUS AS NEEDED
OUTPATIENT
Start: 2024-10-31

## 2024-10-31 RX ORDER — HEPARIN 100 UNIT/ML
500 SYRINGE INTRAVENOUS AS NEEDED
OUTPATIENT
Start: 2024-10-31

## 2024-10-31 RX ORDER — HEPARIN 100 UNIT/ML
500 SYRINGE INTRAVENOUS AS NEEDED
Status: DISCONTINUED | OUTPATIENT
Start: 2024-10-31 | End: 2024-10-31 | Stop reason: HOSPADM

## 2024-10-31 ASSESSMENT — PAIN SCALES - GENERAL: PAINLEVEL_OUTOF10: 0-NO PAIN

## 2024-11-04 ENCOUNTER — NURSE TRIAGE (OUTPATIENT)
Dept: ADMISSION | Facility: HOSPITAL | Age: 54
End: 2024-11-04
Payer: COMMERCIAL

## 2024-11-04 NOTE — TELEPHONE ENCOUNTER
Nate called in to report jock itch. States it started after his 9th chemo cycle, however, it is now coming back worse with each cycle.   He does say he has a rash that is only located on his genital region.   It is red, bumpy, and itchy.   No pain.  He does have some clear drainage that started this morning.  No foul smell.     He was using an otc powder which helped slightly until he received his last treatment on 10/29.    Patient denies any fever, shortness of breath or chest pain.     Asking if team can send something in for him.

## 2024-11-05 ENCOUNTER — APPOINTMENT (OUTPATIENT)
Dept: ENDOCRINOLOGY | Facility: CLINIC | Age: 54
End: 2024-11-05
Payer: COMMERCIAL

## 2024-11-05 ENCOUNTER — TELEPHONE (OUTPATIENT)
Dept: PRIMARY CARE | Facility: CLINIC | Age: 54
End: 2024-11-05

## 2024-11-05 DIAGNOSIS — B35.6 TINEA CRURIS: Primary | ICD-10-CM

## 2024-11-05 RX ORDER — CLOTRIMAZOLE 1 %
CREAM (GRAM) TOPICAL 2 TIMES DAILY
Qty: 60 G | Refills: 0 | Status: SHIPPED | OUTPATIENT
Start: 2024-11-05 | End: 2024-11-26

## 2024-11-05 NOTE — TELEPHONE ENCOUNTER
I called Nate and told him he can try OTC creams to help with the itching and he said he tried that in past and didn't help much. I told him he needs to follow-up with PCP concerning this issue and be evaluated. He said ok he will. Nothing further needed.

## 2024-11-05 NOTE — TELEPHONE ENCOUNTER
Nate called and left V/M on triage line about not hearing back yesterday about a problem he called about. I will call him back now.

## 2024-11-05 NOTE — TELEPHONE ENCOUNTER
"I called Nate back and he said he didn't hear back what he is suppose to do regarding this \"jock itch\" rash. Any suggestions or is team placing a  RX for Nystatin powder as suggested in the previous note? Messaged team and secured chat team.   "

## 2024-11-05 NOTE — TELEPHONE ENCOUNTER
Dr. Medeiros pt    Pt has been going thru chemo for pancreatic cancer and due to the chemo, now has jock itch (for past 3 wks) he has tried otc medication with no luck to get rid of it.    Are you able to call something in to his pharmacy  [facility]??

## 2024-11-05 NOTE — TELEPHONE ENCOUNTER
Secure chat from Dr. You below:  OTC meds are ok too if he hasn't tried.       Secure chat from Adrienne Malloy suggests patient follow-up with PCP concerning this issue since fungal cream has been sent in previously by them.

## 2024-11-11 ENCOUNTER — INFUSION (OUTPATIENT)
Dept: HEMATOLOGY/ONCOLOGY | Facility: CLINIC | Age: 54
End: 2024-11-11
Payer: COMMERCIAL

## 2024-11-11 ENCOUNTER — TELEPHONE (OUTPATIENT)
Dept: HEMATOLOGY/ONCOLOGY | Facility: CLINIC | Age: 54
End: 2024-11-11

## 2024-11-11 VITALS
RESPIRATION RATE: 16 BRPM | SYSTOLIC BLOOD PRESSURE: 130 MMHG | OXYGEN SATURATION: 96 % | TEMPERATURE: 97.3 F | BODY MASS INDEX: 31.57 KG/M2 | HEART RATE: 77 BPM | HEIGHT: 74 IN | WEIGHT: 246 LBS | DIASTOLIC BLOOD PRESSURE: 84 MMHG

## 2024-11-11 DIAGNOSIS — C25.0 MALIGNANT NEOPLASM OF HEAD OF PANCREAS (MULTI): ICD-10-CM

## 2024-11-11 DIAGNOSIS — C25.0 MALIGNANT NEOPLASM OF HEAD OF PANCREAS (MULTI): Primary | ICD-10-CM

## 2024-11-11 LAB
ALBUMIN SERPL BCP-MCNC: 4.1 G/DL (ref 3.4–5)
ALP SERPL-CCNC: 97 U/L (ref 33–120)
ALT SERPL W P-5'-P-CCNC: 55 U/L (ref 10–52)
ANION GAP SERPL CALC-SCNC: 11 MMOL/L (ref 10–20)
AST SERPL W P-5'-P-CCNC: 38 U/L (ref 9–39)
BASOPHILS # BLD AUTO: 0.05 X10*3/UL (ref 0–0.1)
BASOPHILS NFR BLD AUTO: 0.7 %
BILIRUB SERPL-MCNC: 0.4 MG/DL (ref 0–1.2)
BUN SERPL-MCNC: 23 MG/DL (ref 6–23)
CALCIUM SERPL-MCNC: 8.8 MG/DL (ref 8.6–10.3)
CHLORIDE SERPL-SCNC: 112 MMOL/L (ref 98–107)
CO2 SERPL-SCNC: 24 MMOL/L (ref 21–32)
CREAT SERPL-MCNC: 0.85 MG/DL (ref 0.5–1.3)
EGFRCR SERPLBLD CKD-EPI 2021: >90 ML/MIN/1.73M*2
EOSINOPHIL # BLD AUTO: 0.18 X10*3/UL (ref 0–0.7)
EOSINOPHIL NFR BLD AUTO: 2.4 %
ERYTHROCYTE [DISTWIDTH] IN BLOOD BY AUTOMATED COUNT: 14.2 % (ref 11.5–14.5)
GLUCOSE SERPL-MCNC: 85 MG/DL (ref 74–99)
HCT VFR BLD AUTO: 37.7 % (ref 41–52)
HGB BLD-MCNC: 12.7 G/DL (ref 13.5–17.5)
IMM GRANULOCYTES # BLD AUTO: 0.02 X10*3/UL (ref 0–0.7)
IMM GRANULOCYTES NFR BLD AUTO: 0.3 % (ref 0–0.9)
LYMPHOCYTES # BLD AUTO: 2.33 X10*3/UL (ref 1.2–4.8)
LYMPHOCYTES NFR BLD AUTO: 31.7 %
MCH RBC QN AUTO: 30.9 PG (ref 26–34)
MCHC RBC AUTO-ENTMCNC: 33.7 G/DL (ref 32–36)
MCV RBC AUTO: 92 FL (ref 80–100)
MONOCYTES # BLD AUTO: 1.03 X10*3/UL (ref 0.1–1)
MONOCYTES NFR BLD AUTO: 14 %
NEUTROPHILS # BLD AUTO: 3.74 X10*3/UL (ref 1.2–7.7)
NEUTROPHILS NFR BLD AUTO: 50.9 %
PLATELET # BLD AUTO: 124 X10*3/UL (ref 150–450)
POTASSIUM SERPL-SCNC: 3.5 MMOL/L (ref 3.5–5.3)
PROT SERPL-MCNC: 6.7 G/DL (ref 6.4–8.2)
RBC # BLD AUTO: 4.11 X10*6/UL (ref 4.5–5.9)
SODIUM SERPL-SCNC: 143 MMOL/L (ref 136–145)
WBC # BLD AUTO: 7.4 X10*3/UL (ref 4.4–11.3)

## 2024-11-11 PROCEDURE — 2500000004 HC RX 250 GENERAL PHARMACY W/ HCPCS (ALT 636 FOR OP/ED): Performed by: NURSE PRACTITIONER

## 2024-11-11 PROCEDURE — 96523 IRRIG DRUG DELIVERY DEVICE: CPT

## 2024-11-11 PROCEDURE — 36591 DRAW BLOOD OFF VENOUS DEVICE: CPT

## 2024-11-11 PROCEDURE — 86301 IMMUNOASSAY TUMOR CA 19-9: CPT | Mod: PORLAB

## 2024-11-11 PROCEDURE — 85025 COMPLETE CBC W/AUTO DIFF WBC: CPT

## 2024-11-11 PROCEDURE — 80053 COMPREHEN METABOLIC PANEL: CPT

## 2024-11-11 RX ORDER — HEPARIN 100 UNIT/ML
500 SYRINGE INTRAVENOUS AS NEEDED
Status: DISCONTINUED | OUTPATIENT
Start: 2024-11-11 | End: 2024-11-11 | Stop reason: HOSPADM

## 2024-11-11 RX ORDER — HEPARIN 100 UNIT/ML
500 SYRINGE INTRAVENOUS AS NEEDED
Status: CANCELLED | OUTPATIENT
Start: 2024-11-11

## 2024-11-11 RX ORDER — HEPARIN SODIUM,PORCINE/PF 10 UNIT/ML
50 SYRINGE (ML) INTRAVENOUS AS NEEDED
Status: CANCELLED | OUTPATIENT
Start: 2024-11-11

## 2024-11-11 ASSESSMENT — PAIN SCALES - GENERAL: PAINLEVEL_OUTOF10: 0-NO PAIN

## 2024-11-11 NOTE — PROGRESS NOTES
Pt arrived awake, alert, oriented, no apparent distress with unlabored breaths. Pt reports feeling well today without s/sx of illness. Pt here for medi port access/lab draw. Port site without s/sx of infection/infiltration, accessed without difficulty, flushed easily with + blood return. Blood samples obtained and sent per order, medi port flushed well with saline/heparin locked, bleeding controlled and bandage applied to site. Pt with next appointment set for 11/13/24, no further questions or concerns, discharged in stable condition.

## 2024-11-12 LAB — CANCER AG19-9 SERPL-ACNC: 72.69 U/ML

## 2024-11-12 RX ORDER — PALONOSETRON 0.05 MG/ML
0.25 INJECTION, SOLUTION INTRAVENOUS ONCE
Status: CANCELLED | OUTPATIENT
Start: 2024-11-12

## 2024-11-12 RX ORDER — EPINEPHRINE 0.3 MG/.3ML
0.3 INJECTION SUBCUTANEOUS EVERY 5 MIN PRN
Status: CANCELLED | OUTPATIENT
Start: 2024-11-12

## 2024-11-12 RX ORDER — ATROPINE SULFATE 0.4 MG/ML
0.4 INJECTION, SOLUTION ENDOTRACHEAL; INTRAMEDULLARY; INTRAMUSCULAR; INTRAVENOUS; SUBCUTANEOUS
Status: CANCELLED | OUTPATIENT
Start: 2024-11-12

## 2024-11-12 RX ORDER — LORAZEPAM 0.5 MG/1
1 TABLET ORAL ONCE
Status: CANCELLED | OUTPATIENT
Start: 2024-11-12 | End: 2024-11-12

## 2024-11-12 RX ORDER — FAMOTIDINE 10 MG/ML
20 INJECTION INTRAVENOUS ONCE AS NEEDED
Status: CANCELLED | OUTPATIENT
Start: 2024-11-14

## 2024-11-12 RX ORDER — PROCHLORPERAZINE MALEATE 10 MG
10 TABLET ORAL EVERY 6 HOURS PRN
Status: CANCELLED | OUTPATIENT
Start: 2024-11-12

## 2024-11-12 RX ORDER — EPINEPHRINE 0.3 MG/.3ML
0.3 INJECTION SUBCUTANEOUS EVERY 5 MIN PRN
Status: CANCELLED | OUTPATIENT
Start: 2024-11-14

## 2024-11-12 RX ORDER — ALBUTEROL SULFATE 0.83 MG/ML
3 SOLUTION RESPIRATORY (INHALATION) AS NEEDED
Status: CANCELLED | OUTPATIENT
Start: 2024-11-14

## 2024-11-12 RX ORDER — DIPHENHYDRAMINE HYDROCHLORIDE 50 MG/ML
50 INJECTION INTRAMUSCULAR; INTRAVENOUS AS NEEDED
Status: CANCELLED | OUTPATIENT
Start: 2024-11-12

## 2024-11-12 RX ORDER — PROCHLORPERAZINE EDISYLATE 5 MG/ML
10 INJECTION INTRAMUSCULAR; INTRAVENOUS EVERY 6 HOURS PRN
Status: CANCELLED | OUTPATIENT
Start: 2024-11-12

## 2024-11-12 RX ORDER — FAMOTIDINE 10 MG/ML
20 INJECTION INTRAVENOUS ONCE AS NEEDED
Status: CANCELLED | OUTPATIENT
Start: 2024-11-12

## 2024-11-12 RX ORDER — ALBUTEROL SULFATE 0.83 MG/ML
3 SOLUTION RESPIRATORY (INHALATION) AS NEEDED
Status: CANCELLED | OUTPATIENT
Start: 2024-11-12

## 2024-11-12 RX ORDER — DIPHENHYDRAMINE HYDROCHLORIDE 50 MG/ML
50 INJECTION INTRAMUSCULAR; INTRAVENOUS AS NEEDED
Status: CANCELLED | OUTPATIENT
Start: 2024-11-14

## 2024-11-12 RX ORDER — LORAZEPAM 2 MG/ML
1 INJECTION INTRAMUSCULAR AS NEEDED
Status: CANCELLED | OUTPATIENT
Start: 2024-11-12

## 2024-11-13 ENCOUNTER — TELEPHONE (OUTPATIENT)
Dept: ADMISSION | Facility: HOSPITAL | Age: 54
End: 2024-11-13

## 2024-11-13 ENCOUNTER — INFUSION (OUTPATIENT)
Dept: HEMATOLOGY/ONCOLOGY | Facility: CLINIC | Age: 54
End: 2024-11-13
Payer: COMMERCIAL

## 2024-11-13 VITALS
HEIGHT: 74 IN | HEART RATE: 62 BPM | RESPIRATION RATE: 16 BRPM | OXYGEN SATURATION: 96 % | DIASTOLIC BLOOD PRESSURE: 85 MMHG | TEMPERATURE: 97.7 F | WEIGHT: 250.3 LBS | BODY MASS INDEX: 32.12 KG/M2 | SYSTOLIC BLOOD PRESSURE: 136 MMHG

## 2024-11-13 DIAGNOSIS — C25.0 MALIGNANT NEOPLASM OF HEAD OF PANCREAS (MULTI): ICD-10-CM

## 2024-11-13 PROCEDURE — 96413 CHEMO IV INFUSION 1 HR: CPT

## 2024-11-13 PROCEDURE — 96417 CHEMO IV INFUS EACH ADDL SEQ: CPT

## 2024-11-13 PROCEDURE — 96375 TX/PRO/DX INJ NEW DRUG ADDON: CPT | Mod: INF

## 2024-11-13 PROCEDURE — 2500000004 HC RX 250 GENERAL PHARMACY W/ HCPCS (ALT 636 FOR OP/ED): Performed by: NURSE PRACTITIONER

## 2024-11-13 PROCEDURE — 96415 CHEMO IV INFUSION ADDL HR: CPT

## 2024-11-13 PROCEDURE — 96376 TX/PRO/DX INJ SAME DRUG ADON: CPT

## 2024-11-13 PROCEDURE — 2500000001 HC RX 250 WO HCPCS SELF ADMINISTERED DRUGS (ALT 637 FOR MEDICARE OP): Performed by: NURSE PRACTITIONER

## 2024-11-13 PROCEDURE — 96416 CHEMO PROLONG INFUSE W/PUMP: CPT

## 2024-11-13 PROCEDURE — 96367 TX/PROPH/DG ADDL SEQ IV INF: CPT

## 2024-11-13 RX ORDER — PROCHLORPERAZINE MALEATE 10 MG
10 TABLET ORAL EVERY 6 HOURS PRN
Status: DISCONTINUED | OUTPATIENT
Start: 2024-11-13 | End: 2024-11-13 | Stop reason: HOSPADM

## 2024-11-13 RX ORDER — HEPARIN SODIUM,PORCINE/PF 10 UNIT/ML
50 SYRINGE (ML) INTRAVENOUS AS NEEDED
Status: CANCELLED | OUTPATIENT
Start: 2024-11-13

## 2024-11-13 RX ORDER — LORAZEPAM 2 MG/ML
1 INJECTION INTRAMUSCULAR AS NEEDED
Status: DISCONTINUED | OUTPATIENT
Start: 2024-11-13 | End: 2024-11-13 | Stop reason: HOSPADM

## 2024-11-13 RX ORDER — ALBUTEROL SULFATE 0.83 MG/ML
3 SOLUTION RESPIRATORY (INHALATION) AS NEEDED
Status: DISCONTINUED | OUTPATIENT
Start: 2024-11-13 | End: 2024-11-13 | Stop reason: HOSPADM

## 2024-11-13 RX ORDER — FAMOTIDINE 10 MG/ML
20 INJECTION INTRAVENOUS ONCE AS NEEDED
Status: DISCONTINUED | OUTPATIENT
Start: 2024-11-13 | End: 2024-11-13 | Stop reason: HOSPADM

## 2024-11-13 RX ORDER — DEXAMETHASONE 6 MG/1
12 TABLET ORAL ONCE
Status: COMPLETED | OUTPATIENT
Start: 2024-11-13 | End: 2024-11-13

## 2024-11-13 RX ORDER — ATROPINE SULFATE 0.4 MG/ML
0.4 INJECTION, SOLUTION ENDOTRACHEAL; INTRAMEDULLARY; INTRAMUSCULAR; INTRAVENOUS; SUBCUTANEOUS
Status: COMPLETED | OUTPATIENT
Start: 2024-11-13 | End: 2024-11-13

## 2024-11-13 RX ORDER — HEPARIN 100 UNIT/ML
500 SYRINGE INTRAVENOUS AS NEEDED
Status: CANCELLED | OUTPATIENT
Start: 2024-11-13

## 2024-11-13 RX ORDER — HEPARIN 100 UNIT/ML
500 SYRINGE INTRAVENOUS AS NEEDED
Status: DISCONTINUED | OUTPATIENT
Start: 2024-11-13 | End: 2024-11-13 | Stop reason: HOSPADM

## 2024-11-13 RX ORDER — LORAZEPAM 1 MG/1
1 TABLET ORAL ONCE
Status: COMPLETED | OUTPATIENT
Start: 2024-11-13 | End: 2024-11-13

## 2024-11-13 RX ORDER — DIPHENHYDRAMINE HYDROCHLORIDE 50 MG/ML
50 INJECTION INTRAMUSCULAR; INTRAVENOUS AS NEEDED
Status: DISCONTINUED | OUTPATIENT
Start: 2024-11-13 | End: 2024-11-13 | Stop reason: HOSPADM

## 2024-11-13 RX ORDER — PALONOSETRON 0.05 MG/ML
0.25 INJECTION, SOLUTION INTRAVENOUS ONCE
Status: COMPLETED | OUTPATIENT
Start: 2024-11-13 | End: 2024-11-13

## 2024-11-13 RX ORDER — EPINEPHRINE 0.3 MG/.3ML
0.3 INJECTION SUBCUTANEOUS EVERY 5 MIN PRN
Status: DISCONTINUED | OUTPATIENT
Start: 2024-11-13 | End: 2024-11-13 | Stop reason: HOSPADM

## 2024-11-13 RX ORDER — PROCHLORPERAZINE EDISYLATE 5 MG/ML
10 INJECTION INTRAMUSCULAR; INTRAVENOUS EVERY 6 HOURS PRN
Status: DISCONTINUED | OUTPATIENT
Start: 2024-11-13 | End: 2024-11-13 | Stop reason: HOSPADM

## 2024-11-13 ASSESSMENT — PAIN SCALES - GENERAL: PAINLEVEL_OUTOF10: 0-NO PAIN

## 2024-11-13 NOTE — TELEPHONE ENCOUNTER
Pt is requesting a refill of oxycodone 10mg q6 prn, #120.  Last prescription was written 10/11/24.  Preferred pharmacy is [facility] in [place].

## 2024-11-13 NOTE — PROGRESS NOTES
Patient is here for Cycle 12. JUAN Delong notified that patient is itchy from head to toe but no rash other than rash on chest that is not new. Patient advised to take Claritin and benadryl for relief and call PCP or dermatologist if does not resolve. Dr. You and Adrienne messaged to notify that patient does not have a follow up appointment or scan scheduled. Patient tolerated treatment well. Patient was connected to CADD pump. Blood return present at time of connection. Pump was locked and infusing prior to discharge. Patient was discharged in stable condition.

## 2024-11-13 NOTE — SIGNIFICANT EVENT

## 2024-11-15 ENCOUNTER — INFUSION (OUTPATIENT)
Dept: HEMATOLOGY/ONCOLOGY | Facility: CLINIC | Age: 54
End: 2024-11-15
Payer: COMMERCIAL

## 2024-11-15 VITALS
HEART RATE: 55 BPM | BODY MASS INDEX: 32.25 KG/M2 | RESPIRATION RATE: 16 BRPM | OXYGEN SATURATION: 98 % | TEMPERATURE: 97 F | DIASTOLIC BLOOD PRESSURE: 80 MMHG | SYSTOLIC BLOOD PRESSURE: 120 MMHG | HEIGHT: 74 IN | WEIGHT: 251.32 LBS

## 2024-11-15 DIAGNOSIS — C25.0 MALIGNANT NEOPLASM OF HEAD OF PANCREAS (MULTI): ICD-10-CM

## 2024-11-15 DIAGNOSIS — C25.0 MALIGNANT NEOPLASM OF HEAD OF PANCREAS (MULTI): Primary | ICD-10-CM

## 2024-11-15 PROCEDURE — 96523 IRRIG DRUG DELIVERY DEVICE: CPT

## 2024-11-15 PROCEDURE — 2500000004 HC RX 250 GENERAL PHARMACY W/ HCPCS (ALT 636 FOR OP/ED): Performed by: NURSE PRACTITIONER

## 2024-11-15 RX ORDER — HEPARIN 100 UNIT/ML
500 SYRINGE INTRAVENOUS AS NEEDED
OUTPATIENT
Start: 2024-11-15

## 2024-11-15 RX ORDER — OXYCODONE HYDROCHLORIDE 10 MG/1
10 TABLET ORAL EVERY 6 HOURS PRN
Qty: 120 TABLET | Refills: 0 | Status: SHIPPED | OUTPATIENT
Start: 2024-11-15 | End: 2024-12-15

## 2024-11-15 RX ORDER — HEPARIN 100 UNIT/ML
500 SYRINGE INTRAVENOUS AS NEEDED
Status: DISCONTINUED | OUTPATIENT
Start: 2024-11-15 | End: 2024-11-15 | Stop reason: HOSPADM

## 2024-11-15 RX ORDER — HEPARIN SODIUM,PORCINE/PF 10 UNIT/ML
50 SYRINGE (ML) INTRAVENOUS AS NEEDED
OUTPATIENT
Start: 2024-11-15

## 2024-11-15 ASSESSMENT — PAIN SCALES - GENERAL: PAINLEVEL_OUTOF10: 0-NO PAIN

## 2024-11-15 NOTE — PROGRESS NOTES
"Patient presents for pump disconnect, has no complaints alert and oriented x 4. Patient got schedule for appointments placed by team. Patient verbalized understanding of schedule. Patient feels well.     Patient reported that he \"shut the pump off when it beeped this morning. CADD pump disconnected port flushed + blood return noted, flushed with 10cc normal saline and 5cc 10 units heparin.  John needle removed and site covered with bandaid. Patient tolerated procedure well. Patient feels well and has no complaints, vital signs stable. Patient discharged in stable condition with no further needs.     After patient left, this RN discovered that there was still approximately 75% 5 FU chemo left in the bag, but pump had counted down to zero. This RN let team know and asked if patient should be rescheduled for a pump treatment. Team acknowledged message, indicated that this treatment was is final one for now, as he will be getting a CT scan done, then meeting with surgical onc.        "

## 2024-11-18 ENCOUNTER — HOSPITAL ENCOUNTER (OUTPATIENT)
Dept: RADIOLOGY | Facility: HOSPITAL | Age: 54
Discharge: HOME | End: 2024-11-18
Payer: COMMERCIAL

## 2024-11-18 ENCOUNTER — PREP FOR PROCEDURE (OUTPATIENT)
Dept: SURGICAL ONCOLOGY | Facility: CLINIC | Age: 54
End: 2024-11-18

## 2024-11-18 DIAGNOSIS — C25.0 MALIGNANT NEOPLASM OF HEAD OF PANCREAS (MULTI): ICD-10-CM

## 2024-11-18 DIAGNOSIS — C25.0 MALIGNANT NEOPLASM OF HEAD OF PANCREAS (MULTI): Primary | ICD-10-CM

## 2024-11-18 PROCEDURE — 74177 CT ABD & PELVIS W/CONTRAST: CPT

## 2024-11-18 PROCEDURE — 2550000001 HC RX 255 CONTRASTS: Performed by: NURSE PRACTITIONER

## 2024-11-18 PROCEDURE — 71260 CT THORAX DX C+: CPT | Performed by: RADIOLOGY

## 2024-11-18 PROCEDURE — 74177 CT ABD & PELVIS W/CONTRAST: CPT | Performed by: RADIOLOGY

## 2024-11-18 RX ORDER — HEPARIN SODIUM 5000 [USP'U]/ML
5000 INJECTION, SOLUTION INTRAVENOUS; SUBCUTANEOUS ONCE
OUTPATIENT
Start: 2024-11-18 | End: 2024-11-18

## 2024-11-18 NOTE — PROGRESS NOTES
"Reason for visit:  FUV / Review Imaging / Discuss and Consent for Whipple Dec 12  MO = Gama You    HPI:  I met this man May 2024 for newly dx PDAC:    \"This gentleman has a newly diagnosed pancreatic cancer which based on cross-sectional imaging is localized but borderline resectable based on his pattern of venous involvement.  His 19-9 is greater than 700.  Without doubt our group would approach him in a neoadjuvant therapy manner.  Given his hemoglobin A1c he is not a candidate for our ivosidenib clinical trial.  We would pursue upfront chemotherapy followed by surgery.  I doubt based on his imaging at this time that we would do radiation after his chemotherapy but that remains to be determined.  I certainly think that he will likely be a FOLFIRINOX candidate.     I need to complete his workup with a diagnostic laparoscopy and placement of a Mediport.  We are doing that on [place] 3.  I discussed the rationale for that and I discussed the procedure with him.  We talked about this being an outpatient procedure.  We talked about the risks including but not being limited to bleeding, hematoma, infection, hernia, injury to visceral structures, and pneumothorax.  He signed electronic informed consent.\"    -----    12 cycles FFX, last week of Nov 11.  Most recent 19-9 is 73.    Cat Scans done Nov 18:  IMPRESSION:  1. Pancreatic mass intervally decreased in size from prior imaging as  described above.    2. Scattered periportal lymph nodes stable from the prior  examination. No lymphadenopathy in the abdomen/pelvis.  3. No definite metastatic disease in the chest, abdomen, or pelvis.    WE REVIEWED AT IMAGING CONFERENCE THIS WEEK AND FEEL THERE IS EVIDENCE OF LOCAL RX RESPONSE WITH DECREASED SIZE OF THE MASS.   SOME VEIN CONTACT, NOT MUCH CHANGE IN CONTOUR.  NO ARTERIAL INVOLVEMENT.    Mr. Alvarez is accompanied by his wife.  By enlarge she has tolerated his total neoadjuvant chemotherapy well.  He did not require " any hospitalization.    PE: He looks great.  He is not jaundiced.  His abdomen is soft and nontender.  His breath sounds are clear and his heart is regular.    Impression / Plan:    This gentleman has completed total neoadjuvant chemotherapy for his pancreatic cancer.  His 19-9 has decreased but not normalized.  Radiographically we have some local response with shrinkage of the primary tumor as well as a little bit less vein abutment and minimal if any contour change.  It goes without saying that there has been no development of radiographically evident metastatic disease.    The neck step in this gentlemen's care is a Whipple.    Today I have discussed with this gentleman and his wife what is involved in a Whipple procedure.  I used pictorial aids to describe this for them.  We talked about this operation being about 6 hours in duration with a week in the hospital and anywhere from 2 to 4 months of recovery.  We talked about a 30-day mortality rate of 1 to 3% and a 90-day mortality rate of 4 to 5%.  Those data are based on national benchmark numbers, while our numbers at  and my numbers in particular are better.  We talked about an overall complication rate of 50% with a major complication rate of about 20%.  We talked about the most common typical post pancreatectomy complications focusing a lot on drains fistulas bleeding infection etc.  He is already diabetic on insulin.  He already intermittently uses pancreatic enzymes.    This gentleman signed electronic informed consent.  He will be seen in preadmission testing.  We are doing this on 12 December.    This note has been dictated with voice recognition software and has not been reviewed for grammar or content errors.

## 2024-11-22 ENCOUNTER — APPOINTMENT (OUTPATIENT)
Dept: SURGERY | Facility: CLINIC | Age: 54
End: 2024-11-22
Payer: COMMERCIAL

## 2024-11-22 VITALS
HEIGHT: 75 IN | SYSTOLIC BLOOD PRESSURE: 131 MMHG | BODY MASS INDEX: 30.24 KG/M2 | DIASTOLIC BLOOD PRESSURE: 82 MMHG | HEART RATE: 67 BPM | WEIGHT: 243.2 LBS | TEMPERATURE: 97 F | RESPIRATION RATE: 20 BRPM

## 2024-11-22 DIAGNOSIS — C25.0 MALIGNANT NEOPLASM OF HEAD OF PANCREAS (MULTI): Primary | ICD-10-CM

## 2024-11-22 PROCEDURE — 3075F SYST BP GE 130 - 139MM HG: CPT | Performed by: SURGERY

## 2024-11-22 PROCEDURE — 99215 OFFICE O/P EST HI 40 MIN: CPT | Performed by: SURGERY

## 2024-11-22 PROCEDURE — 3048F LDL-C <100 MG/DL: CPT | Performed by: SURGERY

## 2024-11-22 PROCEDURE — 3008F BODY MASS INDEX DOCD: CPT | Performed by: SURGERY

## 2024-11-22 PROCEDURE — 3046F HEMOGLOBIN A1C LEVEL >9.0%: CPT | Performed by: SURGERY

## 2024-11-22 PROCEDURE — 3079F DIAST BP 80-89 MM HG: CPT | Performed by: SURGERY

## 2024-11-22 ASSESSMENT — PAIN SCALES - GENERAL: PAINLEVEL_OUTOF10: 2

## 2024-12-02 ENCOUNTER — CLINICAL SUPPORT (OUTPATIENT)
Dept: PREADMISSION TESTING | Facility: HOSPITAL | Age: 54
End: 2024-12-02
Payer: COMMERCIAL

## 2024-12-02 NOTE — CPM/PAT H&P
"CPM/PAT Evaluation       Name: Nate Alvarez (Nate Alvarez)  /Age: 1970/54 y.o.     { PAT Visit Type:09031}      Chief Complaint: ***    HPI    Nate Alvarez is scheduled for Whipple on 24  Past Medical History:   Diagnosis Date    Diabetes mellitus (Multi)     Gastroesophageal reflux disease without esophagitis 10/05/2023    Hemorrhoids 2023    HTN (hypertension)     Leg cramps 10/05/2023    Oropharyngeal dysphagia 10/05/2023    RADHA (obstructive sleep apnea)     Pancreatic cancer (Multi)     Personal history of other malignant neoplasm of skin        Past Surgical History:   Procedure Laterality Date    COLONOSCOPY      ESOPHAGOGASTRODUODENOSCOPY      EYELID CARCINOMA EXCISION      KNEE ARTHROSCOPY W/ DEBRIDEMENT      ROTATOR CUFF REPAIR Bilateral     TESTICLE SURGERY      Hydrocelectomy    TONSILLECTOMY      VASECTOMY         Patient Sexual activity questions deferred to the physician.    Family History   Problem Relation Name Age of Onset    Diabetes Mother      Ovarian cancer Mother      Liver cancer Father      Lung cancer Father      Colon cancer Father      Hypertension Father      Stroke Maternal Grandmother      Diabetes Maternal Grandmother      Liver cancer Maternal Grandfather      Lung cancer Paternal Grandfather         Allergies   Allergen Reactions    Farxiga [Dapagliflozin] Diarrhea    Glipizide GI Upset    Januvia [Sitagliptin] Diarrhea    Metformin Nausea/vomiting    Mounjaro [Tirzepatide] Nausea/vomiting    Tramadol Nausea/vomiting       Prior to Admission medications    Medication Sig Start Date End Date Taking? Authorizing Provider   BD Insulin Syringe Ultra-Fine 0.3 mL 31 gauge x 5/16\" syringe Inject 1 each under the skin 4 times a day before meals. 24  Haezl Medeiros MD   blood sugar diagnostic (Blood Glucose Test) strip Check blood sugars once daily 10/5/23   Hazel Medeiros MD   blood-glucose meter misc Check  blood sugar as needed 10/5/23   Hazel NATHAN" MD [place]   blood-glucose sensor (FreeStyle Jef 3 Plus Sensor) device Use as directed 9/18/24   Hazel Medeiros MD   blood-glucose sensor (FreeStyle Jef 3 Sensor) device Use as directed 5/3/24   Hazel Medeiros MD   cholecalciferol (Vitamin D-3) 125 MCG (5000 UT) capsule Take 1 capsule (125 mcg) by mouth once daily.  Patient not taking: Reported on 11/22/2024 4/17/24 4/17/25  Hazel Medeiros MD   clotrimazole (Lotrimin) 1 % cream Apply topically 2 times a day for 21 days. apply to affected area 11/5/24 11/26/24  Tony Gomez, DO   FreeStyle Jef 3 [place] misc Use with sensors as directed 5/17/24   Kadeem Haynes DO   insulin glargine (Lantus U-100 Insulin) 100 unit/mL injection Inject 20 Units under the skin once every 24 hours. Take as directed per insulin instructions.  Patient taking differently: Inject 30 Units under the skin once every 24 hours. Take as directed per insulin instructions. 10/24/24 4/22/25  Hazel Medeiros MD   insulin lispro (HumaLOG) 100 unit/mL injection Inject 0.5 mL (50 Units) under the skin 3 times daily (morning, midday, late afternoon). Take as directed per insulin instructions. Plus sliding scale 5/24/24 11/20/24  Hazel Medeiros MD   lancets misc Check blood sugars once daily 10/5/23   Hazel Medeiros MD   lisinopril 5 mg tablet Take 1 tablet (5 mg) by mouth once daily. 4/17/24 10/14/24  Hazel Medeiros MD   LORazepam (Ativan) 1 mg tablet Take 1 tablet (1 mg) by mouth as needed at bedtime for anxiety. Insomnia, or nausea 6/25/24 9/23/24  JUAN May-CNP   naloxone (Narcan) 4 mg/0.1 mL nasal spray Administer 1 spray (4 mg) into affected nostril(s) if needed for opioid reversal. May repeat every 2-3 minutes if needed, alternating nostrils, until medical assistance becomes available. 8/19/24   Shaw You MD   ondansetron (Zofran) 8 mg tablet Take 1 tablet (8 mg) by mouth every 8 hours if needed for nausea or vomiting. 5/28/24   Shaw You MD   oxyCODONE (Roxicodone) 10 mg  "immediate release tablet Take 1 tablet (10 mg) by mouth every 6 hours if needed for severe pain (7 - 10). 11/15/24 12/15/24  JUAN May-CNP   pancrelipase, Lip-Prot-Amyl, (Creon) 36,000-114,000- 180,000 unit capsule,delayed release(DR/EC) capsule Take 2-3 capsules by mouth with meals and 1-2 capsules with snacks daily; for an average of 13 capsules per day. 24   Shaw You MD   pantoprazole (ProtoNix) 40 mg EC tablet Take 1 tablet (40 mg) by mouth 2 times a day. Do not crush, chew, or split. 24   Leno Monahan MD   pen needle, diabetic (BD Ultra-Fine Short Pen Needle) 31 gauge x 5/16\" needle Use four times daily with insulin 5/10/24   Hazel Medeiros MD   prochlorperazine (Compazine) 10 mg tablet Take 1 tablet (10 mg) by mouth every 6 hours if needed for nausea or vomiting. 24   Shaw You MD        PAT ROS     PAT Physical Exam     Airway        Testing/Diagnostic:       - EK/15/24  Normal sinus rhythm  Anterior infarct , age undetermined  Abnormal ECG      - CT Chest A/P: 24  IMPRESSION:  1. Pancreatic mass intervally decreased in size from prior imaging as  described above.      2. Scattered periportal lymph nodes stable from the prior  examination. No lymphadenopathy in the abdomen/pelvis.      3. No definite metastatic disease in the chest, abdomen, or pelvis.      - POC A1C: 7.0 on 24        Patient Specialist/PCP:           PCP: Hazel Medeiros 24 follow up hx of HTN, DM2      Surgical Onc: Roddy Gamble 24 FUV / Review Imaging / Discuss and Consent for Whipple procedure on .      Onc: Shaw You 10/01/24 following for borderline resectable pancreatic adenocarcinoma. He presented in May 2024 with greater than 50 pound weight loss and new onset diabetes. Imaging, EUS and biopsy confirmed adenocarcinoma of the pancreas involving the GDA, celiac axis, SMV.     Plan for C9 today   C10 in 2 weeks   Surgical referral in about 2 weeks.    RTC in " 4 weeks with scans       Endocrinolgy: Marya Wynnewood Courtneymarley 09/25/24 follow up for Type 2 diabetes mellitus diagnosed October 2023. Uses CGM         __________________________________________________________________________  Medication instructions:   Instructed to hold Vitamins, Supplements and Ibuprofen 7 days prior to surgery            Anabelle Hagan LPN  Preadmission Testing          There were no vitals taken for this visit.    DASI Risk Score    No data to display       Caprini DVT Assessment      Flowsheet Row ED to Hosp-Admission (Discharged) from 5/15/2024 in [facility] [address] with Emery Paez MD and Chico Bergman DO   DVT Score 4 filed at 05/15/2024 2330   BMI 30 or less filed at 05/15/2024 2330   RETIRED: Current Status Swollen legs filed at 05/15/2024 2330   RETIRED: History Sepsis filed at 05/15/2024 2330   RETIRED: Age 40-59 years filed at 05/15/2024 2330          Modified Frailty Index    No data to display       CHADS2 Stroke Risk  Current as of today        N/A 3 to 100%: High Risk   2 to < 3%: Medium Risk   0 to < 2%: Low Risk     Last Change: N/A          This score determines the patient's risk of having a stroke if the patient has atrial fibrillation.        This score is not applicable to this patient. Components are not calculated.          Revised Cardiac Risk Index    No data to display       Apfel Simplified Score    No data to display       Risk Analysis Index Results This Encounter    No data found in the last 10 encounters.       Prodigy: High Risk  Total Score: 11              Prodigy Gender Score     Prodigy Previous Opioid Use Score           ARISCAT Score for Postoperative Pulmonary Complications    No data to display       Nunez Perioperative Risk for Myocardial Infarction or Cardiac Arrest ([place])    No data to display         Assessment and Plan:     {[facility] ASSESSMENT AND PLAN:[id number]}

## 2024-12-03 ENCOUNTER — OFFICE VISIT (OUTPATIENT)
Dept: HEMATOLOGY/ONCOLOGY | Facility: CLINIC | Age: 54
End: 2024-12-03
Payer: COMMERCIAL

## 2024-12-03 VITALS
SYSTOLIC BLOOD PRESSURE: 133 MMHG | WEIGHT: 245.81 LBS | RESPIRATION RATE: 18 BRPM | OXYGEN SATURATION: 98 % | DIASTOLIC BLOOD PRESSURE: 83 MMHG | HEART RATE: 56 BPM | TEMPERATURE: 97.5 F | BODY MASS INDEX: 30.89 KG/M2

## 2024-12-03 DIAGNOSIS — C25.0 MALIGNANT NEOPLASM OF HEAD OF PANCREAS (MULTI): ICD-10-CM

## 2024-12-03 PROCEDURE — 3048F LDL-C <100 MG/DL: CPT | Performed by: INTERNAL MEDICINE

## 2024-12-03 PROCEDURE — 3079F DIAST BP 80-89 MM HG: CPT | Performed by: INTERNAL MEDICINE

## 2024-12-03 PROCEDURE — 3044F HG A1C LEVEL LT 7.0%: CPT | Performed by: INTERNAL MEDICINE

## 2024-12-03 PROCEDURE — 99214 OFFICE O/P EST MOD 30 MIN: CPT | Performed by: INTERNAL MEDICINE

## 2024-12-03 PROCEDURE — 3075F SYST BP GE 130 - 139MM HG: CPT | Performed by: INTERNAL MEDICINE

## 2024-12-03 ASSESSMENT — PAIN SCALES - GENERAL: PAINLEVEL_OUTOF10: 2

## 2024-12-03 ASSESSMENT — ENCOUNTER SYMPTOMS
LOSS OF SENSATION IN FEET: 0
DEPRESSION: 0
OCCASIONAL FEELINGS OF UNSTEADINESS: 0

## 2024-12-03 ASSESSMENT — COLUMBIA-SUICIDE SEVERITY RATING SCALE - C-SSRS
1. IN THE PAST MONTH, HAVE YOU WISHED YOU WERE DEAD OR WISHED YOU COULD GO TO SLEEP AND NOT WAKE UP?: NO
2. HAVE YOU ACTUALLY HAD ANY THOUGHTS OF KILLING YOURSELF?: NO
6. HAVE YOU EVER DONE ANYTHING, STARTED TO DO ANYTHING, OR PREPARED TO DO ANYTHING TO END YOUR LIFE?: NO

## 2024-12-03 NOTE — PROGRESS NOTES
Patient ID: Nate Alvarez is a 54 y.o. male from [place], [place].     Referring Physician: Adrienne Malloy, APRN-CNP  [address] [place] [address]    Primary Care Provider: Hazel Medeiros MD    Diagnosis:   Pancreatic cancer 5/2024    Primary Oncologic Surgeon:   Omar Gamble MD    Primary Medical Oncologist:  Borderline Resectable    Primary Radiation Oncologist:  MARSHALL     Current Therapy:  Neoadjuvant FOLFIRINOX    Oncologic Surgery History:  Pending    Oncologic Therapy History:  N/A    Molecular Genetics:  Pending    Current Sites of Disease:  Head of the pancreas involving the gastroduodenal artery celiac axis and 180 degree involvement of the SMV    Oncologic Problem List:  Localized but borderline resectable pancreatic adenocarcinoma  New onset diabetes with hyperglycemia  Cancer cachexia      Oncologic Narrative:  54 y.o.  man from [place] who presented in May 2024 with difficult DM control (A1c 9.1 this month)and some ED visits for fatigue/weakness. Most recently at [facility] May 16-18. 70 lb wt loss mentioned (note some of the new DM meds) .  Tumor markers checked:  CEA: 4.9  Ca 19-9: >700    CT A/P Showed:   Edema surrounding the pancreatic tail compatible with acute pancreatitis. Dilatation of the pancreatic duct which measures 6 mm in diameter and an ill marginated area of hypodensity in the  pancreatic head highly concerning for pancreatic malignancy, and pancreatic protocol MRI is recommended for further evaluation. An  area of necrotizing pancreatitis/pancreatic necrosis is other consideration, however there is no surrounding edema in this region and given the ductal dilatation, findings highly concerning for an underlying malignancy.  Small nonobstructive left renal calculi.     MRI:  IMPRESSION:  1. Pancreatic head/uncinate process mass measuring 2.9 x 2.5 cm with upstream pancreatic duct dilatation and parenchymal atrophy highly  concerning for primary pancreatic neoplasm  (specifically adenocarcinoma). Surrounding changes of mild superimposed pancreatitis. The mass is in contact with the adjacent 3rd part of the duodenum but no upstream dilatation. No biliary obstruction. The  mass has 180 degree contact with the adjacent superior mesenteric vein and likely encasing the 1st right lateral branch. The mass is  likely encasing the gastroduodenal artery distal component. The  celiac axis, superior mesenteric artery and main portal vein are intact.  2. Few subcentimeter peripancreatic indeterminate lymph nodes noted.  3. Hepatic steatosis with segment 4A lesion measuring 0.7 cm with  imaging characteristics favoring hemangioma .     Chest CT:  IMPRESSION:  1. No suspicious pulmonary nodules. Few small calcific granulomas noted in the lingula.  2. No enlarged intrathoracic lymphadenopathy.  3. Left thyroid nodule measuring 1.1 cm. This could be further assessed by dedicated nonemergent thyroid ultrasound if clinically desired.  4. Subdiaphragmatic findings regarding the known pancreatic mass and duct dilatation as well as the hepatic observations are better evaluated in prior MRI dated 05/16/2024     EUS:    Result Text   Impression  Limited endoscopic views of the esophagus, stomach and duodenum were obtained. There was heme and gastritis noted in the stomach. The rest of the exam was unremarkable   A 27 mm by 23 mm irregular and hypoechoic T2N0 mass with poorly defined margins was visualized in the uncinate process of the head with apparent involvement of the superior mesenteric vein; fine needle biopsy was performed, final pathology is pending but preliminary evaluation was positive for malignancy. The pancreas upstream to the mass appeared atrophic with dilated pancreatic duct.   The bile duct appeared normal.  Visualized portions of the liver, spleen, left adrenal gland, left kidney and celiac axis were normal on ultrasound examination.    No abnormal-appearing lymph nodes were  identified in the abdomen.            FINAL DIAGNOSIS   A. PANCREAS HEAD MASS, BIOPSY:     -- Invasive adenocarcinoma, moderately differentiated      6/11/24 - Exp / lap  -no carcinomatosis     Past Medical History: Nate has a past medical history of Anxiety, Arthritis, BCC (basal cell carcinoma), eyelid, right, Diabetes mellitus (Multi), Gastroesophageal reflux disease without esophagitis (10/05/2023), Hemorrhoids (11/03/2023), HTN (hypertension), Leg cramps (10/05/2023), Oropharyngeal dysphagia (10/05/2023), RADHA (obstructive sleep apnea), Pancreatic cancer (Multi), Personal history of other malignant neoplasm of skin, Type 2 diabetes mellitus, and Vision loss.  Surgical History:  Nate has a past surgical history that includes Tonsillectomy; Vasectomy; Testicle surgery; Esophagogastroduodenoscopy; Colonoscopy; Eyelid carcinoma excision; Rotator cuff repair (Bilateral); and Knee arthroscopy w/ debridement.  Social History:  Nate reports that he has never smoked. He has quit using smokeless tobacco.  His smokeless tobacco use included chew. He reports that he does not currently use alcohol after a past usage of about 7.0 standard drinks of alcohol per week. He reports that he does not use drugs.  Family History:    Family History   Problem Relation Name Age of Onset   • Diabetes Mother     • Ovarian cancer Mother     • Liver cancer Father     • Lung cancer Father     • Colon cancer Father     • Hypertension Father     • Stroke Maternal Grandmother     • Diabetes Maternal Grandmother     • Liver cancer Maternal Grandfather     • Lung cancer Paternal Grandfather       Family Oncology History:  Cancer-related family history includes Colon cancer in his father; Liver cancer in his father and maternal grandfather; Lung cancer in his father and paternal grandfather; Ovarian cancer in his mother.    Mom with uterine caner - early 30s   Wife with bolton syndrome  -     - daughter with Bolton Syndrome - age 28     - son  21, has not followed up with genetic testing    SUBJECTIVE:  History of Present Illness:  Ntae Alvarez is a 54 y.o. male who was referred by Adrienne Malloy A* and presents with chemotherapy follow up.     S/p 6th cycle of mFOLFIRINOX   Due for C7  tomorrow     - added fosaprepitant to C2 for N/V - nausea improved - still using compazine as well.    + jaw pain with first bites of food for 5 - 7 days, but tolerable    + cold sensitivity x 1 week    -erythematous rash on arms   No fevers or chills    - + back pain, hip pain on oxycodone    - occ fatigue, but staying active     OBJECTIVE:    VS / Pain:  There were no vitals taken for this visit.  BSA: There is no height or weight on file to calculate BSA.     Pain Scale: 0    Daily Weight  12/03/24 : 112 kg (245 lb 13 oz)  11/22/24 : 110 kg (243 lb 3.2 oz)  11/15/24 : 114 kg (251 lb 5.2 oz)      Physical Exam  Constitutional:       Appearance: Normal appearance.   HENT:      Head: Normocephalic.      Mouth/Throat:      Mouth: Mucous membranes are moist.      Pharynx: Oropharynx is clear.   Eyes:      Pupils: Pupils are equal, round, and reactive to light.   Cardiovascular:      Rate and Rhythm: Normal rate.      Pulses: Normal pulses.   Pulmonary:      Effort: Pulmonary effort is normal.   Abdominal:      General: Abdomen is flat. Bowel sounds are normal.   Musculoskeletal:      Cervical back: Neck supple.   Skin:     General: Skin is warm and dry.      Capillary Refill: Capillary refill takes less than 2 seconds.      Comments: Macular papular rash on bilateral arms   Neurological:      General: No focal deficit present.      Mental Status: He is alert.   Psychiatric:         Mood and Affect: Mood normal.       Performance Status: 1     Diagnostic Results     WBC   Date/Time Value Ref Range Status   11/11/2024 03:49 PM 7.4 4.4 - 11.3 x10*3/uL Final   10/28/2024 11:44 AM 6.0 4.4 - 11.3 x10*3/uL Final   10/14/2024 01:45 PM 5.7 4.4 - 11.3 x10*3/uL Final      Hemoglobin   Date Value Ref Range Status   11/11/2024 12.7 (L) 13.5 - 17.5 g/dL Final   10/28/2024 12.7 (L) 13.5 - 17.5 g/dL Final   10/14/2024 13.3 (L) 13.5 - 17.5 g/dL Final     MCV   Date/Time Value Ref Range Status   11/11/2024 03:49 PM 92 80 - 100 fL Final   10/28/2024 11:44 AM 91 80 - 100 fL Final   10/14/2024 01:45 PM 90 80 - 100 fL Final     Platelets   Date/Time Value Ref Range Status   11/11/2024 03:49  (L) 150 - 450 x10*3/uL Final   10/28/2024 11:44  (L) 150 - 450 x10*3/uL Final   10/14/2024 01:45  (L) 150 - 450 x10*3/uL Final     Neutrophils Absolute   Date/Time Value Ref Range Status   11/11/2024 03:49 PM 3.74 1.20 - 7.70 x10*3/uL Final     Comment:     Percent differential counts (%) should be interpreted in the context of the absolute cell counts (cells/uL).   10/28/2024 11:44 AM 3.13 1.20 - 7.70 x10*3/uL Final     Comment:     Percent differential counts (%) should be interpreted in the context of the absolute cell counts (cells/uL).   10/14/2024 01:45 PM 3.16 1.20 - 7.70 x10*3/uL Final     Comment:     Percent differential counts (%) should be interpreted in the context of the absolute cell counts (cells/uL).     Bilirubin, Total   Date/Time Value Ref Range Status   11/11/2024 03:49 PM 0.4 0.0 - 1.2 mg/dL Final   10/28/2024 11:44 AM 0.5 0.0 - 1.2 mg/dL Final   10/14/2024 01:45 PM 0.7 0.0 - 1.2 mg/dL Final     AST   Date/Time Value Ref Range Status   11/11/2024 03:49 PM 38 9 - 39 U/L Final   10/28/2024 11:44 AM 26 9 - 39 U/L Final   10/14/2024 01:45 PM 29 9 - 39 U/L Final     ALT   Date/Time Value Ref Range Status   11/11/2024 03:49 PM 55 (H) 10 - 52 U/L Final     Comment:     Patients treated with Sulfasalazine may generate falsely decreased results for ALT.   10/28/2024 11:44 AM 38 10 - 52 U/L Final     Comment:     Patients treated with Sulfasalazine may generate falsely decreased results for ALT.   10/14/2024 01:45 PM 37 10 - 52 U/L Final     Comment:     Patients treated  with Sulfasalazine may generate falsely decreased results for ALT.     Creatinine   Date/Time Value Ref Range Status   11/11/2024 03:49 PM 0.85 0.50 - 1.30 mg/dL Final   10/28/2024 11:44 AM 0.70 0.50 - 1.30 mg/dL Final   10/14/2024 01:45 PM 0.75 0.50 - 1.30 mg/dL Final     Urea Nitrogen   Date/Time Value Ref Range Status   11/11/2024 03:49 PM 23 6 - 23 mg/dL Final   10/28/2024 11:44 AM 18 6 - 23 mg/dL Final   10/14/2024 01:45 PM 14 6 - 23 mg/dL Final     Albumin   Date/Time Value Ref Range Status   11/11/2024 03:49 PM 4.1 3.4 - 5.0 g/dL Final   10/28/2024 11:44 AM 4.0 3.4 - 5.0 g/dL Final   10/14/2024 01:45 PM 4.1 3.4 - 5.0 g/dL Final     Cancer AG 19-9   Date/Time Value Ref Range Status   11/11/2024 03:49 PM 72.69 (H) <35.00 U/mL Final   10/29/2024 10:56 AM 45.15 (H) <35.00 U/mL Final   09/30/2024 02:55 PM 49.59 (H) <35.00 U/mL Final     Carcinoembryonic AG   Date/Time Value Ref Range Status   05/17/2024 09:28 AM 4.9 ug/L Final     === 09/26/24 ===    CT CHEST ABDOMEN PELVIS W IV CONTRAST    - Impression -  Pancreatic cancer restaging scan as compared to prior CT from  07/29/2024:  1. Continued interval decrease in size of known pancreatic  head/uncinate process mass.  2. Stable to slightly decreased size of periportal lymph node.  Otherwise, no new abdominopelvic lymphadenopathy or evidence of  metastatic disease within the chest, abdomen, and pelvis.  3. Additional chronic findings as described above.    MACRO:  None    Signed by: Florencio Dennis 9/28/2024 8:34 AM  Dictation workstation:   [id number]      Assessment/Plan   This is a 54-year-old man with borderline resectable pancreatic adenocarcinoma.  He presented in May 2024 with greater than 50 pound weight loss and new onset diabetes.  Imaging, EUS and biopsy confirmed adenocarcinoma of the pancreas involving the GDA, celiac axis, SMV.    Borderline resectable Pancreatic cancer:   - Consented for mFOLFIRINOX - started 6/3/24 - tolerated with jaw pain /  nausea  - added fosaprepitant for C2   -C2  -still with jaw pain, but manageable, we discussed possible dose reduction, he does not feel this is necessary yet.    - improved dz after 4 cycles   Plan for C9 today               -C10 in 2 weeks   Surgical referral in about 2 weeks.    RTC in 4 weeks with scans                  Follow Cancer Antigen 19-9 at least every other cycle  Obtain complete genomic profiling from pancreatic biopsy.    Pain - continue oxycodone      Diabetes:  Continue long and short acting insulin as managed by  Dr. Brandon You MD    [facility] [place]  Office: [phone]  Fax: [phone]

## 2024-12-06 ENCOUNTER — PRE-ADMISSION TESTING (OUTPATIENT)
Dept: PREADMISSION TESTING | Facility: HOSPITAL | Age: 54
End: 2024-12-06
Payer: COMMERCIAL

## 2024-12-06 VITALS
BODY MASS INDEX: 33.9 KG/M2 | TEMPERATURE: 98.2 F | HEART RATE: 68 BPM | HEIGHT: 67 IN | DIASTOLIC BLOOD PRESSURE: 90 MMHG | SYSTOLIC BLOOD PRESSURE: 138 MMHG | WEIGHT: 216 LBS

## 2024-12-06 DIAGNOSIS — I44.0 AV BLOCK, 1ST DEGREE: Primary | ICD-10-CM

## 2024-12-06 DIAGNOSIS — C25.0 MALIGNANT NEOPLASM OF HEAD OF PANCREAS (MULTI): ICD-10-CM

## 2024-12-06 LAB
ABO GROUP (TYPE) IN BLOOD: NORMAL
ALBUMIN SERPL BCP-MCNC: 4.1 G/DL (ref 3.4–5)
ALP SERPL-CCNC: 79 U/L (ref 33–120)
ALT SERPL W P-5'-P-CCNC: 37 U/L (ref 10–52)
ANION GAP SERPL CALC-SCNC: 13 MMOL/L (ref 10–20)
ANTIBODY SCREEN: NORMAL
AST SERPL W P-5'-P-CCNC: 34 U/L (ref 9–39)
BASOPHILS # BLD AUTO: 0.04 X10*3/UL (ref 0–0.1)
BASOPHILS NFR BLD AUTO: 0.8 %
BILIRUB SERPL-MCNC: 0.7 MG/DL (ref 0–1.2)
BUN SERPL-MCNC: 20 MG/DL (ref 6–23)
CALCIUM SERPL-MCNC: 9 MG/DL (ref 8.6–10.6)
CANCER AG19-9 SERPL-ACNC: 162.69 U/ML
CHLORIDE SERPL-SCNC: 108 MMOL/L (ref 98–107)
CO2 SERPL-SCNC: 24 MMOL/L (ref 21–32)
CREAT SERPL-MCNC: 0.79 MG/DL (ref 0.5–1.3)
EGFRCR SERPLBLD CKD-EPI 2021: >90 ML/MIN/1.73M*2
EOSINOPHIL # BLD AUTO: 0.11 X10*3/UL (ref 0–0.7)
EOSINOPHIL NFR BLD AUTO: 2.2 %
ERYTHROCYTE [DISTWIDTH] IN BLOOD BY AUTOMATED COUNT: 13.7 % (ref 11.5–14.5)
EST. AVERAGE GLUCOSE BLD GHB EST-MCNC: 105 MG/DL
GLUCOSE SERPL-MCNC: 182 MG/DL (ref 74–99)
HBA1C MFR BLD: 5.3 %
HCT VFR BLD AUTO: 38.9 % (ref 41–52)
HGB BLD-MCNC: 13.1 G/DL (ref 13.5–17.5)
IMM GRANULOCYTES # BLD AUTO: 0.01 X10*3/UL (ref 0–0.7)
IMM GRANULOCYTES NFR BLD AUTO: 0.2 % (ref 0–0.9)
LYMPHOCYTES # BLD AUTO: 1.71 X10*3/UL (ref 1.2–4.8)
LYMPHOCYTES NFR BLD AUTO: 34.8 %
MCH RBC QN AUTO: 31.2 PG (ref 26–34)
MCHC RBC AUTO-ENTMCNC: 33.7 G/DL (ref 32–36)
MCV RBC AUTO: 93 FL (ref 80–100)
MONOCYTES # BLD AUTO: 0.58 X10*3/UL (ref 0.1–1)
MONOCYTES NFR BLD AUTO: 11.8 %
NEUTROPHILS # BLD AUTO: 2.46 X10*3/UL (ref 1.2–7.7)
NEUTROPHILS NFR BLD AUTO: 50.2 %
NRBC BLD-RTO: 0 /100 WBCS (ref 0–0)
PLATELET # BLD AUTO: 129 X10*3/UL (ref 150–450)
POTASSIUM SERPL-SCNC: 4 MMOL/L (ref 3.5–5.3)
PROT SERPL-MCNC: 6.4 G/DL (ref 6.4–8.2)
RBC # BLD AUTO: 4.2 X10*6/UL (ref 4.5–5.9)
RH FACTOR (ANTIGEN D): NORMAL
SODIUM SERPL-SCNC: 141 MMOL/L (ref 136–145)
WBC # BLD AUTO: 4.9 X10*3/UL (ref 4.4–11.3)

## 2024-12-06 PROCEDURE — 86923 COMPATIBILITY TEST ELECTRIC: CPT

## 2024-12-06 PROCEDURE — 86301 IMMUNOASSAY TUMOR CA 19-9: CPT

## 2024-12-06 PROCEDURE — 83036 HEMOGLOBIN GLYCOSYLATED A1C: CPT

## 2024-12-06 PROCEDURE — 99204 OFFICE O/P NEW MOD 45 MIN: CPT | Performed by: NURSE PRACTITIONER

## 2024-12-06 PROCEDURE — 85025 COMPLETE CBC W/AUTO DIFF WBC: CPT

## 2024-12-06 PROCEDURE — 86901 BLOOD TYPING SEROLOGIC RH(D): CPT

## 2024-12-06 PROCEDURE — 84075 ASSAY ALKALINE PHOSPHATASE: CPT

## 2024-12-06 PROCEDURE — 87081 CULTURE SCREEN ONLY: CPT

## 2024-12-06 RX ORDER — CHLORHEXIDINE GLUCONATE 40 MG/ML
SOLUTION TOPICAL DAILY PRN
Qty: 473 ML | Refills: 0 | Status: SHIPPED | OUTPATIENT
Start: 2024-12-06 | End: 2024-12-11

## 2024-12-06 RX ORDER — CHLORHEXIDINE GLUCONATE ORAL RINSE 1.2 MG/ML
15 SOLUTION DENTAL AS NEEDED
Qty: 473 ML | Refills: 0 | Status: SHIPPED | OUTPATIENT
Start: 2024-12-06 | End: 2024-12-08

## 2024-12-06 ASSESSMENT — ENCOUNTER SYMPTOMS
COUGH: 0
DOUBLE VISION: 0
VISUAL CHANGE: 0
UNEXPECTED WEIGHT CHANGE: 1
JOINT SWELLING: 0
LIMITED RANGE OF MOTION: 0
EXCESSIVE BLEEDING: 0
WEAKNESS: 0
DYSPNEA WITH EXERTION: 0
RHINORRHEA: 1
SHORTNESS OF BREATH: 0
DYSPNEA AT REST: 0
HEMOPTYSIS: 0
PND: 0
NECK PAIN: 0
BRUISES/BLEEDS EASILY: 0
DYSURIA: 0
ABDOMINAL PAIN: 1
WOUND: 0
DIARRHEA: 0
CONSTIPATION: 0
LIGHT-HEADEDNESS: 0
ABDOMINAL DISTENTION: 0
PALPITATIONS: 0
VERTIGO: 0
POLYDIPSIA: 0
NECK MASS: 0
TREMORS: 0
BLOOD IN STOOL: 0
CHILLS: 0
EYE DISCHARGE: 0
NECK STIFFNESS: 0
TROUBLE SWALLOWING: 0
SINUS CONGESTION: 0
NAUSEA: 0
FEVER: 0
NECK SWELLING: 0
DIFFICULTY URINATING: 0
CONFUSION: 0
SKIN CHANGES: 0
ARTHRALGIAS: 0
NUMBNESS: 0
VOMITING: 0
NERVOUS/ANXIOUS: 0
MYALGIAS: 1
WHEEZING: 0
VOICE CHANGE: 0

## 2024-12-06 ASSESSMENT — DUKE ACTIVITY SCORE INDEX (DASI)
CAN YOU CLIMB A FLIGHT OF STAIRS OR WALK UP A HILL: YES
CAN YOU DO HEAVY WORK AROUND THE HOUSE LIKE SCRUBBING FLOORS OR LIFTING AND MOVING HEAVY FURNITURE: YES
CAN YOU HAVE SEXUAL RELATIONS: YES
TOTAL_SCORE: 50.7
CAN YOU RUN A SHORT DISTANCE: YES
DASI METS SCORE: 9
CAN YOU DO YARD WORK LIKE RAKING LEAVES, WEEDING OR PUSHING A MOWER: YES
CAN YOU DO MODERATE WORK AROUND THE HOUSE LIKE VACUUMING, SWEEPING FLOORS OR CARRYING GROCERIES: YES
CAN YOU PARTICIPATE IN STRENOUS SPORTS LIKE SWIMMING, SINGLES TENNIS, FOOTBALL, BASKETBALL, OR SKIING: NO
CAN YOU PARTICIPATE IN MODERATE RECREATIONAL ACTIVITIES LIKE GOLF, BOWLING, DANCING, DOUBLES TENNIS OR THROWING A BASEBALL OR FOOTBALL: YES
CAN YOU TAKE CARE OF YOURSELF (EAT, DRESS, BATHE, OR USE TOILET): YES
CAN YOU DO LIGHT WORK AROUND THE HOUSE LIKE DUSTING OR WASHING DISHES: YES
CAN YOU WALK INDOORS, SUCH AS AROUND YOUR HOUSE: YES
CAN YOU WALK A BLOCK OR TWO ON LEVEL GROUND: YES

## 2024-12-06 ASSESSMENT — LIFESTYLE VARIABLES: SMOKING_STATUS: NONSMOKER

## 2024-12-06 NOTE — CPM/PAT H&P
CPM/PAT Evaluation       Name: Nate Alvarez (Nate Alvarez)  /Age: 1970/54 y.o.     Visit Type:   In-Person       Chief Complaint: perioperative evaluation    HPI 55 y/o male scheduled for whipple on  with Dr. Gamble secondary to malignant neoplasm of the pancreas.  PMHX includes Anxiety, BCC of face, GERD, RADHA (no CPAP), Insulin Dependent Diabetes Mellitus Type 2, HTN, Neoplasm of the Pancreas, Vitamin D Deficiency, Arthritis, mediport present.  Presents to CPM today for perioperative risk stratification and optimization.    PCP: Hazel Medeiros MD   Specialists: Endocrinology:  Dr. Marya Vasquez MD   Heme-Oncology: Dr. Shaw You MD   Surgeon Oncologist: Dr. Roddy Gamble MD    Past Medical History:   Diagnosis Date    Anxiety     Arthritis     BCC (basal cell carcinoma), eyelid, right     s/p resection    Diabetes mellitus (Multi)     Gastroesophageal reflux disease without esophagitis 10/05/2023    Hemorrhoids 2023    HTN (hypertension)     Borderline HTN, no meds    Leg cramps 10/05/2023    Oropharyngeal dysphagia 10/05/2023    RADHA (obstructive sleep apnea)     no PAP    Pancreatic cancer (Multi)     Dx 2024, Treated with chemotherapy completed 24    Personal history of other malignant neoplasm of skin     Type 2 diabetes mellitus     Vision loss     uses corrective lens       Past Surgical History:   Procedure Laterality Date    COLONOSCOPY      ESOPHAGOGASTRODUODENOSCOPY      EYELID CARCINOMA EXCISION      KNEE ARTHROSCOPY W/ DEBRIDEMENT Left     ROTATOR CUFF REPAIR Bilateral     TESTICLE SURGERY      Hydrocelectomy    TONSILLECTOMY      VASECTOMY         Patient Sexual activity questions deferred to the physician.    Family History   Problem Relation Name Age of Onset    Diabetes Mother      Ovarian cancer Mother      Liver cancer Father      Lung cancer Father      Colon cancer Father      Hypertension Father      Stroke Maternal Grandmother       "Diabetes Maternal Grandmother      Liver cancer Maternal Grandfather      Lung cancer Paternal Grandfather         Allergies   Allergen Reactions    Farxiga [Dapagliflozin] Diarrhea    Glipizide GI Upset    Januvia [Sitagliptin] Diarrhea    Metformin Nausea/vomiting    Mounjaro [Tirzepatide] Nausea/vomiting    Tramadol Nausea/vomiting       Prior to Admission medications    Medication Sig Start Date End Date Taking? Authorizing Provider   BD Insulin Syringe Ultra-Fine 0.3 mL 31 gauge x 5/16\" syringe Inject 1 each under the skin 4 times a day before meals. 7/11/24 7/11/25  Hazel Medeiros MD   blood sugar diagnostic (Blood Glucose Test) strip Check blood sugars once daily 10/5/23   Hazel Medeiros MD   blood-glucose meter misc Check  blood sugar as needed 10/5/23   Hazel Medeiros MD   blood-glucose sensor (FreeStyle Jef 3 Plus Sensor) device Use as directed 9/18/24   Hazel Medeiros MD   blood-glucose sensor (FreeStyle Jef 3 Sensor) device Use as directed 5/3/24   Hazel Medeiros MD   cholecalciferol (Vitamin D-3) 125 MCG (5000 UT) capsule Take 1 capsule (125 mcg) by mouth once daily.  Patient not taking: Reported on 11/22/2024 4/17/24 4/17/25  Hazel Medeiros MD   FreeStyle Jef 3 [place] misc Use with sensors as directed 5/17/24   Kadeem Haynes DO   insulin glargine (Lantus U-100 Insulin) 100 unit/mL injection Inject 20 Units under the skin once every 24 hours. Take as directed per insulin instructions.  Patient taking differently: Inject 30 Units under the skin once every 24 hours. Take as directed per insulin instructions. 10/24/24 4/22/25  Hazel Medeiros MD   insulin lispro (HumaLOG) 100 unit/mL injection Inject 0.5 mL (50 Units) under the skin 3 times daily (morning, midday, late afternoon). Take as directed per insulin instructions. Plus sliding scale 5/24/24 11/20/24  Hazel Medeiros MD   lancets misc Check blood sugars once daily 10/5/23   Hazel Medeiros MD   lisinopril 5 mg tablet Take 1 tablet (5 mg) by mouth " "once daily. 4/17/24 10/14/24  Hazel Medeiros MD   LORazepam (Ativan) 1 mg tablet Take 1 tablet (1 mg) by mouth as needed at bedtime for anxiety. Insomnia, or nausea 6/25/24 9/23/24  LEE May   naloxone (Narcan) 4 mg/0.1 mL nasal spray Administer 1 spray (4 mg) into affected nostril(s) if needed for opioid reversal. May repeat every 2-3 minutes if needed, alternating nostrils, until medical assistance becomes available. 8/19/24   Shaw You MD   ondansetron (Zofran) 8 mg tablet Take 1 tablet (8 mg) by mouth every 8 hours if needed for nausea or vomiting. 5/28/24   Shaw You MD   oxyCODONE (Roxicodone) 10 mg immediate release tablet Take 1 tablet (10 mg) by mouth every 6 hours if needed for severe pain (7 - 10). 11/15/24 12/15/24  LEE May   pancrelipase, Lip-Prot-Amyl, (Creon) 36,000-114,000- 180,000 unit capsule,delayed release(DR/EC) capsule Take 2-3 capsules by mouth with meals and 1-2 capsules with snacks daily; for an average of 13 capsules per day. 7/23/24   Shaw You MD   pantoprazole (ProtoNix) 40 mg EC tablet Take 1 tablet (40 mg) by mouth 2 times a day. Do not crush, chew, or split. 5/29/24   Leno Monahan MD   pen needle, diabetic (BD Ultra-Fine Short Pen Needle) 31 gauge x 5/16\" needle Use four times daily with insulin 5/10/24   Hazel Medeiros MD   prochlorperazine (Compazine) 10 mg tablet Take 1 tablet (10 mg) by mouth every 6 hours if needed for nausea or vomiting. 5/28/24   Shaw You MD        PAT ROS:   Constitutional:    no fever   no chills   unexpected weight change  Neuro/Psych:    no numbness   no weakness   no light-headedness   no tremors   no confusion   not nervous/anxious   no self-injury   no suicidal ideation  Eyes:    no discharge   no vision loss   no diplopia   no visual disturbance   no corrective lenses  Ears:    no ear pain   no hearing loss   no vertigo   tinnitus (bilaterally with chemotherapy (intermittent))   no " hearing aides  Nose:    nasal discharge   no sinus congestion   no epistaxis  Mouth:    no dental issues   no mouth pain   no oral bleeding   no mouth lesions  Throat:    no throat pain   no dysphagia   no voice change  Neck:    no neck pain   neck swelling   no neck stiffness   no neck masses  Cardio:    no chest pain   no palpitations   no peripheral edema   no dyspnea   no ALICEA   no paroxysmal nocturnal dyspnea  Respiratory:    no cough   no wheezing   no hemoptysis   no shortness of breath  Endocrine:    no cold intolerance   no heat intolerance   no polydipsia  GI:    no abdominal distention   abdominal pain (2 out of 10, stabbing epigastric region)   no constipation   no diarrhea   no nausea   no vomiting   no blood in stool  :    no difficulty urinating   no dysuria   no oliguria   no polyuria  Musculoskeletal:    Paresthesia in fingers and toes since starting chemotherapy, unchanged. Transient in nature.   no arthralgias   myalgias (lower back pain, chronic)   no decreased ROM   no swelling  Hematologic:    does not bruise/bleed easily   no excessive bleeding   no history of blood transfusion   no blood clots  Skin:   no skin changes   no sores/wound   no rash      Physical Exam  Vitals reviewed.   Constitutional:       General: He is not in acute distress.     Appearance: Normal appearance. He is normal weight. He is not ill-appearing, toxic-appearing or diaphoretic.   HENT:      Head: Normocephalic.      Nose: Nose normal. No congestion or rhinorrhea.      Mouth/Throat:      Mouth: Mucous membranes are moist.      Pharynx: Oropharynx is clear. No oropharyngeal exudate or posterior oropharyngeal erythema.   Eyes:      General: No scleral icterus.        Right eye: No discharge.         Left eye: No discharge.      Conjunctiva/sclera: Conjunctivae normal.   Neck:      Vascular: No carotid bruit.   Cardiovascular:      Rate and Rhythm: Normal rate and regular rhythm.      Pulses: Normal pulses.      Heart  "sounds: Normal heart sounds. No murmur heard.     No friction rub. No gallop.   Pulmonary:      Effort: Pulmonary effort is normal. No respiratory distress.      Breath sounds: Normal breath sounds. No stridor. No wheezing, rhonchi or rales.   Chest:      Chest wall: No tenderness.          Comments: Right chest mediport placement  Musculoskeletal:         General: No swelling or tenderness.      Cervical back: Normal range of motion. No rigidity or tenderness.   Neurological:      General: No focal deficit present.      Mental Status: He is alert.   Psychiatric:         Mood and Affect: Mood normal.         Behavior: Behavior normal.         Thought Content: Thought content normal.         Judgment: Judgment normal.        PAT AIRWAY:   Airway:     Mallampati::  I    TM distance::  >3 FB    Neck ROM::  Full    Visit Vitals  /90   Pulse 68   Temp 36.8 °C (98.2 °F)   Ht 1.702 m (5' 7\")   Wt 98 kg (216 lb)   BMI 33.83 kg/m²   Smoking Status Never   BSA 2.15 m²         DASI Risk Score      Flowsheet Row Pre-Admission Testing from 12/6/2024 in [facility]   Can you take care of yourself (eat, dress, bathe, or use toilet)?  2.75 filed at 12/06/2024 1554   Can you walk indoors, such as around your house? 1.75 filed at 12/06/2024 1554   Can you walk a block or two on level ground?  2.75 filed at 12/06/2024 1554   Can you climb a flight of stairs or walk up a hill? 5.5 filed at 12/06/2024 1554   Can you run a short distance? 8 filed at 12/06/2024 1554   Can you do light work around the house like dusting or washing dishes? 2.7 filed at 12/06/2024 1554   Can you do moderate work around the house like vacuuming, sweeping floors or carrying groceries? 3.5 filed at 12/06/2024 1554   Can you do heavy work around the house like scrubbing floors or lifting and moving heavy furniture?  8 filed at 12/06/2024 1554   Can you do yard work like raking leaves, weeding or pushing a mower? 4.5 filed at 12/06/2024 1554 "   Can you have sexual relations? 5.25 filed at 12/06/2024 1554   Can you participate in moderate recreational activities like golf, bowling, dancing, doubles tennis or throwing a baseball or football? 6 filed at 12/06/2024 1554   Can you participate in strenous sports like swimming, singles tennis, football, basketball, or skiing? 0 filed at 12/06/2024 1554   DASI SCORE 50.7 filed at 12/06/2024 1554   METS Score (Will be calculated only when all the questions are answered) 9 filed at 12/06/2024 1554          Caprini DVT Assessment      Flowsheet Row Pre-Admission Testing from 12/6/2024 in [facility] ED to Hosp-Admission (Discharged) from 5/15/2024 in [facility] Bldg A 7 with Emery Paez MD and Chico Bergman DO   DVT Score 11 filed at 12/06/2024 1620 4 filed at 05/15/2024 2330   Medical Factors Present cancer, chemotherapy, or previous malignancy filed at 12/06/2024 1620 --   Surgical Factors Major surgery planned, lasting over 3 hours filed at 12/06/2024 1620 --   BMI 31-40 (Obesity) filed at 12/06/2024 1620 30 or less filed at 05/15/2024 2330   RETIRED: Current Status -- Swollen legs filed at 05/15/2024 2330   RETIRED: History -- Sepsis filed at 05/15/2024 2330   RETIRED: Age -- 40-59 years filed at 05/15/2024 2330          Modified Frailty Index    No data to display       CHADS2 Stroke Risk  Current as of 33 minutes ago        N/A 3 to 100%: High Risk   2 to < 3%: Medium Risk   0 to < 2%: Low Risk     Last Change: N/A          This score determines the patient's risk of having a stroke if the patient has atrial fibrillation.        This score is not applicable to this patient. Components are not calculated.          Revised Cardiac Risk Index      Flowsheet Row Pre-Admission Testing from 12/6/2024 in [facility]   High-Risk Surgery (Intraperitoneal, Intrathoracic,Suprainguinal vascular) 1 filed at 12/06/2024 1555   History of ischemic heart disease (History  of MI, History of positive exercuse test, Current chest paint considered due to myocardial ischemia, Use of nitrate therapy, ECG with pathological Q Waves) 0 filed at 12/06/2024 1555   History of congestive heart failure (pulmonary edemia, bilateral rales or S3 gallop, Paroxysmal nocturnal dyspnea, CXR showing pulmonary vascular redistribution) 0 filed at 12/06/2024 1555   History of cerebrovascular disease (Prior TIA or stroke) 0 filed at 12/06/2024 1555   Pre-operative insulin treatment 1 filed at 12/06/2024 1555   Pre-operative creatinine>2 mg/dl 0 filed at 12/06/2024 1555   Revised Cardiac Risk Calculator 2 filed at 12/06/2024 1555          Apfel Simplified Score      Flowsheet Row Pre-Admission Testing from 12/6/2024 in [facility]   Smoking status 1 filed at 12/06/2024 1600   History of motion sickness or PONV  0 filed at 12/06/2024 1600   Use of postoperative opioids 1 filed at 12/06/2024 1600   Gender - Female 0=No filed at 12/06/2024 1600   Apfel Simplified Score Calculator 2 filed at 12/06/2024 1600          Risk Analysis Index Results This Encounter    No data found in the last 10 encounters.       Stop Bang Score      Flowsheet Row Pre-Admission Testing from 12/6/2024 in [facility]   Do you snore loudly? 1 filed at 12/06/2024 1559   Do you often feel tired or fatigued after your sleep? 0 filed at 12/06/2024 1559   Has anyone ever observed you stop breathing in your sleep? 0 filed at 12/06/2024 1559   Do you have or are you being treated for high blood pressure? 1 filed at 12/06/2024 1559   Recent BMI (Calculated) 33.8 filed at 12/06/2024 1559   Is BMI greater than 35 kg/m2? 0=No filed at 12/06/2024 1559   Age older than 50 years old? 1=Yes filed at 12/06/2024 1559   Is your neck circumference greater than 17 inches (Male) or 16 inches (Female)? 0 filed at 12/06/2024 1559   Gender - Male 1=Yes filed at 12/06/2024 1559   STOP-BANG Total Score 4 filed at 12/06/2024 8343           Prodigy: High Risk  Total Score: 11              Prodigy Gender Score     Prodigy Previous Opioid Use Score           ARISCAT Score for Postoperative Pulmonary Complications      Flowsheet Row Pre-Admission Testing from 12/6/2024 in [facility]   Age, years  3 filed at 12/06/2024 1558   Preoperative SpO2 0 filed at 12/06/2024 1558   Respiratory infection in the last month Either upper or lower (i.e., URI, bronchitis, pneumonia), with fever and antibiotic treatment 0 filed at 12/06/2024 1558   Preoperative anemoa (Hgb less than 10 g/dl) 0 filed at 12/06/2024 1558   Surgical incision  15 filed at 12/06/2024 1558   Duration of surgery  23 filed at 12/06/2024 1558   Emergency Procedure  0 filed at 12/06/2024 1558   ARISCAT Total Score  41 filed at 12/06/2024 1558          Enrique Perioperative Risk for Myocardial Infarction or Cardiac Arrest (MONTANA)    No data to display         Assessment and Plan:   Anesthesia/Airway:  No anesthesia complications      Neuro:    #Anxiety, controlled and takes Ativan as needed although deneis needed frequently.     Patient is at an increased risk for post operative delirium secondary to type and duration of surgery.  Preoperative brain exercise educational handout provided to patient.    The patient is at an increased risk for perioperative stroke secondary to HTN, general anesthesia, and op time >2.5 hours.       HEENT/Airway:    No HEENT diagnosis or significant findings on chart review or clinical presentation and evaluation. No further preoperative testing/intervention indicated at this time.      Cardiovascular:    #HTN, states was told to stop lisinopril due to blood pressure improving. Monitored by PCP.     #Low HR & First Degree AV Heart Block found on EKG, found today in CPM. Patient noted to have HR in 50-60s at previous office visits with Oncology, however during EKG patient HR in 40's as well as first degree AV block. EKG noted below. Patient asymptomatic  and denies previous episodes of symptoms. Patient scheduled/notified of cardiology appointment on 12/9 with Dr. Kc prior to surgery. Both Dr You and Dr. Gamble have been notified and in agreement of plan. Patient also notified to seek ED care if symptoms arise, such as chest pain, shortness of breath, palpitations, dizziness/lightheadedness.     No additional preoperative testing is currently indicated.    METS are 9    RCRI  2 which is 10.1% 30 day risk of MACE (risk for cardiac death, nonfatal myocardial infarction, and nonfactal cardiac arrest    MONTANA score which indicates a   0.2 % risk of intraoperative or 30-day postoperative MACE    EKG 12/6/24  Marked sinus bradycardia with 1st degree AV block  42 BPM, Printerval 314ms, Qtc 337ms     EKG 5/15/24  Normal sinus rhythm  Anterior infarct , age undetermined  Abnormal ECG      Stress Test   Summary:   1. Adequate level of stress achieved.   2. No electrocardiographic evidence for ischemia at a maximal workload.      Echo  CONCLUSIONS:   1. Left ventricular ejection fraction is normal, calculated by Tom's biplane at 63%.   2. There is moderately increased septal thickness.   3. There is normal right ventricular global systolic function.   4. Aortic root mildly to moderately enlarged 4.4 cm.          Pulmonary:    #RADHA, patient reports he does not use CPAP machine, and instead changes body positions to assist with snoring/sleep. He confirms a prior sleep study and recommendation to return for additional sleep studies that he deferred.     Patient is at increased risk of perioperative complications secondary to  major surgery, duration of surgery > 2 hours, types of anesthetic    Preoperative deep breathing educational handout provided to patient.    ARISCAT:    41  points which is a intermediate (13.3%) risk of in-hospital post-op pulmonary complications     PRODIGY:  8  points which is a intermediate risk of post op opioid induced respiratory  depression episodes    STOP BAN   points which is a intermediate risk for moderate to severe RADHA        Renal:   No renal diagnosis, however patient is at increase risk for perioperative renal complications secondary to  BMI equal to or greater than 30, HTN, intraperitoneal surgery       Endocrine:   #Insulin Dependent Diabetes Mellitus Type 2, follows with endocrinology and currently medicated with 30 units Lantus daily which he takes in evening. Patient directed to take half dose (15 units) on night before surgery. Patient states he was taking Humalog however he no longer takes this, and has been requiring 8 units occasionally, stating 1 time a week if any. He states this has been his humalog routine for at least 2 months. Patient directed to hold humalog day of surgery. Last POC Hgb A1c on 24 7.0.     Hematologic:     #Vitamin D Deficiency, patient taking vitamin D supplementation, hold 7 days prior to surgery.     Preoperative DVT educational handout provided to patient.  Caprini Score:  11  points which is a highest risk of perioperative VTE      Gastrointestinal:   #pancreatic adenocarcinoma, follows with Oncology and completing Neoadjuvant FOLFIRINOX therapy and added fosaprepitant. Patient has been taking Creon supplementation, to continue through surgery. Per note from oncology on 12/3 by Dr. You, he started on 6/3/24. Patient reports last dose of chemotherapy was 24. Patient has right side chest mediport. Most recent CT scan details below. ERAS kit given in office with DM2 instructions.     #GERD, controlled and medicated with protonix.      EAT-10 score of    0  : self-perceived oropharyngeal dysphagia scale (0-40)     Apfel: 2 points 39% risk for post operative N/V    CT Chest abdomen/pelvis 24  IMPRESSION:  1. Pancreatic mass intervally decreased in size from prior imaging as  described above.  2. Scattered periportal lymph nodes stable from the prior  examination. No  lymphadenopathy in the abdomen/pelvis.  3. No definite metastatic disease in the chest, abdomen, or pelvis.        Infectious disease:     No infectious diagnosis or significant findings on chart review or clinical presentation and evaluation.   Prescription provided for CHG body wash and dental rinse. CHG use instructions reviewed and provided to patient.  Staph screen collected      Musculoskeletal:    #Arthritis, mostly in hands and knees. Patient reports follows with PCP and uses OTC medication as needed. Patient notes he takes oxycodone for pancreatic pain which helps, okay to continue PRN prescription till surgery. Hold any OTC NSAID for 7 days prior to surgery, tylenol okay to continue.       Other:     #BCC of face, PCP following. Unchanged and no need for further workup/intervention.         Labs ordered  cbc, comp, t/s, A1c, and MSSA/MRSA culture      Medication instructions and NPO guidelines reviewed with the patient.  All questions or concerns discussed and addressed.     Recent Results (from the past week)   Staphylococcus aureus/MRSA colonization, Culture    Collection Time: 12/06/24 11:28 AM    Specimen: Nares/Axilla/Groin; Swab   Result Value Ref Range    Staph/MRSA Screen Culture (A)      Isolated: Methicillin Susceptible Staphylococcus aureus (MSSA)   CBC and Auto Differential    Collection Time: 12/06/24 11:28 AM   Result Value Ref Range    WBC 4.9 4.4 - 11.3 x10*3/uL    nRBC 0.0 0.0 - 0.0 /100 WBCs    RBC 4.20 (L) 4.50 - 5.90 x10*6/uL    Hemoglobin 13.1 (L) 13.5 - 17.5 g/dL    Hematocrit 38.9 (L) 41.0 - 52.0 %    MCV 93 80 - 100 fL    MCH 31.2 26.0 - 34.0 pg    MCHC 33.7 32.0 - 36.0 g/dL    RDW 13.7 11.5 - 14.5 %    Platelets 129 (L) 150 - 450 x10*3/uL    Neutrophils % 50.2 40.0 - 80.0 %    Immature Granulocytes %, Automated 0.2 0.0 - 0.9 %    Lymphocytes % 34.8 13.0 - 44.0 %    Monocytes % 11.8 2.0 - 10.0 %    Eosinophils % 2.2 0.0 - 6.0 %    Basophils % 0.8 0.0 - 2.0 %    Neutrophils Absolute  2.46 1.20 - 7.70 x10*3/uL    Immature Granulocytes Absolute, Automated 0.01 0.00 - 0.70 x10*3/uL    Lymphocytes Absolute 1.71 1.20 - 4.80 x10*3/uL    Monocytes Absolute 0.58 0.10 - 1.00 x10*3/uL    Eosinophils Absolute 0.11 0.00 - 0.70 x10*3/uL    Basophils Absolute 0.04 0.00 - 0.10 x10*3/uL   Comprehensive metabolic panel    Collection Time: 12/06/24 11:28 AM   Result Value Ref Range    Glucose 182 (H) 74 - 99 mg/dL    Sodium 141 136 - 145 mmol/L    Potassium 4.0 3.5 - 5.3 mmol/L    Chloride 108 (H) 98 - 107 mmol/L    Bicarbonate 24 21 - 32 mmol/L    Anion Gap 13 10 - 20 mmol/L    Urea Nitrogen 20 6 - 23 mg/dL    Creatinine 0.79 0.50 - 1.30 mg/dL    eGFR >90 >60 mL/min/1.73m*2    Calcium 9.0 8.6 - 10.6 mg/dL    Albumin 4.1 3.4 - 5.0 g/dL    Alkaline Phosphatase 79 33 - 120 U/L    Total Protein 6.4 6.4 - 8.2 g/dL    AST 34 9 - 39 U/L    Bilirubin, Total 0.7 0.0 - 1.2 mg/dL    ALT 37 10 - 52 U/L   Cancer Antigen 19-9    Collection Time: 12/06/24 11:28 AM   Result Value Ref Range    Cancer AG 19-9 162.69 (H) <35.00 U/mL   Type And Screen    Collection Time: 12/06/24 11:28 AM   Result Value Ref Range    ABO TYPE A     Rh TYPE POS     ANTIBODY SCREEN NEG    Hemoglobin A1C    Collection Time: 12/06/24 11:28 AM   Result Value Ref Range    Hemoglobin A1C 5.3 See comment %    Estimated Average Glucose 105 Not Established mg/dL

## 2024-12-06 NOTE — H&P (VIEW-ONLY)
CPM/PAT Evaluation       Name: Nate Alvarez (Nate Alvarez)  /Age: 1970/54 y.o.     Visit Type:   In-Person       Chief Complaint: perioperative evaluation    HPI 55 y/o male scheduled for whipple on  with Dr. Gamble secondary to malignant neoplasm of the pancreas.  PMHX includes Anxiety, BCC of face, GERD, RADHA (no CPAP), Insulin Dependent Diabetes Mellitus Type 2, HTN, Neoplasm of the Pancreas, Vitamin D Deficiency, Arthritis, mediport present.  Presents to CPM today for perioperative risk stratification and optimization.    PCP: Hazel Medeiros MD   Specialists: Endocrinology:  Dr. Marya Vasquez MD   Heme-Oncology: Dr. Shaw You MD   Surgeon Oncologist: Dr. Roddy Gamble MD    Past Medical History:   Diagnosis Date    Anxiety     Arthritis     BCC (basal cell carcinoma), eyelid, right     s/p resection    Diabetes mellitus (Multi)     Gastroesophageal reflux disease without esophagitis 10/05/2023    Hemorrhoids 2023    HTN (hypertension)     Borderline HTN, no meds    Leg cramps 10/05/2023    Oropharyngeal dysphagia 10/05/2023    RADHA (obstructive sleep apnea)     no PAP    Pancreatic cancer (Multi)     Dx 2024, Treated with chemotherapy completed 24    Personal history of other malignant neoplasm of skin     Type 2 diabetes mellitus     Vision loss     uses corrective lens       Past Surgical History:   Procedure Laterality Date    COLONOSCOPY      ESOPHAGOGASTRODUODENOSCOPY      EYELID CARCINOMA EXCISION      KNEE ARTHROSCOPY W/ DEBRIDEMENT Left     ROTATOR CUFF REPAIR Bilateral     TESTICLE SURGERY      Hydrocelectomy    TONSILLECTOMY      VASECTOMY         Patient Sexual activity questions deferred to the physician.    Family History   Problem Relation Name Age of Onset    Diabetes Mother      Ovarian cancer Mother      Liver cancer Father      Lung cancer Father      Colon cancer Father      Hypertension Father      Stroke Maternal Grandmother       "Diabetes Maternal Grandmother      Liver cancer Maternal Grandfather      Lung cancer Paternal Grandfather         Allergies   Allergen Reactions    Farxiga [Dapagliflozin] Diarrhea    Glipizide GI Upset    Januvia [Sitagliptin] Diarrhea    Metformin Nausea/vomiting    Mounjaro [Tirzepatide] Nausea/vomiting    Tramadol Nausea/vomiting       Prior to Admission medications    Medication Sig Start Date End Date Taking? Authorizing Provider   BD Insulin Syringe Ultra-Fine 0.3 mL 31 gauge x 5/16\" syringe Inject 1 each under the skin 4 times a day before meals. 7/11/24 7/11/25  Hazel Medeiros MD   blood sugar diagnostic (Blood Glucose Test) strip Check blood sugars once daily 10/5/23   Hazel Medeiros MD   blood-glucose meter misc Check  blood sugar as needed 10/5/23   Hazel Medeiros MD   blood-glucose sensor (FreeStyle Jef 3 Plus Sensor) device Use as directed 9/18/24   Hazel Medeiros MD   blood-glucose sensor (FreeStyle Jef 3 Sensor) device Use as directed 5/3/24   Hazel Medeiros MD   cholecalciferol (Vitamin D-3) 125 MCG (5000 UT) capsule Take 1 capsule (125 mcg) by mouth once daily.  Patient not taking: Reported on 11/22/2024 4/17/24 4/17/25  Hazel Medeiros MD   FreeStyle Jef 3 [place] misc Use with sensors as directed 5/17/24   Kadeem Haynes DO   insulin glargine (Lantus U-100 Insulin) 100 unit/mL injection Inject 20 Units under the skin once every 24 hours. Take as directed per insulin instructions.  Patient taking differently: Inject 30 Units under the skin once every 24 hours. Take as directed per insulin instructions. 10/24/24 4/22/25  Hazel Medeiros MD   insulin lispro (HumaLOG) 100 unit/mL injection Inject 0.5 mL (50 Units) under the skin 3 times daily (morning, midday, late afternoon). Take as directed per insulin instructions. Plus sliding scale 5/24/24 11/20/24  Hazel Medeiros MD   lancets misc Check blood sugars once daily 10/5/23   Hazel Medeiros MD   lisinopril 5 mg tablet Take 1 tablet (5 mg) by mouth " "once daily. 4/17/24 10/14/24  Hazel Medeiros MD   LORazepam (Ativan) 1 mg tablet Take 1 tablet (1 mg) by mouth as needed at bedtime for anxiety. Insomnia, or nausea 6/25/24 9/23/24  LEE May   naloxone (Narcan) 4 mg/0.1 mL nasal spray Administer 1 spray (4 mg) into affected nostril(s) if needed for opioid reversal. May repeat every 2-3 minutes if needed, alternating nostrils, until medical assistance becomes available. 8/19/24   Shaw You MD   ondansetron (Zofran) 8 mg tablet Take 1 tablet (8 mg) by mouth every 8 hours if needed for nausea or vomiting. 5/28/24   Shaw You MD   oxyCODONE (Roxicodone) 10 mg immediate release tablet Take 1 tablet (10 mg) by mouth every 6 hours if needed for severe pain (7 - 10). 11/15/24 12/15/24  LEE aMy   pancrelipase, Lip-Prot-Amyl, (Creon) 36,000-114,000- 180,000 unit capsule,delayed release(DR/EC) capsule Take 2-3 capsules by mouth with meals and 1-2 capsules with snacks daily; for an average of 13 capsules per day. 7/23/24   Shaw You MD   pantoprazole (ProtoNix) 40 mg EC tablet Take 1 tablet (40 mg) by mouth 2 times a day. Do not crush, chew, or split. 5/29/24   Leno Monahan MD   pen needle, diabetic (BD Ultra-Fine Short Pen Needle) 31 gauge x 5/16\" needle Use four times daily with insulin 5/10/24   Hazel Medeiros MD   prochlorperazine (Compazine) 10 mg tablet Take 1 tablet (10 mg) by mouth every 6 hours if needed for nausea or vomiting. 5/28/24   Shaw You MD        PAT ROS:   Constitutional:    no fever   no chills   unexpected weight change  Neuro/Psych:    no numbness   no weakness   no light-headedness   no tremors   no confusion   not nervous/anxious   no self-injury   no suicidal ideation  Eyes:    no discharge   no vision loss   no diplopia   no visual disturbance   no corrective lenses  Ears:    no ear pain   no hearing loss   no vertigo   tinnitus (bilaterally with chemotherapy (intermittent))   no " hearing aides  Nose:    nasal discharge   no sinus congestion   no epistaxis  Mouth:    no dental issues   no mouth pain   no oral bleeding   no mouth lesions  Throat:    no throat pain   no dysphagia   no voice change  Neck:    no neck pain   neck swelling   no neck stiffness   no neck masses  Cardio:    no chest pain   no palpitations   no peripheral edema   no dyspnea   no ALICEA   no paroxysmal nocturnal dyspnea  Respiratory:    no cough   no wheezing   no hemoptysis   no shortness of breath  Endocrine:    no cold intolerance   no heat intolerance   no polydipsia  GI:    no abdominal distention   abdominal pain (2 out of 10, stabbing epigastric region)   no constipation   no diarrhea   no nausea   no vomiting   no blood in stool  :    no difficulty urinating   no dysuria   no oliguria   no polyuria  Musculoskeletal:    Paresthesia in fingers and toes since starting chemotherapy, unchanged. Transient in nature.   no arthralgias   myalgias (lower back pain, chronic)   no decreased ROM   no swelling  Hematologic:    does not bruise/bleed easily   no excessive bleeding   no history of blood transfusion   no blood clots  Skin:   no skin changes   no sores/wound   no rash      Physical Exam  Vitals reviewed.   Constitutional:       General: He is not in acute distress.     Appearance: Normal appearance. He is normal weight. He is not ill-appearing, toxic-appearing or diaphoretic.   HENT:      Head: Normocephalic.      Nose: Nose normal. No congestion or rhinorrhea.      Mouth/Throat:      Mouth: Mucous membranes are moist.      Pharynx: Oropharynx is clear. No oropharyngeal exudate or posterior oropharyngeal erythema.   Eyes:      General: No scleral icterus.        Right eye: No discharge.         Left eye: No discharge.      Conjunctiva/sclera: Conjunctivae normal.   Neck:      Vascular: No carotid bruit.   Cardiovascular:      Rate and Rhythm: Normal rate and regular rhythm.      Pulses: Normal pulses.      Heart  "sounds: Normal heart sounds. No murmur heard.     No friction rub. No gallop.   Pulmonary:      Effort: Pulmonary effort is normal. No respiratory distress.      Breath sounds: Normal breath sounds. No stridor. No wheezing, rhonchi or rales.   Chest:      Chest wall: No tenderness.          Comments: Right chest mediport placement  Musculoskeletal:         General: No swelling or tenderness.      Cervical back: Normal range of motion. No rigidity or tenderness.   Neurological:      General: No focal deficit present.      Mental Status: He is alert.   Psychiatric:         Mood and Affect: Mood normal.         Behavior: Behavior normal.         Thought Content: Thought content normal.         Judgment: Judgment normal.        PAT AIRWAY:   Airway:     Mallampati::  I    TM distance::  >3 FB    Neck ROM::  Full    Visit Vitals  /90   Pulse 68   Temp 36.8 °C (98.2 °F)   Ht 1.702 m (5' 7\")   Wt 98 kg (216 lb)   BMI 33.83 kg/m²   Smoking Status Never   BSA 2.15 m²         DASI Risk Score      Flowsheet Row Pre-Admission Testing from 12/6/[facility] in [facility]   Can you take care of yourself (eat, dress, bathe, or use toilet)?  2.75 filed at 12/06/2024 1554   Can you walk indoors, such as around your house? 1.75 filed at 12/06/2024 1554   Can you walk a block or two on level ground?  2.75 filed at 12/06/2024 1554   Can you climb a flight of stairs or walk up a hill? 5.5 filed at 12/06/2024 1554   Can you run a short distance? 8 filed at 12/06/2024 1554   Can you do light work around the house like dusting or washing dishes? 2.7 filed at 12/06/2024 1554   Can you do moderate work around the house like vacuuming, sweeping floors or carrying groceries? 3.5 filed at 12/06/2024 1554   Can you do heavy work around the house like scrubbing floors or lifting and moving heavy furniture?  8 filed at 12/06/2024 1554   Can you do yard work like raking leaves, weeding or pushing a mower? 4.5 filed at 12/06/2024 1554 "   Can you have sexual relations? 5.25 filed at 12/06/2024 1554   Can you participate in moderate recreational activities like golf, bowling, dancing, doubles tennis or throwing a baseball or football? 6 filed at 12/06/2024 1554   Can you participate in strenous sports like swimming, singles tennis, football, basketball, or skiing? 0 filed at 12/06/2024 1554   DASI SCORE 50.7 filed at 12/06/2024 1554   METS Score (Will be calculated only when all the questions are answered) 9 filed at 12/06/2024 1554          Caprini DVT Assessment      Flowsheet Row Pre-Admission Testing from 12/6/2024 in [facility] ED to Hosp-Admission (Discharged) from 5/15/2024 in [facility] Bldg A 7 with Emeyr Paez MD and Chico Bergman DO   DVT Score 11 filed at 12/06/2024 1620 4 filed at 05/15/2024 2330   Medical Factors Present cancer, chemotherapy, or previous malignancy filed at 12/06/2024 1620 --   Surgical Factors Major surgery planned, lasting over 3 hours filed at 12/06/2024 1620 --   BMI 31-40 (Obesity) filed at 12/06/2024 1620 30 or less filed at 05/15/2024 2330   RETIRED: Current Status -- Swollen legs filed at 05/15/2024 2330   RETIRED: History -- Sepsis filed at 05/15/2024 2330   RETIRED: Age -- 40-59 years filed at 05/15/2024 2330          Modified Frailty Index    No data to display       CHADS2 Stroke Risk  Current as of 33 minutes ago        N/A 3 to 100%: High Risk   2 to < 3%: Medium Risk   0 to < 2%: Low Risk     Last Change: N/A          This score determines the patient's risk of having a stroke if the patient has atrial fibrillation.        This score is not applicable to this patient. Components are not calculated.          Revised Cardiac Risk Index      Flowsheet Row Pre-Admission Testing from 12/6/2024 in [facility]   High-Risk Surgery (Intraperitoneal, Intrathoracic,Suprainguinal vascular) 1 filed at 12/06/2024 1555   History of ischemic heart disease (History  of MI, History of positive exercuse test, Current chest paint considered due to myocardial ischemia, Use of nitrate therapy, ECG with pathological Q Waves) 0 filed at 12/06/2024 1555   History of congestive heart failure (pulmonary edemia, bilateral rales or S3 gallop, Paroxysmal nocturnal dyspnea, CXR showing pulmonary vascular redistribution) 0 filed at 12/06/2024 1555   History of cerebrovascular disease (Prior TIA or stroke) 0 filed at 12/06/2024 1555   Pre-operative insulin treatment 1 filed at 12/06/2024 1555   Pre-operative creatinine>2 mg/dl 0 filed at 12/06/2024 1555   Revised Cardiac Risk Calculator 2 filed at 12/06/2024 1555          Apfel Simplified Score      Flowsheet Row Pre-Admission Testing from 12/6/2024 in [facility]   Smoking status 1 filed at 12/06/2024 1600   History of motion sickness or PONV  0 filed at 12/06/2024 1600   Use of postoperative opioids 1 filed at 12/06/2024 1600   Gender - Female 0=No filed at 12/06/2024 1600   Apfel Simplified Score Calculator 2 filed at 12/06/2024 1600          Risk Analysis Index Results This Encounter    No data found in the last 10 encounters.       Stop Bang Score      Flowsheet Row Pre-Admission Testing from 12/6/2024 in [facility]   Do you snore loudly? 1 filed at 12/06/2024 1559   Do you often feel tired or fatigued after your sleep? 0 filed at 12/06/2024 1559   Has anyone ever observed you stop breathing in your sleep? 0 filed at 12/06/2024 1559   Do you have or are you being treated for high blood pressure? 1 filed at 12/06/2024 1559   Recent BMI (Calculated) 33.8 filed at 12/06/2024 1559   Is BMI greater than 35 kg/m2? 0=No filed at 12/06/2024 1559   Age older than 50 years old? 1=Yes filed at 12/06/2024 1559   Is your neck circumference greater than 17 inches (Male) or 16 inches (Female)? 0 filed at 12/06/2024 1559   Gender - Male 1=Yes filed at 12/06/2024 1559   STOP-BANG Total Score 4 filed at 12/06/2024 5828           Prodigy: High Risk  Total Score: 11              Prodigy Gender Score     Prodigy Previous Opioid Use Score           ARISCAT Score for Postoperative Pulmonary Complications      Flowsheet Row Pre-Admission Testing from 12/6/2024 in [facility]   Age, years  3 filed at 12/06/2024 1558   Preoperative SpO2 0 filed at 12/06/2024 1558   Respiratory infection in the last month Either upper or lower (i.e., URI, bronchitis, pneumonia), with fever and antibiotic treatment 0 filed at 12/06/2024 1558   Preoperative anemoa (Hgb less than 10 g/dl) 0 filed at 12/06/2024 1558   Surgical incision  15 filed at 12/06/2024 1558   Duration of surgery  23 filed at 12/06/2024 1558   Emergency Procedure  0 filed at 12/06/2024 1558   ARISCAT Total Score  41 filed at 12/06/2024 1558          Enrique Perioperative Risk for Myocardial Infarction or Cardiac Arrest (MONTANA)    No data to display         Assessment and Plan:   Anesthesia/Airway:  No anesthesia complications      Neuro:    #Anxiety, controlled and takes Ativan as needed although deneis needed frequently.     Patient is at an increased risk for post operative delirium secondary to type and duration of surgery.  Preoperative brain exercise educational handout provided to patient.    The patient is at an increased risk for perioperative stroke secondary to HTN, general anesthesia, and op time >2.5 hours.       HEENT/Airway:    No HEENT diagnosis or significant findings on chart review or clinical presentation and evaluation. No further preoperative testing/intervention indicated at this time.      Cardiovascular:    #HTN, states was told to stop lisinopril due to blood pressure improving. Monitored by PCP.     #Low HR & First Degree AV Heart Block found on EKG, found today in CPM. Patient noted to have HR in 50-60s at previous office visits with Oncology, however during EKG patient HR in 40's as well as first degree AV block. EKG noted below. Patient asymptomatic  and denies previous episodes of symptoms. Patient scheduled/notified of cardiology appointment on 12/9 with Dr. Kc prior to surgery. Both Dr You and Dr. Gamble have been notified and in agreement of plan. Patient also notified to seek ED care if symptoms arise, such as chest pain, shortness of breath, palpitations, dizziness/lightheadedness.     No additional preoperative testing is currently indicated.    METS are 9    RCRI  2 which is 10.1% 30 day risk of MACE (risk for cardiac death, nonfatal myocardial infarction, and nonfactal cardiac arrest    MONTANA score which indicates a   0.2 % risk of intraoperative or 30-day postoperative MACE    EKG 12/6/24  Marked sinus bradycardia with 1st degree AV block  42 BPM, Printerval 314ms, Qtc 337ms     EKG 5/15/24  Normal sinus rhythm  Anterior infarct , age undetermined  Abnormal ECG      Stress Test   Summary:   1. Adequate level of stress achieved.   2. No electrocardiographic evidence for ischemia at a maximal workload.      Echo  CONCLUSIONS:   1. Left ventricular ejection fraction is normal, calculated by Tom's biplane at 63%.   2. There is moderately increased septal thickness.   3. There is normal right ventricular global systolic function.   4. Aortic root mildly to moderately enlarged 4.4 cm.          Pulmonary:    #RADHA, patient reports he does not use CPAP machine, and instead changes body positions to assist with snoring/sleep. He confirms a prior sleep study and recommendation to return for additional sleep studies that he deferred.     Patient is at increased risk of perioperative complications secondary to  major surgery, duration of surgery > 2 hours, types of anesthetic    Preoperative deep breathing educational handout provided to patient.    ARISCAT:    41  points which is a intermediate (13.3%) risk of in-hospital post-op pulmonary complications     PRODIGY:  8  points which is a intermediate risk of post op opioid induced respiratory  depression episodes    STOP BAN   points which is a intermediate risk for moderate to severe RADHA        Renal:   No renal diagnosis, however patient is at increase risk for perioperative renal complications secondary to  BMI equal to or greater than 30, HTN, intraperitoneal surgery       Endocrine:   #Insulin Dependent Diabetes Mellitus Type 2, follows with endocrinology and currently medicated with 30 units Lantus daily which he takes in evening. Patient directed to take half dose (15 units) on night before surgery. Patient states he was taking Humalog however he no longer takes this, and has been requiring 8 units occasionally, stating 1 time a week if any. He states this has been his humalog routine for at least 2 months. Patient directed to hold humalog day of surgery. Last POC Hgb A1c on 24 7.0.     Hematologic:     #Vitamin D Deficiency, patient taking vitamin D supplementation, hold 7 days prior to surgery.     Preoperative DVT educational handout provided to patient.  Caprini Score:  11  points which is a highest risk of perioperative VTE      Gastrointestinal:   #pancreatic adenocarcinoma, follows with Oncology and completing Neoadjuvant FOLFIRINOX therapy and added fosaprepitant. Patient has been taking Creon supplementation, to continue through surgery. Per note from oncology on 12/3 by Dr. You, he started on 6/3/24. Patient reports last dose of chemotherapy was 24. Patient has right side chest mediport. Most recent CT scan details below. ERAS kit given in office with DM2 instructions.     #GERD, controlled and medicated with protonix.      EAT-10 score of    0  : self-perceived oropharyngeal dysphagia scale (0-40)     Apfel: 2 points 39% risk for post operative N/V    CT Chest abdomen/pelvis 24  IMPRESSION:  1. Pancreatic mass intervally decreased in size from prior imaging as  described above.  2. Scattered periportal lymph nodes stable from the prior  examination. No  lymphadenopathy in the abdomen/pelvis.  3. No definite metastatic disease in the chest, abdomen, or pelvis.        Infectious disease:     No infectious diagnosis or significant findings on chart review or clinical presentation and evaluation.   Prescription provided for CHG body wash and dental rinse. CHG use instructions reviewed and provided to patient.  Staph screen collected      Musculoskeletal:    #Arthritis, mostly in hands and knees. Patient reports follows with PCP and uses OTC medication as needed. Patient notes he takes oxycodone for pancreatic pain which helps, okay to continue PRN prescription till surgery. Hold any OTC NSAID for 7 days prior to surgery, tylenol okay to continue.       Other:     #BCC of face, PCP following. Unchanged and no need for further workup/intervention.         Labs ordered  cbc, comp, t/s, A1c, and MSSA/MRSA culture      Medication instructions and NPO guidelines reviewed with the patient.  All questions or concerns discussed and addressed.     Recent Results (from the past week)   Staphylococcus aureus/MRSA colonization, Culture    Collection Time: 12/06/24 11:28 AM    Specimen: Nares/Axilla/Groin; Swab   Result Value Ref Range    Staph/MRSA Screen Culture (A)      Isolated: Methicillin Susceptible Staphylococcus aureus (MSSA)   CBC and Auto Differential    Collection Time: 12/06/24 11:28 AM   Result Value Ref Range    WBC 4.9 4.4 - 11.3 x10*3/uL    nRBC 0.0 0.0 - 0.0 /100 WBCs    RBC 4.20 (L) 4.50 - 5.90 x10*6/uL    Hemoglobin 13.1 (L) 13.5 - 17.5 g/dL    Hematocrit 38.9 (L) 41.0 - 52.0 %    MCV 93 80 - 100 fL    MCH 31.2 26.0 - 34.0 pg    MCHC 33.7 32.0 - 36.0 g/dL    RDW 13.7 11.5 - 14.5 %    Platelets 129 (L) 150 - 450 x10*3/uL    Neutrophils % 50.2 40.0 - 80.0 %    Immature Granulocytes %, Automated 0.2 0.0 - 0.9 %    Lymphocytes % 34.8 13.0 - 44.0 %    Monocytes % 11.8 2.0 - 10.0 %    Eosinophils % 2.2 0.0 - 6.0 %    Basophils % 0.8 0.0 - 2.0 %    Neutrophils Absolute  2.46 1.20 - 7.70 x10*3/uL    Immature Granulocytes Absolute, Automated 0.01 0.00 - 0.70 x10*3/uL    Lymphocytes Absolute 1.71 1.20 - 4.80 x10*3/uL    Monocytes Absolute 0.58 0.10 - 1.00 x10*3/uL    Eosinophils Absolute 0.11 0.00 - 0.70 x10*3/uL    Basophils Absolute 0.04 0.00 - 0.10 x10*3/uL   Comprehensive metabolic panel    Collection Time: 12/06/24 11:28 AM   Result Value Ref Range    Glucose 182 (H) 74 - 99 mg/dL    Sodium 141 136 - 145 mmol/L    Potassium 4.0 3.5 - 5.3 mmol/L    Chloride 108 (H) 98 - 107 mmol/L    Bicarbonate 24 21 - 32 mmol/L    Anion Gap 13 10 - 20 mmol/L    Urea Nitrogen 20 6 - 23 mg/dL    Creatinine 0.79 0.50 - 1.30 mg/dL    eGFR >90 >60 mL/min/1.73m*2    Calcium 9.0 8.6 - 10.6 mg/dL    Albumin 4.1 3.4 - 5.0 g/dL    Alkaline Phosphatase 79 33 - 120 U/L    Total Protein 6.4 6.4 - 8.2 g/dL    AST 34 9 - 39 U/L    Bilirubin, Total 0.7 0.0 - 1.2 mg/dL    ALT 37 10 - 52 U/L   Cancer Antigen 19-9    Collection Time: 12/06/24 11:28 AM   Result Value Ref Range    Cancer AG 19-9 162.69 (H) <35.00 U/mL   Type And Screen    Collection Time: 12/06/24 11:28 AM   Result Value Ref Range    ABO TYPE A     Rh TYPE POS     ANTIBODY SCREEN NEG    Hemoglobin A1C    Collection Time: 12/06/24 11:28 AM   Result Value Ref Range    Hemoglobin A1C 5.3 See comment %    Estimated Average Glucose 105 Not Established mg/dL

## 2024-12-06 NOTE — PREPROCEDURE INSTRUCTIONS
Thank you for visiting [facility] ([facility]) today for your pre-procedure evaluation, you were seen by     Mitesh Thurston CNP  Department of Anesthesiology and Perioperative Medicine  Main phone [phone]  Fax [phone]      This summary includes instructions and information to aid you during your perioperative period.  Please read carefully. If you have any questions about your visit today, please call the number listed above.  If you become ill or have any changes to your health before your surgery, please contact your primary care provider and alert your surgeon.    Preparing for Surgery       Preoperative Fasting Guidelines    Why must I stop eating and drinking near surgery time?  With sedation, food or liquid in your stomach can enter your lungs causing serious complications  Food can increase nausea and vomiting  When do I need to stop eating and drinking before my surgery?  Do not eat any food after midnight the night before your surgery/procedure.  You may have up to TEN ounces of clear liquid until TWO hours before your instructed arrival time to the hospital.  This includes water, black tea/coffee, (no milk or cream) apple juice, and electrolyte drinks (Gatorade)  You may chew gum until TWO hours before your surgery/procedure   Do not eat any food or drink any liquids after midnight the night before your surgery/procedure.  You may have sips of water to take medications.     Tobacco and Alcohol;  Do not drink alcohol or smoke within 24 hours of surgery.  It is best to quit smoking for as long as possible before any surgery or procedure.       Other Instructions  Why did I have my nose, under my arms, and groin swabbed? The purpose of the swab is to identify Staphylococcus aureus inside your nose or on your skin.  The swab was sent to the laboratory for culture.  A positive swab/culture for Staphylococcus aureus is called colonization or carriage.   What is Staphylococcus  "aureus? Staphylococcus aureus, also known as \"staph\", is a germ found on the skin or in the nose of healthy people.  Sometimes Staphylococcus aureus can get into the body and cause an infection.  This can be minor (such as pimples, boils, or other skin problems).  It might also be serious (such as a blood infection, pneumonia, or a surgical site infection). What is Staphylococcus aureus colonization or carriage? Colonization or carriage means that a person has the germ but is not sick from it.  These bacteria can be spread on the hands or when breathing or sneezing. How is Staphylococcus aureus spread? It is most often spread by close contact with a person or item that carries it. What happens if my culture is positive for Staphylococcus aureus? Your doctor/medical team will use this information to guide any antibiotic treatment which may be necessary.  Regardless of the culture results, we will clean the inside of your nose with a betadine swab just before you have your surgery. Will I get an infection if I have Staphylococcus aureus in my nose or on my skin? Anyone can get an infection with Staphylococcus aureus.  However, the best way to reduce your risk of infection is to follow the instructions provided to you for the use of your CHG soap and dental rinse.  , Body Wash; What is a home preoperative antibacterial shower? This shower is a way of cleaning the skin with a germ-killing solution before surgery.  The solution contains chlorhexidine, commonly known as CHG.  CHG is a skin cleanser with germ-killing ability.  Let your doctor know if you are allergic to chlorhexidine. Why do I need to take a preoperative antibacterial shower? Skin is not sterile.  It is best to try to make your skin as free of germs as possible before surgery.  Proper cleansing with a germ-killing soap before surgery can lower the number of germs on your skin.  This helps to reduce the risk of infection at the surgical site.  Following the " instructions listed below will help you prepare your skin for surgery.   How do I use the solution? Steps:  Begin using your CHG soap 5 days before your scheduled surgery on ___________.   First, wash and rinse your hair using the CHG soap. Keep CHG soap away from ear canals and eyes.  Rinse completely, do not condition.  Hair extensions should be removed. , Oral/Dental Rinse: What is oral/dental rinse?  It is a mouthwash. It is a way of cleaning the mouth with a germ-killing solution before your surgery.  The solution contains chlorhexidine, commonly known as CHG. It is used inside the mouth to kill a bacteria known as Staphylococcus aureus.  Let your doctor know if you are allergic to Chlorhexidine. Why do I need to use CHG oral/dental rinse? The CHG oral/dental rinse helps to kill a bacteria in your mouth known as Staphylococcus aureus.  This reduces the risk of infection at the surgical site.  Using your CHG oral/dental rinse STEPS: Use your CHG oral/dental rinse after you brush your teeth the night before (at bedtime) and the morning of your surgery.  Follow all directions on your prescription label.  Use the cap on the container to measure 15 ml.  Swish (gargle if you can) the mouthwash in your mouth for at least 30 seconds, (do not swallow) and spit out.  After you use your CHG rinse, do not rinse your mouth with water, drink or eat.  Please refer to the prescription label for the appropriate time to resume oral intake What side effects might I have using the CHG oral/dental rinse? CHG rinse will stick to plaque on the teeth.  Brush and floss just before use.  Teeth brushing will help avoid staining of plaque during use.     Patients with Type 1 & 2 Diabetes: Ensure Surgery Immuno-Nutrition Shake; This shake provides specialized nutrients to help prepare your body for surgery. Your surgeon's office may send it to your home, or you may receive it from [facility]  if you are scheduled for an appointment. If your surgeon has instructed you to begin the shakes and you have not received them at least 7 days before your scheduled surgery, please contact your surgeon's office. You should begin drinking your shakes 6 days before your surgery date, start on _______.  Drink half a carton 4 times a day for 5 days. Start 6 days  before your surgery. If your surgeon has given you instructions to drink clear liquids the day before surgery, you can continue to drink your Ensure Surgery immune-nutrition shakes.     The Week before Surgery        Seven days before Surgery  Check your [facility] medication instructions  Do the exercises provided to you by [facility]   Arrange for a responsible, adult licensed  to take you home after surgery and stay with you for 24 hours.  You will not be permitted to drive yourself home if you have received any anesthetic/sedation  Five days before surgery  Check your [facility] medication instructions  Do the exercises provided to you by [facility]   Start using Chlorhexidene (CHG) body wash if prescribed (Continue till day of surgery)      The Day before Surgery       Check your [facility] medication and all other [facility] instructions including when to stop eating and drinking  You will be called with your arrival time for surgery in the late afternoon.  If you do not receive a call please reach out to your surgeon's office.  Do not smoke or drink 24 hours before surgery  Prepare items to bring with you to the hospital  Shower with your chlorhexidine wash if prescribed  Brush your teeth and use your chlorhexidine dental rinse if prescribed    The Day of Surgery       Check your [facility] medication instructions  Ensure you follow the instructions for when to stop eating and drinking  Shower, if prescribed use CHG.  Do not apply any lotions, creams, moisturizers, perfume or deodorant  Brush your teeth and use your CHG dental rinse if prescribed  Wear loose comfortable clothing  Avoid  make-up  Remove  jewelry and piercings, consider professional piercing removal with a plastic spacer if needed  Bring photo ID and Insurance card  Bring an accurate medication list that includes medication dose, frequency and allergies  Bring a copy of your advanced directives (will, health care power of )  Bring any devices and controllers as well as medical devices you have been provided with for surgery (CPAP, slings, braces, etc.)  Dentures, eyeglasses, and contacts will be removed before surgery, please bring cases for contacts or glasses    Preoperative Deep Breathing Exercises    Why it is important to do deep breathing exercises before my surgery?  Deep breathing exercises strengthen your breathing muscles.  This helps you to recover after your surgery and decreases the chance of breathing complications.    How are the deep breathing exercises done?  Sit straight with your back supported.  Breathe in deeply and slowly through your nose. Your lower rib cage should expand and your abdomen may move forward.  Hold that breath for 3 to 5 seconds.  Breathe out through pursed lips, slowly and completely.  Rest and repeat 10 times every hour while awake.  Rest longer if you become dizzy or lightheaded.    Preoperative Brain Exercises    What are brain exercises?  A brain exercise is any activity that engages your thinking (cognitive) skills.    What types of activities are considered brain exercises?  Jigsaw puzzles, crossword puzzles, word jumble, memory games, word search, and many more.  Many can be found free online or on your phone via a mobile myrna.    Why should I do brain exercises before my surgery?  More recent research has shown brain exercise before surgery can lower the risk of postoperative delirium (confusion) which can be especially important for older adults.  Patients who did brain exercises for 5 to 10 hours the days before surgery, cut their risk of postoperative delirium in half up to 1  week after surgery.    Sit-to-Stand Exercise    What is the sit-to-stand exercise?  The sit-to-stand exercise strengthens the muscles of your lower body and muscles in the center of your body (core muscles for stability) helping to maintain and improve your strength and mobility.  How do I do the sit-to-stand exercise?  The goal is to do this exercise without using your arms or hands.  If this is too difficult, use your arms and hands or a chair with armrests to help slowly push yourself to the standing position and lower yourself back to the sitting position. As the movement becomes easier use your arms and hands less.    Steps to the sit-to-stand exercise  Sit up tall in a sturdy chair, knees bent, feet flat on the floor shoulder-width apart.  Shift your hips/pelvis forward in the chair to correctly position yourself for the next movement.  Lean forward at your hips.  Stand up straight putting equal weight on both feet.  Check to be sure you are properly aligned with the chair, in a slow controlled movement sit back down.  Repeat this exercise 10-15 times.  If needed you can do it fewer times until your strength improves.  Rest for 1 minute.  Do another 10-15 sit-to-stand exercises.  Try to do this in the morning and evening.       Simple things you can do to help prevent blood clots     Blood clots are blockages that can form in the body's veins. When a blood clot forms in your deep veins, it may be called a deep vein thrombosis, or DVT for short. Blood clots can happen in any part of the body where blood flows, but they are most common in the arms and legs. If a piece of a blood clot breaks free and travels to the lungs, it is called a pulmonary embolus (PE). A PE can be a very serious problem.      Being in the hospital or having surgery can raise your chances of getting a blood clot because you may not be well enough to move around as much as you normally do.         Ways you can help prevent blood clots in  the hospital       Wearing SCDs  SCDs stands for Sequential Compression Devices.   SCDs are special sleeves that wrap around your legs. They attach to a pump that fills them with air to gently squeeze your legs every few minutes.  This helps return the blood in your legs to your heart.   SCDs should only be taken off when walking or bathing. SCDs may not be comfortable, but they can help save your life.              Pump SCD leg sleeves  Wearing compression stockings - if your doctor orders them. These special snug-fitting stockings gently squeeze your legs to help blood flow.       Walking. Walking helps move the blood in your legs.   If your doctor says it is ok, try walking the halls at least   5 times a day. Ask us to help you get up, so you don't fall.      Taking any blood-thinning medicines your doctor orders.              Ways you can help prevent blood clots at home         Wearing compression stockings - if your doctor orders them.   Walking - to help move the blood in your legs.    Taking any blood-thinning medicines your doctor orders.      Signs of a blood clot or PE    Tell your doctor or nurse right away if you have any of the problems listed below.         If you are at home, seek medical care right away. Call 911 for chest pain or problems breathing.            Signs of a blood clot (DVT) - such as pain, swelling, redness, or warmth in your arm or legs.  Signs of a pulmonary embolism (PE) - such as chest pain or feeling short of breath

## 2024-12-07 LAB — STAPHYLOCOCCUS SPEC CULT: ABNORMAL

## 2024-12-09 ENCOUNTER — APPOINTMENT (OUTPATIENT)
Dept: CARDIOLOGY | Facility: HOSPITAL | Age: 54
End: 2024-12-09
Payer: COMMERCIAL

## 2024-12-10 ENCOUNTER — OFFICE VISIT (OUTPATIENT)
Dept: CARDIOLOGY | Facility: HOSPITAL | Age: 54
End: 2024-12-10
Payer: COMMERCIAL

## 2024-12-10 VITALS
WEIGHT: 216 LBS | HEART RATE: 64 BPM | TEMPERATURE: 96.6 F | BODY MASS INDEX: 33.83 KG/M2 | OXYGEN SATURATION: 99 % | SYSTOLIC BLOOD PRESSURE: 113 MMHG | RESPIRATION RATE: 18 BRPM | DIASTOLIC BLOOD PRESSURE: 80 MMHG

## 2024-12-10 DIAGNOSIS — Z01.810 PREOPERATIVE CARDIOVASCULAR EXAMINATION: ICD-10-CM

## 2024-12-10 DIAGNOSIS — R00.1 SINUS BRADYCARDIA: Primary | ICD-10-CM

## 2024-12-10 DIAGNOSIS — Z92.21 STATUS POST ADMINISTRATION OF CARDIOTOXIC CHEMOTHERAPY: ICD-10-CM

## 2024-12-10 PROCEDURE — 99204 OFFICE O/P NEW MOD 45 MIN: CPT | Performed by: STUDENT IN AN ORGANIZED HEALTH CARE EDUCATION/TRAINING PROGRAM

## 2024-12-10 PROCEDURE — 93005 ELECTROCARDIOGRAM TRACING: CPT | Performed by: STUDENT IN AN ORGANIZED HEALTH CARE EDUCATION/TRAINING PROGRAM

## 2024-12-10 PROCEDURE — G2211 COMPLEX E/M VISIT ADD ON: HCPCS | Performed by: STUDENT IN AN ORGANIZED HEALTH CARE EDUCATION/TRAINING PROGRAM

## 2024-12-10 PROCEDURE — 3044F HG A1C LEVEL LT 7.0%: CPT | Performed by: STUDENT IN AN ORGANIZED HEALTH CARE EDUCATION/TRAINING PROGRAM

## 2024-12-10 PROCEDURE — 99214 OFFICE O/P EST MOD 30 MIN: CPT | Performed by: STUDENT IN AN ORGANIZED HEALTH CARE EDUCATION/TRAINING PROGRAM

## 2024-12-10 PROCEDURE — 3048F LDL-C <100 MG/DL: CPT | Performed by: STUDENT IN AN ORGANIZED HEALTH CARE EDUCATION/TRAINING PROGRAM

## 2024-12-10 PROCEDURE — 3074F SYST BP LT 130 MM HG: CPT | Performed by: STUDENT IN AN ORGANIZED HEALTH CARE EDUCATION/TRAINING PROGRAM

## 2024-12-10 PROCEDURE — 3079F DIAST BP 80-89 MM HG: CPT | Performed by: STUDENT IN AN ORGANIZED HEALTH CARE EDUCATION/TRAINING PROGRAM

## 2024-12-10 PROCEDURE — 93010 ELECTROCARDIOGRAM REPORT: CPT | Performed by: STUDENT IN AN ORGANIZED HEALTH CARE EDUCATION/TRAINING PROGRAM

## 2024-12-10 ASSESSMENT — PAIN SCALES - GENERAL: PAINLEVEL_OUTOF10: 2

## 2024-12-10 NOTE — PATIENT INSTRUCTIONS
Schedule echocardiogram  Schedule stress test   Follow in 6 weeks - [facility]

## 2024-12-10 NOTE — PROGRESS NOTES
Subjective   Nate Alvarez is a 54 y.o. male   Cancer Diagnosis: Pancreatic  Treatment: FOLFIRINOX 6/3/2024 - 11/24/2024  Cumulative Dose: NA  Radiation: No    Risk Factors: DM, HTN, RADHA  Social Hx: Never Smoker    CT Chest 11/15/2024 No mention of coronaries  EKG 12/6/2024  BNP 5/17/2024 - 22  HSTrop 5/15/2024 <3  Lipid 4/16/2024 Total 121, LDL 59        He is here for cardiovascular evaluation due to abnormal EKG prior to [facility]    Currently denies any chest discomfort or shortness breath.  Denies any orthopnea/PND.  Denies any lower extremity edema.  Has not had any dizziness/lightheadedness/syncope.  He notes that previously his heart rate was always 65-70 bpm but most recently it has been in the low 50s.    Review of Systems  A comprehensive review of systems was negative.     Past Medical History:   Diagnosis Date    Anxiety     Arthritis     BCC (basal cell carcinoma), eyelid, right     s/p resection    Diabetes mellitus (Multi)     Gastroesophageal reflux disease without esophagitis 10/05/2023    Hemorrhoids 11/03/2023    HTN (hypertension)     Borderline HTN, no meds    Leg cramps 10/05/2023    Oropharyngeal dysphagia 10/05/2023    RADHA (obstructive sleep apnea)     no PAP    Pancreatic cancer (Multi)     Dx 4/2024, Treated with chemotherapy completed 11/12/24    Personal history of other malignant neoplasm of skin     Type 2 diabetes mellitus     Vision loss     uses corrective lens       Past Surgical History:   Procedure Laterality Date    COLONOSCOPY      ESOPHAGOGASTRODUODENOSCOPY      EYELID CARCINOMA EXCISION      KNEE ARTHROSCOPY W/ DEBRIDEMENT Left     ROTATOR CUFF REPAIR Bilateral     TESTICLE SURGERY      Hydrocelectomy    TONSILLECTOMY      VASECTOMY         Allergies   Allergen Reactions    Farxiga [Dapagliflozin] Diarrhea    Glipizide GI Upset    Januvia [Sitagliptin] Diarrhea    Metformin Nausea/vomiting    Mounjaro [Tirzepatide] Nausea/vomiting    Tramadol Nausea/vomiting       Family  "History   Problem Relation Name Age of Onset    Diabetes Mother      Ovarian cancer Mother      Liver cancer Father      Lung cancer Father      Colon cancer Father      Hypertension Father      Stroke Maternal Grandmother      Diabetes Maternal Grandmother      Liver cancer Maternal Grandfather      Lung cancer Paternal Grandfather         Objective   Visit Vitals  /80 (BP Location: Left arm, Patient Position: Sitting, BP Cuff Size: Adult)   Pulse 64   Temp 35.9 °C (96.6 °F) (Temporal)   Resp 18   Wt 98 kg (216 lb)   SpO2 99%   BMI 33.83 kg/m²   Smoking Status Never   BSA 2.15 m²       General: awake, alert and oriented. No acute distress.   Skin: Skin is warm, dry and intact without rashes or lesions. Appropriate color for ethnicity. Nail beds pink with no cyanosis or clubbing  HEENT: normocephalic, atraumatic; conjunctivae are clear without exudates or hemorrhage. Sclera is non-icteric. Eyelids are normal in appearance without swelling or lesions. Hearing intact. Nares are patent bilaterally. Moist mucous membranes.   Cardiovascular: Regular. No murmurs, gallops, or rubs are auscultated. S1 and S2 are heard and are of normal intensity. No JVD, no carotid bruits  Respiratory: Thorax symmetric. CTAB, breath sounds vesicular. No crackles, wheezes or ronchi.   Gastrointestinal: soft, non-distended, BS + x 4  Genitourinary: exam deferred  Musculoskeletal: moves all extremities  Extremities: pulses palpable bilaterally; no swelling or erythema; no edema  Neurological: alert & oriented x 3; no focal deficits  Psychiatric: appropriate mood and affect    Current Outpatient Medications   Medication Instructions    BD Insulin Syringe Ultra-Fine 0.3 mL 31 gauge x 5/16\" syringe 1 each, subcutaneous, 4 times daily before meals and nightly    blood sugar diagnostic (Blood Glucose Test) strip Check blood sugars once daily    blood-glucose meter misc Check  blood sugar as needed    blood-glucose sensor (FreeStyle Jef 3 " "Plus Sensor) device Use as directed    blood-glucose sensor (FreeStyle Jef 3 Sensor) device Use as directed    chlorhexidine (Hibiclens) 4 % external liquid Topical, Daily PRN    FreeStyle Jef 3 [place] misc Use with sensors as directed    insulin lispro 50 Units, subcutaneous, 3 times daily (morning, midday, late afternoon), Take as directed per insulin instructions. Plus sliding scale    lancets misc Check blood sugars once daily    Lantus U-100 Insulin 20 Units, subcutaneous, Every 24 hours, Take as directed per insulin instructions.    lisinopril 5 mg, oral, Daily    LORazepam (ATIVAN) 1 mg, oral, Nightly PRN, Insomnia, or nausea    naloxone (NARCAN) 4 mg, nasal, As needed, May repeat every 2-3 minutes if needed, alternating nostrils, until medical assistance becomes available.    ondansetron (ZOFRAN) 8 mg, oral, Every 8 hours PRN    oxyCODONE (ROXICODONE) 10 mg, oral, Every 6 hours PRN    pancrelipase, Lip-Prot-Amyl, (Creon) 36,000-114,000- 180,000 unit capsule,delayed release(DR/EC) capsule Take 2-3 capsules by mouth with meals and 1-2 capsules with snacks daily; for an average of 13 capsules per day.    pantoprazole (PROTONIX) 40 mg, oral, 2 times daily, Do not crush, chew, or split.    pen needle, diabetic (BD Ultra-Fine Short Pen Needle) 31 gauge x 5/16\" needle Use four times daily with insulin    prochlorperazine (COMPAZINE) 10 mg, oral, Every 6 hours PRN           Lab Review   Lab Results   Component Value Date    HGB 13.1 (L) 12/06/2024    HGB 12.7 (L) 11/11/2024    HGB 12.7 (L) 10/28/2024     (L) 12/06/2024    WBC 4.9 12/06/2024     12/06/2024    K 4.0 12/06/2024    CREATININE 0.79 12/06/2024    CREATININE 0.85 11/11/2024    CREATININE 0.70 10/28/2024    BUN 20 12/06/2024    CALCIUM 9.0 12/06/2024    BNP 22 05/15/2024    TROPHS <3 05/15/2024    TROPHS <3 05/15/2024    LDLF 66 08/09/2022        Assessment/Plan   Preoperative risk stratification:  -Uncertain about the etiology of his sinus " bradycardia.  Ostensibly it appears to be asymptomatic; however it would be important to establish chronotropic competence prior to his surgery.  Will plan for an echocardiogram to rule out underlying structural heart disease and an exercise stress test to assess for chronotropic competence.  If these are normal he is okay to go for surgery with a low risk of perioperative cardiac complications.  -RTC 6 weeks     Chapito Abreu MD  Advanced Heart Failure/Transplant Cardiology  Cardio-Oncology  [facility]

## 2024-12-11 ENCOUNTER — ANESTHESIA EVENT (OUTPATIENT)
Dept: OPERATING ROOM | Facility: HOSPITAL | Age: 54
End: 2024-12-11
Payer: COMMERCIAL

## 2024-12-11 ENCOUNTER — ANCILLARY PROCEDURE (OUTPATIENT)
Dept: CARDIOLOGY | Facility: CLINIC | Age: 54
End: 2024-12-11
Payer: COMMERCIAL

## 2024-12-11 ENCOUNTER — CLINICAL SUPPORT (OUTPATIENT)
Dept: CARDIOLOGY | Facility: CLINIC | Age: 54
End: 2024-12-11
Payer: COMMERCIAL

## 2024-12-11 VITALS — HEIGHT: 67 IN | WEIGHT: 216 LBS | BODY MASS INDEX: 33.9 KG/M2

## 2024-12-11 DIAGNOSIS — Z01.810 PREOPERATIVE CARDIOVASCULAR EXAMINATION: ICD-10-CM

## 2024-12-11 DIAGNOSIS — Z92.21 STATUS POST ADMINISTRATION OF CARDIOTOXIC CHEMOTHERAPY: ICD-10-CM

## 2024-12-11 DIAGNOSIS — R00.1 SINUS BRADYCARDIA: ICD-10-CM

## 2024-12-11 LAB
AORTIC VALVE MEAN GRADIENT: 5 MMHG
AORTIC VALVE PEAK VELOCITY: 1.54 M/S
AV PEAK GRADIENT: 9 MMHG
AVA (PEAK VEL): 2.86 CM2
AVA (VTI): 2.88 CM2
EJECTION FRACTION APICAL 4 CHAMBER: 56.8
EJECTION FRACTION: 63 %
LEFT ATRIUM VOLUME AREA LENGTH INDEX BSA: 32.3 ML/M2
LEFT VENTRICLE INTERNAL DIMENSION DIASTOLE: 4.51 CM (ref 3.5–6)
LEFT VENTRICULAR OUTFLOW TRACT DIAMETER: 2.16 CM
MITRAL VALVE E/A RATIO: 0.8
RIGHT VENTRICLE FREE WALL PEAK S': 15 CM/S
RIGHT VENTRICLE PEAK SYSTOLIC PRESSURE: 18.9 MMHG
TRICUSPID ANNULAR PLANE SYSTOLIC EXCURSION: 2.1 CM

## 2024-12-11 PROCEDURE — 93018 CV STRESS TEST I&R ONLY: CPT | Performed by: INTERNAL MEDICINE

## 2024-12-11 PROCEDURE — 93017 CV STRESS TEST TRACING ONLY: CPT

## 2024-12-11 PROCEDURE — 93306 TTE W/DOPPLER COMPLETE: CPT

## 2024-12-11 PROCEDURE — 93016 CV STRESS TEST SUPVJ ONLY: CPT | Performed by: INTERNAL MEDICINE

## 2024-12-11 PROCEDURE — 93306 TTE W/DOPPLER COMPLETE: CPT | Performed by: INTERNAL MEDICINE

## 2024-12-11 ASSESSMENT — ENCOUNTER SYMPTOMS
TROUBLE SWALLOWING: 0
DIFFICULTY URINATING: 0
NECK PAIN: 0
DIARRHEA: 0
VOMITING: 0
TREMORS: 0
ABDOMINAL DISTENTION: 0
JOINT SWELLING: 0
VOICE CHANGE: 0
COUGH: 0
BLOOD IN STOOL: 0
DOUBLE VISION: 0
NERVOUS/ANXIOUS: 0
CONSTIPATION: 0
RHINORRHEA: 1
SHORTNESS OF BREATH: 0
POLYDIPSIA: 0
WHEEZING: 0
NAUSEA: 0
DYSPNEA AT REST: 0
SKIN CHANGES: 0
NECK MASS: 0
ABDOMINAL PAIN: 1
DYSPNEA WITH EXERTION: 0
PND: 0
UNEXPECTED WEIGHT CHANGE: 1
CHILLS: 0
PALPITATIONS: 0
FEVER: 0
NUMBNESS: 0
SINUS CONGESTION: 0
HEMOPTYSIS: 0
WEAKNESS: 0
EXCESSIVE BLEEDING: 0
EYE DISCHARGE: 0
NECK SWELLING: 0
BRUISES/BLEEDS EASILY: 0
CONFUSION: 0
ARTHRALGIAS: 0
LIMITED RANGE OF MOTION: 0
NECK STIFFNESS: 0
LIGHT-HEADEDNESS: 0
MYALGIAS: 1
VISUAL CHANGE: 0
VERTIGO: 0
WOUND: 0
DYSURIA: 0

## 2024-12-11 NOTE — ANESTHESIA PREPROCEDURE EVALUATION
"Patient: Nate Alvarez    Procedure Information       Date/Time: 12/12/24 0645    Procedure: Whipple    Location: [facility] OR 12 / [facility]    Surgeons: Roddy Gamble MD            Summary (from patient interview and chart review):   54 yoM w/ h/o insulin dependent T2DM, HTN, RADHA, pancreatic CA presenting for whipple with Dr. Gamble 12/12. Seen in PAT clinic where he was found to have new onset 1st degree HB. Followed with cardiology, underwent stress test (normal) and TTE demonstrating normal LV/RV, no WMA, no significant valvular pathology, mild-moderate aortic root dilation.    Relevant Problems   Cardiac   (+) Essential hypertension      Liver   (+) Malignant neoplasm of head of pancreas (Multi)      Endocrine   (+) Class 1 obesity due to excess calories with serious comorbidity and body mass index (BMI) of 30.0 to 30.9 in adult   (+) Type 2 diabetes mellitus with hyperglycemia, with long-term current use of insulin      Skin   (+) Basal cell carcinoma (BCC) of face   (+) Eczema of both hands        Medication Documentation Review Audit       Reviewed by Neela Tian (Technician) on 12/11/24 at 1404      Medication Order Taking? Sig Documenting Provider Last Dose Status   BD Insulin Syringe Ultra-Fine 0.3 mL 31 gauge x 5/16\" syringe [id number]  Inject 1 each under the skin 4 times a day before meals. Hazel Medeiros MD  Active   blood sugar diagnostic (Blood Glucose Test) strip [id number]  Check blood sugars once daily Hazel Medeiros MD  Active   blood-glucose meter misc [id number]  Check  blood sugar as needed Hazel Medeiros MD  Active   blood-glucose sensor (FreeStyle Jef 3 Plus Sensor) device [id number]  Use as directed Hazel Medeiros MD  Active   blood-glucose sensor (FreeStyle Jef 3 Sensor) device [id number]  Use as directed Hazel Medeiros MD  Active   chlorhexidine (Hibiclens) 4 % external liquid [id number]  Apply topically once daily as needed for wound care for up to 5 days. Akisha " Karena, APRN-CNP  Active   chlorhexidine (Peridex) 0.12 % solution [id number]  Use 15 mL in the mouth or throat if needed (Please rinse mouth night before surgey and morning of surgery) for up to 2 days. Mitesh LEE Thurston   24 235    Patient not taking:   Discontinued 24 1051   FreeStyle Jef 3 [place] misc [id number]  Use with sensors as directed Kadeem Haynes DO  Active   insulin glargine (Lantus U-100 Insulin) 100 unit/mL injection [id number]  Inject 20 Units under the skin once every 24 hours. Take as directed per insulin instructions.   Patient taking differently: Inject 30 Units under the skin once every 24 hours. Take as directed per insulin instructions.    Hazel Medeiros MD  Active   insulin lispro (HumaLOG) 100 unit/mL injection [id number]  Inject 0.5 mL (50 Units) under the skin 3 times daily (morning, midday, late afternoon). Take as directed per insulin instructions. Plus sliding scale   Patient taking differently: Inject 50 Units under the skin 3 times daily (morning, midday, late afternoon). Injects PRN if blood sugar is high    Hazel Medeiros MD   24   lancets [facility] [place] [facility] [id number]  Check blood sugars once daily Hazel Medeiros MD  Active   lisinopril 5 mg tablet [id number] No Take 1 tablet (5 mg) by mouth once daily.   Patient not taking: Reported on 2024    Hazel Medeiros MD Not Taking  10/14/24 2359   LORazepam (Ativan) 1 mg tablet [id number]  Take 1 tablet (1 mg) by mouth as needed at bedtime for anxiety. Insomnia, or nausea LEE May   24 235   naloxone (Narcan) 4 mg/0.1 mL nasal spray [id number]  Administer 1 spray (4 mg) into affected nostril(s) if needed for opioid reversal. May repeat every 2-3 minutes if needed, alternating nostrils, until medical assistance becomes available. Shaw You MD  Active   ondansetron (Zofran) 8 mg tablet [id number]  Take 1 tablet (8 mg) by mouth every 8 hours if needed for nausea or  "vomiting. Shaw You MD  Active   oxyCODONE (Roxicodone) 10 mg immediate release tablet [id number]  Take 1 tablet (10 mg) by mouth every 6 hours if needed for severe pain (7 - 10). JUAN May-CNP  Active   pancrelipase, Lip-Prot-Amyl, (Creon) 36,000-114,000- 180,000 unit capsule,delayed release(DR/EC) capsule [id number]  Take 2-3 capsules by mouth with meals and 1-2 capsules with snacks daily; for an average of 13 capsules per day. Shaw You MD  Active   pantoprazole (ProtoNix) 40 mg EC tablet [id number]  Take 1 tablet (40 mg) by mouth 2 times a day. Do not crush, chew, or split. Leno Monahan MD  Active   pen needle, diabetic (BD Ultra-Fine Short Pen Needle) 31 gauge x 5/16\" needle [id number]  Use four times daily with insulin Hazel Medeiros MD  Active   prochlorperazine (Compazine) 10 mg tablet [id number]  Take 1 tablet (10 mg) by mouth every 6 hours if needed for nausea or vomiting. Shaw You MD  Active                    Visit Vitals  Smoking Status Never            11/13/2024     8:29 AM 11/15/2024    12:34 PM 11/22/2024     1:38 PM 12/3/2024     3:42 PM 12/6/2024     3:55 PM 12/10/2024     1:46 PM 12/11/2024     8:51 AM   Vitals   Systolic 136 120 131 133 138 113    Diastolic 85 80 82 83 90 80    BP Location   Right arm   Left arm    Heart Rate 62 55 67 56 68 64    Temp 36.5 °C (97.7 °F) 36.1 °C (97 °F) 36.1 °C (97 °F) 36.4 °C (97.5 °F) 36.8 °C (98.2 °F) 35.9 °C (96.6 °F)    Resp 16 16 20 18  18    Height 1.881 m (6' 2.06\") 1.881 m (6' 2.06\") 1.9 m (6' 2.8\")  1.702 m (5' 7\")  1.702 m (5' 7\")   Weight (lb) 250.3 251.32 243.2 245.81 216 216 216   BMI 32.09 kg/m2 32.22 kg/m2 30.56 kg/m2 30.89 kg/m2 33.83 kg/m2 33.83 kg/m2 33.83 kg/m2   BSA (m2) 2.44 m2 2.44 m2 2.41 m2 2.43 m2 2.15 m2 2.15 m2 2.15 m2   Visit Report   Report Report  Report         Recent Labs:    Chemistry    Lab Results   Component Value Date/Time     12/06/2024 1128    K 4.0 12/06/2024 1128     (H) 12/06/2024 " "1128    CO2 24 12/06/2024 1128    BUN 20 12/06/2024 1128    CREATININE 0.79 12/06/2024 1128    Lab Results   Component Value Date/Time    CALCIUM 9.0 12/06/2024 1128    ALKPHOS 79 12/06/2024 1128    AST 34 12/06/2024 1128    ALT 37 12/06/2024 1128    BILITOT 0.7 12/06/2024 1128          Lab Results   Component Value Date/Time    WBC 4.9 12/06/2024 1128    HGB 13.1 (L) 12/06/2024 1128    HCT 38.9 (L) 12/06/2024 1128     (L) 12/06/2024 1128     No results found for: \"PROTIME\", \"PTT\", \"INR\"    Electrocardiogram:  Encounter Date: 12/10/24   ECG 12 Lead    Narrative    Sinus bradycardia, normal QTc.       Echocardiogram:  Results for orders placed in visit on 12/11/24    Transthoracic Echo (TTE) Complete    Narrative  [facility], [address] , [place] [address], Tel  [phone]    TRANSTHORACIC ECHOCARDIOGRAM REPORT      Patient Name:       MATT Henson Physician:    [id number] Kush Alfaro DO  Study Date:         12/11/2024          Ordering Provider:    [id number] MATT CHAVIRA  MRN/PID:            [id number]            Fellow:  Accession#:         [id number]        Nurse:  Date of Birth/Age:  1970 / 54      Sonographer:          Shaw lamb  Gender assigned at  M                   Additional Staff:     Yadira Johnson RD  Birth:  Height:             170.18 cm           Admit Date:  Weight:             97.98 kg            Admission Status:     Outpatient  BSA / BMI:          2.09 m2 / 33.83     Encounter#:           [id number]  kg/m2  Blood Pressure:     130/74 mmHg         [facility]    Study Type:    TRANSTHORACIC ECHO (TTE) COMPLETE  Diagnosis/ICD: Bradycardia, unspecified-R00.1; 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 Pancreatic " Cancer.    Study Detail: The following Echo studies were performed: 2D, M-Mode, Doppler and  color flow. Technically challenging study due to prominent lung  artifact.      PHYSICIAN INTERPRETATION:  Left Ventricle: Left ventricular ejection fraction is normal, calculated by Tom's biplane at 63%. There are no regional left ventricular wall motion abnormalities. The left ventricular cavity size is normal. There is moderately increased septal and normal posterior left ventricular wall thickness. There is left ventricular concentric remodeling. Spectral Doppler shows a normal pattern of left ventricular diastolic filling.  Left Atrium: The left atrium is normal in size.  Right Ventricle: The right ventricle is normal in size. There is normal right ventricular global systolic function.  Right Atrium: The right atrium is normal in size.  Aortic Valve: The aortic valve is trileaflet. The aortic valve dimensionless index is 0.78. There is no evidence of aortic valve regurgitation. The peak instantaneous gradient of the aortic valve is 9 mmHg. The mean gradient of the aortic valve is 5 mmHg.  Mitral Valve: The mitral valve is normal in structure. There is no evidence of mitral valve regurgitation.  Tricuspid Valve: The tricuspid valve is structurally normal. No evidence of tricuspid regurgitation.  Pulmonic Valve: The pulmonic valve is structurally normal. There is no indication of pulmonic valve regurgitation.  Pericardium: There is no pericardial effusion noted.  Aorta: The aortic root is abnormal. Aortic root mildly to moderately enlarged 4.4 cm.  In comparison to the previous echocardiogram(s): There are no prior studies on this patient for comparison purposes.      CONCLUSIONS:  1. Left ventricular ejection fraction is normal, calculated by Tom's biplane at 63%.  2. There is moderately increased septal thickness.  3. There is normal right ventricular global systolic function.  4. Aortic root mildly to  moderately enlarged 4.4 cm.    QUANTITATIVE DATA SUMMARY:    2D MEASUREMENTS:          Normal Ranges:  LAs:             3.54 cm  (2.7-4.0cm)  IVSd:            1.41 cm  (0.6-1.1cm)  LVPWd:           1.00 cm  (0.6-1.1cm)  LVIDd:           4.51 cm  (3.9-5.9cm)  LVIDs:           3.04 cm  LV Mass Index:   96 g/m2  LVEDV Index:     53 ml/m2  LV % FS          32.6 %      LA VOLUME:                    Normal Ranges:  LA Vol A4C:        61.6 ml    (22+/-6mL/m2)  LA Vol A2C:        65.5 ml  LA Vol BP:         67.6 ml  LA Vol Index A4C:  29.5ml/m2  LA Vol Index A2C:  31.3 ml/m2  LA Vol Index BP:   32.3 ml/m2  LA Area A4C:       19.6 cm2  LA Area A2C:       21.5 cm2  LA Major Axis A4C: 5.3 cm  LA Major Axis A2C: 6.0 cm  LA Volume Index:   32.3 ml/m2      RA VOLUME BY A/L METHOD:            Normal Ranges:  RA Vol A4C:              43.6 ml    (8.3-19.5ml)  RA Vol Index A4C:        20.9 ml/m2  RA Area A4C:             17.4 cm2  RA Major Axis A4C:       5.9 cm      AORTA MEASUREMENTS:         Normal Ranges:  Ao Sinus, d:        4.20 cm (2.1-3.5cm)  Ao STJ, d:          3.59 cm (1.7-3.4cm)  Asc Ao, d:          4.10 cm (2.1-3.4cm)      LV SYSTOLIC FUNCTION BY 2D PLANIMETRY (MOD):  Normal Ranges:  EF-A4C View:    57 % (>=55%)  EF-A2C View:    60 %  EF-Biplane:     63 %  LV EF Reported: 63 %      LV DIASTOLIC FUNCTION:             Normal Ranges:  MV Peak E:             0.66 m/s    (0.7-1.2 m/s)  MV Peak A:             0.83 m/s    (0.42-0.7 m/s)  E/A Ratio:             0.80        (1.0-2.2)  MV e'                  0.075 m/s   (>8.0)  MV lateral e'          0.09 m/s  MV medial e'           0.06 m/s  MV A Dur:              126.00 msec  E/e' Ratio:            8.80        (<8.0)  PulmV Sys Mason:         42.01 cm/s  PulmV Hare Mason:        47.39 cm/s  PulmV S/D Mason:         0.89  PulmV A Revs Mason:      10.56 cm/s  PulmV A Revs Dur:      100.86 msec      MITRAL VALVE:          Normal Ranges:  MV DT:        285 msec (150-240msec)      AORTIC  VALVE:                     Normal Ranges:  AoV Vmax:                1.54 m/s (<=1.7m/s)  AoV Peak P.4 mmHg (<20mmHg)  AoV Mean P.9 mmHg (1.7-11.5mmHg)  LVOT Max Mason:            1.19 m/s (<=1.1m/s)  AoV VTI:                 27.18 cm (18-25cm)  LVOT VTI:                21.25 cm  LVOT Diameter:           2.16 cm  (1.8-2.4cm)  AoV Area, VTI:           2.88 cm2 (2.5-5.5cm2)  AoV Area,Vmax:           2.86 cm2 (2.5-4.5cm2)  AoV Dimensionless Index: 0.78      RIGHT VENTRICLE:  RV Major 8.1 cm  TAPSE:   21.0 mm  RV s'    0.15 m/s      TRICUSPID VALVE/RVSP:          Normal Ranges:  Peak TR Velocity:     2.00 m/s  Est. RA Pressure:     3 mmHg  RV Syst Pressure:     19 mmHg  (< 30mmHg)  IVC Diam:             1.54 cm      PULMONIC VALVE:          Normal Ranges:  RVOT Vmax:      0.82 m/s (0.6-0.9m/s)  PV Accel Time:  291 msec (>120ms)  PV Max Mason:     0.9 m/s  (0.6-0.9m/s)  PV Max PG:      3.2 mmHg      Pulmonary Veins:  PulmV A Revs Dur: 100.86 msec  PulmV A Revs Mason: 10.56 cm/s  PulmV Hare Mason:   47.39 cm/s  PulmV S/D Mason:    0.89  PulmV Sys Mason:    42.01 cm/s      AORTA:  Asc Ao Diam 4.12 cm      [id number] Kush Alfaro DO  Electronically signed on 2024 at 3:38:26 PM        ** Final **      Above information reviewed including relevant HPI, PMHx, PSHx, anesthesia history, labs, and imaging. I discussed the anesthesia plan with the patient and/or family and reviewed the risks, benefits and alternatives. Agree to proceed.         Clinical information reviewed:     Meds               NPO Detail:  No data recorded     PHYSICAL EXAM    Anesthesia Plan    History of general anesthesia?: yes  History of complications of general anesthesia?: no    ASA 3     general     intravenous induction   Postoperative administration of opioids is intended.  Trial extubation is planned.  Anesthetic plan and risks discussed with patient.  Use of blood products discussed with patient who consented to blood products.     Plan discussed with resident and attending.

## 2024-12-11 NOTE — PROGRESS NOTES
Pharmacy Medication History Review    Nate Alvarez is a 54 y.o. male who is planned to be admitted for Malignant neoplasm of head of pancreas (Multi). Pharmacy called the patient prior to their scheduled procedure and reviewed the patient's [id number] medications for accuracy.    Medications ADDED:  none  Medications CHANGED:  Insulin lispro directions to: injecting PRN if BS is running high  Medications REMOVED:   Lisinopril 5mg    Please review updated prior to admission medication list and comments regarding how patient may be taking medications differently by going to Admission tab --> Admission Orders --> Admit Orders / Review prior to admission medications.     Preferred pharmacy, last doses of medications, and allergies to be confirmed with patient by nursing the day of procedure.     Sources used to complete the med history include:  [facility]  Pharmacy dispense history  Patient interview  Chart Review  Care Everywhere     Below are additional concerns with the patient's PTA list.  Patient states they only use humalog (insulin lispro) PRN if blood sugar is running high. They have not being using daily. L.F. 09/30/24 3ml/14d  Patient confirmed they are injecting 30 units of lantus daily. L.F. 11/17/24 10ml/28d.   Patient states they stopped lisinopril 5mg. Ran out of medication months ago. L.F. 06/09/24 #30/30d  Patient states they take creon 36,000. Usually 2 capsules with meals and 1-2 with snacks. They only take #3 capsules if it is a large meal. L.F. 07/23/24 #400/30d  Patient states they only take lorazepam 1mg PRN  Patient states they take pantoprazole 40mg twice daily. L.F. 09/30/24 #60/30d    Neela Tian   [facility]  Please reach out via Secure Chat for questions

## 2024-12-12 ENCOUNTER — APPOINTMENT (OUTPATIENT)
Dept: RADIOLOGY | Facility: HOSPITAL | Age: 54
DRG: 406 | End: 2024-12-12
Payer: COMMERCIAL

## 2024-12-12 ENCOUNTER — ANESTHESIA (OUTPATIENT)
Dept: OPERATING ROOM | Facility: HOSPITAL | Age: 54
End: 2024-12-12
Payer: COMMERCIAL

## 2024-12-12 ENCOUNTER — HOSPITAL ENCOUNTER (INPATIENT)
Facility: HOSPITAL | Age: 54
DRG: 406 | End: 2024-12-12
Attending: SURGERY | Admitting: SURGERY
Payer: COMMERCIAL

## 2024-12-12 DIAGNOSIS — K92.1 MELENA: ICD-10-CM

## 2024-12-12 DIAGNOSIS — C25.0 MALIGNANT NEOPLASM OF HEAD OF PANCREAS (MULTI): Primary | ICD-10-CM

## 2024-12-12 LAB
ABO GROUP (TYPE) IN BLOOD: NORMAL
ALBUMIN SERPL BCP-MCNC: 3.7 G/DL (ref 3.4–5)
ALP SERPL-CCNC: 44 U/L (ref 33–120)
ALT SERPL W P-5'-P-CCNC: 61 U/L (ref 10–52)
ANION GAP BLDA CALCULATED.4IONS-SCNC: 10 MMO/L (ref 10–25)
ANION GAP BLDA CALCULATED.4IONS-SCNC: 12 MMO/L (ref 10–25)
ANION GAP BLDA CALCULATED.4IONS-SCNC: 15 MMO/L (ref 10–25)
ANION GAP BLDA CALCULATED.4IONS-SCNC: 16 MMO/L (ref 10–25)
ANION GAP BLDA CALCULATED.4IONS-SCNC: 16 MMO/L (ref 10–25)
ANION GAP BLDA CALCULATED.4IONS-SCNC: 17 MMO/L (ref 10–25)
ANION GAP BLDA CALCULATED.4IONS-SCNC: 17 MMO/L (ref 10–25)
ANION GAP BLDA CALCULATED.4IONS-SCNC: 18 MMO/L (ref 10–25)
ANION GAP SERPL CALC-SCNC: 22 MMOL/L (ref 10–20)
APTT PPP: 31 SECONDS (ref 27–38)
AST SERPL W P-5'-P-CCNC: 64 U/L (ref 9–39)
BASE EXCESS BLDA CALC-SCNC: -2.4 MMOL/L (ref -2–3)
BASE EXCESS BLDA CALC-SCNC: -5 MMOL/L (ref -2–3)
BASE EXCESS BLDA CALC-SCNC: -6.7 MMOL/L (ref -2–3)
BASE EXCESS BLDA CALC-SCNC: -7.5 MMOL/L (ref -2–3)
BASE EXCESS BLDA CALC-SCNC: -7.9 MMOL/L (ref -2–3)
BASE EXCESS BLDA CALC-SCNC: -8.5 MMOL/L (ref -2–3)
BASE EXCESS BLDA CALC-SCNC: -8.6 MMOL/L (ref -2–3)
BASE EXCESS BLDA CALC-SCNC: -9.1 MMOL/L (ref -2–3)
BILIRUB DIRECT SERPL-MCNC: 0.8 MG/DL (ref 0–0.3)
BILIRUB SERPL-MCNC: 1.7 MG/DL (ref 0–1.2)
BLOOD EXPIRATION DATE: NORMAL
BODY TEMPERATURE: 37 DEGREES CELSIUS
BUN SERPL-MCNC: 14 MG/DL (ref 6–23)
CA-I BLD-SCNC: 1.09 MMOL/L (ref 1.1–1.33)
CA-I BLDA-SCNC: 1.06 MMOL/L (ref 1.1–1.33)
CA-I BLDA-SCNC: 1.07 MMOL/L (ref 1.1–1.33)
CA-I BLDA-SCNC: 1.09 MMOL/L (ref 1.1–1.33)
CA-I BLDA-SCNC: 1.16 MMOL/L (ref 1.1–1.33)
CA-I BLDA-SCNC: 1.17 MMOL/L (ref 1.1–1.33)
CA-I BLDA-SCNC: 1.18 MMOL/L (ref 1.1–1.33)
CA-I BLDA-SCNC: 1.18 MMOL/L (ref 1.1–1.33)
CA-I BLDA-SCNC: 1.39 MMOL/L (ref 1.1–1.33)
CALCIUM SERPL-MCNC: 8.5 MG/DL (ref 8.6–10.6)
CHLORIDE BLDA-SCNC: 106 MMOL/L (ref 98–107)
CHLORIDE BLDA-SCNC: 106 MMOL/L (ref 98–107)
CHLORIDE BLDA-SCNC: 107 MMOL/L (ref 98–107)
CHLORIDE BLDA-SCNC: 108 MMOL/L (ref 98–107)
CHLORIDE SERPL-SCNC: 104 MMOL/L (ref 98–107)
CO2 SERPL-SCNC: 18 MMOL/L (ref 21–32)
CREAT SERPL-MCNC: 0.95 MG/DL (ref 0.5–1.3)
DISPENSE STATUS: NORMAL
EGFRCR SERPLBLD CKD-EPI 2021: >90 ML/MIN/1.73M*2
ERYTHROCYTE [DISTWIDTH] IN BLOOD BY AUTOMATED COUNT: 14.2 % (ref 11.5–14.5)
GLUCOSE BLD MANUAL STRIP-MCNC: 100 MG/DL (ref 74–99)
GLUCOSE BLD MANUAL STRIP-MCNC: 190 MG/DL (ref 74–99)
GLUCOSE BLD MANUAL STRIP-MCNC: 203 MG/DL (ref 74–99)
GLUCOSE BLD MANUAL STRIP-MCNC: 213 MG/DL (ref 74–99)
GLUCOSE BLD MANUAL STRIP-MCNC: 247 MG/DL (ref 74–99)
GLUCOSE BLD MANUAL STRIP-MCNC: 250 MG/DL (ref 74–99)
GLUCOSE BLD MANUAL STRIP-MCNC: 263 MG/DL (ref 74–99)
GLUCOSE BLDA-MCNC: 143 MG/DL (ref 74–99)
GLUCOSE BLDA-MCNC: 173 MG/DL (ref 74–99)
GLUCOSE BLDA-MCNC: 189 MG/DL (ref 74–99)
GLUCOSE BLDA-MCNC: 196 MG/DL (ref 74–99)
GLUCOSE BLDA-MCNC: 229 MG/DL (ref 74–99)
GLUCOSE BLDA-MCNC: 245 MG/DL (ref 74–99)
GLUCOSE BLDA-MCNC: 252 MG/DL (ref 74–99)
GLUCOSE BLDA-MCNC: 264 MG/DL (ref 74–99)
GLUCOSE SERPL-MCNC: 277 MG/DL (ref 74–99)
HCO3 BLDA-SCNC: 16.1 MMOL/L (ref 22–26)
HCO3 BLDA-SCNC: 16.4 MMOL/L (ref 22–26)
HCO3 BLDA-SCNC: 17.9 MMOL/L (ref 22–26)
HCO3 BLDA-SCNC: 18.4 MMOL/L (ref 22–26)
HCO3 BLDA-SCNC: 19.2 MMOL/L (ref 22–26)
HCO3 BLDA-SCNC: 19.3 MMOL/L (ref 22–26)
HCO3 BLDA-SCNC: 21.1 MMOL/L (ref 22–26)
HCO3 BLDA-SCNC: 23.2 MMOL/L (ref 22–26)
HCT VFR BLD AUTO: 32 % (ref 41–52)
HCT VFR BLD EST: 29 % (ref 41–52)
HCT VFR BLD EST: 29 % (ref 41–52)
HCT VFR BLD EST: 30 % (ref 41–52)
HCT VFR BLD EST: 33 % (ref 41–52)
HCT VFR BLD EST: 34 % (ref 41–52)
HCT VFR BLD EST: 38 % (ref 41–52)
HCT VFR BLD EST: 41 % (ref 41–52)
HCT VFR BLD EST: 43 % (ref 41–52)
HGB BLD-MCNC: 10.8 G/DL (ref 13.5–17.5)
HGB BLDA-MCNC: 10.1 G/DL (ref 13.5–17.5)
HGB BLDA-MCNC: 10.9 G/DL (ref 13.5–17.5)
HGB BLDA-MCNC: 11.2 G/DL (ref 13.5–17.5)
HGB BLDA-MCNC: 12.7 G/DL (ref 13.5–17.5)
HGB BLDA-MCNC: 13.6 G/DL (ref 13.5–17.5)
HGB BLDA-MCNC: 14.2 G/DL (ref 13.5–17.5)
HGB BLDA-MCNC: 9.5 G/DL (ref 13.5–17.5)
HGB BLDA-MCNC: 9.7 G/DL (ref 13.5–17.5)
INHALED O2 CONCENTRATION: 21 %
INHALED O2 CONCENTRATION: 21 %
INHALED O2 CONCENTRATION: 50 %
INHALED O2 CONCENTRATION: 60 %
INR PPP: 1.5 (ref 0.9–1.1)
LACTATE BLDA-SCNC: 1.3 MMOL/L (ref 0.4–2)
LACTATE BLDA-SCNC: 1.6 MMOL/L (ref 0.4–2)
LACTATE BLDA-SCNC: 4 MMOL/L (ref 0.4–2)
LACTATE BLDA-SCNC: 4.1 MMOL/L (ref 0.4–2)
LACTATE BLDA-SCNC: 5.2 MMOL/L (ref 0.4–2)
LACTATE BLDA-SCNC: 5.8 MMOL/L (ref 0.4–2)
LACTATE BLDA-SCNC: 6.8 MMOL/L (ref 0.4–2)
LACTATE BLDA-SCNC: 8.1 MMOL/L (ref 0.4–2)
MAGNESIUM SERPL-MCNC: 1.52 MG/DL (ref 1.6–2.4)
MCH RBC QN AUTO: 31.3 PG (ref 26–34)
MCHC RBC AUTO-ENTMCNC: 33.8 G/DL (ref 32–36)
MCV RBC AUTO: 93 FL (ref 80–100)
NRBC BLD-RTO: 0 /100 WBCS (ref 0–0)
OXYHGB MFR BLDA: 95.9 % (ref 94–98)
OXYHGB MFR BLDA: 96.2 % (ref 94–98)
OXYHGB MFR BLDA: 96.3 % (ref 94–98)
OXYHGB MFR BLDA: 96.7 % (ref 94–98)
OXYHGB MFR BLDA: 96.7 % (ref 94–98)
OXYHGB MFR BLDA: 96.9 % (ref 94–98)
OXYHGB MFR BLDA: 97 % (ref 94–98)
OXYHGB MFR BLDA: 97.1 % (ref 94–98)
PCO2 BLDA: 29 MM HG (ref 38–42)
PCO2 BLDA: 32 MM HG (ref 38–42)
PCO2 BLDA: 39 MM HG (ref 38–42)
PCO2 BLDA: 40 MM HG (ref 38–42)
PCO2 BLDA: 42 MM HG (ref 38–42)
PCO2 BLDA: 44 MM HG (ref 38–42)
PH BLDA: 7.25 PH (ref 7.38–7.42)
PH BLDA: 7.25 PH (ref 7.38–7.42)
PH BLDA: 7.26 PH (ref 7.38–7.42)
PH BLDA: 7.3 PH (ref 7.38–7.42)
PH BLDA: 7.31 PH (ref 7.38–7.42)
PH BLDA: 7.31 PH (ref 7.38–7.42)
PH BLDA: 7.35 PH (ref 7.38–7.42)
PH BLDA: 7.36 PH (ref 7.38–7.42)
PHOSPHATE SERPL-MCNC: 4.9 MG/DL (ref 2.5–4.9)
PLATELET # BLD AUTO: 117 X10*3/UL (ref 150–450)
PO2 BLDA: 106 MM HG (ref 85–95)
PO2 BLDA: 117 MM HG (ref 85–95)
PO2 BLDA: 153 MM HG (ref 85–95)
PO2 BLDA: 185 MM HG (ref 85–95)
PO2 BLDA: 192 MM HG (ref 85–95)
PO2 BLDA: 230 MM HG (ref 85–95)
PO2 BLDA: 266 MM HG (ref 85–95)
PO2 BLDA: 90 MM HG (ref 85–95)
POTASSIUM BLDA-SCNC: 4.1 MMOL/L (ref 3.5–5.3)
POTASSIUM BLDA-SCNC: 4.3 MMOL/L (ref 3.5–5.3)
POTASSIUM BLDA-SCNC: 4.3 MMOL/L (ref 3.5–5.3)
POTASSIUM BLDA-SCNC: 4.4 MMOL/L (ref 3.5–5.3)
POTASSIUM BLDA-SCNC: 4.8 MMOL/L (ref 3.5–5.3)
POTASSIUM BLDA-SCNC: 4.9 MMOL/L (ref 3.5–5.3)
POTASSIUM BLDA-SCNC: 5.3 MMOL/L (ref 3.5–5.3)
POTASSIUM BLDA-SCNC: 5.9 MMOL/L (ref 3.5–5.3)
POTASSIUM SERPL-SCNC: 5.3 MMOL/L (ref 3.5–5.3)
PRODUCT BLOOD TYPE: 5100
PRODUCT BLOOD TYPE: 600
PRODUCT BLOOD TYPE: 6200
PRODUCT BLOOD TYPE: 6200
PRODUCT CODE: NORMAL
PROT SERPL-MCNC: 4.9 G/DL (ref 6.4–8.2)
PROTHROMBIN TIME: 16.4 SECONDS (ref 9.8–12.8)
RBC # BLD AUTO: 3.45 X10*6/UL (ref 4.5–5.9)
RH FACTOR (ANTIGEN D): NORMAL
SAO2 % BLDA: 97 % (ref 94–100)
SAO2 % BLDA: 98 % (ref 94–100)
SODIUM BLDA-SCNC: 135 MMOL/L (ref 136–145)
SODIUM BLDA-SCNC: 135 MMOL/L (ref 136–145)
SODIUM BLDA-SCNC: 136 MMOL/L (ref 136–145)
SODIUM BLDA-SCNC: 136 MMOL/L (ref 136–145)
SODIUM BLDA-SCNC: 137 MMOL/L (ref 136–145)
SODIUM BLDA-SCNC: 138 MMOL/L (ref 136–145)
SODIUM SERPL-SCNC: 139 MMOL/L (ref 136–145)
UNIT ABO: NORMAL
UNIT NUMBER: NORMAL
UNIT RH: NORMAL
UNIT VOLUME: 299
UNIT VOLUME: 319
UNIT VOLUME: 326
UNIT VOLUME: 350
WBC # BLD AUTO: 12.6 X10*3/UL (ref 4.4–11.3)
XM INTEP: NORMAL
XM INTEP: NORMAL

## 2024-12-12 PROCEDURE — 36430 TRANSFUSION BLD/BLD COMPNT: CPT | Performed by: ANESTHESIOLOGIST ASSISTANT

## 2024-12-12 PROCEDURE — 0FT40ZZ RESECTION OF GALLBLADDER, OPEN APPROACH: ICD-10-PCS | Performed by: SURGERY

## 2024-12-12 PROCEDURE — 37799 UNLISTED PX VASCULAR SURGERY: CPT | Performed by: PHYSICIAN ASSISTANT

## 2024-12-12 PROCEDURE — 85610 PROTHROMBIN TIME: CPT | Performed by: PHYSICIAN ASSISTANT

## 2024-12-12 PROCEDURE — 71045 X-RAY EXAM CHEST 1 VIEW: CPT

## 2024-12-12 PROCEDURE — 2500000004 HC RX 250 GENERAL PHARMACY W/ HCPCS (ALT 636 FOR OP/ED): Performed by: SURGERY

## 2024-12-12 PROCEDURE — 0DB60ZZ EXCISION OF STOMACH, OPEN APPROACH: ICD-10-PCS | Performed by: SURGERY

## 2024-12-12 PROCEDURE — 82947 ASSAY GLUCOSE BLOOD QUANT: CPT

## 2024-12-12 PROCEDURE — 2500000004 HC RX 250 GENERAL PHARMACY W/ HCPCS (ALT 636 FOR OP/ED): Performed by: ANESTHESIOLOGIST ASSISTANT

## 2024-12-12 PROCEDURE — 2720000007 HC OR 272 NO HCPCS: Performed by: SURGERY

## 2024-12-12 PROCEDURE — 99223 1ST HOSP IP/OBS HIGH 75: CPT | Performed by: ANESTHESIOLOGY

## 2024-12-12 PROCEDURE — 2500000005 HC RX 250 GENERAL PHARMACY W/O HCPCS

## 2024-12-12 PROCEDURE — 85018 HEMOGLOBIN: CPT | Performed by: PHYSICIAN ASSISTANT

## 2024-12-12 PROCEDURE — 82435 ASSAY OF BLOOD CHLORIDE: CPT

## 2024-12-12 PROCEDURE — A48155: Performed by: STUDENT IN AN ORGANIZED HEALTH CARE EDUCATION/TRAINING PROGRAM

## 2024-12-12 PROCEDURE — 2500000004 HC RX 250 GENERAL PHARMACY W/ HCPCS (ALT 636 FOR OP/ED): Performed by: PHYSICIAN ASSISTANT

## 2024-12-12 PROCEDURE — P9045 ALBUMIN (HUMAN), 5%, 250 ML: HCPCS | Mod: JZ

## 2024-12-12 PROCEDURE — 47760 FUSE BILE DUCTS AND BOWEL: CPT | Performed by: SURGERY

## 2024-12-12 PROCEDURE — 48155 REMOVAL OF PANCREAS: CPT | Performed by: SURGERY

## 2024-12-12 PROCEDURE — 06B80ZZ EXCISION OF PORTAL VEIN, OPEN APPROACH: ICD-10-PCS | Performed by: SURGERY

## 2024-12-12 PROCEDURE — 2500000005 HC RX 250 GENERAL PHARMACY W/O HCPCS: Performed by: ANESTHESIOLOGIST ASSISTANT

## 2024-12-12 PROCEDURE — 0FBG0ZZ EXCISION OF PANCREAS, OPEN APPROACH: ICD-10-PCS | Performed by: SURGERY

## 2024-12-12 PROCEDURE — 2500000004 HC RX 250 GENERAL PHARMACY W/ HCPCS (ALT 636 FOR OP/ED): Mod: JZ

## 2024-12-12 PROCEDURE — 3600000010 HC OR TIME - EACH INCREMENTAL 1 MINUTE - PROCEDURE LEVEL FIVE: Performed by: SURGERY

## 2024-12-12 PROCEDURE — 84100 ASSAY OF PHOSPHORUS: CPT | Performed by: PHYSICIAN ASSISTANT

## 2024-12-12 PROCEDURE — 84075 ASSAY ALKALINE PHOSPHATASE: CPT | Performed by: PHYSICIAN ASSISTANT

## 2024-12-12 PROCEDURE — 43632 REMOVAL OF STOMACH PARTIAL: CPT | Performed by: SURGERY

## 2024-12-12 PROCEDURE — 71045 X-RAY EXAM CHEST 1 VIEW: CPT | Performed by: RADIOLOGY

## 2024-12-12 PROCEDURE — 99223 1ST HOSP IP/OBS HIGH 75: CPT | Performed by: PHYSICIAN ASSISTANT

## 2024-12-12 PROCEDURE — 3700000002 HC GENERAL ANESTHESIA TIME - EACH INCREMENTAL 1 MINUTE: Performed by: SURGERY

## 2024-12-12 PROCEDURE — 06B50ZZ EXCISION OF SUPERIOR MESENTERIC VEIN, OPEN APPROACH: ICD-10-PCS | Performed by: SURGERY

## 2024-12-12 PROCEDURE — P9040 RBC LEUKOREDUCED IRRADIATED: HCPCS

## 2024-12-12 PROCEDURE — 47600 CHOLECYSTECTOMY: CPT | Performed by: SURGERY

## 2024-12-12 PROCEDURE — 99223 1ST HOSP IP/OBS HIGH 75: CPT | Performed by: STUDENT IN AN ORGANIZED HEALTH CARE EDUCATION/TRAINING PROGRAM

## 2024-12-12 PROCEDURE — 85027 COMPLETE CBC AUTOMATED: CPT | Performed by: PHYSICIAN ASSISTANT

## 2024-12-12 PROCEDURE — 2500000004 HC RX 250 GENERAL PHARMACY W/ HCPCS (ALT 636 FOR OP/ED)

## 2024-12-12 PROCEDURE — 93975 VASCULAR STUDY: CPT | Performed by: RADIOLOGY

## 2024-12-12 PROCEDURE — 82330 ASSAY OF CALCIUM: CPT | Performed by: PHYSICIAN ASSISTANT

## 2024-12-12 PROCEDURE — 3700000001 HC GENERAL ANESTHESIA TIME - INITIAL BASE CHARGE: Performed by: SURGERY

## 2024-12-12 PROCEDURE — 2020000001 HC ICU ROOM DAILY

## 2024-12-12 PROCEDURE — 84132 ASSAY OF SERUM POTASSIUM: CPT | Performed by: PHYSICIAN ASSISTANT

## 2024-12-12 PROCEDURE — 0FB90ZZ EXCISION OF COMMON BILE DUCT, OPEN APPROACH: ICD-10-PCS | Performed by: SURGERY

## 2024-12-12 PROCEDURE — 83735 ASSAY OF MAGNESIUM: CPT | Performed by: PHYSICIAN ASSISTANT

## 2024-12-12 PROCEDURE — 93975 VASCULAR STUDY: CPT

## 2024-12-12 PROCEDURE — 2500000004 HC RX 250 GENERAL PHARMACY W/ HCPCS (ALT 636 FOR OP/ED): Performed by: STUDENT IN AN ORGANIZED HEALTH CARE EDUCATION/TRAINING PROGRAM

## 2024-12-12 PROCEDURE — 76705 ECHO EXAM OF ABDOMEN: CPT | Performed by: RADIOLOGY

## 2024-12-12 PROCEDURE — 82435 ASSAY OF BLOOD CHLORIDE: CPT | Performed by: ANESTHESIOLOGIST ASSISTANT

## 2024-12-12 PROCEDURE — 3600000005 HC OR TIME - INITIAL BASE CHARGE - PROCEDURE LEVEL FIVE: Performed by: SURGERY

## 2024-12-12 PROCEDURE — 07BD0ZZ EXCISION OF AORTIC LYMPHATIC, OPEN APPROACH: ICD-10-PCS | Performed by: SURGERY

## 2024-12-12 RX ORDER — HYDROMORPHONE HYDROCHLORIDE 1 MG/ML
INJECTION, SOLUTION INTRAMUSCULAR; INTRAVENOUS; SUBCUTANEOUS AS NEEDED
Status: DISCONTINUED | OUTPATIENT
Start: 2024-12-12 | End: 2024-12-12

## 2024-12-12 RX ORDER — SODIUM CHLORIDE, SODIUM LACTATE, POTASSIUM CHLORIDE, CALCIUM CHLORIDE 600; 310; 30; 20 MG/100ML; MG/100ML; MG/100ML; MG/100ML
100 INJECTION, SOLUTION INTRAVENOUS CONTINUOUS
Status: CANCELLED | OUTPATIENT
Start: 2024-12-12 | End: 2024-12-12

## 2024-12-12 RX ORDER — METHOCARBAMOL 100 MG/ML
1000 INJECTION, SOLUTION INTRAMUSCULAR; INTRAVENOUS ONCE
Status: CANCELLED | OUTPATIENT
Start: 2024-12-12 | End: 2024-12-12

## 2024-12-12 RX ORDER — OXYCODONE HYDROCHLORIDE 5 MG/1
5 TABLET ORAL EVERY 4 HOURS PRN
Status: CANCELLED | OUTPATIENT
Start: 2024-12-12

## 2024-12-12 RX ORDER — ACETAMINOPHEN 10 MG/ML
INJECTION, SOLUTION INTRAVENOUS AS NEEDED
Status: DISCONTINUED | OUTPATIENT
Start: 2024-12-12 | End: 2024-12-12

## 2024-12-12 RX ORDER — LIDOCAINE HYDROCHLORIDE 20 MG/ML
INJECTION, SOLUTION INFILTRATION; PERINEURAL AS NEEDED
Status: DISCONTINUED | OUTPATIENT
Start: 2024-12-12 | End: 2024-12-12

## 2024-12-12 RX ORDER — HEPARIN SODIUM 1000 [USP'U]/ML
INJECTION, SOLUTION INTRAVENOUS; SUBCUTANEOUS AS NEEDED
Status: DISCONTINUED | OUTPATIENT
Start: 2024-12-12 | End: 2024-12-12

## 2024-12-12 RX ORDER — ALBUMIN HUMAN 50 G/1000ML
SOLUTION INTRAVENOUS AS NEEDED
Status: DISCONTINUED | OUTPATIENT
Start: 2024-12-12 | End: 2024-12-12

## 2024-12-12 RX ORDER — PHENYLEPHRINE 10 MG/250 ML(40 MCG/ML)IN 0.9 % SOD.CHLORIDE INTRAVENOUS
CONTINUOUS PRN
Status: DISCONTINUED | OUTPATIENT
Start: 2024-12-12 | End: 2024-12-12

## 2024-12-12 RX ORDER — MAGNESIUM SULFATE HEPTAHYDRATE 40 MG/ML
4 INJECTION, SOLUTION INTRAVENOUS EVERY 6 HOURS PRN
Status: DISCONTINUED | OUTPATIENT
Start: 2024-12-12 | End: 2024-12-13

## 2024-12-12 RX ORDER — DEXTROSE 50 % IN WATER (D50W) INTRAVENOUS SYRINGE
12.5
Status: DISCONTINUED | OUTPATIENT
Start: 2024-12-12 | End: 2024-12-13

## 2024-12-12 RX ORDER — ROPIVACAINE IN 0.9% SOD CHL/PF 0.2 %
14 PLASTIC BAG, INJECTION (ML) EPIDURAL CONTINUOUS
Status: DISCONTINUED | OUTPATIENT
Start: 2024-12-12 | End: 2024-12-13

## 2024-12-12 RX ORDER — DEXMEDETOMIDINE HYDROCHLORIDE 4 UG/ML
INJECTION, SOLUTION INTRAVENOUS CONTINUOUS PRN
Status: DISCONTINUED | OUTPATIENT
Start: 2024-12-12 | End: 2024-12-12

## 2024-12-12 RX ORDER — CALCIUM GLUCONATE 20 MG/ML
2 INJECTION, SOLUTION INTRAVENOUS EVERY 6 HOURS PRN
Status: DISCONTINUED | OUTPATIENT
Start: 2024-12-12 | End: 2024-12-13

## 2024-12-12 RX ORDER — ONDANSETRON HYDROCHLORIDE 2 MG/ML
4 INJECTION, SOLUTION INTRAVENOUS ONCE AS NEEDED
Status: CANCELLED | OUTPATIENT
Start: 2024-12-12

## 2024-12-12 RX ORDER — ROPIVACAINE IN 0.9% SOD CHL/PF 0.2 %
14 PLASTIC BAG, INJECTION (ML) EPIDURAL CONTINUOUS
Status: DISCONTINUED | OUTPATIENT
Start: 2024-12-12 | End: 2024-12-16

## 2024-12-12 RX ORDER — LABETALOL HYDROCHLORIDE 5 MG/ML
5 INJECTION, SOLUTION INTRAVENOUS ONCE AS NEEDED
Status: CANCELLED | OUTPATIENT
Start: 2024-12-12

## 2024-12-12 RX ORDER — HYDROMORPHONE HYDROCHLORIDE 0.2 MG/ML
0.2 INJECTION INTRAMUSCULAR; INTRAVENOUS; SUBCUTANEOUS EVERY 5 MIN PRN
Status: CANCELLED | OUTPATIENT
Start: 2024-12-12

## 2024-12-12 RX ORDER — ONDANSETRON HYDROCHLORIDE 2 MG/ML
INJECTION, SOLUTION INTRAVENOUS AS NEEDED
Status: DISCONTINUED | OUTPATIENT
Start: 2024-12-12 | End: 2024-12-12

## 2024-12-12 RX ORDER — HYDROMORPHONE HYDROCHLORIDE 0.2 MG/ML
0.2 INJECTION INTRAMUSCULAR; INTRAVENOUS; SUBCUTANEOUS EVERY 4 HOURS PRN
Status: DISCONTINUED | OUTPATIENT
Start: 2024-12-12 | End: 2024-12-13

## 2024-12-12 RX ORDER — PHENYLEPHRINE HCL IN 0.9% NACL 0.4MG/10ML
SYRINGE (ML) INTRAVENOUS AS NEEDED
Status: DISCONTINUED | OUTPATIENT
Start: 2024-12-12 | End: 2024-12-12

## 2024-12-12 RX ORDER — SODIUM BICARBONATE 1 MEQ/ML
SYRINGE (ML) INTRAVENOUS AS NEEDED
Status: DISCONTINUED | OUTPATIENT
Start: 2024-12-12 | End: 2024-12-12

## 2024-12-12 RX ORDER — CALCIUM CHLORIDE INJECTION 100 MG/ML
INJECTION, SOLUTION INTRAVENOUS AS NEEDED
Status: DISCONTINUED | OUTPATIENT
Start: 2024-12-12 | End: 2024-12-12

## 2024-12-12 RX ORDER — HEPARIN SODIUM 5000 [USP'U]/ML
5000 INJECTION, SOLUTION INTRAVENOUS; SUBCUTANEOUS ONCE
Status: COMPLETED | OUTPATIENT
Start: 2024-12-12 | End: 2024-12-12

## 2024-12-12 RX ORDER — PROPOFOL 10 MG/ML
INJECTION, EMULSION INTRAVENOUS AS NEEDED
Status: DISCONTINUED | OUTPATIENT
Start: 2024-12-12 | End: 2024-12-12

## 2024-12-12 RX ORDER — POTASSIUM CHLORIDE 14.9 MG/ML
20 INJECTION INTRAVENOUS EVERY 6 HOURS PRN
Status: DISCONTINUED | OUTPATIENT
Start: 2024-12-12 | End: 2024-12-13

## 2024-12-12 RX ORDER — MAGNESIUM SULFATE HEPTAHYDRATE 40 MG/ML
2 INJECTION, SOLUTION INTRAVENOUS EVERY 6 HOURS PRN
Status: DISCONTINUED | OUTPATIENT
Start: 2024-12-12 | End: 2024-12-13

## 2024-12-12 RX ORDER — ACETAMINOPHEN 10 MG/ML
1000 INJECTION, SOLUTION INTRAVENOUS EVERY 6 HOURS SCHEDULED
Status: COMPLETED | OUTPATIENT
Start: 2024-12-12 | End: 2024-12-13

## 2024-12-12 RX ORDER — CALCIUM GLUCONATE 20 MG/ML
1 INJECTION, SOLUTION INTRAVENOUS EVERY 6 HOURS PRN
Status: DISCONTINUED | OUTPATIENT
Start: 2024-12-12 | End: 2024-12-13

## 2024-12-12 RX ORDER — FENTANYL CITRATE 50 UG/ML
INJECTION, SOLUTION INTRAMUSCULAR; INTRAVENOUS AS NEEDED
Status: DISCONTINUED | OUTPATIENT
Start: 2024-12-12 | End: 2024-12-12

## 2024-12-12 RX ORDER — DEXTROSE 50 % IN WATER (D50W) INTRAVENOUS SYRINGE
25
Status: DISCONTINUED | OUTPATIENT
Start: 2024-12-12 | End: 2024-12-13

## 2024-12-12 RX ORDER — SODIUM CHLORIDE, SODIUM LACTATE, POTASSIUM CHLORIDE, CALCIUM CHLORIDE 600; 310; 30; 20 MG/100ML; MG/100ML; MG/100ML; MG/100ML
100 INJECTION, SOLUTION INTRAVENOUS CONTINUOUS
Status: DISCONTINUED | OUTPATIENT
Start: 2024-12-12 | End: 2024-12-13

## 2024-12-12 RX ORDER — SODIUM CHLORIDE, SODIUM LACTATE, POTASSIUM CHLORIDE, CALCIUM CHLORIDE 600; 310; 30; 20 MG/100ML; MG/100ML; MG/100ML; MG/100ML
20 INJECTION, SOLUTION INTRAVENOUS CONTINUOUS
Status: DISCONTINUED | OUTPATIENT
Start: 2024-12-12 | End: 2024-12-12

## 2024-12-12 RX ORDER — PANTOPRAZOLE SODIUM 40 MG/10ML
40 INJECTION, POWDER, LYOPHILIZED, FOR SOLUTION INTRAVENOUS DAILY
Status: DISCONTINUED | OUTPATIENT
Start: 2024-12-12 | End: 2024-12-16

## 2024-12-12 RX ORDER — ROCURONIUM BROMIDE 10 MG/ML
INJECTION, SOLUTION INTRAVENOUS AS NEEDED
Status: DISCONTINUED | OUTPATIENT
Start: 2024-12-12 | End: 2024-12-12

## 2024-12-12 RX ORDER — HYDRALAZINE HYDROCHLORIDE 20 MG/ML
5 INJECTION INTRAMUSCULAR; INTRAVENOUS EVERY 30 MIN PRN
Status: CANCELLED | OUTPATIENT
Start: 2024-12-12

## 2024-12-12 RX ORDER — MIDAZOLAM HYDROCHLORIDE 1 MG/ML
INJECTION INTRAMUSCULAR; INTRAVENOUS AS NEEDED
Status: DISCONTINUED | OUTPATIENT
Start: 2024-12-12 | End: 2024-12-12

## 2024-12-12 RX ORDER — ALBUTEROL SULFATE 0.83 MG/ML
2.5 SOLUTION RESPIRATORY (INHALATION) ONCE AS NEEDED
Status: CANCELLED | OUTPATIENT
Start: 2024-12-12

## 2024-12-12 ASSESSMENT — PAIN SCALES - GENERAL
PAINLEVEL_OUTOF10: 0 - NO PAIN
PAINLEVEL_OUTOF10: 4
PAINLEVEL_OUTOF10: 0 - NO PAIN
PAINLEVEL_OUTOF10: 7
PAINLEVEL_OUTOF10: 6
PAINLEVEL_OUTOF10: 0 - NO PAIN
PAINLEVEL_OUTOF10: 2

## 2024-12-12 ASSESSMENT — PAIN DESCRIPTION - LOCATION: LOCATION: ABDOMEN

## 2024-12-12 ASSESSMENT — PAIN - FUNCTIONAL ASSESSMENT
PAIN_FUNCTIONAL_ASSESSMENT: 0-10

## 2024-12-12 NOTE — OP NOTE
Total pancreatectomy, splenectomy, distal gastrectomy with gastrojejunostomy, choledochojejunostomy, cholecystectomy Operative Note     Date: 2024  OR Location: [facility] OR    Name: Nate Alvarez YOB: 1970, Age: 54 y.o., MRN: [id number], Sex: male    Diagnosis  Pre-op Diagnosis      * Malignant neoplasm of head of pancreas (Multi) [C25.0] Post-op Diagnosis     * Malignant neoplasm of head of pancreas (Multi) [C25.0]     Procedure  Total Pancreatectomy / Splenectomy  Distal Gastrectomy with GJ  Choledochojejunostomy  Cholecystectomy  SMV Resection / Reconstruction (Andrez-Alfonso)      Surgeons   Panel 1:     * Roddy Gamble - Primary  Panel 2:     * Dina Hamilton - Primary    Resident/Fellow/Other Assistant:  Shahriar Khan MD - Resident    Staff:   Circulator: Ewa  Scrub Person: Shahriar  Circulator: Alondra  Scrub Person: Paty  Relief Circulator: Kayla  Relief Scrub: Kayla  Relief Scrub: Albert  Relief Circulator: Allegra    Anesthesia Staff: Anesthesiologist: Sona Fisher MD; Shaw Tovar MD; Cris Corbett DO; Angel Jc MD  C-AA: YESENIA Garcia  Anesthesia Resident: Camilo Mariano MD    Procedure Summary  Anesthesia: General  ASA: III  Estimated Blood Loss:  1250 mL  Intra-op Medications:   Administrations occurring from 0645 to 1540 on 24:   Medication Name Total Dose   albumin human bottle 5% 1,750 mL   calcium chloride 100 mg/mL syringe 1 g   dexAMETHasone (Decadron) injection 4 mg/mL 4 mg   dexmedeTOMIDine 4 mcg/mL in 100 mL NS infusion 74.15 mcg   fentaNYL (Sublimaze) injection 50 mcg/mL 100 mcg   heparin 1,000 units/mL 5,000 Units   ketamine injection 50 mg/ 5 mL (10 mg/mL) 100 mg   LR bolus Cannot be calculated   lidocaine (Xylocaine) injection 2 % 100 mg   midazolam PF (Versed) injection 1 mg/mL 2 mg   phenylephrine (Abbe-Synephrine) 10 mg in sodium chloride 0.9% 250 mL (0.04 mg/mL) infusion (premix) 4.92 mg   phenylephrine 40 mcg/mL syringe 10 mL  1,320 mcg   piperacillin-tazobactam (Zosyn) IV 3.375 g in 50 mL (premix) 6.75 g   propofol (Diprivan) injection 10 mg/mL 200 mg   rocuronium (ZeMuron) 50 mg/5 mL injection 330 mg   ropivacaine (PF) in NS cmpd (Naropin) 1000 mg/500 mL (0.2%) infusion 111.07 mL   heparin (porcine) injection 5,000 Units 5,000 Units              Anesthesia Record               Intraprocedure I/O Totals          Intake    PRBC 700.00 mL    Dexmedetomidine 0.00 mL    The total shown is the total volume documented since Anesthesia Start was filed.    LR bolus 5000.00 mL    Phenylephrine Drip 0.00 mL    The total shown is the total volume documented since Anesthesia Start was filed.    Total Intake 5700 mL       Output    Urine 1500 mL    Total Output 1500 mL       Net    Net Volume 4200 mL          Specimen:   ID Type Source Tests Collected by Time   1 : Lateral portal lymphatic tissue Tissue LYMPH NODE EXCISION SURGICAL PATHOLOGY EXAM Roddy Gamble MD 12/12/2024 0827   2 : Lateral portal lymph node Tissue LYMPH NODE EXCISION SURGICAL PATHOLOGY EXAM Roddy Gamble MD 12/12/2024 0845   3 : Hepatic artery lymph node Tissue LYMPH NODE EXCISION SURGICAL PATHOLOGY EXAM Roddy Gamble MD 12/12/2024 0851   4 : gallbladder Tissue GALLBLADDER CHOLECYSTECTOMY SURGICAL PATHOLOGY EXAM Roddy Gamble MD 12/12/2024 0916   5 : pancreatic neck margin freeze the noncauterized side Tissue PANCREAS RESECTION SURGICAL PATHOLOGY EXAM Roddy Gamble MD 12/12/2024 1056   6 : Whipple, long stitch marks bile duct freeze at bile duct Tissue WHIPPLE SPECIMEN PANCREATICODUODENECTOMY SURGICAL PATHOLOGY EXAM Roddy Gamble MD 12/12/2024 1241   7 : Distal pancreas and spleen Tissue PANCREAS RESECTION, DISTAL (TAIL) SURGICAL PATHOLOGY EXAM Roddy Gamble MD 12/12/2024 1432   8 : Distal gastrectomy Tissue STOMACH DISTAL  GASTRECTOMY SURGICAL PATHOLOGY EXAM Roddy Gamble MD 12/12/2024 1446        Drains and/or Catheters:    Closed/Suction Drain Lateral RLQ (Active)       Closed/Suction Drain Lateral LLQ (Active)       Urethral Catheter (Active)     Findings:  HOP mass involving SMV necessitating SMV resection; Total done as unable to free B/T after splenic vein ligation (pt already insulin dependent, wife notitfied intra-op)    Indications: Nate Alvarez is an 54 y.o. male who is having surgery for Malignant neoplasm of head of pancreas (Multi) [C25.0].      The patient was seen in the preoperative area. The risks, benefits, complications, treatment options, non-operative alternatives, expected recovery and outcomes were discussed with the patient. The possibilities of reaction to medication, pulmonary aspiration, injury to surrounding structures, bleeding, recurrent infection, the need for additional procedures, failure to diagnose a condition, and creating a complication requiring transfusion or operation were discussed with the patient. The patient concurred with the proposed plan, giving informed consent.  The site of surgery was properly noted/marked if necessary per policy. The patient has been actively warmed in preoperative area. Preoperative antibiotics have been ordered and given within 1 hours of incision. Venous thrombosis prophylaxis have been ordered including bilateral sequential compression devices and chemical prophylaxis    Procedure Details:     This man is 54 years old.  He has received total neoadjuvant chemotherapy for pancreatic cancer and is brought to the operating room today for resection.    The patient was brought to the operating room and placed on the operating table in supine position.  A timeout was performed.  After the induction of general endotracheal anesthesia, he was given intravenous antibiotics.  He had a Johansen catheter placed.  He had sequential compression devices placed.  He had been given subcutaneous heparin.  His abdomen was clipped with electric razor and prepped and draped in  standard sterile fashion.    We made a midline incision from xiphoid to umbilicus.  We carried the dissection down through the subcutaneous tissues with electrocautery opening up the fascia the full length of the incision.  We inserted the Bookwalter for retraction.  An abdominal exploration revealed no evidence of ascites liver lesions or carcinomatosis.  This gentleman is not morbidly obese but was quite deep and it made the operation a little bit more challenging but we had good exposure.    We began our dissection by performing a Kocher maneuver elevating the pancreatic head and duodenum up out of the retroperitoneum.  I did this until I was able to identify the superior mesenteric vein well below the infra pancreatic position.    It was obvious to me that this gentleman had some SMV involvement a little bit more cranially and ultimately that ended up needing a vein resection as I will comment on.    I gained access to the lesser sac going through the gastrocolic omentum.  I did this from the level of the inferior pole of the spleen over to the postpyloric area.  I then had a very difficult time identifying the superior mesenteric vein just below the neck of the pancreas and ultimately found that the gastroepiploic and middle colic vessels were involved by tumor right at the level of the SMV.    What I did at this time was to take a break and work along the superior border of the pancreas removing the hepatic artery lymph node.  I then identified the gastroduodenal artery and upon occluding it with the vessel loop there was still a great pulse.  I therefore doubly ligated the gastroduodenal artery on the staying side and singly ligated on the specimen side.  I then dissected around the bile duct which did not have an Endo stent.  With the bile duct retracted laterally and the gastroduodenal artery ligated I had good access to the portal vein.    I next had to turn my attention back to the infra pancreatic  position and worked for some time until I was able to better identify the superior mesenteric vein caudally and then identify it also just below the pancreatic neck.  Ultimately what I ended up needing to do was to take the middle colic vessels and did so with a single firing of the Endo JOHNNY stapler containing a white vascular load.  Again this was right at the base and there was excellent flow along the marginal vessels and after taking those vessels there was still a great pulse in the transverse mesocolon.    What I then did was to work and ultimately tunnel posterior to the pancreatic neck placing a Penrose drain there.    I transected the distal stomach using 2 firings of the JOHNNY stapler containing a green load.  I transected the pancreatic neck using the Penrose drain as a safety guide.  We placed a bulldog on the bile duct and transected just below that.  I should note that I sent a frozen section from the remnant side of the pancreas and it came back with no evidence of malignancy.    What I next did was to work to separate the specimen cranially from the portal vein for a bit.  I then turned my attention to the infra mesocolic compartment and transected the proximal jejunum about 15 cm distal to ligament of Treitz with the JOHNNY stapler containing a blue load.  We then controlled the proximal jejunal and distal duodenal mesentery with clamps and ties or LigaSure device were appropriate and fed the bowel underneath the mesenteric vasculature back to the right upper quadrant.    Again it was quite obvious to me that I was going to need to do a vein resection and I arranged for the help of one of my transplant surgery colleagues.  What I did before bringing her into the operating room was to dissected and vessel loop the portal vein, splenic vein, and superior mesenteric vein both above and below the point of tumor involvement.  I then did is much of the uncinate dissection as I could and finally had that asked  for the help of my colleague and transplant surgery Dr. Cardenas and with her help we performed an en bloc SMV resection and completed the uncinate dissection and got the specimen delivered from the operative field.  She will be dictating her part under separate heading but it included giving some heparin and of course clamping the SMV.  She then did a primary anastomosis.  As part of this we did take the splenic vein to make things more mobile for her and that splenic vein on the remnant side was essentially flush with the face/undersurface of the gland.    After Dr. Cardenas did the anastomosis I copiously irrigated.  I should note that that anastomosis was wide open.  We oversewed the jejunal staple line with 3-0 silk Lembert suture.  We closed ligament of Treitz with 3-0 silk suture.  We oversewed the right side of the gastric staple line with 3-0 silk suture.    What I next did was to try to free up the proximal pancreatic body to be able to perform a pancreaticojejunostomy.  I was unable to do so to my satisfaction and again the splenic vein kept oozing.  I did not think that I was going to get a very satisfactory anastomosis.  This gentleman is already an insulin-dependent diabetic and already uses Creon so I thought the safer thing for him was to simply do a total pancreatectomy and I informed his wife of that at this point in the operation.    What I therefore did was to take the short gastric vessels all the way up to the angle of Hiss.  We dissected out the splenic artery along the superior border of the pancreas and simply ligated it with a silk tie to minimize blood flow to the spleen as there was no splenic vein outflow.  We then worked along the superior border of the pancreas and the inferior border of the pancreas out to the spleen and rotated the spleen and body and tail of the pancreas up out of the retroperitoneum.  We took this dissection to the patient's right and then ultimately took  the splenic artery with the Endo JOHNNY stapler containing a white load just as it came off of the celiac trunk.  That specimen was sent to pathology.    We copiously irrigated.  We made sure that there was hemostasis in the retroperitoneum.  There was excellent hemostasis along the uncinate as well as our middle colic vessel drooping staple line.  I did place a couple of clips along the right lateral aspect of the SMA where the dissection was challenging to get the specimen away.  That would be for radiation therapy guidance if needed.    I elected to take a little bit more stomach with a couple of firings of the JOHNNY stapler.  As is often the case with a total pancreatectomy the stomach gets a little bit of venous congestion and so I took a little bit more such that we were left with probably around hemigastrectomy.  I oversewed the right side of the gastric staple line again.    I next brought the jejunum up through a rent in the right side of the transverse mesocolon.  We then fashioned our choledochojejunostomy.  The bile duct was probably 6 or 7 mm in size.  We did a standard handsewn single layer anastomosis with interrupted 5-0 Vicryl.  We irrigated.      We then performed our gastrojejunostomy in an antecolic fashion.  To do so we resected a little omentum omentum to create a little bit of room for the gastrojejunostomy in this gentleman who had a a lot of omentum.  The gastrojejunostomy was done in a standard handsewn 2 layer fashion.  The outer layer was interrupted 3-0 silk.  The inner layer was a running 3-0 Vicryl.  We placed a stitch at the angle of doom.  I assisted with the placement of nasogastric tube into the gastric remnant.    We performed a final copious irrigation.  We had left a 4 x 4 around the biliary anastomosis and upon removing it there was no suggestion of any bile leakage.  We placed 2 drains both 19 Serbian.  The right drain went posterior the left drain went anterior as per usual.  This  is in relation to the biliary anastomosis.  The drains were secured to the skin level with two 2-0 Prolene's.    We performed a final sweep and made sure that having was accounted for.  We closed the fascia with running looped #1 PDS suture.  We irrigated out the subcutaneous tissue and closed the incision with a number of interrupted 3-0 Vicryl subdermal's followed by staples in my standard dressing.    At the time of my departure from the operating room the patient had been extubated and plans were for transport to the surgical intensive care unit.    This note has been dictated with voice recognition software and has not been reviewed for grammar or content errors.    12/13/24 615am:  IN MY ORIGINAL DICTATION YESTERDAY I FORGOT TO DICTATE THE CHOLECYSTECTOMY.  WE TOOK THE GALLBLADDER DOWN IN A FUNDUS FIRST FASHION.  IT WAS SOMEWHAT INTRAHEPATIC.  WE IDENTIFIED AND INDIVIDUALLY LIGATED CYSTIC DUCT AND ARTERY.  THE GALLBLADDER WAS SENT FOR PERMANENT ANALYSIS.    Complications:  None; patient tolerated the procedure well.    Disposition: PACU - hemodynamically stable.  Condition: stable     Attending Attestation: I was present and scrubbed for the entire procedure. (Except while SMTHOMAS 'mosis done)    Roddy Gamble  Phone Number: [phone]

## 2024-12-12 NOTE — H&P
Surgical Intensive Care Unit H& P     History Of Present Illness  Nate Alvarez is a 54 y.o. male presenting to SICU sp  total pancreatectomy and splenectomy, cholecystectomy, duodenectomy, distal gastrectomy, hepaticojejunostomy, gastrojejunostomy, and SMV resection/reconstruction on 12/12.    His PMHx is significant for pancreatic cancer, anxiety,  BCC of R eyelid, , IDDM2, GERD,  HTN, RADHA, pancreatic adenocarcinoma s/p chemotherapy who presents to the SICU on 12/12 s/p Whipple with total pancreatectomy.      He first presented in 4/2024 with weight loss of >50 pounds and new onset diabetes. Imaging, EUS and biopsy confirmed adenocarcinoma of the pancreas involving the GDA, celiac axis, SMV, deemed borderline resectable. Patient follows with Dr. You in Oncology. Completed neoadjuvant chemo with mFOLFIRINOX and added fosaprepitant, 6/3/24 - 11/14/24, total 12 cycles, tolerated well.      OR Course:   EBL: 1250 ml  UOP: 1500 ml  Crystalloid: 6000 ml  Colloid: 1250 ml  Products: 2 uRBC  Intubation: not difficult, grade 1  Lines/Access: Left radial A line, PIV    Past Medical History  Past Medical History:   Diagnosis Date    Anxiety     Arthritis     BCC (basal cell carcinoma), eyelid, right     s/p resection    Diabetes mellitus (Multi)     Gastroesophageal reflux disease without esophagitis 10/05/2023    Hemorrhoids 11/03/2023    HTN (hypertension)     Borderline HTN, no meds    Leg cramps 10/05/2023    Oropharyngeal dysphagia 10/05/2023    RADHA (obstructive sleep apnea)     no PAP    Pancreatic cancer (Multi)     Dx 4/2024, Treated with chemotherapy completed 11/12/24    Personal history of other malignant neoplasm of skin     Type 2 diabetes mellitus     Vision loss     uses corrective lens       Surgical History  Past Surgical History:   Procedure Laterality Date    COLONOSCOPY      ESOPHAGOGASTRODUODENOSCOPY      EYELID CARCINOMA EXCISION      KNEE ARTHROSCOPY W/ DEBRIDEMENT Left     ROTATOR CUFF  "REPAIR Bilateral     TESTICLE SURGERY      Hydrocelectomy    TONSILLECTOMY      VASECTOMY          Social History  He reports that he has never smoked. He has quit using smokeless tobacco.  His smokeless tobacco use included chew. He reports that he does not currently use alcohol after a past usage of about 1.0 standard drink of alcohol per week. He reports that he does not use drugs.    Family History  Family History   Problem Relation Name Age of Onset    Diabetes Mother      Ovarian cancer Mother      Liver cancer Father      Lung cancer Father      Colon cancer Father      Hypertension Father      Stroke Maternal Grandmother      Diabetes Maternal Grandmother      Liver cancer Maternal Grandfather      Lung cancer Paternal Grandfather          Allergies  Farxiga [dapagliflozin], Glipizide, Januvia [sitagliptin], Metformin, Mounjaro [tirzepatide], and Tramadol    Review of Systems   Reason unable to perform ROS: Limited , drowsy post op.        Physical Exam     Last Recorded Vitals  Blood pressure (!) 137/94, pulse 97, temperature 36.1 °C (97 °F), temperature source Temporal, resp. rate 18, height 1.702 m (5' 7.01\"), weight 98 kg (216 lb 0.8 oz), SpO2 100%.    Relevant Results  Scheduled medications  acetaminophen, 1,000 mg, intravenous, q6h JAN  lactated Ringer's, 500 mL, intravenous, Once  pantoprazole, 40 mg, intravenous, Daily  piperacillin-tazobactam, 3.375 g, intravenous, Once      Continuous medications  insulin regular infusion, 0-20 Units/hr, Last Rate: 2 Units/hr (12/12/24 1818)  lactated Ringer's, 20 mL/hr  lactated Ringer's, 100 mL/hr, Last Rate: 100 mL/hr (12/12/24 1815)  ropivacaine (PF) in NS cmpd, 14 mL/hr  ropivacaine (PF) in NS cmpd, 14 mL/hr, Last Rate: 14 mL/hr (12/12/24 0744)      PRN medications  PRN medications: calcium gluconate, calcium gluconate, dextrose, dextrose, glucagon, glucagon, HYDROmorphone, HYDROmorphone, insulin regular, magnesium sulfate, magnesium sulfate, potassium " chloride       Results from last 7 days   Lab Units 12/12/24  1754 12/06/24  1128   WBC AUTO x10*3/uL 12.6* 4.9   HEMOGLOBIN g/dL 10.8* 13.1*   PLATELETS AUTO x10*3/uL 117* 129*      Results from last 7 days   Lab Units 12/06/24  1128   SODIUM mmol/L 141   POTASSIUM mmol/L 4.0   CHLORIDE mmol/L 108*   CO2 mmol/L 24   BUN mg/dL 20   CREATININE mg/dL 0.79   GLUCOSE mg/dL 182*      Results from last 7 days   Lab Units 12/12/24  1754   INR  1.5*   PROTIME seconds 16.4*   APTT seconds 31         Assessment/Plan   Assessment & Plan  Malignant neoplasm of head of pancreas (Multi)    Nate Alvarez is a 54 y.o. male presenting to SICU sp  total pancreatectomy and splenectomy, cholecystectomy, duodenectomy, distal gastrectomy, hepaticojejunostomy, gastrojejunostomy, and SMV resection/reconstruction on 12/12.    His PMHx is significant for pancreatic cancer, anxiety,  BCC of R eyelid, , IDDM2, GERD,  HTN, RADHA, pancreatic adenocarcinoma s/p chemotherapy who presents to the SICU on 12/12 s/p Whipple with total pancreatectomy.      NEURO: Hx of anxiety. Drowsy post op. Post op pain   - Bilateral [place] blocks with catheters performed preoperatively on 12/12   - ongoing neuro and pain assessments  -PT/OT consult   - Home meds: Ativan prn anxiety. Had been taking oxycodone and tylenol for pancreatic pain pre-op.     CV: H/O HTN, previously on lisinopril. Bradycardia and 1st degree AV heart block found on PAT EKG, asytmptomatic, was worked up by Dr. Abreu, had normal stress test, bradycardia on follow up EKG, ultimately cleared for surgery. Last Echo (12/10/24): LVEF 63%, aortic root mildly enlarged, moderately increased septal thickness.   - Goals MAP 65-85.  - continuous EKG, ABP monitoring  -Trend lactate  - Home meds: None      PULM: Hx of RADHA not on CPAP. Extubated postoperatively without difficulty  - Wean FiO2 as tolerated.    - Q1h incentive spirometer while awake  - additional pulm toilet prn    - F/U post op CXR     GI:  Hx of GERD. Pancreatic adenocarcinoma s/p total pancreatectomy and splenectomy, cholecystectomy, duodenectomy, distal gastrectomy, hepaticojejunostomy, gastrojejunostomy, and SMV resection/reconstruction on 12/12.    -Liver US with dopplers now  - Endocrine consult tomorrow  - NGT to LIWS  - PPI for GI prophylaxis  - Obtain post op and daily LFTs.  - Home meds: protonix     : No history of renal disease  - Maintenance IVF.    - Volume resuscitation as needed.    - Maintain U/O >1-2ml/kg/hr.    - Check renal function panel post op and daily.   - Replete electrolytes to goal K>4, Mg>2, Phos>2.5, ionized Ca>1.10.     HEME: Vitamin D Deficiency. Acute surgical blood loss anemia.   - Check CBC and coags post op, then daily if stable  - lovenox sc starting POD 1 if appropriate  - Home meds: Vit D     ENDO: Hx of insulin dependent DM. Pancreatic adenocarcinoma s/p whipple procedure, total pancreatectomy. Initial  upon arrival to SICU  -Start insulin infusion  - Hourly BG while on the infusion  -Consult endocrinology tomorrow     ID: Afebrile. WBC pending . Received Zosyn intra-op   -A dose zosyn post op      Lines:  - R chest mediport   -12/12 L radial A line  -PIV     Dispo: continue ICU care. Reassess dispo in am. Discussed with ICU attending Dr. Vilma JORGE spent 45 minutes in the professional and overall care of this patient.      Pankaj Dominguez PA-C  SICU phone [phone]

## 2024-12-12 NOTE — ANESTHESIA PROCEDURE NOTES
Peripheral IV  Date/Time: 12/12/2024 8:23 AM      Placement  Needle size: 16 G  Laterality: left  Location: hand  Site prep: alcohol  Technique: anatomical landmarks  Attempts: 1

## 2024-12-12 NOTE — ANESTHESIA PROCEDURE NOTES
Arterial Line:    Date/Time: 12/12/2024 7:40 AM    Staffing  Performed: resident   Authorized by: Angel Jc MD    Performed by: Camilo Mariano MD    An arterial line was placed. Procedure performed using surface landmarks.in the OR for the following indication(s): continuous blood pressure monitoring and blood sampling needed.    A 20 gauge (size), 1 and 3/8 inch (length), Angiocath (type) catheter was placed into the Left radial artery, secured by Tegaderm,   Seldinger technique not used.  Events:  patient tolerated procedure well with no complications.

## 2024-12-12 NOTE — SIGNIFICANT EVENT
Surgical Oncology Post-Op Plan:    Mr. Alvarez is a 53 y/o male now s/p total pancreatectomy and splenectomy, cholecystectomy, duodenectomy, distal gastrectomy, hepaticojejunostomy, gastrojejunostomy, and SMV resection/reconstruction.      Plan of Care:  Neuro: PCA, IV tylenol  CV: Telemetry  Pulm: , respiratory per ICU  GI: NGT to LIWS, NPO, PPI  : Maintain inman   ID: One more dose of Abx, then DC  Heme: continue DVT prophylaxis  Endo: consult endocrinology for assistance with glucose management given total pancreatectomy; q1hr glucose checks     Obtain CMP, CBC, coags, ABG now; follow up additional ABG in 4 hours.     Will additionally need liver duplex tonight for evaluation after SMV resection/reconstruction.   Hold home anti-HTN; Resume remaining home meds.      Shahriar Khan MD  General Surgery Resident  Surgical Oncology

## 2024-12-12 NOTE — CONSULTS
Nate Alvarez is a 54 y.o. year old male patient who presents for [facility] with Dr. Gamble on 12/12. Acute Pain consulted for block for postoperative pain control.     Anticipated Postop Pain Issues -   Palliative: typically relieved with IV analgesics and regional local anesthetics  Provocative: typically with movement  Quality: typically burning and aching  Radiation: typically none  Severity: typically severe 8-10/10  Timing: typically constant    Past Medical History:   Diagnosis Date    Anxiety     Arthritis     BCC (basal cell carcinoma), eyelid, right     s/p resection    Diabetes mellitus (Multi)     Gastroesophageal reflux disease without esophagitis 10/05/2023    Hemorrhoids 11/03/2023    HTN (hypertension)     Borderline HTN, no meds    Leg cramps 10/05/2023    Oropharyngeal dysphagia 10/05/2023    RADHA (obstructive sleep apnea)     no PAP    Pancreatic cancer (Multi)     Dx 4/2024, Treated with chemotherapy completed 11/12/24    Personal history of other malignant neoplasm of skin     Type 2 diabetes mellitus     Vision loss     uses corrective lens        Past Surgical History:   Procedure Laterality Date    COLONOSCOPY      ESOPHAGOGASTRODUODENOSCOPY      EYELID CARCINOMA EXCISION      KNEE ARTHROSCOPY W/ DEBRIDEMENT Left     ROTATOR CUFF REPAIR Bilateral     TESTICLE SURGERY      Hydrocelectomy    TONSILLECTOMY      VASECTOMY          Family History   Problem Relation Name Age of Onset    Diabetes Mother      Ovarian cancer Mother      Liver cancer Father      Lung cancer Father      Colon cancer Father      Hypertension Father      Stroke Maternal Grandmother      Diabetes Maternal Grandmother      Liver cancer Maternal Grandfather      Lung cancer Paternal Grandfather          Social History     Socioeconomic History    Marital status:      Spouse name: Not on file    Number of children: Not on file    Years of education: Not on file    Highest education level: Not on file   Occupational  History    Not on file   Tobacco Use    Smoking status: Never    Smokeless tobacco: Former     Types: Chew    Tobacco comments:     [place] 30 years ago   Vaping Use    Vaping status: Never Used   Substance and Sexual Activity    Alcohol use: Not Currently     Alcohol/week: 1.0 standard drink of alcohol     Types: 1 Standard drinks or equivalent per week     Comment: very occasional    Drug use: Never    Sexual activity: Defer   Other Topics Concern    Not on file   Social History Narrative    Not on file     Social Drivers of Health     Financial Resource Strain: Low Risk  (5/16/2024)    Overall Financial Resource Strain (CARDIA)     Difficulty of Paying Living Expenses: Not hard at all   Food Insecurity: Not on file   Transportation Needs: No Transportation Needs (5/16/2024)    PRAPARE - Transportation     Lack of Transportation (Medical): No     Lack of Transportation (Non-Medical): No   Physical Activity: Not on file   Stress: Not on file   Social Connections: Not on file   Intimate Partner Violence: Not on file   Housing Stability: Low Risk  (5/16/2024)    Housing Stability Vital Sign     Unable to Pay for Housing in the Last Year: No     Number of Places Lived in the Last Year: 1     Unstable Housing in the Last Year: No        Allergies   Allergen Reactions    Farxiga [Dapagliflozin] Diarrhea    Glipizide GI Upset    Januvia [Sitagliptin] Diarrhea    Metformin Nausea/vomiting    Mounjaro [Tirzepatide] Nausea/vomiting    Tramadol Nausea/vomiting         Review of Systems  Gen: No fatigue, anorexia, insomnia, fever.   Eyes: No vision loss, double vision, drainage, eye pain.   ENT: No pharyngitis, dry mouth, no hearing changes or ear discharge  Cardiac: No chest pain, palpitations, syncope, near syncope.   Pulmonary: No shortness of breath, cough, hemoptysis.   Heme/lymph: No swollen glands, fever, bleeding.   GI: No abdominal pain, change in bowel habits, melena, hematemesis, hematochezia, nausea, vomiting,  diarrhea.   : No discharge, dysuria, frequency, urgency, hematuria.  Endo: No polyuria or weight loss.   Musculoskeletal: Negative for any pain or loss of ROM/weakness  Skin: No rashes or lesions  Neuro: Normal speech, no numbness or weakness. No gait difficulties  Review of systems is otherwise negative unless stated above or in history of present illness.    Physical Exam:  Constitutional:  no distress, alert and cooperative  Eyes: clear sclera  Head/Neck: No apparent injury, trachea midline  Respiratory/Thorax: Patent airways, thorax symmetric, breathing comfortably  Cardiovascular: no pitting edema  Gastrointestinal: Nondistended  Musculoskeletal: ROM intact  Extremities: no clubbing  Neurological: alert, navas x4  Psychological: Appropriate affect    Results for orders placed or performed during the hospital encounter of 12/12/24 (from the past 24 hours)   POCT GLUCOSE   Result Value Ref Range    POCT Glucose 100 (H) 74 - 99 mg/dL      Nate Alvarez is a 54 y.o. year old male patient who presents for ipple with Dr. Gamble on 12/12. Acute Pain consulted for block for postoperative pain control.     Plan:  - Bilateral [place] blocks with catheters performed preoperatively on 12/12  - Ambit ball with Ropivacaine 0.2%/NaCl 0.9% 500mL, Rate 7 cc/hr bilaterally  - Ambit medication will not interfere with pain medication prescribed by the primary team.   - Please be aware of local anesthetic toxic dose and absorption variability before considering lidocaine patches  - Acute pain service will follow while catheters in place  - Rest of pain management per primary team    Acute Pain Resident  pg [phone] ph [phone]

## 2024-12-12 NOTE — ANESTHESIA PROCEDURE NOTES
Peripheral Block    Patient location during procedure: pre-op  Start time: 12/12/2024 6:52 AM  End time: 12/12/2024 7:05 AM  Reason for block: at surgeon's request and post-op pain management  Staffing  Performed: resident   Authorized by: Gokul Aburto DO    Performed by: Gokul Aburto DO  Preanesthetic Checklist  Completed: patient identified, IV checked, site marked, risks and benefits discussed, surgical consent, monitors and equipment checked, pre-op evaluation and timeout performed   Timeout performed at: 12/12/2024 6:52 AM  Peripheral Block  Patient position: sitting  Prep: ChloraPrep  Patient monitoring: heart rate and continuous pulse ox  Block type: [place]  Laterality: B/L  Injection technique: catheter  Guidance: ultrasound guided  Local infiltration: ropivacaine  Needle  Needle type: Tuohy   Needle gauge: 22 G  Needle length: 8 cm  Needle localization: ultrasound guidance     image stored in chart  Assessment  Injection assessment: negative aspiration for heme, no paresthesia on injection, incremental injection and local visualized surrounding nerve on ultrasound  Additional Notes  Erector spinae plane block: Informed consent obtained.  Risks, benefits, and alternatives discussed.  ASA monitors placed, and timeout performed.  Patient positioned, prepped with chlorhexidine, and draped with sterile towels. Anatomical landmarks clearly marked.    Ultrasound guidance used to visualize the erector spine a muscle above the TP/costal junction at T7.  Good visualization of the needle throughout duration of the procedure.  Aspiration was negative. A total of Type of Local: 25 cc of 0.5% ropivacaine was divided and injected bilaterally.  Catheters threaded and secured. Patient tolerated procedure well.    Timeout by Judit COTA

## 2024-12-12 NOTE — INTERVAL H&P NOTE
I saw pt in preop.  He reports no issues since his office visit with me.  All the cards work-up negative in terms of stress test and echo.  Cards has reviewed and spoken to me/CPM.

## 2024-12-12 NOTE — ANESTHESIA PROCEDURE NOTES
Airway  Date/Time: 12/12/2024 7:23 AM  Urgency: elective    Airway not difficult    Staffing  Performed: resident   Authorized by: Angel Jc MD    Performed by: Camilo Mariano MD  Patient location during procedure: OR    Indications and Patient Condition  Indications for airway management: anesthesia and airway protection  Spontaneous ventilation: present  Sedation level: deep  Preoxygenated: yes  Patient position: sniffing  MILS maintained throughout  Mask difficulty assessment: 1 - vent by mask  Planned trial extubation    Final Airway Details  Final airway type: endotracheal airway      Successful airway: ETT  Cuffed: yes   Successful intubation technique: direct laryngoscopy  Facilitating devices/methods: intubating stylet  Endotracheal tube insertion site: oral  Blade: Roland  Blade size: #4  ETT size (mm): 7.5  Cormack-Lehane Classification: grade I - full view of glottis  Placement verified by: capnometry   Measured from: teeth  ETT to teeth (cm): 22  Number of attempts at approach: 1

## 2024-12-12 NOTE — ANESTHESIA PROCEDURE NOTES
Peripheral IV  Date/Time: 12/12/2024 7:29 AM      Placement  Needle size: 18 G  Laterality: right  Location: forearm  Site prep: alcohol  Technique: anatomical landmarks  Attempts: 1

## 2024-12-13 ENCOUNTER — APPOINTMENT (OUTPATIENT)
Dept: RADIOLOGY | Facility: HOSPITAL | Age: 54
DRG: 406 | End: 2024-12-13
Payer: COMMERCIAL

## 2024-12-13 LAB
ALBUMIN SERPL BCP-MCNC: 3.4 G/DL (ref 3.4–5)
ALBUMIN SERPL BCP-MCNC: 3.4 G/DL (ref 3.4–5)
ALP SERPL-CCNC: 40 U/L (ref 33–120)
ALT SERPL W P-5'-P-CCNC: 66 U/L (ref 10–52)
ANION GAP BLDA CALCULATED.4IONS-SCNC: 12 MMO/L (ref 10–25)
ANION GAP BLDA CALCULATED.4IONS-SCNC: 8 MMO/L (ref 10–25)
ANION GAP SERPL CALC-SCNC: 16 MMOL/L (ref 10–20)
ANION GAP SERPL CALC-SCNC: 16 MMOL/L (ref 10–20)
APTT PPP: 27 SECONDS (ref 27–38)
AST SERPL W P-5'-P-CCNC: 66 U/L (ref 9–39)
BASE EXCESS BLDA CALC-SCNC: -3.3 MMOL/L (ref -2–3)
BASE EXCESS BLDA CALC-SCNC: 2 MMOL/L (ref -2–3)
BILIRUB DIRECT SERPL-MCNC: 0.3 MG/DL (ref 0–0.3)
BILIRUB SERPL-MCNC: 1 MG/DL (ref 0–1.2)
BODY TEMPERATURE: 37 DEGREES CELSIUS
BODY TEMPERATURE: 37 DEGREES CELSIUS
BUN SERPL-MCNC: 13 MG/DL (ref 6–23)
BUN SERPL-MCNC: 13 MG/DL (ref 6–23)
CA-I BLD-SCNC: 1.16 MMOL/L (ref 1.1–1.33)
CA-I BLDA-SCNC: 1.18 MMOL/L (ref 1.1–1.33)
CA-I BLDA-SCNC: 1.21 MMOL/L (ref 1.1–1.33)
CALCIUM SERPL-MCNC: 8.5 MG/DL (ref 8.6–10.6)
CALCIUM SERPL-MCNC: 8.5 MG/DL (ref 8.6–10.6)
CHLORIDE BLDA-SCNC: 106 MMOL/L (ref 98–107)
CHLORIDE BLDA-SCNC: 106 MMOL/L (ref 98–107)
CHLORIDE SERPL-SCNC: 107 MMOL/L (ref 98–107)
CHLORIDE SERPL-SCNC: 107 MMOL/L (ref 98–107)
CO2 SERPL-SCNC: 21 MMOL/L (ref 21–32)
CO2 SERPL-SCNC: 21 MMOL/L (ref 21–32)
CREAT SERPL-MCNC: 0.9 MG/DL (ref 0.5–1.3)
CREAT SERPL-MCNC: 0.9 MG/DL (ref 0.5–1.3)
EGFRCR SERPLBLD CKD-EPI 2021: >90 ML/MIN/1.73M*2
EGFRCR SERPLBLD CKD-EPI 2021: >90 ML/MIN/1.73M*2
ERYTHROCYTE [DISTWIDTH] IN BLOOD BY AUTOMATED COUNT: 15.2 % (ref 11.5–14.5)
GLUCOSE BLD MANUAL STRIP-MCNC: 126 MG/DL (ref 74–99)
GLUCOSE BLD MANUAL STRIP-MCNC: 131 MG/DL (ref 74–99)
GLUCOSE BLD MANUAL STRIP-MCNC: 133 MG/DL (ref 74–99)
GLUCOSE BLD MANUAL STRIP-MCNC: 142 MG/DL (ref 74–99)
GLUCOSE BLD MANUAL STRIP-MCNC: 142 MG/DL (ref 74–99)
GLUCOSE BLD MANUAL STRIP-MCNC: 143 MG/DL (ref 74–99)
GLUCOSE BLD MANUAL STRIP-MCNC: 167 MG/DL (ref 74–99)
GLUCOSE BLD MANUAL STRIP-MCNC: 168 MG/DL (ref 74–99)
GLUCOSE BLD MANUAL STRIP-MCNC: 173 MG/DL (ref 74–99)
GLUCOSE BLD MANUAL STRIP-MCNC: 177 MG/DL (ref 74–99)
GLUCOSE BLD MANUAL STRIP-MCNC: 180 MG/DL (ref 74–99)
GLUCOSE BLD MANUAL STRIP-MCNC: 183 MG/DL (ref 74–99)
GLUCOSE BLD MANUAL STRIP-MCNC: 186 MG/DL (ref 74–99)
GLUCOSE BLD MANUAL STRIP-MCNC: 196 MG/DL (ref 74–99)
GLUCOSE BLD MANUAL STRIP-MCNC: 201 MG/DL (ref 74–99)
GLUCOSE BLD MANUAL STRIP-MCNC: 208 MG/DL (ref 74–99)
GLUCOSE BLD MANUAL STRIP-MCNC: 214 MG/DL (ref 74–99)
GLUCOSE BLD MANUAL STRIP-MCNC: 220 MG/DL (ref 74–99)
GLUCOSE BLD MANUAL STRIP-MCNC: 224 MG/DL (ref 74–99)
GLUCOSE BLD MANUAL STRIP-MCNC: 227 MG/DL (ref 74–99)
GLUCOSE BLD MANUAL STRIP-MCNC: 227 MG/DL (ref 74–99)
GLUCOSE BLDA-MCNC: 176 MG/DL (ref 74–99)
GLUCOSE BLDA-MCNC: 198 MG/DL (ref 74–99)
GLUCOSE SERPL-MCNC: 183 MG/DL (ref 74–99)
GLUCOSE SERPL-MCNC: 183 MG/DL (ref 74–99)
HCO3 BLDA-SCNC: 21.2 MMOL/L (ref 22–26)
HCO3 BLDA-SCNC: 24.7 MMOL/L (ref 22–26)
HCT VFR BLD AUTO: 30.8 % (ref 41–52)
HCT VFR BLD EST: 31 % (ref 41–52)
HCT VFR BLD EST: 32 % (ref 41–52)
HGB BLD-MCNC: 10.4 G/DL (ref 13.5–17.5)
HGB BLDA-MCNC: 10.3 G/DL (ref 13.5–17.5)
HGB BLDA-MCNC: 10.5 G/DL (ref 13.5–17.5)
INHALED O2 CONCENTRATION: 21 %
INHALED O2 CONCENTRATION: 21 %
INR PPP: 1.4 (ref 0.9–1.1)
LACTATE BLDA-SCNC: 2.8 MMOL/L (ref 0.4–2)
LACTATE BLDA-SCNC: 6.4 MMOL/L (ref 0.4–2)
MAGNESIUM SERPL-MCNC: 2.45 MG/DL (ref 1.6–2.4)
MCH RBC QN AUTO: 31 PG (ref 26–34)
MCHC RBC AUTO-ENTMCNC: 33.8 G/DL (ref 32–36)
MCV RBC AUTO: 92 FL (ref 80–100)
NRBC BLD-RTO: 0 /100 WBCS (ref 0–0)
OXYHGB MFR BLDA: 93.9 % (ref 94–98)
OXYHGB MFR BLDA: 96.2 % (ref 94–98)
PCO2 BLDA: 31 MM HG (ref 38–42)
PCO2 BLDA: 35 MM HG (ref 38–42)
PH BLDA: 7.39 PH (ref 7.38–7.42)
PH BLDA: 7.51 PH (ref 7.38–7.42)
PHOSPHATE SERPL-MCNC: 2.7 MG/DL (ref 2.5–4.9)
PHOSPHATE SERPL-MCNC: 2.7 MG/DL (ref 2.5–4.9)
PLATELET # BLD AUTO: 111 X10*3/UL (ref 150–450)
PO2 BLDA: 73 MM HG (ref 85–95)
PO2 BLDA: 86 MM HG (ref 85–95)
POTASSIUM BLDA-SCNC: 4.3 MMOL/L (ref 3.5–5.3)
POTASSIUM BLDA-SCNC: 4.6 MMOL/L (ref 3.5–5.3)
POTASSIUM SERPL-SCNC: 4.4 MMOL/L (ref 3.5–5.3)
POTASSIUM SERPL-SCNC: 4.4 MMOL/L (ref 3.5–5.3)
PROT SERPL-MCNC: 4.9 G/DL (ref 6.4–8.2)
PROTHROMBIN TIME: 15.7 SECONDS (ref 9.8–12.8)
RBC # BLD AUTO: 3.35 X10*6/UL (ref 4.5–5.9)
SAO2 % BLDA: 96 % (ref 94–100)
SAO2 % BLDA: 97 % (ref 94–100)
SODIUM BLDA-SCNC: 134 MMOL/L (ref 136–145)
SODIUM BLDA-SCNC: 135 MMOL/L (ref 136–145)
SODIUM SERPL-SCNC: 140 MMOL/L (ref 136–145)
SODIUM SERPL-SCNC: 140 MMOL/L (ref 136–145)
WBC # BLD AUTO: 12.4 X10*3/UL (ref 4.4–11.3)

## 2024-12-13 PROCEDURE — 82330 ASSAY OF CALCIUM: CPT | Performed by: PHYSICIAN ASSISTANT

## 2024-12-13 PROCEDURE — 99291 CRITICAL CARE FIRST HOUR: CPT

## 2024-12-13 PROCEDURE — 71045 X-RAY EXAM CHEST 1 VIEW: CPT

## 2024-12-13 PROCEDURE — 80051 ELECTROLYTE PANEL: CPT | Performed by: PHYSICIAN ASSISTANT

## 2024-12-13 PROCEDURE — 83735 ASSAY OF MAGNESIUM: CPT | Performed by: PHYSICIAN ASSISTANT

## 2024-12-13 PROCEDURE — 1170000001 HC PRIVATE ONCOLOGY ROOM DAILY

## 2024-12-13 PROCEDURE — 80069 RENAL FUNCTION PANEL: CPT | Mod: CCI | Performed by: PHYSICIAN ASSISTANT

## 2024-12-13 PROCEDURE — 97162 PT EVAL MOD COMPLEX 30 MIN: CPT | Mod: GP

## 2024-12-13 PROCEDURE — 85610 PROTHROMBIN TIME: CPT | Performed by: PHYSICIAN ASSISTANT

## 2024-12-13 PROCEDURE — 84100 ASSAY OF PHOSPHORUS: CPT | Performed by: PHYSICIAN ASSISTANT

## 2024-12-13 PROCEDURE — 99222 1ST HOSP IP/OBS MODERATE 55: CPT | Performed by: INTERNAL MEDICINE

## 2024-12-13 PROCEDURE — 2500000004 HC RX 250 GENERAL PHARMACY W/ HCPCS (ALT 636 FOR OP/ED)

## 2024-12-13 PROCEDURE — 97165 OT EVAL LOW COMPLEX 30 MIN: CPT | Mod: GO

## 2024-12-13 PROCEDURE — 71045 X-RAY EXAM CHEST 1 VIEW: CPT | Performed by: RADIOLOGY

## 2024-12-13 PROCEDURE — 2500000004 HC RX 250 GENERAL PHARMACY W/ HCPCS (ALT 636 FOR OP/ED): Performed by: STUDENT IN AN ORGANIZED HEALTH CARE EDUCATION/TRAINING PROGRAM

## 2024-12-13 PROCEDURE — 99231 SBSQ HOSP IP/OBS SF/LOW 25: CPT | Performed by: STUDENT IN AN ORGANIZED HEALTH CARE EDUCATION/TRAINING PROGRAM

## 2024-12-13 PROCEDURE — 82247 BILIRUBIN TOTAL: CPT | Performed by: PHYSICIAN ASSISTANT

## 2024-12-13 PROCEDURE — 2500000002 HC RX 250 W HCPCS SELF ADMINISTERED DRUGS (ALT 637 FOR MEDICARE OP, ALT 636 FOR OP/ED)

## 2024-12-13 PROCEDURE — 37799 UNLISTED PX VASCULAR SURGERY: CPT | Performed by: PHYSICIAN ASSISTANT

## 2024-12-13 PROCEDURE — 82947 ASSAY GLUCOSE BLOOD QUANT: CPT

## 2024-12-13 PROCEDURE — 82435 ASSAY OF BLOOD CHLORIDE: CPT | Performed by: PHYSICIAN ASSISTANT

## 2024-12-13 PROCEDURE — 2500000004 HC RX 250 GENERAL PHARMACY W/ HCPCS (ALT 636 FOR OP/ED): Performed by: NURSE PRACTITIONER

## 2024-12-13 PROCEDURE — 85027 COMPLETE CBC AUTOMATED: CPT | Performed by: PHYSICIAN ASSISTANT

## 2024-12-13 PROCEDURE — 2500000004 HC RX 250 GENERAL PHARMACY W/ HCPCS (ALT 636 FOR OP/ED): Performed by: PHYSICIAN ASSISTANT

## 2024-12-13 RX ORDER — HYDROMORPHONE HYDROCHLORIDE 0.2 MG/ML
0.2 INJECTION INTRAMUSCULAR; INTRAVENOUS; SUBCUTANEOUS EVERY 2 HOUR PRN
Status: DISCONTINUED | OUTPATIENT
Start: 2024-12-13 | End: 2024-12-13

## 2024-12-13 RX ORDER — NALOXONE HYDROCHLORIDE 0.4 MG/ML
0.2 INJECTION, SOLUTION INTRAMUSCULAR; INTRAVENOUS; SUBCUTANEOUS AS NEEDED
Status: DISCONTINUED | OUTPATIENT
Start: 2024-12-13 | End: 2024-12-16

## 2024-12-13 RX ORDER — ENOXAPARIN SODIUM 100 MG/ML
40 INJECTION SUBCUTANEOUS DAILY
Status: DISCONTINUED | OUTPATIENT
Start: 2024-12-13 | End: 2024-12-18 | Stop reason: HOSPADM

## 2024-12-13 RX ORDER — INSULIN GLARGINE 100 [IU]/ML
25 INJECTION, SOLUTION SUBCUTANEOUS EVERY 24 HOURS
Status: DISCONTINUED | OUTPATIENT
Start: 2024-12-13 | End: 2024-12-14

## 2024-12-13 RX ORDER — DEXTROSE 50 % IN WATER (D50W) INTRAVENOUS SYRINGE
12.5
Status: DISCONTINUED | OUTPATIENT
Start: 2024-12-13 | End: 2024-12-18 | Stop reason: HOSPADM

## 2024-12-13 RX ORDER — LABETALOL HYDROCHLORIDE 5 MG/ML
10 INJECTION, SOLUTION INTRAVENOUS EVERY 4 HOURS PRN
Status: DISCONTINUED | OUTPATIENT
Start: 2024-12-13 | End: 2024-12-18 | Stop reason: HOSPADM

## 2024-12-13 RX ORDER — INSULIN LISPRO 100 [IU]/ML
0-5 INJECTION, SOLUTION INTRAVENOUS; SUBCUTANEOUS EVERY 6 HOURS
Status: DISCONTINUED | OUTPATIENT
Start: 2024-12-13 | End: 2024-12-14

## 2024-12-13 RX ORDER — SODIUM CHLORIDE, SODIUM LACTATE, POTASSIUM CHLORIDE, CALCIUM CHLORIDE 600; 310; 30; 20 MG/100ML; MG/100ML; MG/100ML; MG/100ML
50 INJECTION, SOLUTION INTRAVENOUS CONTINUOUS
Status: DISCONTINUED | OUTPATIENT
Start: 2024-12-13 | End: 2024-12-16

## 2024-12-13 RX ORDER — DEXTROSE 50 % IN WATER (D50W) INTRAVENOUS SYRINGE
25
Status: DISCONTINUED | OUTPATIENT
Start: 2024-12-13 | End: 2024-12-18 | Stop reason: HOSPADM

## 2024-12-13 RX ORDER — KETOROLAC TROMETHAMINE 15 MG/ML
15 INJECTION, SOLUTION INTRAMUSCULAR; INTRAVENOUS 4 TIMES DAILY
Status: COMPLETED | OUTPATIENT
Start: 2024-12-13 | End: 2024-12-15

## 2024-12-13 RX ORDER — HYDROMORPHONE HCL/0.9% NACL/PF 15 MG/30ML
PATIENT CONTROLLED ANALGESIA SYRINGE INTRAVENOUS CONTINUOUS
Status: DISCONTINUED | OUTPATIENT
Start: 2024-12-13 | End: 2024-12-16

## 2024-12-13 ASSESSMENT — PAIN SCALES - GENERAL
PAINLEVEL_OUTOF10: 5 - MODERATE PAIN
PAINLEVEL_OUTOF10: 3
PAINLEVEL_OUTOF10: 3
PAINLEVEL_OUTOF10: 4
PAINLEVEL_OUTOF10: 3
PAINLEVEL_OUTOF10: 7
PAINLEVEL_OUTOF10: 0 - NO PAIN
PAINLEVEL_OUTOF10: 3
PAINLEVEL_OUTOF10: 3
PAINLEVEL_OUTOF10: 6
PAINLEVEL_OUTOF10: 2
PAINLEVEL_OUTOF10: 3
PAINLEVEL_OUTOF10: 0 - NO PAIN
PAINLEVEL_OUTOF10: 4
PAINLEVEL_OUTOF10: 3
PAINLEVEL_OUTOF10: 3
PAINLEVEL_OUTOF10: 5 - MODERATE PAIN
PAINLEVEL_OUTOF10: 7
PAINLEVEL_OUTOF10: 8
PAINLEVEL_OUTOF10: 4
PAINLEVEL_OUTOF10: 5 - MODERATE PAIN
PAINLEVEL_OUTOF10: 3
PAINLEVEL_OUTOF10: 8
PAINLEVEL_OUTOF10: 3

## 2024-12-13 ASSESSMENT — COGNITIVE AND FUNCTIONAL STATUS - GENERAL
STANDING UP FROM CHAIR USING ARMS: A LITTLE
MOBILITY SCORE: 17
MOVING TO AND FROM BED TO CHAIR: A LITTLE
TOILETING: A LITTLE
CLIMB 3 TO 5 STEPS WITH RAILING: A LOT
HELP NEEDED FOR BATHING: A LITTLE
DRESSING REGULAR LOWER BODY CLOTHING: A LITTLE
DAILY ACTIVITIY SCORE: 21
WALKING IN HOSPITAL ROOM: A LITTLE
MOVING FROM LYING ON BACK TO SITTING ON SIDE OF FLAT BED WITH BEDRAILS: A LITTLE
TURNING FROM BACK TO SIDE WHILE IN FLAT BAD: A LITTLE

## 2024-12-13 ASSESSMENT — PAIN - FUNCTIONAL ASSESSMENT

## 2024-12-13 ASSESSMENT — ACTIVITIES OF DAILY LIVING (ADL)
ADL_ASSISTANCE: INDEPENDENT
BATHING_ASSISTANCE: STAND BY
LACK_OF_TRANSPORTATION: NO
ADL_ASSISTANCE: INDEPENDENT

## 2024-12-13 ASSESSMENT — PAIN DESCRIPTION - LOCATION: LOCATION: ABDOMEN

## 2024-12-13 NOTE — ANESTHESIA POSTPROCEDURE EVALUATION
Patient: Nate Alvarez    Procedure Summary       Date: 12/12/24 Room / Location: [facility] OR 12 / Virtual [facility] OR    Anesthesia Start: 0712 Anesthesia Stop: 1752    Procedures:       Total pancreatectomy, splenectomy, distal gastrectomy with gastrojejunostomy, choledochojejunostomy, cholecystectomy      SMV resection Diagnosis:       Malignant neoplasm of head of pancreas (Multi)      (Malignant neoplasm of head of pancreas (Multi) [C25.0])    Surgeons: Roddy Gamble MD; Dina Hamilton MD Responsible Provider: Shaw Tovar MD    Anesthesia Type: general ASA Status: 3            Anesthesia Type: general    Vitals Value Taken Time   /95 12/12/24 1930   Temp 36.3 °C (97.3 °F) 12/12/24 1750   Pulse 101 12/12/24 1957   Resp 0 12/12/24 1957   SpO2 98 % 12/12/24 1957   Vitals shown include unfiled device data.    Anesthesia Post Evaluation    Patient location during evaluation: PACU  Patient participation: complete - patient participated  Level of consciousness: awake  Pain management: adequate  Airway patency: patent  Cardiovascular status: acceptable  Respiratory status: acceptable  Hydration status: acceptable  Postoperative Nausea and Vomiting: none        No notable events documented.

## 2024-12-13 NOTE — CARE PLAN
Problem: Skin  Goal: Decreased wound size/increased tissue granulation at next dressing change  Outcome: Progressing  Flowsheets (Taken 12/13/2024 0527)  Decreased wound size/increased tissue granulation at next dressing change:   Promote sleep for wound healing   Utilize specialty bed per algorithm   Protective dressings over bony prominences  Goal: Participates in plan/prevention/treatment measures  Outcome: Progressing  Flowsheets (Taken 12/13/2024 0527)  Participates in plan/prevention/treatment measures: Discuss with provider PT/OT consult  Goal: Prevent/manage excess moisture  Outcome: Progressing  Flowsheets (Taken 12/13/2024 0527)  Prevent/manage excess moisture:   Cleanse incontinence/protect with barrier cream   Moisturize dry skin   Monitor for/manage infection if present   Use wicking fabric (obtain order)   Follow provider orders for dressing changes  Goal: Prevent/minimize sheer/friction injuries  Outcome: Progressing  Flowsheets (Taken 12/13/2024 0527)  Prevent/minimize sheer/friction injuries:   Complete micro-shifts as needed if patient unable. Adjust patient position to relieve pressure points, not a full turn   Increase activity/out of bed for meals   Use pull sheet   HOB 30 degrees or less   Turn/reposition every 2 hours/use positioning/transfer devices   Utilize specialty bed per algorithm  Goal: Promote/optimize nutrition  Outcome: Progressing  Flowsheets (Taken 12/13/2024 0527)  Promote/optimize nutrition: Monitor/record intake including meals  Goal: Promote skin healing  Outcome: Progressing  Flowsheets (Taken 12/13/2024 0527)  Promote skin healing:   Assess skin/pad under line(s)/device(s)   Protective dressings over bony prominences   Turn/reposition every 2 hours/use positioning/transfer devices   Rotate device position/do not position patient on device   Ensure correct size (line/device) and apply per  instructions    The clinical goals for the shift include pain management

## 2024-12-13 NOTE — PROGRESS NOTES
12/13/24 1434   Discharge Planning   Living Arrangements Spouse/significant other   Support Systems Spouse/significant other   Type of Residence Private residence   Number of Stairs to Enter Residence 6   Do you have animals or pets at home? No   Who is requesting discharge planning? Provider   Home or Post Acute Services None   Expected Discharge Disposition Home   Does the patient need discharge transport arranged? No   Financial Resource Strain   How hard is it for you to pay for the very basics like food, housing, medical care, and heating? Not hard   Housing Stability   In the last 12 months, was there a time when you were not able to pay the mortgage or rent on time? N   At any time in the past 12 months, were you homeless or living in a shelter (including now)? N   Transportation Needs   In the past 12 months, has lack of transportation kept you from medical appointments or from getting medications? no   In the past 12 months, has lack of transportation kept you from meetings, work, or from getting things needed for daily living? No   Patient Choice   Provider Choice list and CMS website (https://medicare.gov/care-compare#search) for post-acute Quality and Resource Measure Data were provided and reviewed with: Patient     Met with pt and introduced myself as care coordinator with Care Transitions Team for discharge planning. Pt reports being independent with ADL's. Pt is independent  with ambulation. Pt Resides with Wife. Patient still drives.  Pt reported no safety concerns at home, she stated no falls in last year. Pt's address, phone number, and contact information was verified.      PCP: [facility]  DATE OF LAST VISIT: 3 Months Ago  PHARMACY: [facility]  MEDICATIONS AFFORDABLE:Yes  FEELS SAFE AT HOME: Yes  RECENT FALLS: n/a  EQUIPMENT USED IN HOME: n/a  HOME O2/CPAP/NEBS: n/a  DME SUPPLIER: n/a  TRANSPORT HOME: No  DIABETIC/SUPPLIES NEEDED: Yes, No supplies Needed  HD SCHEDULE: n/a    Transitional Care  Coordinator Note: Patient discussed with medical team, per medical team patient is not medically ready. Discharge dispo: [facility]  Koffi Alcaraz RN  Transitional Care Coordinator

## 2024-12-13 NOTE — PROGRESS NOTES
SICU Daily Progress Note    Subjective   POD 1 s/p total pancreatectomy and splenectomy, cholecystectomy, duodenectomy, distal gastrectomy, hepaticojejunostomy, gastrojejunostomy, and SMV resection/reconstruction on 12/12 for pancreatic adenocarcinoma.     Post-operatively, patient had an uptrending lactate to 8.1. Received total of 1.75 L LR overnight, most recent lactate 6.4 at midnight.   Blood glucose levels have remained between 120-170 overnight, on insulin drip at 0.47 units/hr this AM.     This AM, patient feels well, with some pain but dilaudid has been helping. Some intermittent nausea. Denies fevers/chills, vomiting, chest pain, shortness of breath. Denies flatus. Has no other concerns.       Review of Systems:  Review of Systems  All systems negative except for above.    Scheduled Medications:   acetaminophen, 1,000 mg, intravenous, q6h JAN  pantoprazole, 40 mg, intravenous, Daily         Continuous Medications:   insulin regular infusion, 0-20 Units/hr, Last Rate: 0.47 Units/hr (12/13/24 0700)  lactated Ringer's, 100 mL/hr, Last Rate: 100 mL/hr (12/12/24 1900)  ropivacaine (PF) in NS cmpd, 14 mL/hr  ropivacaine (PF) in NS cmpd, 14 mL/hr, Last Rate: 14 mL/hr (12/12/24 0744)         PRN Medications:   PRN medications: calcium gluconate, calcium gluconate, dextrose, dextrose, glucagon, glucagon, HYDROmorphone, HYDROmorphone, HYDROmorphone, insulin regular, labetaloL, magnesium sulfate, magnesium sulfate, potassium chloride    Objective   Vitals:  Most Recent:  Vitals:    12/13/24 0700   BP:    Pulse: 88   Resp: 16   Temp:    SpO2: 98%       24hr Min/Max:  Temp  Min: 36.3 °C (97.3 °F)  Max: 36.3 °C (97.3 °F)  Pulse  Min: 82  Max: 106  BP  Min: 133/85  Max: 180/107  Resp  Min: 0  Max: 23  SpO2  Min: 96 %  Max: 100 %    I/O:  I/O last 2 completed shifts:  In: 9322.1 (95.1 mL/kg) [I.V.:1205.3 (12.3 mL/kg); Blood:700; IV Piggyback:7416.9]  Out: 5090 (51.9 mL/kg) [Urine:3330 (1.4 mL/kg/hr); Emesis/NG output:200;  Drains:310; Blood:1250]  Weight: 98 kg     Physical Exam:   Physical Exam  Physical Exam:  GEN: In no acute distress. Alert and awake.  HEENT: Sclera anicteric. Moist mucous membranes.  RESP: Breathing non-labored, equal chest rise. On RA.  CV: Regular rate, normotensive  GI: Abdomen soft, nondistended, appropriately tender for postoperative course.   : Johansen in place with clear yellow urine.  MSK: No gross deformities. Moves all extremities spontaneously.  NEURO: Alert and oriented x3. No focal deficits.  PSYCH: Appropriate mood and affect.  SKIN: Laparotomy well-approximated with dermabond.   DRAINS: Abdominal surgical drains x2 with serosanguinous output    Lab/Radiology/Diagnostic Review:  Results for orders placed or performed during the hospital encounter of 12/12/24 (from the past 24 hours)   Blood Gas Arterial Full Panel   Result Value Ref Range    POCT pH, Arterial 7.35 (L) 7.38 - 7.42 pH    POCT pCO2, Arterial 42 38 - 42 mm Hg    POCT pO2, Arterial 117 (H) 85 - 95 mm Hg    POCT SO2, Arterial 97 94 - 100 %    POCT Oxy Hemoglobin, Arterial 96.9 94.0 - 98.0 %    POCT Hematocrit Calculated, Arterial 43.0 41.0 - 52.0 %    POCT Sodium, Arterial 137 136 - 145 mmol/L    POCT Potassium, Arterial 4.1 3.5 - 5.3 mmol/L    POCT Chloride, Arterial 108 (H) 98 - 107 mmol/L    POCT Ionized Calcium, Arterial 1.18 1.10 - 1.33 mmol/L    POCT Glucose, Arterial 143 (H) 74 - 99 mg/dL    POCT Lactate, Arterial 1.3 0.4 - 2.0 mmol/L    POCT Base Excess, Arterial -2.4 (L) -2.0 - 3.0 mmol/L    POCT HCO3 Calculated, Arterial 23.2 22.0 - 26.0 mmol/L    POCT Hemoglobin, Arterial 14.2 13.5 - 17.5 g/dL    POCT Anion Gap, Arterial 10 10 - 25 mmo/L    Patient Temperature 37.0 degrees Celsius    FiO2 50 %   Blood Gas Arterial Full Panel   Result Value Ref Range    POCT pH, Arterial 7.31 (L) 7.38 - 7.42 pH    POCT pCO2, Arterial 42 38 - 42 mm Hg    POCT pO2, Arterial 106 (H) 85 - 95 mm Hg    POCT SO2, Arterial 97 94 - 100 %    POCT Oxy  Hemoglobin, Arterial 96.3 94.0 - 98.0 %    POCT Hematocrit Calculated, Arterial 41.0 41.0 - 52.0 %    POCT Sodium, Arterial 137 136 - 145 mmol/L    POCT Potassium, Arterial 4.3 3.5 - 5.3 mmol/L    POCT Chloride, Arterial 108 (H) 98 - 107 mmol/L    POCT Ionized Calcium, Arterial 1.17 1.10 - 1.33 mmol/L    POCT Glucose, Arterial 229 (H) 74 - 99 mg/dL    POCT Lactate, Arterial 1.6 0.4 - 2.0 mmol/L    POCT Base Excess, Arterial -5.0 (L) -2.0 - 3.0 mmol/L    POCT HCO3 Calculated, Arterial 21.1 (L) 22.0 - 26.0 mmol/L    POCT Hemoglobin, Arterial 13.6 13.5 - 17.5 g/dL    POCT Anion Gap, Arterial 12 10 - 25 mmo/L    Patient Temperature 37.0 degrees Celsius    FiO2 50 %   Blood Gas Arterial Full Panel   Result Value Ref Range    POCT pH, Arterial 7.25 (LL) 7.38 - 7.42 pH    POCT pCO2, Arterial 42 38 - 42 mm Hg    POCT pO2, Arterial 192 (H) 85 - 95 mm Hg    POCT SO2, Arterial 98 94 - 100 %    POCT Oxy Hemoglobin, Arterial 97.0 94.0 - 98.0 %    POCT Hematocrit Calculated, Arterial 38.0 (L) 41.0 - 52.0 %    POCT Sodium, Arterial 136 136 - 145 mmol/L    POCT Potassium, Arterial 4.4 3.5 - 5.3 mmol/L    POCT Chloride, Arterial 106 98 - 107 mmol/L    POCT Ionized Calcium, Arterial 1.07 (L) 1.10 - 1.33 mmol/L    POCT Glucose, Arterial 189 (H) 74 - 99 mg/dL    POCT Lactate, Arterial 4.1 (HH) 0.4 - 2.0 mmol/L    POCT Base Excess, Arterial -8.5 (L) -2.0 - 3.0 mmol/L    POCT HCO3 Calculated, Arterial 18.4 (L) 22.0 - 26.0 mmol/L    POCT Hemoglobin, Arterial 12.7 (L) 13.5 - 17.5 g/dL    POCT Anion Gap, Arterial 16 10 - 25 mmo/L    Patient Temperature 37.0 degrees Celsius    FiO2 60 %   Blood Gas Arterial Full Panel   Result Value Ref Range    POCT pH, Arterial 7.30 (L) 7.38 - 7.42 pH    POCT pCO2, Arterial 39 38 - 42 mm Hg    POCT pO2, Arterial 266 (H) 85 - 95 mm Hg    POCT SO2, Arterial 98 94 - 100 %    POCT Oxy Hemoglobin, Arterial 97.1 94.0 - 98.0 %    POCT Hematocrit Calculated, Arterial 30.0 (L) 41.0 - 52.0 %    POCT Sodium, Arterial  137 136 - 145 mmol/L    POCT Potassium, Arterial 4.8 3.5 - 5.3 mmol/L    POCT Chloride, Arterial 108 (H) 98 - 107 mmol/L    POCT Ionized Calcium, Arterial 1.18 1.10 - 1.33 mmol/L    POCT Glucose, Arterial 173 (H) 74 - 99 mg/dL    POCT Lactate, Arterial 4.0 (HH) 0.4 - 2.0 mmol/L    POCT Base Excess, Arterial -6.7 (L) -2.0 - 3.0 mmol/L    POCT HCO3 Calculated, Arterial 19.2 (L) 22.0 - 26.0 mmol/L    POCT Hemoglobin, Arterial 10.1 (L) 13.5 - 17.5 g/dL    POCT Anion Gap, Arterial 15 10 - 25 mmo/L    Patient Temperature 37.0 degrees Celsius    FiO2 60 %   Blood Gas Arterial Full Panel   Result Value Ref Range    POCT pH, Arterial 7.25 (LL) 7.38 - 7.42 pH    POCT pCO2, Arterial 44 (H) 38 - 42 mm Hg    POCT pO2, Arterial 185 (H) 85 - 95 mm Hg    POCT SO2, Arterial 97 94 - 100 %    POCT Oxy Hemoglobin, Arterial 96.2 94.0 - 98.0 %    POCT Hematocrit Calculated, Arterial 29.0 (L) 41.0 - 52.0 %    POCT Sodium, Arterial 138 136 - 145 mmol/L    POCT Potassium, Arterial 4.9 3.5 - 5.3 mmol/L    POCT Chloride, Arterial 107 98 - 107 mmol/L    POCT Ionized Calcium, Arterial 1.06 (L) 1.10 - 1.33 mmol/L    POCT Glucose, Arterial 196 (H) 74 - 99 mg/dL    POCT Lactate, Arterial 5.2 (HH) 0.4 - 2.0 mmol/L    POCT Base Excess, Arterial -7.5 (L) -2.0 - 3.0 mmol/L    POCT HCO3 Calculated, Arterial 19.3 (L) 22.0 - 26.0 mmol/L    POCT Hemoglobin, Arterial 9.5 (L) 13.5 - 17.5 g/dL    POCT Anion Gap, Arterial 17 10 - 25 mmo/L    Patient Temperature 37.0 degrees Celsius    FiO2 60 %   Blood Gas Arterial Full Panel   Result Value Ref Range    POCT pH, Arterial 7.26 (L) 7.38 - 7.42 pH    POCT pCO2, Arterial 40 38 - 42 mm Hg    POCT pO2, Arterial 230 (H) 85 - 95 mm Hg    POCT SO2, Arterial 98 94 - 100 %    POCT Oxy Hemoglobin, Arterial 96.7 94.0 - 98.0 %    POCT Hematocrit Calculated, Arterial 29.0 (L) 41.0 - 52.0 %    POCT Sodium, Arterial 135 (L) 136 - 145 mmol/L    POCT Potassium, Arterial 5.9 (H) 3.5 - 5.3 mmol/L    POCT Chloride, Arterial 107 98  - 107 mmol/L    POCT Ionized Calcium, Arterial 1.39 (H) 1.10 - 1.33 mmol/L    POCT Glucose, Arterial 252 (H) 74 - 99 mg/dL    POCT Lactate, Arterial 5.8 (HH) 0.4 - 2.0 mmol/L    POCT Base Excess, Arterial -8.6 (L) -2.0 - 3.0 mmol/L    POCT HCO3 Calculated, Arterial 17.9 (L) 22.0 - 26.0 mmol/L    POCT Hemoglobin, Arterial 9.7 (L) 13.5 - 17.5 g/dL    POCT Anion Gap, Arterial 16 10 - 25 mmo/L    Patient Temperature 37.0 degrees Celsius    FiO2 50 %   Blood Gas Arterial Full Panel   Result Value Ref Range    POCT pH, Arterial 7.31 (L) 7.38 - 7.42 pH    POCT pCO2, Arterial 32 (L) 38 - 42 mm Hg    POCT pO2, Arterial 153 (H) 85 - 95 mm Hg    POCT SO2, Arterial 98 94 - 100 %    POCT Oxy Hemoglobin, Arterial 96.7 94.0 - 98.0 %    POCT Hematocrit Calculated, Arterial 33.0 (L) 41.0 - 52.0 %    POCT Sodium, Arterial 135 (L) 136 - 145 mmol/L    POCT Potassium, Arterial 5.3 3.5 - 5.3 mmol/L    POCT Chloride, Arterial 107 98 - 107 mmol/L    POCT Ionized Calcium, Arterial 1.09 (L) 1.10 - 1.33 mmol/L    POCT Glucose, Arterial 264 (H) 74 - 99 mg/dL    POCT Lactate, Arterial 6.8 (HH) 0.4 - 2.0 mmol/L    POCT Base Excess, Arterial -9.1 (L) -2.0 - 3.0 mmol/L    POCT HCO3 Calculated, Arterial 16.1 (L) 22.0 - 26.0 mmol/L    POCT Hemoglobin, Arterial 10.9 (L) 13.5 - 17.5 g/dL    POCT Anion Gap, Arterial 17 10 - 25 mmo/L    Patient Temperature 37.0 degrees Celsius    FiO2 21 %   Magnesium   Result Value Ref Range    Magnesium 1.52 (L) 1.60 - 2.40 mg/dL   Calcium, Ionized   Result Value Ref Range    POCT Calcium, Ionized 1.09 (L) 1.1 - 1.33 mmol/L   Coagulation Screen   Result Value Ref Range    Protime 16.4 (H) 9.8 - 12.8 seconds    INR 1.5 (H) 0.9 - 1.1    aPTT 31 27 - 38 seconds   CBC   Result Value Ref Range    WBC 12.6 (H) 4.4 - 11.3 x10*3/uL    nRBC 0.0 0.0 - 0.0 /100 WBCs    RBC 3.45 (L) 4.50 - 5.90 x10*6/uL    Hemoglobin 10.8 (L) 13.5 - 17.5 g/dL    Hematocrit 32.0 (L) 41.0 - 52.0 %    MCV 93 80 - 100 fL    MCH 31.3 26.0 - 34.0 pg     MCHC 33.8 32.0 - 36.0 g/dL    RDW 14.2 11.5 - 14.5 %    Platelets 117 (L) 150 - 450 x10*3/uL   Hepatic function panel   Result Value Ref Range    Albumin 3.7 3.4 - 5.0 g/dL    Bilirubin, Total 1.7 (H) 0.0 - 1.2 mg/dL    Bilirubin, Direct 0.8 (H) 0.0 - 0.3 mg/dL    Alkaline Phosphatase 44 33 - 120 U/L    ALT 61 (H) 10 - 52 U/L    AST 64 (H) 9 - 39 U/L    Total Protein 4.9 (L) 6.4 - 8.2 g/dL   Basic Metabolic Panel   Result Value Ref Range    Glucose 277 (H) 74 - 99 mg/dL    Sodium 139 136 - 145 mmol/L    Potassium 5.3 3.5 - 5.3 mmol/L    Chloride 104 98 - 107 mmol/L    Bicarbonate 18 (L) 21 - 32 mmol/L    Anion Gap 22 (H) 10 - 20 mmol/L    Urea Nitrogen 14 6 - 23 mg/dL    Creatinine 0.95 0.50 - 1.30 mg/dL    eGFR >90 >60 mL/min/1.73m*2    Calcium 8.5 (L) 8.6 - 10.6 mg/dL   Phosphorus   Result Value Ref Range    Phosphorus 4.9 2.5 - 4.9 mg/dL   POCT GLUCOSE   Result Value Ref Range    POCT Glucose 263 (H) 74 - 99 mg/dL   POCT GLUCOSE   Result Value Ref Range    POCT Glucose 250 (H) 74 - 99 mg/dL   POCT GLUCOSE   Result Value Ref Range    POCT Glucose 247 (H) 74 - 99 mg/dL   Blood Gas Arterial Full Panel   Result Value Ref Range    POCT pH, Arterial 7.36 (L) 7.38 - 7.42 pH    POCT pCO2, Arterial 29 (L) 38 - 42 mm Hg    POCT pO2, Arterial 90 85 - 95 mm Hg    POCT SO2, Arterial 97 94 - 100 %    POCT Oxy Hemoglobin, Arterial 95.9 94.0 - 98.0 %    POCT Hematocrit Calculated, Arterial 34.0 (L) 41.0 - 52.0 %    POCT Sodium, Arterial 136 136 - 145 mmol/L    POCT Potassium, Arterial 4.3 3.5 - 5.3 mmol/L    POCT Chloride, Arterial 106 98 - 107 mmol/L    POCT Ionized Calcium, Arterial 1.16 1.10 - 1.33 mmol/L    POCT Glucose, Arterial 245 (H) 74 - 99 mg/dL    POCT Lactate, Arterial 8.1 (HH) 0.4 - 2.0 mmol/L    POCT Base Excess, Arterial -7.9 (L) -2.0 - 3.0 mmol/L    POCT HCO3 Calculated, Arterial 16.4 (L) 22.0 - 26.0 mmol/L    POCT Hemoglobin, Arterial 11.2 (L) 13.5 - 17.5 g/dL    POCT Anion Gap, Arterial 18 10 - 25 mmo/L     Patient Temperature 37.0 degrees Celsius    FiO2 21 %   POCT GLUCOSE   Result Value Ref Range    POCT Glucose 213 (H) 74 - 99 mg/dL   POCT GLUCOSE   Result Value Ref Range    POCT Glucose 203 (H) 74 - 99 mg/dL   POCT GLUCOSE   Result Value Ref Range    POCT Glucose 190 (H) 74 - 99 mg/dL   POCT GLUCOSE   Result Value Ref Range    POCT Glucose 183 (H) 74 - 99 mg/dL   Calcium, Ionized   Result Value Ref Range    POCT Calcium, Ionized 1.16 1.1 - 1.33 mmol/L   CBC   Result Value Ref Range    WBC 12.4 (H) 4.4 - 11.3 x10*3/uL    nRBC 0.0 0.0 - 0.0 /100 WBCs    RBC 3.35 (L) 4.50 - 5.90 x10*6/uL    Hemoglobin 10.4 (L) 13.5 - 17.5 g/dL    Hematocrit 30.8 (L) 41.0 - 52.0 %    MCV 92 80 - 100 fL    MCH 31.0 26.0 - 34.0 pg    MCHC 33.8 32.0 - 36.0 g/dL    RDW 15.2 (H) 11.5 - 14.5 %    Platelets 111 (L) 150 - 450 x10*3/uL   Coagulation Screen   Result Value Ref Range    Protime 15.7 (H) 9.8 - 12.8 seconds    INR 1.4 (H) 0.9 - 1.1    aPTT 27 27 - 38 seconds   Hepatic function panel   Result Value Ref Range    Albumin 3.4 3.4 - 5.0 g/dL    Bilirubin, Total 1.0 0.0 - 1.2 mg/dL    Bilirubin, Direct 0.3 0.0 - 0.3 mg/dL    Alkaline Phosphatase 40 33 - 120 U/L    ALT 66 (H) 10 - 52 U/L    AST 66 (H) 9 - 39 U/L    Total Protein 4.9 (L) 6.4 - 8.2 g/dL   Renal Function Panel   Result Value Ref Range    Glucose 183 (H) 74 - 99 mg/dL    Sodium 140 136 - 145 mmol/L    Potassium 4.4 3.5 - 5.3 mmol/L    Chloride 107 98 - 107 mmol/L    Bicarbonate 21 21 - 32 mmol/L    Anion Gap 16 10 - 20 mmol/L    Urea Nitrogen 13 6 - 23 mg/dL    Creatinine 0.90 0.50 - 1.30 mg/dL    eGFR >90 >60 mL/min/1.73m*2    Calcium 8.5 (L) 8.6 - 10.6 mg/dL    Phosphorus 2.7 2.5 - 4.9 mg/dL    Albumin 3.4 3.4 - 5.0 g/dL   Basic Metabolic Panel   Result Value Ref Range    Glucose 183 (H) 74 - 99 mg/dL    Sodium 140 136 - 145 mmol/L    Potassium 4.4 3.5 - 5.3 mmol/L    Chloride 107 98 - 107 mmol/L    Bicarbonate 21 21 - 32 mmol/L    Anion Gap 16 10 - 20 mmol/L    Urea  Nitrogen 13 6 - 23 mg/dL    Creatinine 0.90 0.50 - 1.30 mg/dL    eGFR >90 >60 mL/min/1.73m*2    Calcium 8.5 (L) 8.6 - 10.6 mg/dL   Phosphorus   Result Value Ref Range    Phosphorus 2.7 2.5 - 4.9 mg/dL   Magnesium   Result Value Ref Range    Magnesium 2.45 (H) 1.60 - 2.40 mg/dL   Blood Gas Arterial Full Panel   Result Value Ref Range    POCT pH, Arterial 7.39 7.38 - 7.42 pH    POCT pCO2, Arterial 35 (L) 38 - 42 mm Hg    POCT pO2, Arterial 73 (L) 85 - 95 mm Hg    POCT SO2, Arterial 96 94 - 100 %    POCT Oxy Hemoglobin, Arterial 93.9 (L) 94.0 - 98.0 %    POCT Hematocrit Calculated, Arterial 31.0 (L) 41.0 - 52.0 %    POCT Sodium, Arterial 135 (L) 136 - 145 mmol/L    POCT Potassium, Arterial 4.6 3.5 - 5.3 mmol/L    POCT Chloride, Arterial 106 98 - 107 mmol/L    POCT Ionized Calcium, Arterial 1.21 1.10 - 1.33 mmol/L    POCT Glucose, Arterial 198 (H) 74 - 99 mg/dL    POCT Lactate, Arterial 6.4 (HH) 0.4 - 2.0 mmol/L    POCT Base Excess, Arterial -3.3 (L) -2.0 - 3.0 mmol/L    POCT HCO3 Calculated, Arterial 21.2 (L) 22.0 - 26.0 mmol/L    POCT Hemoglobin, Arterial 10.3 (L) 13.5 - 17.5 g/dL    POCT Anion Gap, Arterial 12 10 - 25 mmo/L    Patient Temperature 37.0 degrees Celsius    FiO2 21 %   POCT GLUCOSE   Result Value Ref Range    POCT Glucose 167 (H) 74 - 99 mg/dL   POCT GLUCOSE   Result Value Ref Range    POCT Glucose 142 (H) 74 - 99 mg/dL   POCT GLUCOSE   Result Value Ref Range    POCT Glucose 131 (H) 74 - 99 mg/dL   POCT GLUCOSE   Result Value Ref Range    POCT Glucose 142 (H) 74 - 99 mg/dL   POCT GLUCOSE   Result Value Ref Range    POCT Glucose 133 (H) 74 - 99 mg/dL   POCT GLUCOSE   Result Value Ref Range    POCT Glucose 126 (H) 74 - 99 mg/dL   POCT GLUCOSE   Result Value Ref Range    POCT Glucose 143 (H) 74 - 99 mg/dL     XR chest 1 view: 12/13/2024  1.  Interval increase in left retrocardiac hazy/linear opacity which could represent consolidation and/or atelectasis.   2. No significant pulmonary edema.     US liver  with doppler: 12/13/2024  1. Superior mesenteric vein is unable to be assessed due to overlying bowel gas.   2. Complex fluid is noted in the gallbladder fossa, likely postsurgical. Sterility of this fluid can not be determined on the basis of ultrasound.   3. Hepatic steatosis.   4. Unremarkable Doppler assessment of the hepatic vessels as detailed above.       Assessment/Plan   Malignant neoplasm of head of pancreas (Multi)     Nate Alvarez is a 54 y.o. male presenting to SICU sp  total pancreatectomy and splenectomy, cholecystectomy, duodenectomy, distal gastrectomy, hepaticojejunostomy, gastrojejunostomy, and SMV resection/reconstruction on 12/12.     His PMHx is significant for pancreatic cancer, anxiety,  BCC of R eyelid, , IDDM2, GERD,  HTN, RADHA, pancreatic adenocarcinoma s/p chemotherapy who presents to the SICU on 12/12 s/p Whipple with total pancreatectomy.      NEURO: Hx of anxiety. Post op pain   - Bilateral [place] blocks with catheters performed preoperatively on 12/12   - Start toradol 15mg q4h  - Start dilaudid PCA 0.2, reassess pain throughout day and adjust dose as appropriate given pre-op opioid use for pancreatic pain  - continue IV tylenol   - ongoing neuro and pain assessments  -PT/OT consult   - Home meds: Ativan prn anxiety, takes infrequently. Had been taking oxycodone and tylenol for pancreatic pain pre-op.     CV: H/O HTN, previously on lisinopril. Bradycardia and 1st degree AV heart block found on PAT EKG, asytmptomatic, was worked up by Dr. Abreu, had normal stress test, bradycardia on follow up EKG, ultimately cleared for surgery. Last Echo (12/10/24): LVEF 63%, aortic root mildly enlarged, moderately increased septal thickness. Post op lactate to 8.1, received 6L fluids intra-op, 1.75L LR in SICU overnight, lactate down to 2.8 on 12/13 AM.   - Goals MAP 65-85.  - PRN labetalol for SBP >140  - continuous EKG, ABP monitoring  - Trend lactate  - Home meds: None      PULM: Hx of RADHA not  on CPAP. Extubated postoperatively without difficulty. Post op CXR with consolidation vs atelectasis.  - Wean FiO2 as tolerated.    - Q1h incentive spirometer while awake  - additional pulm toilet prn    - post op and daily CXR     GI: Hx of GERD. Pancreatic adenocarcinoma s/p total pancreatectomy and splenectomy, cholecystectomy, duodenectomy, distal gastrectomy, hepaticojejunostomy, gastrojejunostomy, and SMV resection/reconstruction on 12/12. Liver US 12/12 with hepatic steatosis, unremarkable doppler of hepatic vessels. Post op LFTs: ALT 66, AST 66.   - NGT to LIWS  - NPO  - PPI for GI prophylaxis  - Obtain post op and daily LFTs.  - Home meds: protonix     : No history of renal disease  - Maintenance IVF --> LR at 100 ml/hr per surg onc  - Volume resuscitation as needed.    - Maintain U/O >1-2ml/kg/hr.    - Check renal function panel post op and daily.   - Replete electrolytes to goal K>4, Mg>2, Phos>2.5, ionized Ca>1.10.  - continue inman     HEME: Vitamin D Deficiency. Acute surgical blood loss anemia. INR 1.4, gave 1x K.   - Check CBC and coags post op, then daily if stable  - Start lovenox for DVT prophylaxis, ok per surgical team  - SCDs  - Home meds: Vit D     ENDO: Hx of insulin dependent DM. Pancreatic adenocarcinoma s/p whipple procedure, total pancreatectomy. Initial  upon arrival to SICU  - Continue insulin infusion  - Hourly BG while on the infusion  - Endocrine consult today, follow up recs     ID: Afebrile. WBC pending . Received Zosyn intra-op   -A dose zosyn post op      Lines:  - R chest mediport  -12/12 L radial A line - remove  -PIV   - inman     Dispo: continue ICU care. Reassess dispo in am. Discussed with ICU attending Dr. Baca and fellow Dr. Meghna Brand MD - PGY-1  SICU phone [phone]

## 2024-12-13 NOTE — PROGRESS NOTES
Occupational Therapy    Evaluation    Patient Name: Nate Alvarez  MRN: [id number]  Today's Date: 12/13/2024  Room: 05/05  Time Calculation  Start Time: 1149  Stop Time: 1206  Time Calculation (min): 17 min    Assessment  IP OT Assessment  OT Assessment: Nate is a 55yo male presenting  with deficits in ADLs, IADLs, transfers and functional mobility. Pt would benefit from skilled OT intervention to return to PLOF safely  Prognosis: Good  Barriers to Discharge Home: No anticipated barriers  Evaluation/Treatment Tolerance: Patient tolerated treatment well  Medical Staff Made Aware: Yes  End of Session Communication: Bedside nurse  End of Session Patient Position: Up in chair, Alarm off, not on at start of session  Plan:  Inpatient Plan  Treatment Interventions: ADL retraining, Functional transfer training, UE strengthening/ROM, Cognitive reorientation, Patient/family training, Compensatory technique education  OT Frequency: 2 times per week  OT Discharge Recommendations: Low intensity level of continued care  Equipment Recommended upon Discharge:  (TBD)  OT Recommended Transfer Status: Assist of 1  OT - OK to Discharge: Yes  OT Assessment  OT Assessment Results: Decreased ADL status, Decreased IADLs, Decreased functional mobility  Prognosis: Good  Barriers to Discharge: None  Evaluation/Treatment Tolerance: Patient tolerated treatment well  Medical Staff Made Aware: Yes  Strengths: Ability to acquire knowledge, Capable of completing ADLs semi/independent, Premorbid level of function, Insight into problems    Subjective   Current Problem:  1. Malignant neoplasm of head of pancreas (Multi)  Surgical Pathology Exam    Surgical Pathology Exam      2. Melena          General:  Reason for Referral: S/p total pancreatectomy and splenectomy, cholecystectomy, duodenectomy, distal gastrectomy, hepaticojejunostomy, gastrojejunostomy, and SMV resection/reconstruction with Dr. Gamble on 12/12.  Past Medical History Relevant  to Rehab: pancreatic cancer, anxiety,  BCC of R eyelid, , IDDM2, GERD,  HTN, RADHA, pancreatic adenocarcinoma  Prior to Session Communication: Bedside nurse  Patient Position Received: Bed, 3 rail up  Family/Caregiver Present: Yes  Caregiver Feedback: wife present  General Comment: Pt sitting in chair, pleasant and agreeable to OT,eager to return to PLOF.  a-line, 2 abdominal drains, PCA pump, pain block, tele, inman   Precautions:  Medical Precautions: Abdominal precautions  Post-Surgical Precautions: Abdominal surgery precautions  Precautions Comment: MAP 65-85  Vital Signs:  Vital Signs (Past 2hrs)        Date/Time Vitals Session Patient Position Pulse Resp SpO2 BP MAP (mmHg)    12/13/24 1400 --  --  82  13  94 %  156/91  109                   Pain:  Pain Assessment  Pain Assessment: 0-10  0-10 (Numeric) Pain Score: 4  Pain Type: Surgical pain  Pain Location: Abdomen  Lines/Tubes/Drains:  Implantable Port 06/03/24 Right Chest Single lumen port (Active)   Number of days: 193       Urethral Catheter (Active)   Number of days: 1       Closed/Suction Drain Lateral RLQ (Active)   Number of days: 1       Closed/Suction Drain Lateral LLQ (Active)   Number of days: 1       NG/OG/Feeding Tube NG - [place] sump Left nostril (Active)   Number of days: 1         Objective   Cognition:  Overall Cognitive Status: Within Functional Limits  Orientation Level: Oriented X4           Home Living:  Type of Home: House  Lives With: Spouse  Home Adaptive Equipment: None  Home Layout: Two level, Able to live on main level with bedroom/bathroom  Home Access: Level entry  Bathroom Shower/Tub: Walk-in shower  Bathroom Toilet: Standard   Prior Function:  Level of [place]: Independent with ADLs and functional transfers  Receives Help From: Family  ADL Assistance: Independent  Homemaking Assistance: Independent  Ambulatory Assistance: Independent  Vocational: Full time employment ([employer])  Prior Function Comments: (+) drives  IADL  History:     ADL:  Eating Assistance: Independent (anticipated)  Grooming Assistance: Independent (anticipated)  Bathing Assistance: Stand by (anticipated)  UE Dressing Assistance: Stand by (anticipated)  LE Dressing Assistance: Stand by (anticipated)  Toileting Assistance with Device: Stand by (anticipated)  ADL Comments: educated on figure 4 technique for Lb dressing. Simulates donning socks  Activity Tolerance:  Endurance: Tolerates 10 - 20 min exercise with multiple rests  Early Mobility/Exercise Safety Screen: Proceed with mobilization - No exclusion criteria met  Balance:     Bed Mobility/Transfers: Bed Mobility  Bed Mobility: Yes  Bed Mobility 1  Bed Mobility 1: Sitting to supine  Level of Assistance 1: Close supervision  Bed Mobility Comments 1: HOB flat   and Transfers  Transfer: Yes  Transfer 1  Transfer From 1: Stand to, Chair with arms to  Transfer to 1: Bed, Stand  Technique 1: Sit to stand, Stand to sit  Transfer Level of Assistance 1: Close supervision  Trials/Comments 1: assist for line management only  IADL's:      Vision:     and    Sensation:  Light Touch: No apparent deficits  Strength:  Strength Comments: BUE WFL  Perception:  Inattention/Neglect: Appears intact  Coordination:  Movements are Fluid and Coordinated: Yes   Hand Function:  Hand Function  Gross Grasp: Functional  Extremities: RUE   RUE : Within Functional Limits, LUE   LUE: Within Functional Limits, RLE   RLE : Within Functional Limits, and LLE   LLE : Within Functional Limits    Outcome Measures: [facility] Daily Activity  Putting on and taking off regular lower body clothing: A little  Bathing (including washing, rinsing, drying): A little  Putting on and taking off regular upper body clothing: None  Toileting, which includes using toilet, bedpan or urinal: A little  Taking care of personal grooming such as brushing teeth: None  Eating Meals: None  Daily Activity - Total Score: 21    Confusion Assessment Method-ICU (CAM-ICU)  Feature 1:  Acute Onset or Fluctuating Course: Negative  Overall CAM-ICU: Negative   ICU Mobility Screen  Early Mobility/Exercise Safety Screen: Proceed with mobilization - No exclusion criteria met  ICU Mobility Scale: Transferring bed to chair,          Education Documentation  Body Mechanics, taught by Sarika Ho OT at 12/13/2024  3:52 PM.  Learner: Significant Other, Patient  Readiness: Acceptance  Method: Explanation  Response: Verbalizes Understanding  Comment: abdominal precautions, OT POC    Precautions, taught by Sarika Ho OT at 12/13/2024  3:52 PM.  Learner: Significant Other, Patient  Readiness: Acceptance  Method: Explanation  Response: Verbalizes Understanding  Comment: abdominal precautions, OT POC    ADL Training, taught by Sarika Ho OT at 12/13/2024  3:52 PM.  Learner: Significant Other, Patient  Readiness: Acceptance  Method: Explanation  Response: Verbalizes Understanding  Comment: abdominal precautions, OT POC    Education Comments  No comments found.        Goals:     Encounter Problems       Encounter Problems (Active)       ADLs       Patient with complete lower body dressing with independent level of assistance donning and doffing all LE clothes  with while edge of bed  (Progressing)       Start:  12/13/24    Expected End:  01/03/25            Patient will complete toileting including hygiene clothing management/hygiene with independent level of assistance. (Progressing)       Start:  12/13/24    Expected End:  01/03/25               BALANCE       Pt will maintain dynamic standing balance during ADL task with independent level of assistance in order to demonstrate decreased risk of falling and improved postural control. (Progressing)       Start:  12/13/24    Expected End:  01/03/25               COGNITION/SAFETY       Patient will recall and adhere to abdominal precautions during all functional mobility/ADL tasks in order to demonstrate improved understanding and promote healing post op  (Progressing)       Start:  12/13/24    Expected End:  01/03/25 12/13/24 at 3:53 PM   Sarika Ho, OT   Rehab Office: [phone]

## 2024-12-13 NOTE — PROGRESS NOTES
[facility]  DEPARTMENT OF SURGERY    PROGRESS NOTE    SUBJECTIVE  Patient is now s/p total pancreatectomy w/ splenectomy 12/12  No acute events overnight, received 3L IV bolus for elevated lactate, lactate 2.8 this morning from 6.4  Afebrile and HDS   Pain controlled, No N/V  NGT 200cc  Johansen 3.3L  L  ml  R NHI 205cc    OBJECTIVE  Vitals:  Temp:  [36.3 °C (97.3 °F)-36.6 °C (97.9 °F)] 36.6 °C (97.9 °F)  Heart Rate:  [] 103  Resp:  [0-23] 16  BP: (133-180)/() 145/113  Arterial Line BP 1: ()/(59-80) 134/79    I/Os:  I/O last 3 completed shifts:  In: 9322.1 (95.1 mL/kg) [I.V.:1205.3 (12.3 mL/kg); Blood:700; IV Piggyback:7416.9]  Out: 5090 (51.9 mL/kg) [Urine:3330 (0.9 mL/kg/hr); Emesis/NG output:200; Drains:310; Blood:1250]  Weight: 98 kg     Exam:  Gen:  NAD, non-toxic appearing  HEENT:  Atraumatic, normocephalic  Eyes:  No scleral icterus  Card:  RRR  Resp:  Non-labored breathing on RA  Abd:  Soft, appropriately tender, mildly distended, midline incision clean, dry, intact, NHI x2 serosang   Ext:  WWP, no swelling of BLE  MSK:  ESTES  Neuro:  AOx3, no gross neurological deficits  Psych:  Appropriate mood and affect    Labs:  CBC:   Lab Results   Component Value Date    WBC 12.4 (H) 12/13/2024    RBC 3.35 (L) 12/13/2024     BMP:   Lab Results   Component Value Date    GLUCOSE 183 (H) 12/13/2024    GLUCOSE 183 (H) 12/13/2024    CO2 21 12/13/2024    CO2 21 12/13/2024    BUN 13 12/13/2024    BUN 13 12/13/2024    CREATININE 0.90 12/13/2024    CREATININE 0.90 12/13/2024    CALCIUM 8.5 (L) 12/13/2024    CALCIUM 8.5 (L) 12/13/2024     Coagulation:   Lab Results   Component Value Date    INR 1.4 (H) 12/13/2024    APTT 27 12/13/2024       Imaging:  None today      ASSESSMENT:  Mr. Alvarez is a 54M w/ PMH significant for DM, HTN, RADHA, pancreatic cancer s/p neoadjuvant chemo and now total pancreatectomy, splenectomy, cholecystectomy, duodenectomy, distal gastrectomy,  hepaticojejunostomy, gastrojejunostomy, and SMV resection/reconstruction on 12/12. He is recovering in the ICU. Postoperative course notable for elevated lactate which is being managed with fluid resuscitation.     PLAN:  - Multimodal pain control: IV tylenol, add dilaudid PCA and Toradol   - NPO, mIVF LR 100cc/hr   - NGT LIWS, will consider removing in PM   - Start subcutaneous Lovenox 40mg daily   - Start protonix 40mg daily   - Endocrinology consult for glycemic management s/p total pancreatectomy  - Splenectomy vaccine POD4 (12/16)  - Keep Johansen catheter   - Keep Abdominal drains   - Continue IV fluid resuscitation and trend lactate     d/w Dr. Tye Galeana MD

## 2024-12-13 NOTE — OP NOTE
Hepatobiliary Operative Note     Date: 2024  OR Location: [facility] OR    Name: Nate Alvarez, : 1970, Age: 54 y.o., MRN: [id number], Sex: male    Diagnosis  Pre-op Diagnosis      * Malignant neoplasm of head of pancreas (Multi) [C25.0] Post-op Diagnosis     * Malignant neoplasm of head of pancreas (Multi) [C25.0]     Procedures  SMV/portal vein resection and primary anastomosis    Surgeon: Dina Hamilton MD, PhD     Indications: Intra-operative consultation by Dr. Gamble, surgical oncology, for portal vein/SMV resection and repair during Whipple procedure. Nate Alvarez is an 54 y.o. male who was having surgery for Malignant neoplasm of head of pancreas (Multi) [C25.0]. Intra-operatively, the tumor was found to be encasing the lateral border of the portal vein and SMV confluence.    Procedure Details: I was called intraoperatively after it was recognized that the pancreatic tumor was encasing the portal vein/superior mesenteric vein. When I arrived, portal vein, splenic vein, IMV, and SMV had been dissected. The tumor specimen remained attached to the right lateral PV/SMV confluence. We proceeded to ligate the splenic vein and IMV. This allowed the final lymphatic/nerve tissue abutting the SMA to be divided between serial ties. Anesthesia then administered 5000 units IV heparin. The PV and SMV were clamped and the vessels transected allowing excision of a portion of the vein with the tumor specimen. The specimen was passed off the field and some 4-0 Prolene sutures were used to achieve hemostasis. The SMV anastomosis was performed with running 6-0 Prolene sutures and tied with a growth factor. We did flush out the bowel/SMV prior to tying to ensure no distal thrombus. After unclamping there was excellent hemostasis and the PV/SMV was not narrowed or under undue tension. The bowel appeared healthy. At this point, Dr. Gamble completed the remainder of the case, please refer to the details  of his operative report.     Attending Attestation: I was present and scrubbed for the key portions of the procedure.     Dina Hamilton MD, PHD, MPH  Chief, Transplant and Hepatobiliary Surgery

## 2024-12-13 NOTE — PROGRESS NOTES
Postop Pain HPI -   Palliative: relieved with IV analgesics and regional local anesthetics  Provocative: movement  Quality:  burning and aching  Radiation:  none  Severity:  7/10  Timing: constant    24-HOUR OPIOID CONSUMPTION:   Dilaudid 0.4 x2    Scheduled medications  acetaminophen, 1,000 mg, intravenous, q6h JAN  enoxaparin, 40 mg, subcutaneous, Daily  pantoprazole, 40 mg, intravenous, Daily      Continuous medications  insulin regular infusion, 0-20 Units/hr, Last Rate: 0.47 Units/hr (12/13/24 0749)  lactated Ringer's, 100 mL/hr, Last Rate: 100 mL/hr (12/12/24 1900)  ropivacaine (PF) in NS cmpd, 14 mL/hr  ropivacaine (PF) in NS cmpd, 14 mL/hr, Last Rate: 14 mL/hr (12/12/24 0744)      PRN medications  PRN medications: calcium gluconate, calcium gluconate, dextrose, dextrose, glucagon, glucagon, HYDROmorphone, HYDROmorphone, HYDROmorphone, insulin regular, labetaloL, magnesium sulfate, magnesium sulfate, potassium chloride     Physical Exam:  Constitutional:  no distress, alert and cooperative  Eyes: clear sclera  Head/Neck: No apparent injury, trachea midline  Respiratory/Thorax: Patent airways, thorax symmetric, breathing comfortably  Cardiovascular: no pitting edema  Gastrointestinal: Nondistended  Musculoskeletal: ROM intact  Extremities: no clubbing  Neurological: alert, navas x4  Psychological: Appropriate affect    Results for orders placed or performed during the hospital encounter of 12/12/24 (from the past 24 hours)   Blood Gas Arterial Full Panel   Result Value Ref Range    POCT pH, Arterial 7.35 (L) 7.38 - 7.42 pH    POCT pCO2, Arterial 42 38 - 42 mm Hg    POCT pO2, Arterial 117 (H) 85 - 95 mm Hg    POCT SO2, Arterial 97 94 - 100 %    POCT Oxy Hemoglobin, Arterial 96.9 94.0 - 98.0 %    POCT Hematocrit Calculated, Arterial 43.0 41.0 - 52.0 %    POCT Sodium, Arterial 137 136 - 145 mmol/L    POCT Potassium, Arterial 4.1 3.5 - 5.3 mmol/L    POCT Chloride, Arterial 108 (H) 98 - 107 mmol/L    POCT Ionized  Calcium, Arterial 1.18 1.10 - 1.33 mmol/L    POCT Glucose, Arterial 143 (H) 74 - 99 mg/dL    POCT Lactate, Arterial 1.3 0.4 - 2.0 mmol/L    POCT Base Excess, Arterial -2.4 (L) -2.0 - 3.0 mmol/L    POCT HCO3 Calculated, Arterial 23.2 22.0 - 26.0 mmol/L    POCT Hemoglobin, Arterial 14.2 13.5 - 17.5 g/dL    POCT Anion Gap, Arterial 10 10 - 25 mmo/L    Patient Temperature 37.0 degrees Celsius    FiO2 50 %   Blood Gas Arterial Full Panel   Result Value Ref Range    POCT pH, Arterial 7.31 (L) 7.38 - 7.42 pH    POCT pCO2, Arterial 42 38 - 42 mm Hg    POCT pO2, Arterial 106 (H) 85 - 95 mm Hg    POCT SO2, Arterial 97 94 - 100 %    POCT Oxy Hemoglobin, Arterial 96.3 94.0 - 98.0 %    POCT Hematocrit Calculated, Arterial 41.0 41.0 - 52.0 %    POCT Sodium, Arterial 137 136 - 145 mmol/L    POCT Potassium, Arterial 4.3 3.5 - 5.3 mmol/L    POCT Chloride, Arterial 108 (H) 98 - 107 mmol/L    POCT Ionized Calcium, Arterial 1.17 1.10 - 1.33 mmol/L    POCT Glucose, Arterial 229 (H) 74 - 99 mg/dL    POCT Lactate, Arterial 1.6 0.4 - 2.0 mmol/L    POCT Base Excess, Arterial -5.0 (L) -2.0 - 3.0 mmol/L    POCT HCO3 Calculated, Arterial 21.1 (L) 22.0 - 26.0 mmol/L    POCT Hemoglobin, Arterial 13.6 13.5 - 17.5 g/dL    POCT Anion Gap, Arterial 12 10 - 25 mmo/L    Patient Temperature 37.0 degrees Celsius    FiO2 50 %   Blood Gas Arterial Full Panel   Result Value Ref Range    POCT pH, Arterial 7.25 (LL) 7.38 - 7.42 pH    POCT pCO2, Arterial 42 38 - 42 mm Hg    POCT pO2, Arterial 192 (H) 85 - 95 mm Hg    POCT SO2, Arterial 98 94 - 100 %    POCT Oxy Hemoglobin, Arterial 97.0 94.0 - 98.0 %    POCT Hematocrit Calculated, Arterial 38.0 (L) 41.0 - 52.0 %    POCT Sodium, Arterial 136 136 - 145 mmol/L    POCT Potassium, Arterial 4.4 3.5 - 5.3 mmol/L    POCT Chloride, Arterial 106 98 - 107 mmol/L    POCT Ionized Calcium, Arterial 1.07 (L) 1.10 - 1.33 mmol/L    POCT Glucose, Arterial 189 (H) 74 - 99 mg/dL    POCT Lactate, Arterial 4.1 (HH) 0.4 - 2.0  mmol/L    POCT Base Excess, Arterial -8.5 (L) -2.0 - 3.0 mmol/L    POCT HCO3 Calculated, Arterial 18.4 (L) 22.0 - 26.0 mmol/L    POCT Hemoglobin, Arterial 12.7 (L) 13.5 - 17.5 g/dL    POCT Anion Gap, Arterial 16 10 - 25 mmo/L    Patient Temperature 37.0 degrees Celsius    FiO2 60 %   Blood Gas Arterial Full Panel   Result Value Ref Range    POCT pH, Arterial 7.30 (L) 7.38 - 7.42 pH    POCT pCO2, Arterial 39 38 - 42 mm Hg    POCT pO2, Arterial 266 (H) 85 - 95 mm Hg    POCT SO2, Arterial 98 94 - 100 %    POCT Oxy Hemoglobin, Arterial 97.1 94.0 - 98.0 %    POCT Hematocrit Calculated, Arterial 30.0 (L) 41.0 - 52.0 %    POCT Sodium, Arterial 137 136 - 145 mmol/L    POCT Potassium, Arterial 4.8 3.5 - 5.3 mmol/L    POCT Chloride, Arterial 108 (H) 98 - 107 mmol/L    POCT Ionized Calcium, Arterial 1.18 1.10 - 1.33 mmol/L    POCT Glucose, Arterial 173 (H) 74 - 99 mg/dL    POCT Lactate, Arterial 4.0 (HH) 0.4 - 2.0 mmol/L    POCT Base Excess, Arterial -6.7 (L) -2.0 - 3.0 mmol/L    POCT HCO3 Calculated, Arterial 19.2 (L) 22.0 - 26.0 mmol/L    POCT Hemoglobin, Arterial 10.1 (L) 13.5 - 17.5 g/dL    POCT Anion Gap, Arterial 15 10 - 25 mmo/L    Patient Temperature 37.0 degrees Celsius    FiO2 60 %   Blood Gas Arterial Full Panel   Result Value Ref Range    POCT pH, Arterial 7.25 (LL) 7.38 - 7.42 pH    POCT pCO2, Arterial 44 (H) 38 - 42 mm Hg    POCT pO2, Arterial 185 (H) 85 - 95 mm Hg    POCT SO2, Arterial 97 94 - 100 %    POCT Oxy Hemoglobin, Arterial 96.2 94.0 - 98.0 %    POCT Hematocrit Calculated, Arterial 29.0 (L) 41.0 - 52.0 %    POCT Sodium, Arterial 138 136 - 145 mmol/L    POCT Potassium, Arterial 4.9 3.5 - 5.3 mmol/L    POCT Chloride, Arterial 107 98 - 107 mmol/L    POCT Ionized Calcium, Arterial 1.06 (L) 1.10 - 1.33 mmol/L    POCT Glucose, Arterial 196 (H) 74 - 99 mg/dL    POCT Lactate, Arterial 5.2 (HH) 0.4 - 2.0 mmol/L    POCT Base Excess, Arterial -7.5 (L) -2.0 - 3.0 mmol/L    POCT HCO3 Calculated, Arterial 19.3 (L)  22.0 - 26.0 mmol/L    POCT Hemoglobin, Arterial 9.5 (L) 13.5 - 17.5 g/dL    POCT Anion Gap, Arterial 17 10 - 25 mmo/L    Patient Temperature 37.0 degrees Celsius    FiO2 60 %   Blood Gas Arterial Full Panel   Result Value Ref Range    POCT pH, Arterial 7.26 (L) 7.38 - 7.42 pH    POCT pCO2, Arterial 40 38 - 42 mm Hg    POCT pO2, Arterial 230 (H) 85 - 95 mm Hg    POCT SO2, Arterial 98 94 - 100 %    POCT Oxy Hemoglobin, Arterial 96.7 94.0 - 98.0 %    POCT Hematocrit Calculated, Arterial 29.0 (L) 41.0 - 52.0 %    POCT Sodium, Arterial 135 (L) 136 - 145 mmol/L    POCT Potassium, Arterial 5.9 (H) 3.5 - 5.3 mmol/L    POCT Chloride, Arterial 107 98 - 107 mmol/L    POCT Ionized Calcium, Arterial 1.39 (H) 1.10 - 1.33 mmol/L    POCT Glucose, Arterial 252 (H) 74 - 99 mg/dL    POCT Lactate, Arterial 5.8 (HH) 0.4 - 2.0 mmol/L    POCT Base Excess, Arterial -8.6 (L) -2.0 - 3.0 mmol/L    POCT HCO3 Calculated, Arterial 17.9 (L) 22.0 - 26.0 mmol/L    POCT Hemoglobin, Arterial 9.7 (L) 13.5 - 17.5 g/dL    POCT Anion Gap, Arterial 16 10 - 25 mmo/L    Patient Temperature 37.0 degrees Celsius    FiO2 50 %   Blood Gas Arterial Full Panel   Result Value Ref Range    POCT pH, Arterial 7.31 (L) 7.38 - 7.42 pH    POCT pCO2, Arterial 32 (L) 38 - 42 mm Hg    POCT pO2, Arterial 153 (H) 85 - 95 mm Hg    POCT SO2, Arterial 98 94 - 100 %    POCT Oxy Hemoglobin, Arterial 96.7 94.0 - 98.0 %    POCT Hematocrit Calculated, Arterial 33.0 (L) 41.0 - 52.0 %    POCT Sodium, Arterial 135 (L) 136 - 145 mmol/L    POCT Potassium, Arterial 5.3 3.5 - 5.3 mmol/L    POCT Chloride, Arterial 107 98 - 107 mmol/L    POCT Ionized Calcium, Arterial 1.09 (L) 1.10 - 1.33 mmol/L    POCT Glucose, Arterial 264 (H) 74 - 99 mg/dL    POCT Lactate, Arterial 6.8 (HH) 0.4 - 2.0 mmol/L    POCT Base Excess, Arterial -9.1 (L) -2.0 - 3.0 mmol/L    POCT HCO3 Calculated, Arterial 16.1 (L) 22.0 - 26.0 mmol/L    POCT Hemoglobin, Arterial 10.9 (L) 13.5 - 17.5 g/dL    POCT Anion Gap, Arterial  17 10 - 25 mmo/L    Patient Temperature 37.0 degrees Celsius    FiO2 21 %   Magnesium   Result Value Ref Range    Magnesium 1.52 (L) 1.60 - 2.40 mg/dL   Calcium, Ionized   Result Value Ref Range    POCT Calcium, Ionized 1.09 (L) 1.1 - 1.33 mmol/L   Coagulation Screen   Result Value Ref Range    Protime 16.4 (H) 9.8 - 12.8 seconds    INR 1.5 (H) 0.9 - 1.1    aPTT 31 27 - 38 seconds   CBC   Result Value Ref Range    WBC 12.6 (H) 4.4 - 11.3 x10*3/uL    nRBC 0.0 0.0 - 0.0 /100 WBCs    RBC 3.45 (L) 4.50 - 5.90 x10*6/uL    Hemoglobin 10.8 (L) 13.5 - 17.5 g/dL    Hematocrit 32.0 (L) 41.0 - 52.0 %    MCV 93 80 - 100 fL    MCH 31.3 26.0 - 34.0 pg    MCHC 33.8 32.0 - 36.0 g/dL    RDW 14.2 11.5 - 14.5 %    Platelets 117 (L) 150 - 450 x10*3/uL   Hepatic function panel   Result Value Ref Range    Albumin 3.7 3.4 - 5.0 g/dL    Bilirubin, Total 1.7 (H) 0.0 - 1.2 mg/dL    Bilirubin, Direct 0.8 (H) 0.0 - 0.3 mg/dL    Alkaline Phosphatase 44 33 - 120 U/L    ALT 61 (H) 10 - 52 U/L    AST 64 (H) 9 - 39 U/L    Total Protein 4.9 (L) 6.4 - 8.2 g/dL   Basic Metabolic Panel   Result Value Ref Range    Glucose 277 (H) 74 - 99 mg/dL    Sodium 139 136 - 145 mmol/L    Potassium 5.3 3.5 - 5.3 mmol/L    Chloride 104 98 - 107 mmol/L    Bicarbonate 18 (L) 21 - 32 mmol/L    Anion Gap 22 (H) 10 - 20 mmol/L    Urea Nitrogen 14 6 - 23 mg/dL    Creatinine 0.95 0.50 - 1.30 mg/dL    eGFR >90 >60 mL/min/1.73m*2    Calcium 8.5 (L) 8.6 - 10.6 mg/dL   Phosphorus   Result Value Ref Range    Phosphorus 4.9 2.5 - 4.9 mg/dL   POCT GLUCOSE   Result Value Ref Range    POCT Glucose 263 (H) 74 - 99 mg/dL   POCT GLUCOSE   Result Value Ref Range    POCT Glucose 250 (H) 74 - 99 mg/dL   POCT GLUCOSE   Result Value Ref Range    POCT Glucose 247 (H) 74 - 99 mg/dL   Blood Gas Arterial Full Panel   Result Value Ref Range    POCT pH, Arterial 7.36 (L) 7.38 - 7.42 pH    POCT pCO2, Arterial 29 (L) 38 - 42 mm Hg    POCT pO2, Arterial 90 85 - 95 mm Hg    POCT SO2, Arterial 97 94  - 100 %    POCT Oxy Hemoglobin, Arterial 95.9 94.0 - 98.0 %    POCT Hematocrit Calculated, Arterial 34.0 (L) 41.0 - 52.0 %    POCT Sodium, Arterial 136 136 - 145 mmol/L    POCT Potassium, Arterial 4.3 3.5 - 5.3 mmol/L    POCT Chloride, Arterial 106 98 - 107 mmol/L    POCT Ionized Calcium, Arterial 1.16 1.10 - 1.33 mmol/L    POCT Glucose, Arterial 245 (H) 74 - 99 mg/dL    POCT Lactate, Arterial 8.1 (HH) 0.4 - 2.0 mmol/L    POCT Base Excess, Arterial -7.9 (L) -2.0 - 3.0 mmol/L    POCT HCO3 Calculated, Arterial 16.4 (L) 22.0 - 26.0 mmol/L    POCT Hemoglobin, Arterial 11.2 (L) 13.5 - 17.5 g/dL    POCT Anion Gap, Arterial 18 10 - 25 mmo/L    Patient Temperature 37.0 degrees Celsius    FiO2 21 %   POCT GLUCOSE   Result Value Ref Range    POCT Glucose 213 (H) 74 - 99 mg/dL   POCT GLUCOSE   Result Value Ref Range    POCT Glucose 203 (H) 74 - 99 mg/dL   POCT GLUCOSE   Result Value Ref Range    POCT Glucose 190 (H) 74 - 99 mg/dL   POCT GLUCOSE   Result Value Ref Range    POCT Glucose 183 (H) 74 - 99 mg/dL   Calcium, Ionized   Result Value Ref Range    POCT Calcium, Ionized 1.16 1.1 - 1.33 mmol/L   CBC   Result Value Ref Range    WBC 12.4 (H) 4.4 - 11.3 x10*3/uL    nRBC 0.0 0.0 - 0.0 /100 WBCs    RBC 3.35 (L) 4.50 - 5.90 x10*6/uL    Hemoglobin 10.4 (L) 13.5 - 17.5 g/dL    Hematocrit 30.8 (L) 41.0 - 52.0 %    MCV 92 80 - 100 fL    MCH 31.0 26.0 - 34.0 pg    MCHC 33.8 32.0 - 36.0 g/dL    RDW 15.2 (H) 11.5 - 14.5 %    Platelets 111 (L) 150 - 450 x10*3/uL   Coagulation Screen   Result Value Ref Range    Protime 15.7 (H) 9.8 - 12.8 seconds    INR 1.4 (H) 0.9 - 1.1    aPTT 27 27 - 38 seconds   Hepatic function panel   Result Value Ref Range    Albumin 3.4 3.4 - 5.0 g/dL    Bilirubin, Total 1.0 0.0 - 1.2 mg/dL    Bilirubin, Direct 0.3 0.0 - 0.3 mg/dL    Alkaline Phosphatase 40 33 - 120 U/L    ALT 66 (H) 10 - 52 U/L    AST 66 (H) 9 - 39 U/L    Total Protein 4.9 (L) 6.4 - 8.2 g/dL   Renal Function Panel   Result Value Ref Range     Glucose 183 (H) 74 - 99 mg/dL    Sodium 140 136 - 145 mmol/L    Potassium 4.4 3.5 - 5.3 mmol/L    Chloride 107 98 - 107 mmol/L    Bicarbonate 21 21 - 32 mmol/L    Anion Gap 16 10 - 20 mmol/L    Urea Nitrogen 13 6 - 23 mg/dL    Creatinine 0.90 0.50 - 1.30 mg/dL    eGFR >90 >60 mL/min/1.73m*2    Calcium 8.5 (L) 8.6 - 10.6 mg/dL    Phosphorus 2.7 2.5 - 4.9 mg/dL    Albumin 3.4 3.4 - 5.0 g/dL   Basic Metabolic Panel   Result Value Ref Range    Glucose 183 (H) 74 - 99 mg/dL    Sodium 140 136 - 145 mmol/L    Potassium 4.4 3.5 - 5.3 mmol/L    Chloride 107 98 - 107 mmol/L    Bicarbonate 21 21 - 32 mmol/L    Anion Gap 16 10 - 20 mmol/L    Urea Nitrogen 13 6 - 23 mg/dL    Creatinine 0.90 0.50 - 1.30 mg/dL    eGFR >90 >60 mL/min/1.73m*2    Calcium 8.5 (L) 8.6 - 10.6 mg/dL   Phosphorus   Result Value Ref Range    Phosphorus 2.7 2.5 - 4.9 mg/dL   Magnesium   Result Value Ref Range    Magnesium 2.45 (H) 1.60 - 2.40 mg/dL   Blood Gas Arterial Full Panel   Result Value Ref Range    POCT pH, Arterial 7.39 7.38 - 7.42 pH    POCT pCO2, Arterial 35 (L) 38 - 42 mm Hg    POCT pO2, Arterial 73 (L) 85 - 95 mm Hg    POCT SO2, Arterial 96 94 - 100 %    POCT Oxy Hemoglobin, Arterial 93.9 (L) 94.0 - 98.0 %    POCT Hematocrit Calculated, Arterial 31.0 (L) 41.0 - 52.0 %    POCT Sodium, Arterial 135 (L) 136 - 145 mmol/L    POCT Potassium, Arterial 4.6 3.5 - 5.3 mmol/L    POCT Chloride, Arterial 106 98 - 107 mmol/L    POCT Ionized Calcium, Arterial 1.21 1.10 - 1.33 mmol/L    POCT Glucose, Arterial 198 (H) 74 - 99 mg/dL    POCT Lactate, Arterial 6.4 (HH) 0.4 - 2.0 mmol/L    POCT Base Excess, Arterial -3.3 (L) -2.0 - 3.0 mmol/L    POCT HCO3 Calculated, Arterial 21.2 (L) 22.0 - 26.0 mmol/L    POCT Hemoglobin, Arterial 10.3 (L) 13.5 - 17.5 g/dL    POCT Anion Gap, Arterial 12 10 - 25 mmo/L    Patient Temperature 37.0 degrees Celsius    FiO2 21 %   POCT GLUCOSE   Result Value Ref Range    POCT Glucose 167 (H) 74 - 99 mg/dL   POCT GLUCOSE   Result Value  Ref Range    POCT Glucose 142 (H) 74 - 99 mg/dL   POCT GLUCOSE   Result Value Ref Range    POCT Glucose 131 (H) 74 - 99 mg/dL   POCT GLUCOSE   Result Value Ref Range    POCT Glucose 142 (H) 74 - 99 mg/dL   POCT GLUCOSE   Result Value Ref Range    POCT Glucose 133 (H) 74 - 99 mg/dL   POCT GLUCOSE   Result Value Ref Range    POCT Glucose 126 (H) 74 - 99 mg/dL   POCT GLUCOSE   Result Value Ref Range    POCT Glucose 143 (H) 74 - 99 mg/dL   POCT GLUCOSE   Result Value Ref Range    POCT Glucose 168 (H) 74 - 99 mg/dL       Nate Alvarez is a 54 y.o. year old male patient who presents for [place] with Dr. Gamble on 12/12. Acute Pain consulted for block for postoperative pain control.      Plan:  - Bilateral [place] blocks with catheters performed preoperatively on 12/12  - Ambit ball with Ropivacaine 0.2%/NaCl 0.9% 500mL, Rate 7 cc/hr bilaterally  - Bolused 5cc of 0.5% ropivacaine bilaterally  - Recommend 2g Mag q8 x3 doses  - Will refill and continue infusion today   - Plan to pull catheters tomorrow evening  - Ambit medication will not interfere with pain medication prescribed by the primary team.   - Please be aware of local anesthetic toxic dose and absorption variability before considering lidocaine patches  - Acute pain service will follow while catheters in place  - Rest of pain management per primary team     Acute Pain Resident  pg [phone] ph [phone]

## 2024-12-13 NOTE — CONSULTS
Inpatient consult to Endocrinology  Consult performed by: Jennifer Reyes MD  Consult ordered by: Roddy Gamble MD  Reason for consult: Inpatient Hyperglycemia s/p Whipple  Assessment/Recommendations: Inpatient Hyperglycemia s/p Whipple        Reason For Consult  Inpatient Hyperglycemia s/p Whipple   History Of Present Illness  Nate Alvarez is a 54 y.o. male with PMHx Pancreatic Adenoca, HTN, GERD, Anxiety, BCC of R eyelid GERD, and OA,. Patient is s/p pancreatectomy, splenectomy, cholecystectomy, duodenectomy, distal gastrectomy, hepaticojejunostomy, gastrojejunostomy, and SMV resection/reconstruction on 12/12. Endocrine is consulted on 12/13 to assist in management of Hyperglycemia.     Diagnosis Background HX:    Initial presentation on 4/2024 with weight loss of >50 pounds and new onset diabetes. Imaging, EUS and biopsy confirmed adenocarcinoma of the pancreas involving the GDA, celiac axis, SMV. S/P neoadjuvant chemo with mFOLFIRINOX and added fosaprepitant, 6/3/24 - 11/14/24, total 12 cycles.    Hospital Course:  Insulin Drip with total requirements approx 30 units.     Home Diabetes Regimen:   Upon initial diagnosis of DM back in Sept/Oct patient was taking oral antidiabetes medications to no avail - metformin, glipizide, farxiga. GLP1 R agonist mounjaro as well.   Started on insulin back in October 2023. Per patient at the time, daily insulin requirements were about 120 units - novolog TID and lantus. Despite that blood sugars remained in the range of 280 - 300.   In May of 2024, insulin requirements had decreased to total Lantus of 30 units daily  with better control of his blood sugars.     Grandparent with DM 2 - insulin dependent.     Upon initial encounter, patient remains NPO - PCA pump for analgesia.   (Insulin Drip with total requirements approx 30 units.)    Results from Most Recent A1C  POC HEMOGLOBIN A1c   Date/Time Value Ref Range Status   09/25/2024 10:01 AM 7.0 (A) 4.2 - 6.5 % Final      Hemoglobin A1C   Date/Time Value Ref Range Status   12/06/2024 11:28 AM 5.3 See comment % Final         Current Facility-Administered Medications:     acetaminophen (Ofirmev) injection 1,000 mg, 1,000 mg, intravenous, q6h JAN, Pankaj Dominguez PA-C, 1,000 mg at 12/13/24 1329    calcium gluconate 1 g in sodium chloride (iso) IV 50 mL, 1 g, intravenous, q6h PRN, Pankaj Dominguez PA-C    calcium gluconate 2 g in sodium chloride (iso)  mL, 2 g, intravenous, q6h PRN, Pankaj Dominguez PA-C    dextrose 50 % injection 12.5 g, 12.5 g, intravenous, q15 min PRN, Pankaj Dominguez PA-C    dextrose 50 % injection 12.5 g, 12.5 g, intravenous, q15 min PRN, Deacon Brand MD    dextrose 50 % injection 25 g, 25 g, intravenous, q15 min PRN, Pankaj Dominguez PA-C    dextrose 50 % injection 25 g, 25 g, intravenous, q15 min PRN, Deacon Brand MD    enoxaparin (Lovenox) syringe 40 mg, 40 mg, subcutaneous, Daily, Deacon Brand MD, 40 mg at 12/13/24 0926    glucagon (Glucagen) injection 1 mg, 1 mg, intramuscular, q15 min PRN, Pankaj Dominguez PA-C    glucagon (Glucagen) injection 1 mg, 1 mg, intramuscular, q15 min PRN, Pankaj Dominguez PA-C    glucagon (Glucagen) injection 1 mg, 1 mg, intramuscular, q15 min PRN, Deacon Brand MD    glucagon (Glucagen) injection 1 mg, 1 mg, intramuscular, q15 min PRN, Deacon Brand MD    [START ON 12/16/2024] pneumococcal conjugate 20-valent (PREVNAR 20) vaccine, 0.5 mL, intramuscular, Once **AND** [START ON 12/16/2024] mening vac A,C,Y,W135 dip (PF) (MENVEO) 10-5 mcg/0.5 mL injection 0.5 mL, 0.5 mL, intramuscular, Once **AND** [START ON 12/16/2024] meningococcal B vaccine,4-comp (Bexsero) vaccine 0.5 mL, 0.5 mL, intramuscular, Once **AND** [START ON 12/16/2024] haemophilus b conjugate (ActHIB) 10 mcg/0.5 mL vaccine 0.5 mL, 0.5 mL, intramuscular, Once, LEE Powell    hydromorphone PCA 0.5 mg/mL in NS opioid naive, , intravenous, Continuous, LEE Powell, Rate Verify at 12/13/24  1200    insulin glargine (Lantus) injection 25 Units, 25 Units, subcutaneous, q24h, Deacon Brand MD, 25 Units at 12/13/24 1605    insulin regular (HumuLIN, NovoLIN) bolus from bag 2-6 Units, 2-6 Units, intravenous, PRN, Pankaj Dominguez PA-C    insulin regular 100 unit/100 mL (1 unit/mL) in 0.9 % NaCl infusion, 0-20 Units/hr, intravenous, Continuous, Pankaj Dominguez PA-C, Last Rate: 0.8 mL/hr at 12/13/24 1628, 0.8 Units/hr at 12/13/24 1628    ketorolac (Toradol) injection 15 mg, 15 mg, intravenous, 4x daily, LEE Powell, 15 mg at 12/13/24 1329    labetaloL (Normodyne,Trandate) injection 10 mg, 10 mg, intravenous, q4h PRN, Chichi Plummer MD, 10 mg at 12/13/24 0157    lactated Ringer's infusion, 100 mL/hr, intravenous, Continuous, LEE Powell, Last Rate: 100 mL/hr at 12/13/24 1500, 100 mL/hr at 12/13/24 1500    magnesium sulfate 2 g in sterile water for injection 50 mL, 2 g, intravenous, q6h PRN, Pankaj Dominguez PA-C    magnesium sulfate 4 g in sterile water for injection 100 mL, 4 g, intravenous, q6h PRN, Pankaj Dominguez PA-C, Stopped at 12/13/24 0030    naloxone (Narcan) injection 0.2 mg, 0.2 mg, intravenous, PRN, LEE Powell    pantoprazole (ProtoNix) injection 40 mg, 40 mg, intravenous, Daily, Pankaj Dominguez PA-C, 40 mg at 12/13/24 0926    potassium chloride 20 mEq in sterile water for injection 100 mL, 20 mEq, intravenous, q6h PRN, Pankja Dominguez PA-C    ropivacaine (PF) in NS cmpd (Naropin) 1000 mg/500 mL (0.2%) infusion, 14 mL/hr, infiltration, Continuous, Gokul Aburto DO, Last Rate: 14 mL/hr at 12/12/24 0744, 14 mL/hr at 12/12/24 0744    Facility-Administered Medications Ordered in Other Encounters:     heparin flush 100 unit/mL syringe 500 Units, 500 Units, intra-catheter, PRN, Adrienne Malloy, JUAN-CNP, 500 Units at 08/05/24 1344       Past Medical History  He has a past medical history of Anxiety, Arthritis, BCC (basal cell carcinoma), eyelid, right,  Diabetes mellitus (Multi), Gastroesophageal reflux disease without esophagitis (10/05/2023), Hemorrhoids (11/03/2023), HTN (hypertension), Leg cramps (10/05/2023), Oropharyngeal dysphagia (10/05/2023), RADHA (obstructive sleep apnea), Pancreatic cancer (Multi), Personal history of other malignant neoplasm of skin, Type 2 diabetes mellitus, and Vision loss.    Surgical History  He has a past surgical history that includes Tonsillectomy; Vasectomy; Testicle surgery; Esophagogastroduodenoscopy; Colonoscopy; Eyelid carcinoma excision; Rotator cuff repair (Bilateral); and Knee arthroscopy w/ debridement (Left).     Social History  He reports that he has never smoked. He has quit using smokeless tobacco.  His smokeless tobacco use included chew. He reports that he does not currently use alcohol after a past usage of about 1.0 standard drink of alcohol per week. He reports that he does not use drugs.    Family History  Family History   Problem Relation Name Age of Onset    Diabetes Mother      Ovarian cancer Mother      Liver cancer Father      Lung cancer Father      Colon cancer Father      Hypertension Father      Stroke Maternal Grandmother      Diabetes Maternal Grandmother      Liver cancer Maternal Grandfather      Lung cancer Paternal Grandfather          Allergies  Farxiga [dapagliflozin], Glipizide, Januvia [sitagliptin], Metformin, Mounjaro [tirzepatide], and Tramadol    Review of Systems As above      Physical Exam  Vitals:    12/13/24 1100   BP: (!) 145/113   Pulse: 103   Resp: 16   Temp:    SpO2: 95%   CCO3   HEENT: PEERLA, Dubhoff in place/ No Thyromegaly  Chest: CTA, GBAE  Abdo: +BS, Surg scar - in band., soft, nt, no HSM   Neuro: No delay in relaxation phase  LE: +pp no edema     ROS, PMH, FH/SH, surgical history and allergies have been reviewed.    Last Recorded Vitals  Blood pressure (!) 145/113, pulse 103, temperature 36.6 °C (97.9 °F), temperature source Temporal, resp. rate 16, height 1.702 m (5'  "7.01\"), weight 98 kg (216 lb 0.8 oz), SpO2 95%.    Relevant Results  Results from last 7 days   Lab Units 12/13/24  1101 12/13/24  1013 12/13/24  0801 12/13/24  0658 12/13/24  0601 12/13/24  0100 12/13/24  0014 12/12/24  1813 12/12/24  1754   POCT GLUCOSE mg/dL 180* 177* 168* 143* 126*   < >  --    < >  --    GLUCOSE mg/dL  --   --   --   --   --   --  183*  183*  --  277*    < > = values in this interval not displayed.       Current Facility-Administered Medications:     acetaminophen (Ofirmev) injection 1,000 mg, 1,000 mg, intravenous, q6h JAN, Pankaj Dominguez PA-C, 1,000 mg at 12/13/24 0621    calcium gluconate 1 g in sodium chloride (iso) IV 50 mL, 1 g, intravenous, q6h PRN, Pankaj Dominguez PA-C    calcium gluconate 2 g in sodium chloride (iso)  mL, 2 g, intravenous, q6h PRN, Pankaj Dominguez PA-C    dextrose 50 % injection 12.5 g, 12.5 g, intravenous, q15 min PRN, YVETTE Pelayo-RADHA    dextrose 50 % injection 25 g, 25 g, intravenous, q15 min PRN, Pankaj Dominguez PA-C    enoxaparin (Lovenox) syringe 40 mg, 40 mg, subcutaneous, Daily, Deacon Brand MD, 40 mg at 12/13/24 0926    glucagon (Glucagen) injection 1 mg, 1 mg, intramuscular, q15 min PRN, Pankaj Dominguez PA-C    glucagon (Glucagen) injection 1 mg, 1 mg, intramuscular, q15 min PRN, Pankaj Dominguez PA-C    [START ON 12/16/2024] pneumococcal conjugate 20-valent (PREVNAR 20) vaccine, 0.5 mL, intramuscular, Once **AND** [START ON 12/16/2024] mening vac A,C,Y,W135 dip (PF) (MENVEO) 10-5 mcg/0.5 mL injection 0.5 mL, 0.5 mL, intramuscular, Once **AND** [START ON 12/16/2024] meningococcal B vaccine,4-comp (Bexsero) vaccine 0.5 mL, 0.5 mL, intramuscular, Once **AND** [START ON 12/16/2024] haemophilus b conjugate (ActHIB) 10 mcg/0.5 mL vaccine 0.5 mL, 0.5 mL, intramuscular, Once, LEE Powell    hydromorphone PCA 0.5 mg/mL in NS opioid naive, , intravenous, Continuous, LEE Powell, New Syringe/Cartridge at 12/13/24 0909    insulin regular " (HumuLIN, NovoLIN) bolus from bag 2-6 Units, 2-6 Units, intravenous, PRN, Pankaj Dominguez PA-C    insulin regular 100 unit/100 mL (1 unit/mL) in 0.9 % NaCl infusion, 0-20 Units/hr, intravenous, Continuous, Pankaj Dominguez PA-C, Last Rate: 0.47 mL/hr at 12/13/24 1102, 0.47 Units/hr at 12/13/24 1102    ketorolac (Toradol) injection 15 mg, 15 mg, intravenous, 4x daily, ELE Powell, 15 mg at 12/13/24 0939    labetaloL (Normodyne,Trandate) injection 10 mg, 10 mg, intravenous, q4h PRN, Chichi Plummer MD, 10 mg at 12/13/24 0157    lactated Ringer's infusion, 100 mL/hr, intravenous, Continuous, LEE Powell, Last Rate: 100 mL/hr at 12/13/24 1100, 100 mL/hr at 12/13/24 1100    magnesium sulfate 2 g in sterile water for injection 50 mL, 2 g, intravenous, q6h PRN, Pankaj Dominguez PA-C    magnesium sulfate 4 g in sterile water for injection 100 mL, 4 g, intravenous, q6h PRN, Pankaj Dominguez PA-C, Stopped at 12/13/24 0030    naloxone (Narcan) injection 0.2 mg, 0.2 mg, intravenous, PRN, LEE Powell    pantoprazole (ProtoNix) injection 40 mg, 40 mg, intravenous, Daily, Pankaj Dominguez PA-C, 40 mg at 12/13/24 0926    potassium chloride 20 mEq in sterile water for injection 100 mL, 20 mEq, intravenous, q6h PRN, Pankaj Dominguez PA-C    ropivacaine (PF) in NS cmpd (Naropin) 1000 mg/500 mL (0.2%) infusion, 14 mL/hr, infiltration, Continuous, Gokul Aburto DO, Last Rate: 14 mL/hr at 12/12/24 0744, 14 mL/hr at 12/12/24 0744    Facility-Administered Medications Ordered in Other Encounters:     heparin flush 100 unit/mL syringe 500 Units, 500 Units, intra-catheter, PRN, Adrienne Malloy, APRN-CNP, 500 Units at 08/05/24 1344      Assessment/Plan   Assessment & Plan  Malignant neoplasm of head of pancreas (Multi)    # Inpatient Hyperglycemia Management   Whipple on 12/12 for pancreatic adenocarcinoma    Endocrine recommendations - 12/13/24:   Diet: NPO   Anticipating NPO today 12/12 and possibly  tomorrow - 12/13    (Insulin Drip with total requirements approx 30 units.)    Please administer Lantus of 25 units.   Insulin drip to be kept on for 2 hours.   Then discontinue insulin drip - Bridging.    Sliding Scale # 1 (Lispro 1 unit for every 50>150) Q 6 hours.    In the event patient becomes hypoglycemic - administer D5 W. Do not hold Glargine as patient does not have insulin production.     Outpatient considerations include insulin pump.   Patient is aware and agreeable to plan.   Endocrine pager [phone]   Patient is discussed, seen, and examined by Dr. Bran.   Jennifer Reyes MD

## 2024-12-13 NOTE — SIGNIFICANT EVENT
Postoperative Check Note    Subjective  Nate Alvarez is a 54 y.o. male who is now POD0 s/p total pancreatectomy, splenectomy, duodenectomy, distal gastrectomy, hepaticojejunostomy, gastrojejunostomy, and SMV resection/reconstruction. Postoperatively, patient feels well, and denies fevers/chills, n/v, chest pain, shortness of breath. Feels his pain is well-controlled and appropriate for this time. Has no other concerns.    Objective  Vitals:  Visit Vitals  /83   Pulse 82   Temp 36.3 °C (97.3 °F) (Temporal)   Resp 17       Physical Exam:  GEN: No acute distress. Alert, awake and conversive.  HEENT: Sclera anicteric. Moist mucous membranes.  RESP: Breathing non-labored, equal chest rise. On 2L NC.  CV: Regular rate, normotensive  GI: Abdomen soft, nondistended, appropriately tender for postoperative course.   : Johansen in place with clear yellow urine.  MSK: No gross deformities. Moves all extremities spontaneously.  NEURO: Alert and oriented x3. No focal deficits.  PSYCH: Appropriate mood and affect.  SKIN: Laparotomy well-approximated with dermabond.    Most recent labs:            10.4     12.4>-----<111              30.8     140; 140  107; 107  13; 13                  ----------------<183; 183     4.4; 4.4  21; 21  0.90; 0.90            Mg 2.45         Assessment  Nate Alvarez is a 54 y.o. male who is now POD0 s/p total pancreatectomy, splenectomy, duodenectomy, distal gastrectomy, hepaticojejunostomy, gastrojejunostomy, and SMV resection/reconstruction.  Patient is in stable condition, appropriate for postoperative course. The plan is as follows:    - Will continue to optimize pain management, current pain regimen dilaudid and tylenol  - Will continue to monitor resuscitation based on patient's urine output.  - Will continue to monitor patient's lactate.   - NPO   -  LR @ 100mL/hr     Andrez Faith MD  PGY-1 General Surgery  Surgical Oncology [id number]

## 2024-12-13 NOTE — PROGRESS NOTES
Physical Therapy    Physical Therapy Evaluation    Patient Name: Nate Alvarez  MRN: [id number]  Department[facility]  Room: 05/05-A  Today's Date: 12/13/2024   Time Calculation  Start Time: 1031  Stop Time: 1052  Time Calculation (min): 21 min    Assessment/Plan   PT Assessment  PT Assessment Results: Decreased endurance, Decreased mobility, Pain  Rehab Prognosis: Excellent  Barriers to Discharge Home: No anticipated barriers  Evaluation/Treatment Tolerance: Patient tolerated treatment well  Medical Staff Made Aware: Yes  Strengths: Coping skills, Attitude of self, Premorbid level of function, Support of Caregivers  Barriers to Participation: Comorbidities  End of Session Communication: Bedside nurse  Assessment Comment: Pt primarily limited by pain during PT assessment, pt required SBA for bed mobility, transfers and short distance ambulation using FWW. Pt will benefit from continued skilled PT to address deficits and facilitate return to PLOF  End of Session Patient Position: Up in chair, Alarm off, not on at start of session  IP OR SWING BED PT PLAN  Inpatient or Swing Bed: Inpatient  PT Plan  Treatment/Interventions: Bed mobility, Transfer training, Gait training, Balance training, Neuromuscular re-education, Strengthening, Endurance training, Therapeutic exercise, Therapeutic activity, Positioning, Stair training, Home exercise program  PT Plan: Ongoing PT  PT Frequency: 4 times per week  PT Discharge Recommendations: Low intensity level of continued care (HHC)  Equipment Recommended upon Discharge:  (TBD)  PT Recommended Transfer Status: Assistive device, Stand by assist  PT - OK to Discharge: Yes    Subjective   General Visit Information:  General  Reason for Referral: S/p total pancreatectomy and splenectomy, cholecystectomy, duodenectomy, distal gastrectomy, hepaticojejunostomy, gastrojejunostomy, and SMV resection/reconstruction with Dr. Gamble on 12/12.  Past Medical History Relevant to Rehab:  pancreatic cancer, anxiety,  BCC of R eyelid, , IDDM2, GERD,  HTN, RADHA, pancreatic adenocarcinoma  Family/Caregiver Present: Yes  Caregiver Feedback: wife present  Prior to Session Communication: Bedside nurse  Patient Position Received: Bed, 3 rail up  General Comment: Pt supine in bed, pleasant and agreeable to PT. a-line, 2 abdominal drains, PCA pump, pain block, tele, inman  Home Living:  Home Living  Type of Home: House  Lives With: Spouse  Home Adaptive Equipment: None  Home Layout: Two level, Able to live on main level with bedroom/bathroom  Home Access: Level entry  Bathroom Shower/Tub: Walk-in shower  Prior Level of Function:  Prior Function Per Pt/Caregiver Report  Level of [place]: Independent with ADLs and functional transfers  Receives Help From: Family  ADL Assistance: Independent  Homemaking Assistance: Independent  Ambulatory Assistance: Independent  Vocational: Full time employment ([employer])  Prior Function Comments: (+) drives  Precautions:  Precautions  Medical Precautions: Abdominal precautions  Post-Surgical Precautions: Abdominal surgery precautions  Precautions Comment: MAP 65-85     Vital Signs (Past 2hrs)        Date/Time Vitals Session Patient Position Pulse Resp SpO2 BP MAP (mmHg)    12/13/24 1031 Pre PT  --  101  21  96 %  127/79  95     12/13/24 1051 Post PT  --  100  19  96 %  145/73  94     12/13/24 1100 --  --  103  16  95 %  145/113  123                         Objective   Pain:  Pain Assessment  Pain Assessment: 0-10  0-10 (Numeric) Pain Score: 4  Pain Type: Surgical pain  Pain Location: Abdomen  Pain Orientation: Mid  Pain Interventions: Repositioned  Response to Interventions: Increase in pain  Cognition:  Cognition  Overall Cognitive Status: Within Functional Limits  Orientation Level: Oriented X4    General Assessments:    Activity Tolerance  Endurance: Tolerates 10 - 20 min exercise with multiple rests  Early Mobility/Exercise Safety Screen: Proceed with mobilization -  No exclusion criteria met  Activity Tolerance Comments: pain limiting    Sensation  Light Touch: No apparent deficits    Strength  Strength Comments: BLE WFL  Strength  Strength Comments: BLE WFL    Perception  Inattention/Neglect: Appears intact      Coordination  Movements are Fluid and Coordinated: Yes    Postural Control  Postural Control: Within Functional Limits    Static Sitting Balance  Static Sitting-Balance Support: Feet supported  Static Sitting-Level of Assistance: Close supervision  Static Sitting-Comment/Number of Minutes: 5 min    Static Standing Balance  Static Standing-Balance Support: Bilateral upper extremity supported  Static Standing-Level of Assistance: Close supervision  Functional Assessments:  Bed Mobility  Bed Mobility: Yes  Bed Mobility 1  Bed Mobility 1: Supine to sitting  Level of Assistance 1: Close supervision  Bed Mobility Comments 1: HOB elevated, cues for log roll    Transfers  Transfer: Yes  Transfer 1  Transfer From 1: Bed to, Stand to  Transfer to 1: Stand, Chair with arms  Technique 1: Sit to stand, Stand to sit  Transfer Device 1: Walker  Transfer Level of Assistance 1: Close supervision  Trials/Comments 1: x1 trial    Ambulation/Gait Training  Ambulation/Gait Training Performed: Yes  Ambulation/Gait Training 1  Surface 1: Level tile  Device 1: Rolling walker  Assistance 1: Close supervision  Comments/Distance (ft) 1: lateral pivoting steps from bed to chair ~3ft, deferred further distance 2/2 pain    Stairs  Stairs: No  Extremity/Trunk Assessments:  RUE   RUE : Within Functional Limits  LUE   LUE: Within Functional Limits  RLE   RLE : Within Functional Limits  LLE   LLE : Within Functional Limits  Outcome Measures:  [facility] Basic Mobility  Turning from your back to your side while in a flat bed without using bedrails: A little  Moving from lying on your back to sitting on the side of a flat bed without using bedrails: A little  Moving to and from bed to chair (including a  wheelchair): A little  Standing up from a chair using your arms (e.g. wheelchair or bedside chair): A little  To walk in hospital room: A little  Climbing 3-5 steps with railing: A lot  Basic Mobility - Total Score: 17    FSS-ICU  Ambulation: Walks <50 feet with any assistance x1 or walks any distance with assistance x2 people  Rolling: Supervision or set-up only  Sitting: Supervision or set-up only  Transfer Sit-to-Stand: Supervision or set-up only  Transfer Supine-to-Sit: Supervision or set-up only  Total Score: 21      Early Mobility/Exercise Safety Screen: Proceed with mobilization - No exclusion criteria met  ICU Mobility Scale: Transferring bed to chair [5]    Encounter Problems       Encounter Problems (Active)       Balance       Pt will maintain dynamic balance during functional activities independently        Start:  12/13/24    Expected End:  12/27/24               Mobility       STG - Patient will ambulate >150ft independently        Start:  12/13/24    Expected End:  12/27/24            STG - Patient will ascend and descend a flight of stairs independently        Start:  12/13/24    Expected End:  12/27/24               PT Transfers       STG - Patient to transfer to and from sit to supine independently        Start:  12/13/24    Expected End:  12/27/24            STG - Patient will transfer sit to and from stand independently        Start:  12/13/24    Expected End:  12/27/24                   Education Documentation  Precautions, taught by Jaylin Goyal PT at 12/13/2024 12:17 PM.  Learner: Patient  Readiness: Acceptance  Method: Explanation  Response: Verbalizes Understanding  Comment: rehab POC, abdominal precautions    Body Mechanics, taught by Jaylin Goyal PT at 12/13/2024 12:17 PM.  Learner: Patient  Readiness: Acceptance  Method: Explanation  Response: Verbalizes Understanding  Comment: rehab POC, abdominal precautions    Mobility Training, taught by Jaylin Goyal PT at 12/13/2024 12:17  PM.  Learner: Patient  Readiness: Acceptance  Method: Explanation  Response: Verbalizes Understanding  Comment: rehab POC, abdominal precautions    Education Comments  No comments found.        Jaylin Goyal, PT

## 2024-12-13 NOTE — H&P
Surgical Intensive Care Unit H& P     History Of Present Illness  Nate Alvarez is a 54 y.o. male presenting to SICU sp  total pancreatectomy and splenectomy, cholecystectomy, duodenectomy, distal gastrectomy, hepaticojejunostomy, gastrojejunostomy, and SMV resection/reconstruction on 12/12.    His PMHx is significant for pancreatic cancer, anxiety,  BCC of R eyelid, , IDDM2, GERD,  HTN, RADHA, pancreatic adenocarcinoma s/p chemotherapy who presents to the SICU on 12/12 s/p Whipple with total pancreatectomy.      He first presented in 4/2024 with weight loss of >50 pounds and new onset diabetes. Imaging, EUS and biopsy confirmed adenocarcinoma of the pancreas involving the GDA, celiac axis, SMV, deemed borderline resectable. Patient follows with Dr. You in Oncology. Completed neoadjuvant chemo with mFOLFIRINOX and added fosaprepitant, 6/3/24 - 11/14/24, total 12 cycles, tolerated well.      OR Course:   EBL: 1250 ml  UOP: 1500 ml  Crystalloid: 6000 ml  Colloid: 1250 ml  Products: 2 uRBC  Intubation: not difficult, grade 1  Lines/Access: Left radial A line, PIV    Past Medical History  Past Medical History:   Diagnosis Date    Anxiety     Arthritis     BCC (basal cell carcinoma), eyelid, right     s/p resection    Diabetes mellitus (Multi)     Gastroesophageal reflux disease without esophagitis 10/05/2023    Hemorrhoids 11/03/2023    HTN (hypertension)     Borderline HTN, no meds    Leg cramps 10/05/2023    Oropharyngeal dysphagia 10/05/2023    RADHA (obstructive sleep apnea)     no PAP    Pancreatic cancer (Multi)     Dx 4/2024, Treated with chemotherapy completed 11/12/24    Personal history of other malignant neoplasm of skin     Type 2 diabetes mellitus     Vision loss     uses corrective lens       Surgical History  Past Surgical History:   Procedure Laterality Date    COLONOSCOPY      ESOPHAGOGASTRODUODENOSCOPY      EYELID CARCINOMA EXCISION      KNEE ARTHROSCOPY W/ DEBRIDEMENT Left     ROTATOR CUFF  "REPAIR Bilateral     TESTICLE SURGERY      Hydrocelectomy    TONSILLECTOMY      VASECTOMY          Social History  He reports that he has never smoked. He has quit using smokeless tobacco.  His smokeless tobacco use included chew. He reports that he does not currently use alcohol after a past usage of about 1.0 standard drink of alcohol per week. He reports that he does not use drugs.    Family History  Family History   Problem Relation Name Age of Onset    Diabetes Mother      Ovarian cancer Mother      Liver cancer Father      Lung cancer Father      Colon cancer Father      Hypertension Father      Stroke Maternal Grandmother      Diabetes Maternal Grandmother      Liver cancer Maternal Grandfather      Lung cancer Paternal Grandfather          Allergies  Farxiga [dapagliflozin], Glipizide, Januvia [sitagliptin], Metformin, Mounjaro [tirzepatide], and Tramadol    Review of Systems   Reason unable to perform ROS: Limited , drowsy post op.        Physical Exam     Last Recorded Vitals  Blood pressure 161/90, pulse 88, temperature 36.3 °C (97.3 °F), temperature source Temporal, resp. rate 16, height 1.702 m (5' 7.01\"), weight 98 kg (216 lb 0.8 oz), SpO2 98%.    Relevant Results  Scheduled medications  acetaminophen, 1,000 mg, intravenous, q6h JAN  pantoprazole, 40 mg, intravenous, Daily      Continuous medications  insulin regular infusion, 0-20 Units/hr, Last Rate: 0.47 Units/hr (12/13/24 0749)  lactated Ringer's, 100 mL/hr, Last Rate: 100 mL/hr (12/12/24 1900)  ropivacaine (PF) in NS cmpd, 14 mL/hr  ropivacaine (PF) in NS cmpd, 14 mL/hr, Last Rate: 14 mL/hr (12/12/24 0744)      PRN medications  PRN medications: calcium gluconate, calcium gluconate, dextrose, dextrose, glucagon, glucagon, HYDROmorphone, HYDROmorphone, HYDROmorphone, insulin regular, labetaloL, magnesium sulfate, magnesium sulfate, potassium chloride       Results from last 7 days   Lab Units 12/13/24  0014 12/12/24  1754 12/06/24  1128   WBC AUTO " x10*3/uL 12.4* 12.6* 4.9   HEMOGLOBIN g/dL 10.4* 10.8* 13.1*   PLATELETS AUTO x10*3/uL 111* 117* 129*      Results from last 7 days   Lab Units 12/13/24  0014 12/12/24  1754 12/06/24  1128 12/06/24  1128   SODIUM mmol/L 140  140 139  --  141   POTASSIUM mmol/L 4.4  4.4 5.3  --  4.0   CHLORIDE mmol/L 107  107 104  --  108*   CO2 mmol/L 21  21 18*  --  24   BUN mg/dL 13  13 14  --  20   CREATININE mg/dL 0.90  0.90 0.95  --  0.79   GLUCOSE mg/dL 183*  183* 277*  --  182*   MAGNESIUM mg/dL 2.45* 1.52*   < >  --    PHOSPHORUS mg/dL 2.7  2.7 4.9   < >  --     < > = values in this interval not displayed.      Results from last 7 days   Lab Units 12/13/24  0014 12/12/24 1754   INR  1.4* 1.5*   PROTIME seconds 15.7* 16.4*   APTT seconds 27 31         Assessment/Plan   Assessment & Plan  Malignant neoplasm of head of pancreas (Multi)    Nate Alvarez is a 54 y.o. male presenting to SICU sp  total pancreatectomy and splenectomy, cholecystectomy, duodenectomy, distal gastrectomy, hepaticojejunostomy, gastrojejunostomy, and SMV resection/reconstruction on 12/12.    His PMHx is significant for pancreatic cancer, anxiety,  BCC of R eyelid, , IDDM2, GERD,  HTN, RADHA, pancreatic adenocarcinoma s/p chemotherapy who presents to the SICU on 12/12 s/p Whipple with total pancreatectomy.      NEURO: Hx of anxiety. Drowsy post op. Post op pain   - Bilateral [place] blocks with catheters performed preoperatively on 12/12   - ongoing neuro and pain assessments  -PT/OT consult   - Home meds: Ativan prn anxiety. Had been taking oxycodone and tylenol for pancreatic pain pre-op.     CV: H/O HTN, previously on lisinopril. Bradycardia and 1st degree AV heart block found on PAT EKG, asytmptomatic, was worked up by Dr. Abreu, had normal stress test, bradycardia on follow up EKG, ultimately cleared for surgery. Last Echo (12/10/24): LVEF 63%, aortic root mildly enlarged, moderately increased septal thickness.   - Goals MAP 65-85.  -  continuous EKG, ABP monitoring  -Trend lactate  - Home meds: None      PULM: Hx of RADHA not on CPAP. Extubated postoperatively without difficulty  - Wean FiO2 as tolerated.    - Q1h incentive spirometer while awake  - additional pulm toilet prn    - F/U post op CXR     GI: Hx of GERD. Pancreatic adenocarcinoma s/p total pancreatectomy and splenectomy, cholecystectomy, duodenectomy, distal gastrectomy, hepaticojejunostomy, gastrojejunostomy, and SMV resection/reconstruction on 12/12.    -Liver US with dopplers now  - Endocrine consult tomorrow  - NGT to LIWS  - PPI for GI prophylaxis  - Obtain post op and daily LFTs.  - Home meds: protonix     : No history of renal disease  - Maintenance IVF.    - Volume resuscitation as needed.    - Maintain U/O >1-2ml/kg/hr.    - Check renal function panel post op and daily.   - Replete electrolytes to goal K>4, Mg>2, Phos>2.5, ionized Ca>1.10.     HEME: Vitamin D Deficiency. Acute surgical blood loss anemia.   - Check CBC and coags post op, then daily if stable  - lovenox sc starting POD 1 if appropriate  - Home meds: Vit D     ENDO: Hx of insulin dependent DM. Pancreatic adenocarcinoma s/p whipple procedure, total pancreatectomy. Initial  upon arrival to SICU  -Start insulin infusion  - Hourly BG while on the infusion  -Consult endocrinology tomorrow     I have discussed the case with the resident/advanced practice provider. I have personally performed a history, physical exam, and my own medical decision making. I have reviewed the note and agree with the findings and plan with the following exceptions as identified below. I have personally provided 37 minutes of critical care time exclusive of time spent on separately billable procedures. Time includes review of laboratory data, radiology results, discussion with consultants, family and monitoring for potential decompensation. Interventions were performed as documented above.      Family/Surrogate updated with plan of  care.  Code status addressed/up to date.

## 2024-12-14 LAB
ALBUMIN SERPL BCP-MCNC: 3.2 G/DL (ref 3.4–5)
ALP SERPL-CCNC: 59 U/L (ref 33–120)
ALT SERPL W P-5'-P-CCNC: 57 U/L (ref 10–52)
ANION GAP SERPL CALC-SCNC: 12 MMOL/L (ref 10–20)
AST SERPL W P-5'-P-CCNC: 56 U/L (ref 9–39)
BILIRUB SERPL-MCNC: 0.6 MG/DL (ref 0–1.2)
BUN SERPL-MCNC: 11 MG/DL (ref 6–23)
CALCIUM SERPL-MCNC: 8.4 MG/DL (ref 8.6–10.6)
CHLORIDE SERPL-SCNC: 101 MMOL/L (ref 98–107)
CO2 SERPL-SCNC: 28 MMOL/L (ref 21–32)
CREAT SERPL-MCNC: 0.68 MG/DL (ref 0.5–1.3)
EGFRCR SERPLBLD CKD-EPI 2021: >90 ML/MIN/1.73M*2
ERYTHROCYTE [DISTWIDTH] IN BLOOD BY AUTOMATED COUNT: 14.9 % (ref 11.5–14.5)
GLUCOSE BLD MANUAL STRIP-MCNC: 205 MG/DL (ref 74–99)
GLUCOSE BLD MANUAL STRIP-MCNC: 215 MG/DL (ref 74–99)
GLUCOSE BLD MANUAL STRIP-MCNC: 219 MG/DL (ref 74–99)
GLUCOSE BLD MANUAL STRIP-MCNC: 221 MG/DL (ref 74–99)
GLUCOSE SERPL-MCNC: 234 MG/DL (ref 74–99)
HCT VFR BLD AUTO: 26.3 % (ref 41–52)
HGB BLD-MCNC: 8.9 G/DL (ref 13.5–17.5)
MAGNESIUM SERPL-MCNC: 1.92 MG/DL (ref 1.6–2.4)
MCH RBC QN AUTO: 31.1 PG (ref 26–34)
MCHC RBC AUTO-ENTMCNC: 33.8 G/DL (ref 32–36)
MCV RBC AUTO: 92 FL (ref 80–100)
NRBC BLD-RTO: 0 /100 WBCS (ref 0–0)
PLATELET # BLD AUTO: 140 X10*3/UL (ref 150–450)
POTASSIUM SERPL-SCNC: 4.2 MMOL/L (ref 3.5–5.3)
PROT SERPL-MCNC: 5 G/DL (ref 6.4–8.2)
RBC # BLD AUTO: 2.86 X10*6/UL (ref 4.5–5.9)
SODIUM SERPL-SCNC: 137 MMOL/L (ref 136–145)
WBC # BLD AUTO: 18.8 X10*3/UL (ref 4.4–11.3)

## 2024-12-14 PROCEDURE — 1170000001 HC PRIVATE ONCOLOGY ROOM DAILY

## 2024-12-14 PROCEDURE — 85027 COMPLETE CBC AUTOMATED: CPT

## 2024-12-14 PROCEDURE — 36416 COLLJ CAPILLARY BLOOD SPEC: CPT

## 2024-12-14 PROCEDURE — 99024 POSTOP FOLLOW-UP VISIT: CPT | Performed by: SURGERY

## 2024-12-14 PROCEDURE — 2500000004 HC RX 250 GENERAL PHARMACY W/ HCPCS (ALT 636 FOR OP/ED)

## 2024-12-14 PROCEDURE — 99232 SBSQ HOSP IP/OBS MODERATE 35: CPT | Performed by: INTERNAL MEDICINE

## 2024-12-14 PROCEDURE — 2500000002 HC RX 250 W HCPCS SELF ADMINISTERED DRUGS (ALT 637 FOR MEDICARE OP, ALT 636 FOR OP/ED): Performed by: STUDENT IN AN ORGANIZED HEALTH CARE EDUCATION/TRAINING PROGRAM

## 2024-12-14 PROCEDURE — 2500000002 HC RX 250 W HCPCS SELF ADMINISTERED DRUGS (ALT 637 FOR MEDICARE OP, ALT 636 FOR OP/ED)

## 2024-12-14 PROCEDURE — 80053 COMPREHEN METABOLIC PANEL: CPT

## 2024-12-14 PROCEDURE — 83735 ASSAY OF MAGNESIUM: CPT

## 2024-12-14 PROCEDURE — 82947 ASSAY GLUCOSE BLOOD QUANT: CPT

## 2024-12-14 PROCEDURE — 99231 SBSQ HOSP IP/OBS SF/LOW 25: CPT | Performed by: STUDENT IN AN ORGANIZED HEALTH CARE EDUCATION/TRAINING PROGRAM

## 2024-12-14 RX ORDER — INSULIN GLARGINE 100 [IU]/ML
32 INJECTION, SOLUTION SUBCUTANEOUS EVERY 24 HOURS
Status: DISCONTINUED | OUTPATIENT
Start: 2024-12-14 | End: 2024-12-16

## 2024-12-14 RX ORDER — INSULIN LISPRO 100 [IU]/ML
0-10 INJECTION, SOLUTION INTRAVENOUS; SUBCUTANEOUS
Status: DISCONTINUED | OUTPATIENT
Start: 2024-12-14 | End: 2024-12-18 | Stop reason: HOSPADM

## 2024-12-14 ASSESSMENT — COGNITIVE AND FUNCTIONAL STATUS - GENERAL
DAILY ACTIVITIY SCORE: 24
CLIMB 3 TO 5 STEPS WITH RAILING: A LITTLE
DAILY ACTIVITIY SCORE: 24
MOBILITY SCORE: 23
MOBILITY SCORE: 23
CLIMB 3 TO 5 STEPS WITH RAILING: A LITTLE

## 2024-12-14 ASSESSMENT — PAIN SCALES - GENERAL
PAINLEVEL_OUTOF10: 4
PAINLEVEL_OUTOF10: 3
PAINLEVEL_OUTOF10: 3

## 2024-12-14 ASSESSMENT — PAIN - FUNCTIONAL ASSESSMENT
PAIN_FUNCTIONAL_ASSESSMENT: 0-10
PAIN_FUNCTIONAL_ASSESSMENT: 0-10

## 2024-12-14 NOTE — PROGRESS NOTES
[facility]  DEPARTMENT OF SURGERY    PROGRESS NOTE    SUBJECTIVE  No acute events overnight.  Transferred to floor from ICU yesterday evening.  Doing well.  No nausea or vomiting. No other concerns at this time.    OBJECTIVE  Vitals:  Temp:  [36.2 °C (97.2 °F)-36.7 °C (98.1 °F)] 36.3 °C (97.3 °F)  Heart Rate:  [] 76  Resp:  [12-21] 18  BP: (102-162)/() 102/66  Arterial Line BP 1: (127-142)/(65-79) 134/79    I/Os:  I/O last 3 completed shifts:  In: 4321.2 (44.1 mL/kg) [I.V.:2804.3 (28.6 mL/kg); IV Piggyback:1516.9]  Out: 4550 (46.4 mL/kg) [Urine:3595 (1 mL/kg/hr); Emesis/NG output:250; Drains:705]  Weight: 98 kg     Exam:  Gen:  NAD, non-toxic appearing  HEENT:  Atraumatic, normocephalic  Eyes:  No scleral icterus  Card:  RRR  Resp:  Non-labored breathing on RA  Abd:  Soft, appropriately tender, mildly distended, midline incision clean, dry, intact, NHI x2 serosang   Ext:  WWP, no swelling of BLE  MSK:  ESTES  Neuro:  AOx3, no gross neurological deficits  Psych:  Appropriate mood and affect    Labs:  CBC:   Lab Results   Component Value Date    WBC 18.8 (H) 12/14/2024    RBC 2.86 (L) 12/14/2024     BMP:   Lab Results   Component Value Date    GLUCOSE 234 (H) 12/14/2024    CO2 28 12/14/2024    BUN 11 12/14/2024    CREATININE 0.68 12/14/2024    CALCIUM 8.4 (L) 12/14/2024     Coagulation:   Lab Results   Component Value Date    INR 1.4 (H) 12/13/2024    APTT 27 12/13/2024       Imaging:  None today      ASSESSMENT:  Mr. Alvarez is a 54M w/ PMH significant for DM, HTN, RADHA, pancreatic cancer s/p neoadjuvant chemo and now total pancreatectomy, splenectomy, cholecystectomy, duodenectomy, distal gastrectomy, hepaticojejunostomy, gastrojejunostomy, and SMV resection/reconstruction on 12/12.  Initially transferred to ICU for glycemic management, now progressing appropriately for postoperative course.    PLAN:  - Multimodal pain control: IV tylenol, add dilaudid PCA and Toradol   -  sips and chips, IVF LR 75cc/hr   - Will need creon when on FLD.  - Continue subcutaneous Lovenox 40mg daily   - Continue protonix 40mg daily   - Endocrinology consulted for glycemic management s/p total pancreatectomy  - Splenectomy vaccine POD4 (12/16)  - Johansen out, ToV.  - Keep Abdominal drains. No need for drain amylase in setting of total pancreatectomy.    D/w Dr. Lewis.    Vahe Luo MD

## 2024-12-14 NOTE — CARE PLAN
Problem: Skin  Goal: Decreased wound size/increased tissue granulation at next dressing change  12/14/2024 1200 by Prerna Burr  Outcome: Progressing  12/14/2024 1200 by Prerna Burr  Outcome: Progressing  Goal: Participates in plan/prevention/treatment measures  12/14/2024 1200 by Prerna Burr  Outcome: Progressing  12/14/2024 1200 by Prerna Burr  Outcome: Progressing  Goal: Prevent/manage excess moisture  12/14/2024 1200 by Prerna Burr  Outcome: Progressing  12/14/2024 1200 by Prerna Burr  Outcome: Progressing  Goal: Prevent/minimize sheer/friction injuries  12/14/2024 1200 by Prerna Burr  Outcome: Progressing  12/14/2024 1200 by Prerna Burr  Outcome: Progressing  Goal: Promote/optimize nutrition  12/14/2024 1200 by Prerna Burr  Outcome: Progressing  12/14/2024 1200 by Prerna Burr  Outcome: Progressing  Goal: Promote skin healing  12/14/2024 1200 by Prerna Burr  Outcome: Progressing  12/14/2024 1200 by Prerna Burr  Outcome: Progressing     Problem: Pain - Adult  Goal: Verbalizes/displays adequate comfort level or baseline comfort level  Outcome: Progressing     Problem: Safety - Adult  Goal: Free from fall injury  Outcome: Progressing     Problem: Discharge Planning  Goal: Discharge to home or other facility with appropriate resources  Outcome: Progressing     Problem: Chronic Conditions and Co-morbidities  Goal: Patient's chronic conditions and co-morbidity symptoms are monitored and maintained or improved  Outcome: Progressing

## 2024-12-14 NOTE — CARE PLAN
Problem: Skin  Goal: Decreased wound size/increased tissue granulation at next dressing change  Outcome: Progressing  Flowsheets (Taken 12/13/2024 0527 by Qing Lugo RN)  Decreased wound size/increased tissue granulation at next dressing change:   Promote sleep for wound healing   Utilize specialty bed per algorithm   Protective dressings over bony prominences  Goal: Participates in plan/prevention/treatment measures  Outcome: Progressing  Flowsheets (Taken 12/13/2024 0527 by Qing Lugo RN)  Participates in plan/prevention/treatment measures: Discuss with provider PT/OT consult  Goal: Prevent/manage excess moisture  Outcome: Progressing  Flowsheets (Taken 12/13/2024 0527 by Qing Lugo RN)  Prevent/manage excess moisture:   Cleanse incontinence/protect with barrier cream   Moisturize dry skin   Monitor for/manage infection if present   Use wicking fabric (obtain order)   Follow provider orders for dressing changes  Goal: Prevent/minimize sheer/friction injuries  Outcome: Progressing  Flowsheets (Taken 12/13/2024 0527 by Qing Lugo RN)  Prevent/minimize sheer/friction injuries:   Complete micro-shifts as needed if patient unable. Adjust patient position to relieve pressure points, not a full turn   Increase activity/out of bed for meals   Use pull sheet   HOB 30 degrees or less   Turn/reposition every 2 hours/use positioning/transfer devices   Utilize specialty bed per algorithm  Goal: Promote/optimize nutrition  Outcome: Progressing  Flowsheets (Taken 12/13/2024 0527 by Qing Lugo RN)  Promote/optimize nutrition: Monitor/record intake including meals  Goal: Promote skin healing  Outcome: Progressing  Flowsheets (Taken 12/13/2024 0527 by Qing Lugo RN)  Promote skin healing:   Assess skin/pad under line(s)/device(s)   Protective dressings over bony prominences   Turn/reposition every 2 hours/use positioning/transfer devices   Rotate device position/do not position  patient on device   Ensure correct size (line/device) and apply per  instructions

## 2024-12-14 NOTE — PROGRESS NOTES
Postop Pain HPI -   Palliative: relieved with IV analgesics and regional local anesthetics  Provocative: movement  Quality:  burning and aching  Radiation:  none  Severity:  2/10  Timing: constant    24-HOUR OPIOID CONSUMPTION:  Toradol x4, dilaudid PCA    Scheduled medications  enoxaparin, 40 mg, subcutaneous, Daily  [START ON 12/16/2024] pneumoc 20-tabatha conj-dip cr(PF), 0.5 mL, intramuscular, Once   And  [START ON 12/16/2024] mening vac A,C,Y,W135 dip (PF), 0.5 mL, intramuscular, Once   And  [START ON 12/16/2024] meningococcal B vaccine,4-comp, 0.5 mL, intramuscular, Once   And  [START ON 12/16/2024] haemophilus b conjugate, 0.5 mL, intramuscular, Once  insulin glargine, 25 Units, subcutaneous, q24h  insulin lispro, 0-5 Units, subcutaneous, q6h  ketorolac, 15 mg, intravenous, 4x daily  pantoprazole, 40 mg, intravenous, Daily      Continuous medications  HYDROmorphone,   lactated Ringer's, 100 mL/hr, Last Rate: 100 mL/hr (12/14/24 0515)  ropivacaine (PF) in NS cmpd, 14 mL/hr, Last Rate: 14 mL/hr (12/12/24 0744)      PRN medications  PRN medications: dextrose, dextrose, glucagon, glucagon, labetaloL, naloxone     Physical Exam:  Constitutional:  no distress, alert and cooperative  Eyes: clear sclera  Head/Neck: No apparent injury, trachea midline  Respiratory/Thorax: Patent airways, thorax symmetric, breathing comfortably  Cardiovascular: no pitting edema  Gastrointestinal: Nondistended  Musculoskeletal: ROM intact  Extremities: no clubbing  Neurological: alert, navas x4  Psychological: Appropriate affect    Results for orders placed or performed during the hospital encounter of 12/12/24 (from the past 24 hours)   Blood Gas Arterial Full Panel   Result Value Ref Range    POCT pH, Arterial 7.51 (H) 7.38 - 7.42 pH    POCT pCO2, Arterial 31 (L) 38 - 42 mm Hg    POCT pO2, Arterial 86 85 - 95 mm Hg    POCT SO2, Arterial 97 94 - 100 %    POCT Oxy Hemoglobin, Arterial 96.2 94.0 - 98.0 %    POCT Hematocrit Calculated, Arterial  32.0 (L) 41.0 - 52.0 %    POCT Sodium, Arterial 134 (L) 136 - 145 mmol/L    POCT Potassium, Arterial 4.3 3.5 - 5.3 mmol/L    POCT Chloride, Arterial 106 98 - 107 mmol/L    POCT Ionized Calcium, Arterial 1.18 1.10 - 1.33 mmol/L    POCT Glucose, Arterial 176 (H) 74 - 99 mg/dL    POCT Lactate, Arterial 2.8 (H) 0.4 - 2.0 mmol/L    POCT Base Excess, Arterial 2.0 -2.0 - 3.0 mmol/L    POCT HCO3 Calculated, Arterial 24.7 22.0 - 26.0 mmol/L    POCT Hemoglobin, Arterial 10.5 (L) 13.5 - 17.5 g/dL    POCT Anion Gap, Arterial 8 (L) 10 - 25 mmo/L    Patient Temperature 37.0 degrees Celsius    FiO2 21 %   POCT GLUCOSE   Result Value Ref Range    POCT Glucose 177 (H) 74 - 99 mg/dL   POCT GLUCOSE   Result Value Ref Range    POCT Glucose 180 (H) 74 - 99 mg/dL   POCT GLUCOSE   Result Value Ref Range    POCT Glucose 201 (H) 74 - 99 mg/dL   POCT GLUCOSE   Result Value Ref Range    POCT Glucose 227 (H) 74 - 99 mg/dL   POCT GLUCOSE   Result Value Ref Range    POCT Glucose 196 (H) 74 - 99 mg/dL   POCT GLUCOSE   Result Value Ref Range    POCT Glucose 227 (H) 74 - 99 mg/dL   POCT GLUCOSE   Result Value Ref Range    POCT Glucose 224 (H) 74 - 99 mg/dL   POCT GLUCOSE   Result Value Ref Range    POCT Glucose 220 (H) 74 - 99 mg/dL   POCT GLUCOSE   Result Value Ref Range    POCT Glucose 186 (H) 74 - 99 mg/dL   POCT GLUCOSE   Result Value Ref Range    POCT Glucose 173 (H) 74 - 99 mg/dL   POCT GLUCOSE   Result Value Ref Range    POCT Glucose 208 (H) 74 - 99 mg/dL   POCT GLUCOSE   Result Value Ref Range    POCT Glucose 214 (H) 74 - 99 mg/dL   POCT GLUCOSE   Result Value Ref Range    POCT Glucose 219 (H) 74 - 99 mg/dL   CBC   Result Value Ref Range    WBC 18.8 (H) 4.4 - 11.3 x10*3/uL    nRBC 0.0 0.0 - 0.0 /100 WBCs    RBC 2.86 (L) 4.50 - 5.90 x10*6/uL    Hemoglobin 8.9 (L) 13.5 - 17.5 g/dL    Hematocrit 26.3 (L) 41.0 - 52.0 %    MCV 92 80 - 100 fL    MCH 31.1 26.0 - 34.0 pg    MCHC 33.8 32.0 - 36.0 g/dL    RDW 14.9 (H) 11.5 - 14.5 %    Platelets 140  (L) 150 - 450 x10*3/uL   Comprehensive Metabolic Panel   Result Value Ref Range    Glucose 234 (H) 74 - 99 mg/dL    Sodium 137 136 - 145 mmol/L    Potassium 4.2 3.5 - 5.3 mmol/L    Chloride 101 98 - 107 mmol/L    Bicarbonate 28 21 - 32 mmol/L    Anion Gap 12 10 - 20 mmol/L    Urea Nitrogen 11 6 - 23 mg/dL    Creatinine 0.68 0.50 - 1.30 mg/dL    eGFR >90 >60 mL/min/1.73m*2    Calcium 8.4 (L) 8.6 - 10.6 mg/dL    Albumin 3.2 (L) 3.4 - 5.0 g/dL    Alkaline Phosphatase 59 33 - 120 U/L    Total Protein 5.0 (L) 6.4 - 8.2 g/dL    AST 56 (H) 9 - 39 U/L    Bilirubin, Total 0.6 0.0 - 1.2 mg/dL    ALT 57 (H) 10 - 52 U/L   Magnesium   Result Value Ref Range    Magnesium 1.92 1.60 - 2.40 mg/dL    Nate Alvarez is a 54 y.o. year old male patient who presents for [place] with Dr. Gamble on 12/12. Acute Pain consulted for block for postoperative pain control.      Plan:  - Bilateral [place] blocks with catheters performed preoperatively on 12/12  - Ambit ball with Ropivacaine 0.2%/NaCl 0.9% 500mL, Rate 7 cc/hr bilaterally  - Catheters to be pulled at bedside at 9pm  - Acute pain to sign off  - Ambit medication will not interfere with pain medication prescribed by the primary team.   - Please be aware of local anesthetic toxic dose and absorption variability before considering lidocaine patches  - Acute pain service will follow while catheters in place  - Rest of pain management per primary team     Acute Pain Resident  pg [phone] ph [phone]

## 2024-12-14 NOTE — PROGRESS NOTES
"Nate Alvarez is a 54 y.o. male on day 2 of admission presenting with Malignant neoplasm of head of pancreas (Multi).    Subjective   Ice chips and sips of clears today - 12/14  POD 2 Whipple    Objective   Physical Exam  Vitals:    12/14/24 1147   BP: 120/86   Pulse: 81   Resp: 16   Temp: 36.6 °C (97.8 °F)   SpO2: 93%    CCO3   HEENT: PEERLA, Dubhoff in place/ No Thyromegaly  Chest: CTA, GBAE  Abdo: +BS, Surg scar - in band., soft, nt, no HSM   Neuro: No delay in relaxation phase  LE: +pp no edema   Last Recorded Vitals  Blood pressure 120/86, pulse 81, temperature 36.6 °C (97.8 °F), temperature source Temporal, resp. rate 16, height 1.702 m (5' 7.01\"), weight 98 kg (216 lb 0.8 oz), SpO2 93%.  Intake/Output last 3 Shifts:  I/O last 3 completed shifts:  In: 4321.2 (44.1 mL/kg) [I.V.:2804.3 (28.6 mL/kg); IV Piggyback:1516.9]  Out: 4550 (46.4 mL/kg) [Urine:3595 (1 mL/kg/hr); Emesis/NG output:250; Drains:705]  Weight: 98 kg     Relevant Results  Results from last 7 days   Lab Units 12/14/24  1149 12/14/24  0610 12/14/24  0510 12/13/24  2343 12/13/24  2152 12/13/24  1931 12/13/24  0100 12/13/24  0014 12/12/24  1813 12/12/24  1754   POCT GLUCOSE mg/dL 221*  --  219* 214* 208* 173*   < >  --    < >  --    GLUCOSE mg/dL  --  234*  --   --   --   --   --  183*  183*  --  277*    < > = values in this interval not displayed.     Current Facility-Administered Medications:     dextrose 50 % injection 12.5 g, 12.5 g, intravenous, q15 min PRN, Andrez Faith MD    dextrose 50 % injection 25 g, 25 g, intravenous, q15 min PRN, Andrez Faith MD    enoxaparin (Lovenox) syringe 40 mg, 40 mg, subcutaneous, Daily, Andrez Faith MD, 40 mg at 12/14/24 0847    glucagon (Glucagen) injection 1 mg, 1 mg, intramuscular, q15 min PRN, Andrez Faith MD    glucagon (Glucagen) injection 1 mg, 1 mg, intramuscular, q15 min PRN, Andrez Faith MD    [START ON 12/16/2024] pneumococcal conjugate 20-valent (PREVNAR 20) " vaccine, 0.5 mL, intramuscular, Once **AND** [START ON 12/16/2024] mening vac A,C,Y,W135 dip (PF) (MENVEO) 10-5 mcg/0.5 mL injection 0.5 mL, 0.5 mL, intramuscular, Once **AND** [START ON 12/16/2024] meningococcal B vaccine,4-comp (Bexsero) vaccine 0.5 mL, 0.5 mL, intramuscular, Once **AND** [START ON 12/16/2024] haemophilus b conjugate (ActHIB) 10 mcg/0.5 mL vaccine 0.5 mL, 0.5 mL, intramuscular, Once, Andrez Faith MD    hydromorphone PCA 0.5 mg/mL in NS opioid naive, , intravenous, Continuous, Andrez Faith MD, Rate Verify at 12/13/24 1200    insulin glargine (Lantus) injection 25 Units, 25 Units, subcutaneous, q24h, Andrez Faith MD, 25 Units at 12/13/24 1605    insulin lispro injection 0-5 Units, 0-5 Units, subcutaneous, q6h, Andrez Faith MD, 2 Units at 12/14/24 0511    ketorolac (Toradol) injection 15 mg, 15 mg, intravenous, 4x daily, Andrez Faith MD, 15 mg at 12/14/24 0514    labetaloL (Normodyne,Trandate) injection 10 mg, 10 mg, intravenous, q4h PRN, Andrez Faith MD, 10 mg at 12/13/24 0157    lactated Ringer's infusion, 75 mL/hr, intravenous, Continuous, Vahe Luo MD, Last Rate: 75 mL/hr at 12/14/24 1029, 75 mL/hr at 12/14/24 1029    naloxone (Narcan) injection 0.2 mg, 0.2 mg, intravenous, PRN, Andrez Faith MD    pantoprazole (ProtoNix) injection 40 mg, 40 mg, intravenous, Daily, Andrez Faith MD, 40 mg at 12/14/24 0847    ropivacaine (PF) in NS cmpd (Naropin) 1000 mg/500 mL (0.2%) infusion, 14 mL/hr, infiltration, Continuous, Andrez Faith MD, Last Rate: 14 mL/hr at 12/12/24 0744, 14 mL/hr at 12/12/24 0744    Facility-Administered Medications Ordered in Other Encounters:     heparin flush 100 unit/mL syringe 500 Units, 500 Units, intra-catheter, PRN, Adrienne Malloy, APRN-CNP, 500 Units at 08/05/24 1344        Assessment/Plan   Assessment & Plan  Malignant neoplasm of head of pancreas (Multi)    Nate ERICKA Alvarez is a 54 y.o. male with  PMHx Pancreatic Adenoca, HTN, GERD, Anxiety, BCC of R eyelid GERD, and OA,. Patient is s/p pancreatectomy, splenectomy, cholecystectomy, duodenectomy, distal gastrectomy, hepaticojejunostomy, gastrojejunostomy, and SMV resection/reconstruction on 12/12. Endocrine is consulted on 12/13 to assist in insulin bridging off the insulin drip and subsequently management of Diabetes.     # Inpatient Hyperglycemia Management   Whipple on 12/12 for Pancreatic Adenocarcinoma     Diet: Ice chips and sips of water     (Insulin Drip with total requirements approx 30 units - Bridging on 12/13 evening with Lantus of 25 units )    Endocrine recommendations - 12/14/24:    Please increase  Lantus from 25 units to 32 units.      Please increase Sliding Scale from # 1 to #2 (Lispro 2 units for every 50>150) Q 6 hours.     In the event patient becomes hypoglycemic - administer D5 W. Do not hold Glargine as patient does not have insulin production.   Hypoglycemia Protocol.      Please consult Ms. Jacqueline Dennis for Diabetes Education/CGM..... She can meet with patient on Monday 12/16/24.   Outpatient considerations include Insulin pump - discussed at bedside.   Patient is aware and agreeable to plan.   Endocrine is following. Endocrine pager [phone]. Above plan is communicated with primary team.   Patient is discussed, seen, and examined  with Dr. Bran.   Jennifer Reyes MD

## 2024-12-15 VITALS
HEIGHT: 67 IN | DIASTOLIC BLOOD PRESSURE: 74 MMHG | OXYGEN SATURATION: 94 % | TEMPERATURE: 97.9 F | BODY MASS INDEX: 33.91 KG/M2 | RESPIRATION RATE: 16 BRPM | SYSTOLIC BLOOD PRESSURE: 126 MMHG | WEIGHT: 216.05 LBS | HEART RATE: 74 BPM

## 2024-12-15 LAB
ALBUMIN SERPL BCP-MCNC: 3.2 G/DL (ref 3.4–5)
ALP SERPL-CCNC: 64 U/L (ref 33–120)
ALT SERPL W P-5'-P-CCNC: 47 U/L (ref 10–52)
ANION GAP SERPL CALC-SCNC: 11 MMOL/L (ref 10–20)
AST SERPL W P-5'-P-CCNC: 47 U/L (ref 9–39)
BILIRUB SERPL-MCNC: 0.6 MG/DL (ref 0–1.2)
BUN SERPL-MCNC: 10 MG/DL (ref 6–23)
CALCIUM SERPL-MCNC: 8.4 MG/DL (ref 8.6–10.6)
CHLORIDE SERPL-SCNC: 103 MMOL/L (ref 98–107)
CO2 SERPL-SCNC: 30 MMOL/L (ref 21–32)
CREAT SERPL-MCNC: 0.64 MG/DL (ref 0.5–1.3)
EGFRCR SERPLBLD CKD-EPI 2021: >90 ML/MIN/1.73M*2
ERYTHROCYTE [DISTWIDTH] IN BLOOD BY AUTOMATED COUNT: 14.3 % (ref 11.5–14.5)
GLUCOSE BLD MANUAL STRIP-MCNC: 121 MG/DL (ref 74–99)
GLUCOSE BLD MANUAL STRIP-MCNC: 121 MG/DL (ref 74–99)
GLUCOSE BLD MANUAL STRIP-MCNC: 137 MG/DL (ref 74–99)
GLUCOSE BLD MANUAL STRIP-MCNC: 84 MG/DL (ref 74–99)
GLUCOSE SERPL-MCNC: 80 MG/DL (ref 74–99)
HCT VFR BLD AUTO: 25.9 % (ref 41–52)
HGB BLD-MCNC: 8.5 G/DL (ref 13.5–17.5)
MAGNESIUM SERPL-MCNC: 2.07 MG/DL (ref 1.6–2.4)
MCH RBC QN AUTO: 31 PG (ref 26–34)
MCHC RBC AUTO-ENTMCNC: 32.8 G/DL (ref 32–36)
MCV RBC AUTO: 95 FL (ref 80–100)
NRBC BLD-RTO: 0 /100 WBCS (ref 0–0)
PLATELET # BLD AUTO: 168 X10*3/UL (ref 150–450)
POTASSIUM SERPL-SCNC: 3.8 MMOL/L (ref 3.5–5.3)
PROT SERPL-MCNC: 5.3 G/DL (ref 6.4–8.2)
RBC # BLD AUTO: 2.74 X10*6/UL (ref 4.5–5.9)
SODIUM SERPL-SCNC: 140 MMOL/L (ref 136–145)
WBC # BLD AUTO: 16.1 X10*3/UL (ref 4.4–11.3)

## 2024-12-15 PROCEDURE — 99232 SBSQ HOSP IP/OBS MODERATE 35: CPT | Performed by: INTERNAL MEDICINE

## 2024-12-15 PROCEDURE — 1170000001 HC PRIVATE ONCOLOGY ROOM DAILY

## 2024-12-15 PROCEDURE — 2500000004 HC RX 250 GENERAL PHARMACY W/ HCPCS (ALT 636 FOR OP/ED)

## 2024-12-15 PROCEDURE — 83735 ASSAY OF MAGNESIUM: CPT

## 2024-12-15 PROCEDURE — 36416 COLLJ CAPILLARY BLOOD SPEC: CPT

## 2024-12-15 PROCEDURE — 80053 COMPREHEN METABOLIC PANEL: CPT

## 2024-12-15 PROCEDURE — 99231 SBSQ HOSP IP/OBS SF/LOW 25: CPT

## 2024-12-15 PROCEDURE — 2500000002 HC RX 250 W HCPCS SELF ADMINISTERED DRUGS (ALT 637 FOR MEDICARE OP, ALT 636 FOR OP/ED): Performed by: STUDENT IN AN ORGANIZED HEALTH CARE EDUCATION/TRAINING PROGRAM

## 2024-12-15 PROCEDURE — 82947 ASSAY GLUCOSE BLOOD QUANT: CPT

## 2024-12-15 PROCEDURE — 85027 COMPLETE CBC AUTOMATED: CPT

## 2024-12-15 PROCEDURE — 99024 POSTOP FOLLOW-UP VISIT: CPT | Performed by: SURGERY

## 2024-12-15 ASSESSMENT — PAIN SCALES - GENERAL
PAINLEVEL_OUTOF10: 3
PAINLEVEL_OUTOF10: 2
PAINLEVEL_OUTOF10: 3

## 2024-12-15 ASSESSMENT — PAIN - FUNCTIONAL ASSESSMENT
PAIN_FUNCTIONAL_ASSESSMENT: 0-10
PAIN_FUNCTIONAL_ASSESSMENT: 0-10

## 2024-12-15 ASSESSMENT — COGNITIVE AND FUNCTIONAL STATUS - GENERAL
MOBILITY SCORE: 24
DAILY ACTIVITIY SCORE: 24
DAILY ACTIVITIY SCORE: 24
MOBILITY SCORE: 24

## 2024-12-15 NOTE — PROGRESS NOTES
[facility]  DEPARTMENT OF SURGERY    PROGRESS NOTE    SUBJECTIVE  No acute events overnight.  Did well overnight. Got up and walking to bathroom several times. Eager to walk today. Pain reasonably well-controlled. No other concerns this AM. Denies flatus or bowel movements yet.    OBJECTIVE  Vitals:  Temp:  [36.3 °C (97.3 °F)-36.8 °C (98.2 °F)] 36.3 °C (97.3 °F)  Heart Rate:  [60-81] 60  Resp:  [16-18] 18  BP: (102-149)/(66-86) 112/72    I/Os:  I/O last 3 completed shifts:  In: 1501.6 (15.3 mL/kg) [P.O.:90; I.V.:1411.6 (14.4 mL/kg)]  Out: 5085 (51.9 mL/kg) [Urine:4595 (1.3 mL/kg/hr); Emesis/NG output:25; Drains:465]  Weight: 98 kg     Exam:  Gen:  NAD, non-toxic appearing  HEENT:  Atraumatic, normocephalic  Eyes:  No scleral icterus  Card:  RRR  Resp:  Non-labored breathing on RA  Abd:  Soft, appropriately tender, mildly distended, midline incision clean, dry, intact, NHI x2 serosang   Ext:  WWP, no swelling of BLE  MSK:  ESTES  Neuro:  AOx3, no gross neurological deficits  Psych:  Appropriate mood and affect    Labs:  CBC:   Lab Results   Component Value Date    WBC 16.1 (H) 12/15/2024    RBC 2.74 (L) 12/15/2024     BMP:   Lab Results   Component Value Date    GLUCOSE 234 (H) 12/14/2024    CO2 28 12/14/2024    BUN 11 12/14/2024    CREATININE 0.68 12/14/2024    CALCIUM 8.4 (L) 12/14/2024     Coagulation:   Lab Results   Component Value Date    INR 1.4 (H) 12/13/2024    APTT 27 12/13/2024       Imaging:  None today      ASSESSMENT:  Mr. Alvarez is a 54M w/ PMH significant for DM, HTN, RADHA, pancreatic cancer s/p neoadjuvant chemo and now total pancreatectomy, splenectomy, cholecystectomy, duodenectomy, distal gastrectomy, hepaticojejunostomy, gastrojejunostomy, and SMV resection/reconstruction on 12/12.  Initially transferred to ICU for glycemic management, now progressing appropriately for postoperative course.    PLAN:  - Multimodal pain control: IV tylenol, dilaudid PCA and  toradol  - limited CLD, IVF LR 50cc/hr   - Will need creon when on FLD.  - Continue subcutaneous Lovenox 40mg daily   - Continue protonix 40mg daily   - Endocrinology consulted for glycemic management s/p total pancreatectomy; appreciate recs.  - Splenectomy vaccines POD4 (12/16)  - Voiding spontaneously.  - No need for drain amylase in setting of total pancreatectomy.    D/w Dr. Lewis.    Vahe Luo MD

## 2024-12-15 NOTE — ASSESSMENT & PLAN NOTE
Nate Alvarez is a 54 y.o. male with PMHx Pancreatic Adenoca, HTN, GERD, Anxiety, BCC of R eyelid GERD, and OA,. Patient is s/p pancreatectomy, splenectomy, cholecystectomy, duodenectomy, distal gastrectomy, hepaticojejunostomy, gastrojejunostomy, and SMV resection/reconstruction on 12/12. Endocrine is consulted on 12/13 to assist in insulin bridging off the insulin drip and subsequently management of Diabetes.      # Inpatient Hyperglycemia Management   Whipple on 12/12 for Pancreatic Adenocarcinoma     Diet: Ice chips and sips of water     (Insulin Drip with total requirements approx 30 units - Bridging on 12/13 evening with Lantus of 25 units )         Endocrine recommendations - 12/15/24:      Adult Diet Clear   Please decrease   Lantus from 32 to 30 units       C/W #2 (Lispro 2 units for every 50>150) Q 6 hours.     In the event patient becomes hypoglycemic - administer D5 W. Do not hold Glargine as patient does not have insulin production.   Hypoglycemia Protocol.      Please consult Ms. Jacqueline Collins for Diabetes Education/CGM..... She can meet with patient on Monday 12/16/24.   Outpatient considerations include Insulin pump - discussed at bedside.   Patient is aware and agreeable to plan.   Endocrine is following. Endocrine pager [phone]. Above plan is communicated with primary team.   Patient is discussed, seen, and examined  with Dr. Bran.   VINITA Wayne

## 2024-12-15 NOTE — PROGRESS NOTES
"Nate Alvarez is a 54 y.o. male on day 3 of admission presenting with Malignant neoplasm of head of pancreas (Multi).    Subjective   Patient is seen and examined this AM.   Diet remains - ice chips and sips of clears in AM advanced to full clears mid-day.     Objective   Physical Exam  Vitals:    12/15/24 0741   BP: 112/72   Pulse: 60   Resp: 18   Temp: 36.3 °C (97.3 °F)   SpO2: 94%    HEENT: PEERLA, No Thyromegaly  Chest: CTA, GBAE  Abdo: +BS, Surg scar - in band., soft, nt, no HSM   Neuro: No delay in relaxation phase  LE: +pp no edema   Last Recorded Vitals  Blood pressure 112/72, pulse 60, temperature 36.3 °C (97.3 °F), temperature source Temporal, resp. rate 18, height 1.702 m (5' 7.01\"), weight 98 kg (216 lb 0.8 oz), SpO2 94%.  Intake/Output last 3 Shifts:  I/O last 3 completed shifts:  In: 1501.6 (15.3 mL/kg) [P.O.:90; I.V.:1411.6 (14.4 mL/kg)]  Out: 5085 (51.9 mL/kg) [Urine:4595 (1.3 mL/kg/hr); Emesis/NG output:25; Drains:465]  Weight: 98 kg     Relevant Results  Results from last 7 days   Lab Units 12/15/24  0623 12/15/24  0608 12/14/24  1641 12/14/24  1314 12/14/24  1149 12/14/24  0610 12/14/24  0510 12/13/24  0100 12/13/24  0014 12/12/24  1813 12/12/24  1754   POCT GLUCOSE mg/dL 84  --  205* 215* 221*  --  219*   < >  --    < >  --    GLUCOSE mg/dL  --  80  --   --   --  234*  --   --  183*  183*  --  277*    < > = values in this interval not displayed.     Current Facility-Administered Medications:     dextrose 50 % injection 12.5 g, 12.5 g, intravenous, q15 min PRN, Andrez Faith MD    dextrose 50 % injection 25 g, 25 g, intravenous, q15 min PRN, Andrez Faith MD    enoxaparin (Lovenox) syringe 40 mg, 40 mg, subcutaneous, Daily, Andrez Faith MD, 40 mg at 12/14/24 0847    glucagon (Glucagen) injection 1 mg, 1 mg, intramuscular, q15 min PRN, Andrez Faith MD    glucagon (Glucagen) injection 1 mg, 1 mg, intramuscular, q15 min PRN, Andrez Faith MD    [START ON " 12/16/2024] pneumococcal conjugate 20-valent (PREVNAR 20) vaccine, 0.5 mL, intramuscular, Once **AND** [START ON 12/16/2024] mening vac A,C,Y,W135 dip (PF) (MENVEO) 10-5 mcg/0.5 mL injection 0.5 mL, 0.5 mL, intramuscular, Once **AND** [START ON 12/16/2024] meningococcal B vaccine,4-comp (Bexsero) vaccine 0.5 mL, 0.5 mL, intramuscular, Once **AND** [START ON 12/16/2024] haemophilus b conjugate (ActHIB) 10 mcg/0.5 mL vaccine 0.5 mL, 0.5 mL, intramuscular, Once, Andrez Faith MD    hydromorphone PCA 0.5 mg/mL in NS opioid naive, , intravenous, Continuous, Andrez Faith MD, New Syringe/Cartridge at 12/15/24 0636    insulin glargine (Lantus) injection 32 Units, 32 Units, subcutaneous, q24h, Shahriar Khan MD, 32 Units at 12/14/24 1643    insulin lispro injection 0-10 Units, 0-10 Units, subcutaneous, TID AC, Shahriar Khan MD, 4 Units at 12/14/24 1643    ketorolac (Toradol) injection 15 mg, 15 mg, intravenous, 4x daily, Andrez Faith MD, 15 mg at 12/15/24 0624    labetaloL (Normodyne,Trandate) injection 10 mg, 10 mg, intravenous, q4h PRN, Andrez Faith MD, 10 mg at 12/13/24 0157    lactated Ringer's infusion, 50 mL/hr, intravenous, Continuous, Vahe Luo MD, Last Rate: 50 mL/hr at 12/15/24 0911, 50 mL/hr at 12/15/24 0911    naloxone (Narcan) injection 0.2 mg, 0.2 mg, intravenous, PRN, Andrez Faith MD    pantoprazole (ProtoNix) injection 40 mg, 40 mg, intravenous, Daily, Andrez Faith MD, 40 mg at 12/15/24 0949    ropivacaine (PF) in NS cmpd (Naropin) 1000 mg/500 mL (0.2%) infusion, 14 mL/hr, infiltration, Continuous, Andrez Faith MD, Last Rate: 14 mL/hr at 12/12/24 0744, 14 mL/hr at 12/12/24 0744    Facility-Administered Medications Ordered in Other Encounters:     heparin flush 100 unit/mL syringe 500 Units, 500 Units, intra-catheter, PRN, Adrienne Malloy, APRN-CNP, 500 Units at 08/05/24 1344           Assessment/Plan   Assessment & Plan  Malignant neoplasm of  head of pancreas (Multi)      Nate Alvarez is a 54 y.o. male with PMHx Pancreatic Adenoca, HTN, GERD, Anxiety, BCC of R eyelid GERD, and OA,. Patient is s/p pancreatectomy, splenectomy, cholecystectomy, duodenectomy, distal gastrectomy, hepaticojejunostomy, gastrojejunostomy, and SMV resection/reconstruction on 12/12. Endocrine is consulted on 12/13 to assist in insulin bridging off the insulin drip and subsequently management of Diabetes.      # Inpatient Hyperglycemia Management   Whipple on 12/12 for Pancreatic Adenocarcinoma     (Insulin Drip with total requirements approx 30 units - Bridging on 12/13 evening with Lantus of 25 units )    Endocrine recommendations - 12/15/24:      Adult Diet Clear   Please decrease Lantus from 32 units to 30 units       C/W Sliding Scale  #2 (Lispro 2 units for every 50>150) Q 6 hours.     In the event patient becomes hypoglycemic - administer D5 W. Do not hold Glargine as patient does not have insulin production.   Hypoglycemia Protocol.      Please consult Ms. Jacqueline Collins for Diabetes Education/CGM..... She can meet with patient on Monday 12/16/24.   Outpatient considerations include Insulin pump - discussed at bedside.     Plan communicated with Primary Team.   Patient is aware and agreeable to plan.   Endocrine is following. Endocrine pager [phone]. Above plan is communicated with primary team.   Patient is discussed, seen, and examined  with Dr. Bran.   MD Jennifer Wayne MD

## 2024-12-15 NOTE — CARE PLAN
Problem: Skin  Goal: Decreased wound size/increased tissue granulation at next dressing change  Outcome: Progressing  Goal: Participates in plan/prevention/treatment measures  Outcome: Progressing  Goal: Prevent/manage excess moisture  Outcome: Progressing  Goal: Prevent/minimize sheer/friction injuries  Outcome: Progressing  Goal: Promote/optimize nutrition  Outcome: Progressing  Goal: Promote skin healing  Outcome: Progressing     Problem: Pain - Adult  Goal: Verbalizes/displays adequate comfort level or baseline comfort level  Outcome: Progressing     Problem: Safety - Adult  Goal: Free from fall injury  Outcome: Progressing     Problem: Discharge Planning  Goal: Discharge to home or other facility with appropriate resources  Outcome: Progressing     Problem: Chronic Conditions and Co-morbidities  Goal: Patient's chronic conditions and co-morbidity symptoms are monitored and maintained or improved  Outcome: Progressing

## 2024-12-15 NOTE — PROGRESS NOTES
Postop Pain HPI -   Palliative: relieved with IV analgesics and regional local anesthetics  Provocative: movement  Quality:  burning and aching  Radiation:  none  Severity:  3/10  Timing: constant    Scheduled medications  enoxaparin, 40 mg, subcutaneous, Daily  [START ON 12/16/2024] pneumoc 20-tabatha conj-dip cr(PF), 0.5 mL, intramuscular, Once   And  [START ON 12/16/2024] mening vac A,C,Y,W135 dip (PF), 0.5 mL, intramuscular, Once   And  [START ON 12/16/2024] meningococcal B vaccine,4-comp, 0.5 mL, intramuscular, Once   And  [START ON 12/16/2024] haemophilus b conjugate, 0.5 mL, intramuscular, Once  insulin glargine, 32 Units, subcutaneous, q24h  insulin lispro, 0-10 Units, subcutaneous, TID AC  ketorolac, 15 mg, intravenous, 4x daily  pantoprazole, 40 mg, intravenous, Daily      Continuous medications  HYDROmorphone,   lactated Ringer's, 50 mL/hr, Last Rate: 50 mL/hr (12/15/24 0911)  ropivacaine (PF) in NS cmpd, 14 mL/hr, Last Rate: 14 mL/hr (12/12/24 0744)      PRN medications  PRN medications: dextrose, dextrose, glucagon, glucagon, labetaloL, naloxone     Physical Exam:  Constitutional:  no distress, alert and cooperative  Eyes: clear sclera  Head/Neck: No apparent injury, trachea midline  Respiratory/Thorax: Patent airways, thorax symmetric, breathing comfortably  Cardiovascular: no pitting edema  Gastrointestinal: Nondistended  Musculoskeletal: ROM intact  Extremities: no clubbing  Neurological: alert, navas x4  Psychological: Appropriate affect    Results for orders placed or performed during the hospital encounter of 12/12/24 (from the past 24 hours)   POCT GLUCOSE   Result Value Ref Range    POCT Glucose 221 (H) 74 - 99 mg/dL   POCT GLUCOSE   Result Value Ref Range    POCT Glucose 215 (H) 74 - 99 mg/dL   POCT GLUCOSE   Result Value Ref Range    POCT Glucose 205 (H) 74 - 99 mg/dL   CBC   Result Value Ref Range    WBC 16.1 (H) 4.4 - 11.3 x10*3/uL    nRBC 0.0 0.0 - 0.0 /100 WBCs    RBC 2.74 (L) 4.50 - 5.90  x10*6/uL    Hemoglobin 8.5 (L) 13.5 - 17.5 g/dL    Hematocrit 25.9 (L) 41.0 - 52.0 %    MCV 95 80 - 100 fL    MCH 31.0 26.0 - 34.0 pg    MCHC 32.8 32.0 - 36.0 g/dL    RDW 14.3 11.5 - 14.5 %    Platelets 168 150 - 450 x10*3/uL   Comprehensive Metabolic Panel   Result Value Ref Range    Glucose 80 74 - 99 mg/dL    Sodium 140 136 - 145 mmol/L    Potassium 3.8 3.5 - 5.3 mmol/L    Chloride 103 98 - 107 mmol/L    Bicarbonate 30 21 - 32 mmol/L    Anion Gap 11 10 - 20 mmol/L    Urea Nitrogen 10 6 - 23 mg/dL    Creatinine 0.64 0.50 - 1.30 mg/dL    eGFR >90 >60 mL/min/1.73m*2    Calcium 8.4 (L) 8.6 - 10.6 mg/dL    Albumin 3.2 (L) 3.4 - 5.0 g/dL    Alkaline Phosphatase 64 33 - 120 U/L    Total Protein 5.3 (L) 6.4 - 8.2 g/dL    AST 47 (H) 9 - 39 U/L    Bilirubin, Total 0.6 0.0 - 1.2 mg/dL    ALT 47 10 - 52 U/L   Magnesium   Result Value Ref Range    Magnesium 2.07 1.60 - 2.40 mg/dL   POCT GLUCOSE   Result Value Ref Range    POCT Glucose 84 74 - 99 mg/dL      Nate Alvarez is a 54 y.o. year old male patient who presents for [place] with Dr. Gamble on 12/12. Acute Pain consulted for block for postoperative pain control.      Plan:  - Bilateral [place] blocks with catheters performed preoperatively on 12/12  - Catheters pulled at bedside at 8:30am today.  - Please restart Lovenox at 1pm today  - Acute pain to sign off  - Rest of pain management per primary team     Acute Pain Resident  pg [phone] ph [phone]

## 2024-12-16 ENCOUNTER — APPOINTMENT (OUTPATIENT)
Dept: ENDOCRINOLOGY | Facility: CLINIC | Age: 54
End: 2024-12-16
Payer: COMMERCIAL

## 2024-12-16 LAB
ALBUMIN SERPL BCP-MCNC: 3.2 G/DL (ref 3.4–5)
ALP SERPL-CCNC: 90 U/L (ref 33–120)
ALT SERPL W P-5'-P-CCNC: 42 U/L (ref 10–52)
ANION GAP SERPL CALC-SCNC: 12 MMOL/L (ref 10–20)
AST SERPL W P-5'-P-CCNC: 39 U/L (ref 9–39)
BILIRUB SERPL-MCNC: 0.7 MG/DL (ref 0–1.2)
BUN SERPL-MCNC: 10 MG/DL (ref 6–23)
CALCIUM SERPL-MCNC: 8.7 MG/DL (ref 8.6–10.6)
CHLORIDE SERPL-SCNC: 104 MMOL/L (ref 98–107)
CO2 SERPL-SCNC: 29 MMOL/L (ref 21–32)
CREAT SERPL-MCNC: 0.63 MG/DL (ref 0.5–1.3)
EGFRCR SERPLBLD CKD-EPI 2021: >90 ML/MIN/1.73M*2
ERYTHROCYTE [DISTWIDTH] IN BLOOD BY AUTOMATED COUNT: 14.1 % (ref 11.5–14.5)
GLUCOSE BLD MANUAL STRIP-MCNC: 111 MG/DL (ref 74–99)
GLUCOSE BLD MANUAL STRIP-MCNC: 176 MG/DL (ref 74–99)
GLUCOSE BLD MANUAL STRIP-MCNC: 187 MG/DL (ref 74–99)
GLUCOSE BLD MANUAL STRIP-MCNC: 263 MG/DL (ref 74–99)
GLUCOSE BLD MANUAL STRIP-MCNC: 52 MG/DL (ref 74–99)
GLUCOSE SERPL-MCNC: 57 MG/DL (ref 74–99)
HCT VFR BLD AUTO: 27.2 % (ref 41–52)
HGB BLD-MCNC: 9 G/DL (ref 13.5–17.5)
MAGNESIUM SERPL-MCNC: 2.13 MG/DL (ref 1.6–2.4)
MCH RBC QN AUTO: 31 PG (ref 26–34)
MCHC RBC AUTO-ENTMCNC: 33.1 G/DL (ref 32–36)
MCV RBC AUTO: 94 FL (ref 80–100)
NRBC BLD-RTO: 0.5 /100 WBCS (ref 0–0)
PLATELET # BLD AUTO: 239 X10*3/UL (ref 150–450)
POTASSIUM SERPL-SCNC: 4 MMOL/L (ref 3.5–5.3)
PROT SERPL-MCNC: 5.7 G/DL (ref 6.4–8.2)
RBC # BLD AUTO: 2.9 X10*6/UL (ref 4.5–5.9)
SODIUM SERPL-SCNC: 141 MMOL/L (ref 136–145)
WBC # BLD AUTO: 12.8 X10*3/UL (ref 4.4–11.3)

## 2024-12-16 PROCEDURE — 84075 ASSAY ALKALINE PHOSPHATASE: CPT

## 2024-12-16 PROCEDURE — 90620 MENB-4C VACCINE IM: CPT

## 2024-12-16 PROCEDURE — 99232 SBSQ HOSP IP/OBS MODERATE 35: CPT

## 2024-12-16 PROCEDURE — 2500000004 HC RX 250 GENERAL PHARMACY W/ HCPCS (ALT 636 FOR OP/ED): Performed by: NURSE PRACTITIONER

## 2024-12-16 PROCEDURE — 90648 HIB PRP-T VACCINE 4 DOSE IM: CPT

## 2024-12-16 PROCEDURE — 2500000004 HC RX 250 GENERAL PHARMACY W/ HCPCS (ALT 636 FOR OP/ED)

## 2024-12-16 PROCEDURE — 2500000001 HC RX 250 WO HCPCS SELF ADMINISTERED DRUGS (ALT 637 FOR MEDICARE OP)

## 2024-12-16 PROCEDURE — 2500000002 HC RX 250 W HCPCS SELF ADMINISTERED DRUGS (ALT 637 FOR MEDICARE OP, ALT 636 FOR OP/ED): Performed by: NURSE PRACTITIONER

## 2024-12-16 PROCEDURE — 1170000001 HC PRIVATE ONCOLOGY ROOM DAILY

## 2024-12-16 PROCEDURE — 2500000001 HC RX 250 WO HCPCS SELF ADMINISTERED DRUGS (ALT 637 FOR MEDICARE OP): Performed by: NURSE PRACTITIONER

## 2024-12-16 PROCEDURE — 90471 IMMUNIZATION ADMIN: CPT

## 2024-12-16 PROCEDURE — 2500000005 HC RX 250 GENERAL PHARMACY W/O HCPCS

## 2024-12-16 PROCEDURE — 83735 ASSAY OF MAGNESIUM: CPT

## 2024-12-16 PROCEDURE — 90677 PCV20 VACCINE IM: CPT

## 2024-12-16 PROCEDURE — 2500000002 HC RX 250 W HCPCS SELF ADMINISTERED DRUGS (ALT 637 FOR MEDICARE OP, ALT 636 FOR OP/ED): Performed by: STUDENT IN AN ORGANIZED HEALTH CARE EDUCATION/TRAINING PROGRAM

## 2024-12-16 PROCEDURE — 82947 ASSAY GLUCOSE BLOOD QUANT: CPT

## 2024-12-16 PROCEDURE — 85027 COMPLETE CBC AUTOMATED: CPT

## 2024-12-16 PROCEDURE — 90472 IMMUNIZATION ADMIN EACH ADD: CPT

## 2024-12-16 PROCEDURE — 36415 COLL VENOUS BLD VENIPUNCTURE: CPT

## 2024-12-16 RX ORDER — POLYETHYLENE GLYCOL 3350 17 G/17G
17 POWDER, FOR SOLUTION ORAL DAILY
Status: DISCONTINUED | OUTPATIENT
Start: 2024-12-16 | End: 2024-12-18 | Stop reason: HOSPADM

## 2024-12-16 RX ORDER — OXYCODONE HYDROCHLORIDE 5 MG/1
10 TABLET ORAL EVERY 4 HOURS PRN
Status: DISCONTINUED | OUTPATIENT
Start: 2024-12-16 | End: 2024-12-18 | Stop reason: HOSPADM

## 2024-12-16 RX ORDER — OXYCODONE HYDROCHLORIDE 5 MG/1
5 TABLET ORAL ONCE
Status: COMPLETED | OUTPATIENT
Start: 2024-12-16 | End: 2024-12-16

## 2024-12-16 RX ORDER — PANTOPRAZOLE SODIUM 40 MG/1
40 TABLET, DELAYED RELEASE ORAL
Status: DISCONTINUED | OUTPATIENT
Start: 2024-12-17 | End: 2024-12-18 | Stop reason: HOSPADM

## 2024-12-16 RX ORDER — OXYCODONE HYDROCHLORIDE 5 MG/1
5 TABLET ORAL EVERY 4 HOURS PRN
Status: DISCONTINUED | OUTPATIENT
Start: 2024-12-16 | End: 2024-12-18 | Stop reason: HOSPADM

## 2024-12-16 RX ORDER — INSULIN GLARGINE 100 [IU]/ML
28 INJECTION, SOLUTION SUBCUTANEOUS EVERY 24 HOURS
Status: DISCONTINUED | OUTPATIENT
Start: 2024-12-16 | End: 2024-12-18 | Stop reason: HOSPADM

## 2024-12-16 RX ORDER — DOCUSATE SODIUM 100 MG/1
100 CAPSULE, LIQUID FILLED ORAL 2 TIMES DAILY
Status: DISCONTINUED | OUTPATIENT
Start: 2024-12-16 | End: 2024-12-18 | Stop reason: HOSPADM

## 2024-12-16 ASSESSMENT — PAIN SCALES - GENERAL
PAINLEVEL_OUTOF10: 6
PAINLEVEL_OUTOF10: 7
PAINLEVEL_OUTOF10: 5 - MODERATE PAIN
PAINLEVEL_OUTOF10: 7

## 2024-12-16 ASSESSMENT — COGNITIVE AND FUNCTIONAL STATUS - GENERAL
DAILY ACTIVITIY SCORE: 24
MOBILITY SCORE: 24
DAILY ACTIVITIY SCORE: 24
MOBILITY SCORE: 24

## 2024-12-16 ASSESSMENT — PAIN - FUNCTIONAL ASSESSMENT: PAIN_FUNCTIONAL_ASSESSMENT: 0-10

## 2024-12-16 NOTE — CARE PLAN
The patient's goals for the shift include      The clinical goals for the shift include pt will have controlled pain throughout shift.      Problem: Skin  Goal: Decreased wound size/increased tissue granulation at next dressing change  Outcome: Progressing  Goal: Participates in plan/prevention/treatment measures  Outcome: Progressing  Goal: Prevent/manage excess moisture  Outcome: Progressing  Goal: Prevent/minimize sheer/friction injuries  Outcome: Progressing  Goal: Promote/optimize nutrition  Outcome: Progressing  Goal: Promote skin healing  Outcome: Progressing     Problem: Pain - Adult  Goal: Verbalizes/displays adequate comfort level or baseline comfort level  Outcome: Progressing     Problem: Safety - Adult  Goal: Free from fall injury  Outcome: Progressing     Problem: Discharge Planning  Goal: Discharge to home or other facility with appropriate resources  Outcome: Progressing     Problem: Chronic Conditions and Co-morbidities  Goal: Patient's chronic conditions and co-morbidity symptoms are monitored and maintained or improved  Outcome: Progressing

## 2024-12-16 NOTE — PROGRESS NOTES
Mr. Alvarez is a 54M w/ PMH significant for DM, HTN, RADHA, pancreatic cancer s/p neoadjuvant chemo and now total pancreatectomy, splenectomy, cholecystectomy, duodenectomy, distal gastrectomy, hepaticojejunostomy, gastrojejunostomy, and SMV resection/reconstruction on 12/12. ADOD 12/18, will need home care for PT, SN (new diabetic).     Met with patient to discuss home care. Patient reports he does not feel he needs home care, but is agreeable to trying it per medical team recommendation. Patient will require an external home care referral. Referrals sent in [facility]. Awaiting accepting facility. [facility] received phone call from the following companies who cannot accept: [employer], and [employer]. Will continue to reach out to home care companies. Mary Kay Juan RN [facility]     14:52   At home with [place], [facility] care ([place] office), and [facility] all decline. [facility] sent 10 more external referrals. Awaiting response. Mary Kay Juan RN [facility]

## 2024-12-16 NOTE — CONSULTS
"Inpatient Diabetes Education Consult    Reason for Visit:  Nate Alvarez is a 54 y.o. male who presents for s/p Whipple procedure and now diagnosis is T1DM    Discharge Equipment/Supply Needs: additional insulin pen supplies       Patient has supplies at home: Blood glucose meter:  , Testing strips:  , Lancets, Pen needles, and CGM supplies: Jef 3    Patient History and Assessment:  New diagnosis: Type 1  Previous diagnosis: Type 2  Patient known to Diabetes Education department: No  Treatment prior to hospital admission: Insulin: Rapid acting, Long acting, via pen  Blood glucose testing: Continuous glucose monitor  Complications:  pancreatic cancer and now s/p Whipple procedure  PTA Medications:    Current Outpatient Medications   Medication Instructions    BD Insulin Syringe Ultra-Fine 0.3 mL 31 gauge x 5/16\" syringe 1 each, subcutaneous, 4 times daily before meals and nightly    blood sugar diagnostic (Blood Glucose Test) strip Check blood sugars once daily    blood-glucose meter misc Check  blood sugar as needed    blood-glucose sensor (FreeStyle Jef 3 Plus Sensor) device Use as directed    blood-glucose sensor (FreeStyle Jef 3 Sensor) device Use as directed    FreeStyle Jef 3 Jenkins misc Use with sensors as directed    insulin lispro 50 Units, subcutaneous, 3 times daily (morning, midday, late afternoon), Take as directed per insulin instructions. Plus sliding scale    lancets misc Check blood sugars once daily    Lantus U-100 Insulin 20 Units, subcutaneous, Every 24 hours, Take as directed per insulin instructions.    lisinopril 5 mg, oral, Daily    LORazepam (ATIVAN) 1 mg, oral, Nightly PRN, Insomnia, or nausea    naloxone (NARCAN) 4 mg, nasal, As needed, May repeat every 2-3 minutes if needed, alternating nostrils, until medical assistance becomes available.    ondansetron (ZOFRAN) 8 mg, oral, Every 8 hours PRN    pancrelipase, Lip-Prot-Amyl, (Creon) 36,000-114,000- 180,000 unit capsule,delayed " "release(DR/EC) capsule Take 2-3 capsules by mouth with meals and 1-2 capsules with snacks daily; for an average of 13 capsules per day.    pantoprazole (PROTONIX) 40 mg, oral, 2 times daily, Do not crush, chew, or split.    pen needle, diabetic (BD Ultra-Fine Short Pen Needle) 31 gauge x 5/16\" needle Use four times daily with insulin    prochlorperazine (COMPAZINE) 10 mg, oral, Every 6 hours PRN       Glucose   Date/Time Value Ref Range Status   12/16/2024 05:53 AM 57 (L) 74 - 99 mg/dL Final   12/15/2024 06:08 AM 80 74 - 99 mg/dL Final   12/14/2024 06:10  (H) 74 - 99 mg/dL Final   12/13/2024 12:14  (H) 74 - 99 mg/dL Final   12/13/2024 12:14  (H) 74 - 99 mg/dL Final   12/12/2024 05:54  (H) 74 - 99 mg/dL Final   12/06/2024 11:28  (H) 74 - 99 mg/dL Final   11/11/2024 03:49 PM 85 74 - 99 mg/dL Final   10/28/2024 11:44  (H) 74 - 99 mg/dL Final   10/14/2024 01:45  (H) 74 - 99 mg/dL Final     No results found for: \"CPEPTIDE\"  POC HEMOGLOBIN A1c   Date Value Ref Range Status   09/25/2024 7.0 (A) 4.2 - 6.5 % Final   04/17/2024 9.3 (A) 4.2 - 6.5 % Final   01/11/2024 7.4 (A) 4.2 - 6.5 % Final     Hemoglobin A1C   Date Value Ref Range Status   12/06/2024 5.3 See comment % Final   05/10/2024 9.1 (H) see below % Final       Patient Learning/Readiness Assessment:  Mr. Alvarez is agreeable to diabetes ed visit today.    Interventions/Topics Covered:  See After Visit Summary for handouts/information sheets provided to patient.  Education Documentation  Self-Care Behaviors, taught by Jacqueline Collins RN at 12/16/2024  1:47 PM.  Learner: Significant Other, Family, Patient  Readiness: Acceptance  Method: Explanation, Teach-back  Response: Demonstrated Understanding  Comment: inpatient regimen similar to outpatient regimen          Additional topics covered: General review of recent BG values and insulin plan for inpatient.  He is well versed in managing DM x 6 months now.  He has understanding that " "insulin is now required for survival since surgery.    He is comfortable with insulin pen and using Jef 3 CGM.  He is to have follow up outpatient for insulin pump.  States his wife wears insulin pump and he has learned from her.    Currently his appetite is \"not good\" but he anticipates improvement over time.    Additional materials provided: n/a    CGM:  already wears Jef 3    Education Outcome/Recommendations:        Recommendations for bedside nursing: Allow patient to self-inject insulin (supervised)    Recommendations for Providers: Follow-up w/ PCP and/or Endocrinology    Additional Comments: Mr. Alvarez is comfortable with self administration of insulin and monitoring BG.  He will be following with endocrinologist closer to home in [place].  Will follow as needed while inpatient.  Time spent:  15 mins.         "

## 2024-12-16 NOTE — PROGRESS NOTES
Nate Alvarez is a 54 y.o. male on day 4 of admission presenting with Malignant neoplasm of head of pancreas (Multi).      Subjective   Pt received 32 Units of Lantus yesterday, his BS this AM was 52. He is on full liquid diet.     Objective     Last Recorded Vitals  /80 (BP Location: Left arm, Patient Position: Lying)   Pulse 75   Temp 36.4 °C (97.5 °F) (Temporal)   Resp 16   Wt 98 kg (216 lb 0.8 oz)   SpO2 94%   Intake/Output last 3 Shifts:    Intake/Output Summary (Last 24 hours) at 12/16/2024 1308  Last data filed at 12/16/2024 1142  Gross per 24 hour   Intake --   Output 3705 ml   Net -3705 ml       Admission Weight  Weight: 98 kg (216 lb 0.8 oz) (12/12/24 0622)    Daily Weight  12/12/24 : 98 kg (216 lb 0.8 oz)    Image Results  US liver with doppler  Narrative: Interpreted By:  Nakul Wolf and Benza Andrew   STUDY:  US LIVER WITH DOPPLER;  12/12/2024 7:23 pm      INDICATION:  Signs/Symptoms:SMV reconstruction.          COMPARISON:  CT chest abdomen pelvis dated 11/18/2024      ACCESSION NUMBER(S):  [id number]      ORDERING CLINICIAN:  PORTER DENNY      TECHNIQUE:  Multiple images of the right upper quadrant were obtained.  Gray  scale, color Doppler and spectral Doppler waveform analysis was  performed.  This examination was interpreted at [facility].      FINDINGS:  LIVER:  The liver measures 16.8 cm and is diffusely echogenic in appearance,  consistent with diffuse fatty infiltration. The resulting increased  beam attenuation thereby limiting evaluation of the liver for focal  lesions. Within the limitations, no focal lesions are seen.      GALLBLADDER:  The gallbladder is surgically absent. Complex fluid is seen in the  gallbladder fossa.      BILIARY SYSTEM:  No evidence of intra or extrahepatic biliary dilatation is  identified; the common bile duct measures 4.0 mm.      DOPPLER EVALUATION:      HEPATIC ARTERIES:  Hepatic artery and  its right and left branches RI's are estimated at  0.71, 0.68 and 0.60, respectively.      PORTAL VEIN:  Portal vein is patent and measures 0.79 cm. There is normal  respiratory variation. Portal vein velocities are calculated as  follows: main portal vein 14.9 cm/s, antegrade flow; left portal vein  branch 21.5 cm/s, antegrade flow; right portal vein anterior branch  22.1 cm/s, antegrade flow; right portal vein posterior branch 19.1  cm/s, antegrade flow. The splenic vein is also patent. The superior  mesenteric vein is not well visualized.      HEPATIC VEIN:  The right, middle and left hepatic veins are patent and demonstrate  triphasic antegrade flow. IVC appears also patent.      PANCREAS:  The pancreas is poorly visualized due to overlying bowel gas.      RIGHT KIDNEY:  The right kidney measures 11.7 cm in length. No hydronephrosis or  renal calculi are seen.      SPLEEN:  The spleen is not visualized.      Impression: 1. Superior mesenteric vein is unable to be assessed due to overlying  bowel gas.  2. Complex fluid is noted in the gallbladder fossa, likely  postsurgical.  3. Hepatic steatosis.  4. Unremarkable Doppler assessment of the hepatic vessels as detailed  above.      I personally reviewed the images/study and I agree with the findings  as stated above by resident physician, Eric Martinez MD. This study  was interpreted at [facility],  [place], [place].      MACRO:  None      Signed by: Nakul Wolf 12/13/2024 10:32 AM  Dictation workstation:   [id number]  XR chest 1 view  Narrative: Interpreted By:  Berta Hidalgo,   STUDY:  XR CHEST 1 VIEW; 12/13/2024 8:39 am      INDICATION:  Signs/Symptoms:monitor atelectasis post-op, in ICU.      COMPARISON:  Radiograph dated 12/12/2024      ACCESSION NUMBER(S):  [id number]      ORDERING CLINICIAN:  SAE HSIEH      FINDINGS:  Enteric tube is in place with the tip outside the field of view.  Right IJ  MediPort is in place with the tip poorly seen.      The cardiac silhouette size is within normal limits. There is no  focal consolidation, edema or pneumothorax. No sizeable pleural  effusion. No acute osseous abnormality.      Impression: No focal infiltrate, pleural effusion, edema or pneumothorax.          Signed by: Berta Sylvester 12/13/2024 9:35 AM  Dictation workstation:   [id number]  XR chest 1 view  Narrative: Interpreted By:  Berta Hidalgo,  and Jessa Cartwright   STUDY:  XR CHEST 1 VIEW;  12/12/2024 6:38 pm      INDICATION:  Signs/Symptoms:r/o fluid overload.          COMPARISON:  Chest radiographs 06/03/2024, CT chest/abdomen/pelvis 09/26/2024.      ACCESSION NUMBER(S):  [id number]      ORDERING CLINICIAN:  PORTER DENNY      FINDINGS:  AP radiograph of the chest was provided.      MEDICAL DEVICES:  Enteric tube distal tip and side port overlying expected location of  the stomach. Right chest wall MediPort distal tip overlying the lower  SVC.      CARDIOMEDIASTINAL SILHOUETTE:  Cardiomediastinal silhouette is normal in size and configuration.      LUNGS:  Interval increase in left retrocardiac hazy/linear opacity. No  pneumothorax or pleural effusion. No significant pulmonary edema.      ABDOMEN:  No remarkable upper abdominal findings.      BONES:  No acute osseous changes.      Impression: 1.  Interval increase in left retrocardiac hazy/linear opacity which  could represent consolidation and/or atelectasis.  2. No significant pulmonary edema.      I personally reviewed the images/study and I agree with the findings  as stated by Dr. Chay Germain. This study was interpreted at  [place], [place].      MACRO:  None      Signed by: Berta Sylvester 12/13/2024 6:53 AM  Dictation workstation:   [id number]      Physical Exam  Vitals and nursing note reviewed.         Relevant Results  Results from last 7 days   Lab Units 12/16/24  0624 12/16/24  8527  12/16/24  0553 12/15/24  2000 12/15/24  1637 12/15/24  1148 12/15/24  0623 12/15/24  0608 12/14/24  1641 12/14/24  1314 12/14/24  1149 12/14/24  0610 12/14/24  0510 12/13/24  0100 12/13/24  0014 12/12/24  1813 12/12/24  1754   POCT GLUCOSE mg/dL 111* 52*  --  121* 137* 121* 84  --  205* 215* 221*  --  219*   < >  --    < >  --    GLUCOSE mg/dL  --   --  57*  --   --   --   --  80  --   --   --  234*  --   --  183*  183*  --  277*    < > = values in this interval not displayed.        Results from last 7 days   Lab Units 12/16/24  0553 12/15/24  0608 12/14/24  0610 12/13/24  0014 12/12/24  1754   SODIUM mmol/L 141 140 137 140  140 139   POTASSIUM mmol/L 4.0 3.8 4.2 4.4  4.4 5.3   CHLORIDE mmol/L 104 103 101 107  107 104   CO2 mmol/L 29 30 28 21  21 18*   BUN mg/dL 10 10 11 13  13 14   CREATININE mg/dL 0.63 0.64 0.68 0.90  0.90 0.95   EGFR mL/min/1.73m*2 >90 >90 >90 >90  >90 >90   GLUCOSE mg/dL 57* 80 234* 183*  183* 277*   CALCIUM mg/dL 8.7 8.4* 8.4* 8.5*  8.5* 8.5*   PHOSPHORUS mg/dL  --   --   --  2.7  2.7 4.9      Results from last 7 days   Lab Units 12/16/24  0553   SODIUM mmol/L 141   POTASSIUM mmol/L 4.0   CHLORIDE mmol/L 104   CO2 mmol/L 29   BUN mg/dL 10   CREATININE mg/dL 0.63   CALCIUM mg/dL 8.7   PROTEIN TOTAL g/dL 5.7*   BILIRUBIN TOTAL mg/dL 0.7   ALK PHOS U/L 90   ALT U/L 42   AST U/L 39   GLUCOSE mg/dL 57*                Assessment & Plan  Malignant neoplasm of head of pancreas (Multi)    demetria Alvarez is a 54 y.o. male with PMHx Pancreatic Adenoca, HTN, GERD, Anxiety, BCC of R eyelid GERD, and OA,. Patient is s/p pancreatectomy, splenectomy, cholecystectomy, duodenectomy, distal gastrectomy, hepaticojejunostomy, gastrojejunostomy, and SMV resection/reconstruction on 12/12. Endocrine is consulted on 12/13 to assist in insulin bridging off the insulin drip and subsequently management of Diabetes.      # Inpatient Hyperglycemia Management   Whipple on 12/12 for Pancreatic Adenocarcinoma      (Insulin Drip with total requirements approx 30 units - Bridging on 12/13 evening with Lantus of 25 units )     Endocrine recommendations - 12/16/24:       Adult Diet Clear   Please decrease Lantus from 32 units to 28 units  every 24hr.      C/W Sliding Scale  #2 (Lispro 2 units for every 50>150) Q 6 hours.     In the event patient becomes hypoglycemic - administer D5 W. Do not hold Glargine as patient does not have insulin production.   Hypoglycemia Protocol.      Please consult Ms. Jacquelinepam Collins for Diabetes Education/CGM..... She can meet with patient on Monday 12/16/24.   Outpatient considerations include Insulin pump - discussed at bedside.      Plan communicated with Primary Team.   Patient is aware and agreeable to plan.   Endocrine is following. Endocrine pager [phone]. Above plan is communicated with primary team.   Patient is discussed with the attending Dr. Bran.     Iza Alexandra MD  PGY3 IM Resident.

## 2024-12-16 NOTE — PROGRESS NOTES
"[facility] [place]  DEPARTMENT OF SURGERY    PROGRESS NOTE    SUBJECTIVE  Patient was hypoglycemic to 52 early this morning, corrected appropriately with dextrose; patient was asymptomatic. Otherwise no acute events overnight. Seen and assessed at bedside this morning, no acute complaints. Reports feeling well overall. Tolerating PO diet, passing gas. Pain controlled.     OBJECTIVE  Vitals:  Temp:  [36 °C (96.8 °F)-36.6 °C (97.9 °F)] 36 °C (96.8 °F)  Heart Rate:  [60-74] 68  Resp:  [16-18] 16  BP: (112-128)/(69-77) 122/77    I/Os:  I/O last 3 completed shifts:  In: 1448 (14.8 mL/kg) [P.O.:100; I.V.:1348 (13.8 mL/kg)]  Out: 5395 (55.1 mL/kg) [Urine:5125 (1.5 mL/kg/hr); Drains:270]  Weight: 98 kg     Exam:  Gen:  NAD, non-toxic appearing  HEENT:  Atraumatic, normocephalic  Eyes:  No scleral icterus  Card:  RRR  Resp:  Non-labored breathing on RA  Abd:  Soft, appropriately tender, mildly distended, midline incision clean, dry, intact, NHI x2 serosang   Ext:  WWP, no swelling of BLE  MSK:  ESTES  Neuro:  AOx3, no gross neurological deficits  Psych:  Appropriate mood and affect    Labs:  CBC:   Lab Results   Component Value Date    WBC 16.1 (H) 12/15/2024    RBC 2.74 (L) 12/15/2024     BMP:   Lab Results   Component Value Date    GLUCOSE 80 12/15/2024    CO2 30 12/15/2024    BUN 10 12/15/2024    CREATININE 0.64 12/15/2024    CALCIUM 8.4 (L) 12/15/2024     Coagulation:   No results found for: \"INR\", \"APTT\"    Imaging:  None today    ASSESSMENT:  Mr. Alvarez is a 54M w/ PMH significant for DM, HTN, RADHA, pancreatic cancer s/p neoadjuvant chemo and now total pancreatectomy, splenectomy, cholecystectomy, duodenectomy, distal gastrectomy, hepaticojejunostomy, gastrojejunostomy, and SMV resection/reconstruction on 12/12.  Initially transferred to ICU for glycemic management, now progressing appropriately for postoperative course.    PLAN:  - Discontinue PCA, transition to PO oxycodone  - Encourage IS  - " Advance to full liquid diet with creon  - HLIV  - Continue protonix PO 40mg daily   - Endocrinology consulted for glycemic management s/p total pancreatectomy; appreciate recs  - Insulin SSI, lantus 32u  - Splenectomy vaccines POD4 (12/16)  - Lovenox daily DVT ppx; will require at discharge  - No drain amylase labs in setting of total pancreatectomy  - Remove L drain today 12/16    Discussed with Dr. Tye Gaines MD  General Surgery PGY1  Surgical Oncology - [place] [phone]

## 2024-12-17 LAB
ALBUMIN SERPL BCP-MCNC: 3.8 G/DL (ref 3.4–5)
ALP SERPL-CCNC: 119 U/L (ref 33–120)
ALT SERPL W P-5'-P-CCNC: 39 U/L (ref 10–52)
ANION GAP SERPL CALC-SCNC: 16 MMOL/L (ref 10–20)
APTT PPP: 28 SECONDS (ref 27–38)
AST SERPL W P-5'-P-CCNC: 27 U/L (ref 9–39)
BILIRUB SERPL-MCNC: 1.1 MG/DL (ref 0–1.2)
BUN SERPL-MCNC: 11 MG/DL (ref 6–23)
CALCIUM SERPL-MCNC: 9.4 MG/DL (ref 8.6–10.6)
CHLORIDE SERPL-SCNC: 100 MMOL/L (ref 98–107)
CO2 SERPL-SCNC: 26 MMOL/L (ref 21–32)
CREAT SERPL-MCNC: 0.72 MG/DL (ref 0.5–1.3)
EGFRCR SERPLBLD CKD-EPI 2021: >90 ML/MIN/1.73M*2
ERYTHROCYTE [DISTWIDTH] IN BLOOD BY AUTOMATED COUNT: 14.1 % (ref 11.5–14.5)
GLUCOSE BLD MANUAL STRIP-MCNC: 148 MG/DL (ref 74–99)
GLUCOSE BLD MANUAL STRIP-MCNC: 242 MG/DL (ref 74–99)
GLUCOSE BLD MANUAL STRIP-MCNC: 326 MG/DL (ref 74–99)
GLUCOSE SERPL-MCNC: 148 MG/DL (ref 74–99)
HCT VFR BLD AUTO: 31.8 % (ref 41–52)
HGB BLD-MCNC: 10.7 G/DL (ref 13.5–17.5)
INR PPP: 1.2 (ref 0.9–1.1)
MAGNESIUM SERPL-MCNC: 2.18 MG/DL (ref 1.6–2.4)
MCH RBC QN AUTO: 30.9 PG (ref 26–34)
MCHC RBC AUTO-ENTMCNC: 33.6 G/DL (ref 32–36)
MCV RBC AUTO: 92 FL (ref 80–100)
NRBC BLD-RTO: 1.3 /100 WBCS (ref 0–0)
PLATELET # BLD AUTO: 390 X10*3/UL (ref 150–450)
POTASSIUM SERPL-SCNC: 3.9 MMOL/L (ref 3.5–5.3)
PROT SERPL-MCNC: 6.8 G/DL (ref 6.4–8.2)
PROTHROMBIN TIME: 13.9 SECONDS (ref 9.8–12.8)
RBC # BLD AUTO: 3.46 X10*6/UL (ref 4.5–5.9)
SODIUM SERPL-SCNC: 138 MMOL/L (ref 136–145)
WBC # BLD AUTO: 13.9 X10*3/UL (ref 4.4–11.3)

## 2024-12-17 PROCEDURE — 2500000002 HC RX 250 W HCPCS SELF ADMINISTERED DRUGS (ALT 637 FOR MEDICARE OP, ALT 636 FOR OP/ED): Performed by: STUDENT IN AN ORGANIZED HEALTH CARE EDUCATION/TRAINING PROGRAM

## 2024-12-17 PROCEDURE — 83735 ASSAY OF MAGNESIUM: CPT

## 2024-12-17 PROCEDURE — 2500000004 HC RX 250 GENERAL PHARMACY W/ HCPCS (ALT 636 FOR OP/ED): Performed by: NURSE PRACTITIONER

## 2024-12-17 PROCEDURE — 84075 ASSAY ALKALINE PHOSPHATASE: CPT

## 2024-12-17 PROCEDURE — 2500000001 HC RX 250 WO HCPCS SELF ADMINISTERED DRUGS (ALT 637 FOR MEDICARE OP): Performed by: NURSE PRACTITIONER

## 2024-12-17 PROCEDURE — 97530 THERAPEUTIC ACTIVITIES: CPT | Mod: GP,CQ

## 2024-12-17 PROCEDURE — 85730 THROMBOPLASTIN TIME PARTIAL: CPT

## 2024-12-17 PROCEDURE — 2500000004 HC RX 250 GENERAL PHARMACY W/ HCPCS (ALT 636 FOR OP/ED)

## 2024-12-17 PROCEDURE — 97116 GAIT TRAINING THERAPY: CPT | Mod: GP,CQ

## 2024-12-17 PROCEDURE — 2500000001 HC RX 250 WO HCPCS SELF ADMINISTERED DRUGS (ALT 637 FOR MEDICARE OP)

## 2024-12-17 PROCEDURE — 1170000001 HC PRIVATE ONCOLOGY ROOM DAILY

## 2024-12-17 PROCEDURE — 2500000005 HC RX 250 GENERAL PHARMACY W/O HCPCS: Performed by: NURSE PRACTITIONER

## 2024-12-17 PROCEDURE — 2500000002 HC RX 250 W HCPCS SELF ADMINISTERED DRUGS (ALT 637 FOR MEDICARE OP, ALT 636 FOR OP/ED): Performed by: NURSE PRACTITIONER

## 2024-12-17 PROCEDURE — 82947 ASSAY GLUCOSE BLOOD QUANT: CPT

## 2024-12-17 PROCEDURE — 85027 COMPLETE CBC AUTOMATED: CPT

## 2024-12-17 RX ORDER — METHOCARBAMOL 500 MG/1
500 TABLET, FILM COATED ORAL EVERY 6 HOURS SCHEDULED
Status: DISCONTINUED | OUTPATIENT
Start: 2024-12-17 | End: 2024-12-18 | Stop reason: HOSPADM

## 2024-12-17 RX ORDER — LIDOCAINE 560 MG/1
1 PATCH PERCUTANEOUS; TOPICAL; TRANSDERMAL DAILY
Status: DISCONTINUED | OUTPATIENT
Start: 2024-12-17 | End: 2024-12-18 | Stop reason: HOSPADM

## 2024-12-17 RX ORDER — METHOCARBAMOL 500 MG/1
500 TABLET, FILM COATED ORAL EVERY 6 HOURS PRN
Status: DISCONTINUED | OUTPATIENT
Start: 2024-12-17 | End: 2024-12-17

## 2024-12-17 RX ORDER — ACETAMINOPHEN 325 MG/1
650 TABLET ORAL EVERY 6 HOURS
Status: DISCONTINUED | OUTPATIENT
Start: 2024-12-17 | End: 2024-12-18 | Stop reason: HOSPADM

## 2024-12-17 ASSESSMENT — PAIN - FUNCTIONAL ASSESSMENT
PAIN_FUNCTIONAL_ASSESSMENT: 0-10

## 2024-12-17 ASSESSMENT — COGNITIVE AND FUNCTIONAL STATUS - GENERAL
WALKING IN HOSPITAL ROOM: A LITTLE
DAILY ACTIVITIY SCORE: 24
MOBILITY SCORE: 18
MOVING TO AND FROM BED TO CHAIR: A LITTLE
MOVING FROM LYING ON BACK TO SITTING ON SIDE OF FLAT BED WITH BEDRAILS: A LITTLE
DAILY ACTIVITIY SCORE: 24
MOBILITY SCORE: 24
STANDING UP FROM CHAIR USING ARMS: A LITTLE
MOBILITY SCORE: 24
CLIMB 3 TO 5 STEPS WITH RAILING: A LITTLE
TURNING FROM BACK TO SIDE WHILE IN FLAT BAD: A LITTLE

## 2024-12-17 ASSESSMENT — PAIN SCALES - GENERAL
PAINLEVEL_OUTOF10: 4
PAINLEVEL_OUTOF10: 4
PAINLEVEL_OUTOF10: 7
PAINLEVEL_OUTOF10: 0 - NO PAIN
PAINLEVEL_OUTOF10: 7
PAINLEVEL_OUTOF10: 5 - MODERATE PAIN
PAINLEVEL_OUTOF10: 8
PAINLEVEL_OUTOF10: 7
PAINLEVEL_OUTOF10: 7

## 2024-12-17 ASSESSMENT — PAIN DESCRIPTION - LOCATION: LOCATION: ABDOMEN

## 2024-12-17 NOTE — PROGRESS NOTES
"[facility]  DEPARTMENT OF SURGERY    PROGRESS NOTE    SUBJECTIVE  No acute events overnight. Seen and assessed at bedside this morning, no acute complaints. Reports some R shoulder pain after receiving splenectomy vaccines yesterday. Otherwise postsurgical abdominal pain has been manageable. No nausea/vomiting. Tolerating diet.     OBJECTIVE  Vitals:  Temp:  [36.4 °C (97.5 °F)-37.4 °C (99.3 °F)] 36.6 °C (97.9 °F)  Heart Rate:  [67-82] 70  Resp:  [16] 16  BP: (134-156)/(76-87) 143/83    I/Os:  I/O last 3 completed shifts:  In: 0 (0 mL/kg)   Out: 4740 (48.4 mL/kg) [Urine:4500 (1.3 mL/kg/hr); Drains:240]  Weight: 98 kg     Exam:  Gen:  NAD, non-toxic appearing  HEENT:  Atraumatic, normocephalic  Eyes:  No scleral icterus  Card:  RRR  Resp:  Non-labored breathing on RA  Abd:  Soft, appropriately tender, mildly distended, midline incision clean, dry, intact, NHI x1 serosanguineous   Ext:  WWP, no swelling of BLE  MSK:  ESTES  Neuro:  AOx3, no gross neurological deficits  Psych:  Appropriate mood and affect    Labs:  CBC:   Lab Results   Component Value Date    WBC 12.8 (H) 12/16/2024    RBC 2.90 (L) 12/16/2024     BMP:   Lab Results   Component Value Date    GLUCOSE 57 (L) 12/16/2024    CO2 29 12/16/2024    BUN 10 12/16/2024    CREATININE 0.63 12/16/2024    CALCIUM 8.7 12/16/2024     Coagulation:   No results found for: \"INR\", \"APTT\"    Imaging:  None today    ASSESSMENT:  Mr. Alvarez is a 54M w/ PMH significant for DM, HTN, RADHA, pancreatic cancer s/p neoadjuvant chemo and now total pancreatectomy, splenectomy, cholecystectomy, duodenectomy, distal gastrectomy, hepaticojejunostomy, gastrojejunostomy, and SMV resection/reconstruction on 12/12.  Initially transferred to ICU for glycemic management, now progressing appropriately for postoperative course.    PLAN:  - PO oxycodone for pain control  - Encourage IS  - Continue soft regular diet with creon  - HLIV  - Continue protonix PO 40mg " daily   - Endocrinology consulted for glycemic management s/p total pancreatectomy; appreciate recs  - Insulin SSI #2 q6h, lantus 28u q24h  - Will update patient's outpatient endocrinologist  - Splenectomy vaccines completed  - Lovenox daily DVT ppx; will require at discharge  - No drain amylase labs in setting of total pancreatectomy  - L drain removed 12/16; remove R drain today 12/17    Discussed with Dr. Tye Gaines MD  General Surgery PGY1  Surgical Oncology - [place] [phone]

## 2024-12-17 NOTE — CARE PLAN
The patient's goals for the shift include      The clinical goals for the shift include pt will report pain score less than 5/10 throughout shift.      Problem: Skin  Goal: Decreased wound size/increased tissue granulation at next dressing change  Outcome: Progressing  Goal: Participates in plan/prevention/treatment measures  Outcome: Progressing  Goal: Prevent/manage excess moisture  Outcome: Progressing  Goal: Prevent/minimize sheer/friction injuries  Outcome: Progressing  Goal: Promote/optimize nutrition  Outcome: Progressing  Goal: Promote skin healing  Outcome: Progressing     Problem: Pain - Adult  Goal: Verbalizes/displays adequate comfort level or baseline comfort level  Outcome: Progressing     Problem: Safety - Adult  Goal: Free from fall injury  Outcome: Progressing     Problem: Discharge Planning  Goal: Discharge to home or other facility with appropriate resources  Outcome: Progressing     Problem: Chronic Conditions and Co-morbidities  Goal: Patient's chronic conditions and co-morbidity symptoms are monitored and maintained or improved  Outcome: Progressing

## 2024-12-17 NOTE — HOSPITAL COURSE
54 yr old male with pancreas cancer who underwent TPS, DG, GJ, CDJ, cholecystectomy, SNV resection/reconstruction with Heidi Gamble and Theresa on 12/12.  Transferred to [facility] post-op.  Post-op course uncomplicated.  Johansen removed POD 2 and passed TOV.  Diet advanced slowly as tolerated.  IV medication transitioned to oral with adequate PO intake.  Endocrine consulted for management of insulin.  Surgical drains removed POD # and #.  DC home POD # on extended DVT prophylaxis.

## 2024-12-17 NOTE — PROGRESS NOTES
Physical Therapy    Physical Therapy Treatment    Patient Name: Nate Alvarez  MRN: [id number]  Department: [facility]  Room: 60/6013-A  Today's Date: 12/17/2024  Time Calculation  Start Time: 1630  Stop Time: 1653  Time Calculation (min): 23 min         Assessment/Plan   PT Assessment  End of Session Communication: Bedside nurse  Assessment Comment: .  End of Session Patient Position: Up in chair, Alarm off, not on at start of session     PT Plan  Treatment/Interventions: Bed mobility, Transfer training, Gait training, Balance training, Neuromuscular re-education, Strengthening, Endurance training, Therapeutic exercise, Therapeutic activity, Positioning, Stair training, Home exercise program  PT Plan: Ongoing PT  PT Frequency: 4 times per week  PT Discharge Recommendations: Low intensity level of continued care (HHC)  Equipment Recommended upon Discharge:  (TBD)  PT Recommended Transfer Status: Assistive device, Stand by assist  PT - OK to Discharge: Yes      General Visit Information:   PT  Visit  PT Received On: 12/17/24  General  Prior to Session Communication: Bedside nurse  Patient Position Received: Bed, 3 rail up  General Comment: pt supine in bed, pt cooperative and agreeable to session. pt demosntrates increased understanding of bed mobilty tasks while maintaining abdominal precautions. pt ambulates 300' x2 trials with no AD. intermittent cues to inhibit lateral sway    Subjective   Precautions:  Precautions  Medical Precautions: Abdominal precautions  Post-Surgical Precautions: Abdominal surgery precautions    Vital Signs (Past 2hrs)                 Objective   Pain:  Pain Assessment  Pain Assessment: 0-10  0-10 (Numeric) Pain Score: 0 - No pain  Cognition:  Cognition  Orientation Level: Oriented X4       Treatments:       Bed Mobility 1  Bed Mobility 1: Supine to sitting  Level of Assistance 1: Close supervision  Bed Mobility Comments 1: HOB flat, cues for log roll    Ambulation/Gait Training 1  Surface 1:  Level tile  Device 1: Rolling walker  Assistance 1: Close supervision  Comments/Distance (ft) 1: 2x300', cues to inhibit lateral sway  Transfer 1  Technique 1: Sit to stand, Stand to sit  Transfer Device 1: Walker  Transfer Level of Assistance 1: Close supervision         Outcome Measures:  [facility] Basic Mobility  Turning from your back to your side while in a flat bed without using bedrails: A little  Moving from lying on your back to sitting on the side of a flat bed without using bedrails: A little  Moving to and from bed to chair (including a wheelchair): A little  Standing up from a chair using your arms (e.g. wheelchair or bedside chair): A little  To walk in hospital room: A little  Climbing 3-5 steps with railing: A little  Basic Mobility - Total Score: 18    Education Documentation  Body Mechanics, taught by Philippe Cai PTA at 12/17/2024  5:41 PM.  Learner: Patient  Readiness: Acceptance  Method: Explanation  Response: Verbalizes Understanding    Education Comments  No comments found.        OP EDUCATION:       Encounter Problems       Encounter Problems (Active)       Balance       Pt will maintain dynamic balance during functional activities independently  (Progressing)       Start:  12/13/24    Expected End:  12/27/24               Mobility       STG - Patient will ambulate >150ft independently  (Progressing)       Start:  12/13/24    Expected End:  12/27/24            STG - Patient will ascend and descend a flight of stairs independently  (Progressing)       Start:  12/13/24    Expected End:  12/27/24               PT Transfers       STG - Patient to transfer to and from sit to supine independently        Start:  12/13/24    Expected End:  12/27/24            STG - Patient will transfer sit to and from stand independently        Start:  12/13/24    Expected End:  12/27/24               Pain - Adult

## 2024-12-17 NOTE — PROGRESS NOTES
12/17/24 1300   Discharge Planning   Living Arrangements Spouse/significant other   Support Systems Spouse/significant other   Type of Residence Private residence   Who is requesting discharge planning? Provider   Home or Post Acute Services In home services   Type of Home Care Services Home PT   Expected Discharge Disposition Home H     [facility] Note:    [facility] sent out blanket Home care referrals, have not received an accepting Home Care agency. Faxed referrals to the offline Home care agencies. [facility] to follow up for an accepting Home Care Agency.  Serena Traore RN

## 2024-12-17 NOTE — NURSING NOTE
Mr. Alvarez is resting in bed.  He is comfortable with current insulin regimen and able to adjust as recommended or prescribed at time of discharge.  States plan is for discharge tomorrow.  Will sign off at this time as he is able to manage an insulin regimen.  Mrs. Alvarez is able to assist at home as well.  Time spent:  15 mins.

## 2024-12-18 VITALS
WEIGHT: 216.05 LBS | HEART RATE: 76 BPM | TEMPERATURE: 95.9 F | DIASTOLIC BLOOD PRESSURE: 88 MMHG | HEIGHT: 67 IN | RESPIRATION RATE: 18 BRPM | SYSTOLIC BLOOD PRESSURE: 144 MMHG | BODY MASS INDEX: 33.91 KG/M2 | OXYGEN SATURATION: 97 %

## 2024-12-18 LAB
ALBUMIN SERPL BCP-MCNC: 3.5 G/DL (ref 3.4–5)
ANION GAP SERPL CALC-SCNC: 15 MMOL/L (ref 10–20)
BUN SERPL-MCNC: 15 MG/DL (ref 6–23)
CALCIUM SERPL-MCNC: 9.1 MG/DL (ref 8.6–10.6)
CHLORIDE SERPL-SCNC: 101 MMOL/L (ref 98–107)
CO2 SERPL-SCNC: 26 MMOL/L (ref 21–32)
CREAT SERPL-MCNC: 0.74 MG/DL (ref 0.5–1.3)
EGFRCR SERPLBLD CKD-EPI 2021: >90 ML/MIN/1.73M*2
ERYTHROCYTE [DISTWIDTH] IN BLOOD BY AUTOMATED COUNT: 14.3 % (ref 11.5–14.5)
GLUCOSE BLD MANUAL STRIP-MCNC: 272 MG/DL (ref 74–99)
GLUCOSE SERPL-MCNC: 240 MG/DL (ref 74–99)
HCT VFR BLD AUTO: 33.2 % (ref 41–52)
HGB BLD-MCNC: 11 G/DL (ref 13.5–17.5)
MAGNESIUM SERPL-MCNC: 2.21 MG/DL (ref 1.6–2.4)
MCH RBC QN AUTO: 30.6 PG (ref 26–34)
MCHC RBC AUTO-ENTMCNC: 33.1 G/DL (ref 32–36)
MCV RBC AUTO: 92 FL (ref 80–100)
NRBC BLD-RTO: 1.5 /100 WBCS (ref 0–0)
PHOSPHATE SERPL-MCNC: 2.6 MG/DL (ref 2.5–4.9)
PLATELET # BLD AUTO: 459 X10*3/UL (ref 150–450)
POTASSIUM SERPL-SCNC: 4.4 MMOL/L (ref 3.5–5.3)
RBC # BLD AUTO: 3.6 X10*6/UL (ref 4.5–5.9)
SODIUM SERPL-SCNC: 138 MMOL/L (ref 136–145)
WBC # BLD AUTO: 15.9 X10*3/UL (ref 4.4–11.3)

## 2024-12-18 PROCEDURE — 2500000004 HC RX 250 GENERAL PHARMACY W/ HCPCS (ALT 636 FOR OP/ED): Performed by: NURSE PRACTITIONER

## 2024-12-18 PROCEDURE — 99024 POSTOP FOLLOW-UP VISIT: CPT | Performed by: NURSE PRACTITIONER

## 2024-12-18 PROCEDURE — 80069 RENAL FUNCTION PANEL: CPT

## 2024-12-18 PROCEDURE — 2500000001 HC RX 250 WO HCPCS SELF ADMINISTERED DRUGS (ALT 637 FOR MEDICARE OP): Performed by: NURSE PRACTITIONER

## 2024-12-18 PROCEDURE — 82947 ASSAY GLUCOSE BLOOD QUANT: CPT

## 2024-12-18 PROCEDURE — 2500000004 HC RX 250 GENERAL PHARMACY W/ HCPCS (ALT 636 FOR OP/ED)

## 2024-12-18 PROCEDURE — 2500000001 HC RX 250 WO HCPCS SELF ADMINISTERED DRUGS (ALT 637 FOR MEDICARE OP)

## 2024-12-18 PROCEDURE — 2500000005 HC RX 250 GENERAL PHARMACY W/O HCPCS

## 2024-12-18 PROCEDURE — 2500000002 HC RX 250 W HCPCS SELF ADMINISTERED DRUGS (ALT 637 FOR MEDICARE OP, ALT 636 FOR OP/ED): Performed by: STUDENT IN AN ORGANIZED HEALTH CARE EDUCATION/TRAINING PROGRAM

## 2024-12-18 PROCEDURE — 85027 COMPLETE CBC AUTOMATED: CPT

## 2024-12-18 PROCEDURE — 83735 ASSAY OF MAGNESIUM: CPT

## 2024-12-18 PROCEDURE — 99232 SBSQ HOSP IP/OBS MODERATE 35: CPT | Performed by: INTERNAL MEDICINE

## 2024-12-18 RX ORDER — INSULIN LISPRO 100 [IU]/ML
INJECTION, SOLUTION INTRAVENOUS; SUBCUTANEOUS
Start: 2024-12-18

## 2024-12-18 RX ORDER — POLYETHYLENE GLYCOL 3350 17 G/17G
17 POWDER, FOR SOLUTION ORAL DAILY
COMMUNITY
Start: 2024-12-18

## 2024-12-18 RX ORDER — INSULIN GLARGINE 100 [IU]/ML
30 INJECTION, SOLUTION SUBCUTANEOUS EVERY 24 HOURS
Start: 2024-12-18

## 2024-12-18 RX ORDER — OXYCODONE HYDROCHLORIDE 5 MG/1
5 TABLET ORAL EVERY 4 HOURS PRN
Qty: 28 TABLET | Refills: 0 | Status: SHIPPED | OUTPATIENT
Start: 2024-12-18 | End: 2024-12-25

## 2024-12-18 RX ORDER — ENOXAPARIN SODIUM 100 MG/ML
40 INJECTION SUBCUTANEOUS DAILY
Qty: 21 EACH | Refills: 0 | Status: SHIPPED | OUTPATIENT
Start: 2024-12-18

## 2024-12-18 RX ORDER — METHOCARBAMOL 500 MG/1
500 TABLET, FILM COATED ORAL 4 TIMES DAILY
Qty: 28 TABLET | Refills: 0 | Status: SHIPPED | OUTPATIENT
Start: 2024-12-18 | End: 2024-12-25

## 2024-12-18 RX ORDER — DOCUSATE SODIUM 100 MG/1
100 CAPSULE, LIQUID FILLED ORAL 2 TIMES DAILY
COMMUNITY
Start: 2024-12-18

## 2024-12-18 RX ORDER — ACETAMINOPHEN 325 MG/1
650 TABLET ORAL EVERY 6 HOURS
COMMUNITY
Start: 2024-12-18

## 2024-12-18 ASSESSMENT — COGNITIVE AND FUNCTIONAL STATUS - GENERAL
MOBILITY SCORE: 24
DAILY ACTIVITIY SCORE: 24

## 2024-12-18 ASSESSMENT — PAIN SCALES - GENERAL
PAINLEVEL_OUTOF10: 4
PAINLEVEL_OUTOF10: 7
PAINLEVEL_OUTOF10: 8
PAINLEVEL_OUTOF10: 7

## 2024-12-18 ASSESSMENT — PAIN - FUNCTIONAL ASSESSMENT
PAIN_FUNCTIONAL_ASSESSMENT: 0-10

## 2024-12-18 ASSESSMENT — PAIN DESCRIPTION - ORIENTATION: ORIENTATION: MID

## 2024-12-18 ASSESSMENT — PAIN DESCRIPTION - LOCATION: LOCATION: ABDOMEN

## 2024-12-18 NOTE — PROGRESS NOTES
"Nate Alvarez is a 54 y.o. male on day 6 of admission presenting with Malignant neoplasm of head of pancreas (Multi).    Subjective   NAEON. Pt seen and examined at bedside this am, doing ok, tolerating po intake now.  Plan for discharge today.    I have reviewed histories, allergies and medications have been reviewed and there are no changes       Objective       Last Recorded Vitals  Blood pressure 144/88, pulse 76, temperature 35.5 °C (95.9 °F), temperature source Temporal, resp. rate 18, height 1.702 m (5' 7.01\"), weight 98 kg (216 lb 0.8 oz), SpO2 97%.  Intake/Output last 3 Shifts:  I/O last 3 completed shifts:  In: 150 (1.5 mL/kg) [P.O.:150]  Out: 1825 (18.6 mL/kg) [Urine:1775 (0.5 mL/kg/hr); Drains:50]  Weight: 98 kg     Relevant Results  Results from last 7 days   Lab Units 12/18/24  0857 12/18/24  0602 12/17/24  1556 12/17/24  1225 12/17/24  0735 12/17/24  0647 12/16/24  2126 12/16/24  0554 12/16/24  0553 12/15/24  0623 12/15/24  0608 12/14/24  1149 12/14/24  0610   POCT GLUCOSE mg/dL 272*  --  326* 242* 148*  --  263*   < >  --    < >  --    < >  --    GLUCOSE mg/dL  --  240*  --   --   --  148*  --   --  57*  --  80  --  234*    < > = values in this interval not displayed.           Assessment/Plan   Assessment & Plan  Malignant neoplasm of head of pancreas (Multi)    Nate Alvarez is a 54 y.o. male with PMHx Pancreatic Adenoca, HTN, GERD, Anxiety, BCC of R eyelid GERD, and OA,. Patient is s/p pancreatectomy, splenectomy, cholecystectomy, duodenectomy, distal gastrectomy, hepaticojejunostomy, gastrojejunostomy, and SMV resection/reconstruction on 12/12. Endocrine is consulted on 12/13 to assist in insulin bridging off the insulin drip and subsequently management of Diabetes.     Home regimen:   Lantus 25 units subcutaneous bedtime; 32 units when you have steroids x 4 days, and Humalog 12-18 units TID AC     # Inpatient Hyperglycemia Management   [facility] on 12/12 for Pancreatic Adenocarcinoma   "   (Insulin Drip with total requirements approx 30 units - Bridging on 12/13 evening with Lantus of 25 units )   Pt is tolerating po well at this time. Plan for discharge today.      Endocrine recommendations   Plan for discharge today   -Glargine 30  units at bedtime  -Lispro 6 units TID AC  -Lispro Sliding Scale  #2 (Lispro 2 units for every 50>150) TID AC     Pt received DM education by Ms. Jacqueline Collins for Diabetes Education/CGM.....Outpatient considerations include Insulin pump - discussed at bedside.   Pt has an appointment and will follow up with his endocrinologist within 2 days upon discharge     Plan communicated with Primary Team.   Patient is aware and agreeable to plan.     Patient is discussed with the attending Dr. Field Diana Sawass Najjar, MD  Endocrine fellow, PGY4

## 2024-12-18 NOTE — PROGRESS NOTES
12/18/24 1000   Discharge Planning   Living Arrangements Spouse/significant other   Support Systems Spouse/significant other   Assistance Needed None   Type of Residence Private residence   Who is requesting discharge planning? Provider   Home or Post Acute Services None   Expected Discharge Disposition Home   Does the patient need discharge transport arranged? No     [facility] unable to find an accepting [facility] due out of network, out of service area and no staff availability. Notified JUAN Tay, no accepting [facility]. Per Mary aKy loyola for patient to discharge home no needs. Serena Traore RN.  ADOD 12/18/24 BERENICEN

## 2024-12-18 NOTE — PROGRESS NOTES
[facility]  DEPARTMENT OF SURGERY    PROGRESS NOTE    SUBJECTIVE  No acute events overnight. Seen and assessed at bedside this morning, no acute complaints. Patient reports that pain overall has improved, with regard to right shoulder and postsurgical abdominal pain. Tolerating diet, ambulating without issue. Denies nausea or new/worsening abdominal pain. Reports feeling well overall.    OBJECTIVE  Vitals:  Temp:  [35.4 °C (95.7 °F)-36.5 °C (97.7 °F)] 35.4 °C (95.7 °F)  Heart Rate:  [] 69  Resp:  [18-19] 18  BP: (126-136)/(81-94) 134/89    I/Os:  I/O last 3 completed shifts:  In: 150 (1.5 mL/kg) [P.O.:150]  Out: 1825 (18.6 mL/kg) [Urine:1775 (0.5 mL/kg/hr); Drains:50]  Weight: 98 kg     Exam:  Gen:  NAD, non-toxic appearing  HEENT:  Atraumatic, normocephalic  Eyes:  No scleral icterus  Card:  Regular rate  Resp:  Non-labored breathing on RA  Abd:  Soft, appropriately tender, mildly distended, midline incision clean, dry, intact  Ext:  WWP, no swelling of BLE  MSK:  ESTES  Neuro:  AOx3, no gross neurological deficits  Psych:  Appropriate mood and affect    Labs:  CBC:   Lab Results   Component Value Date    WBC 15.9 (H) 12/18/2024    RBC 3.60 (L) 12/18/2024     BMP:   Lab Results   Component Value Date    GLUCOSE 240 (H) 12/18/2024    CO2 26 12/18/2024    BUN 15 12/18/2024    CREATININE 0.74 12/18/2024    CALCIUM 9.1 12/18/2024     Coagulation:   Lab Results   Component Value Date    INR 1.2 (H) 12/17/2024    APTT 28 12/17/2024       Imaging:  None today    ASSESSMENT:  Mr. Alvarez is a 54M w/ PMH significant for DM, HTN, RADHA, pancreatic cancer s/p neoadjuvant chemo and now total pancreatectomy, splenectomy, cholecystectomy, duodenectomy, distal gastrectomy, hepaticojejunostomy, gastrojejunostomy, and SMV resection/reconstruction on 12/12.  Initially transferred to ICU for glycemic management, now progressing appropriately for postoperative course on RNF.    PLAN:  - PO  oxycodone, robaxin, and tylenol for pain control  - Encourage IS  - Continue soft regular diet with creon  - HLIV  - Continue protonix PO 40mg daily   - Endocrinology consulted for glycemic management s/p total pancreatectomy; appreciate updated recs for discharge  - Insulin SSI #2 q6h, lantus 28u q24h  - Patient planned for outpatient endocrinology follow-up  - Splenectomy vaccines completed  - Lovenox daily DVT ppx; will require at discharge  - Plan for discharge today  - Patient with some leukocytosis on AM labs without clinical signs/concern for infectious process. Patient to be discharged with counseling on return precautions and repeat CBC 12/20 as an outpatient    Discussed with Dr. Tye Gaines MD  General Surgery PGY1  Surgical Oncology - [facility] [id number]

## 2024-12-18 NOTE — DISCHARGE SUMMARY
"Discharge Diagnosis  Malignant neoplasm of head of pancreas (Multi)    Hospital Course  54 yr old male with pancreas cancer who underwent TPS, DG, GJ, CDJ, cholecystectomy, SNV resection/reconstruction with Heidi Gamble and Theresa on 12/12.  Transferred to [facility] post-op.  Post-op course uncomplicated.  Johansen removed POD 2 and passed TOV.  Diet advanced slowly as tolerated.  IV medication transitioned to oral with adequate PO intake.  Endocrine consulted for management of insulin.  Surgical drains removed prior to DC.  DC home POD 7 on extended DVT prophylaxis with close endocrine follow up and repeat labs 12/20.    Pertinent Physical Exam At Time of Discharge  Neurological: Awake, alert, conversive  Respiratory/Thorax: even, unlabored  Genitourinary: voiding  Gastrointestinal: soft, NT, ND.  Incision well approximated.  Skin: warm, dry  Musculoskeletal: ESTES  Eyes: non-icteric  Extremities: no edema   Psychological: appropriate mood/affect     /88 (BP Location: Left arm, Patient Position: Lying)   Pulse 76   Temp 35.5 °C (95.9 °F) (Temporal)   Resp 18   Ht 1.702 m (5' 7.01\")   Wt 98 kg (216 lb 0.8 oz)   SpO2 97%   BMI 33.83 kg/m²      Home Medications     Medication List      START taking these medications     acetaminophen 325 mg tablet; Commonly known as: Tylenol; Take 2 tablets   (650 mg) by mouth every 6 hours.   docusate sodium 100 mg capsule; Commonly known as: Colace; Take 1   capsule (100 mg) by mouth 2 times a day.   enoxaparin 40 mg/0.4 mL syringe; Commonly known as: Lovenox; Inject 0.4   mL (40 mg) under the skin once daily.   methocarbamol 500 mg tablet; Commonly known as: Robaxin; Take 1 tablet   (500 mg) by mouth 4 times a day for 7 days.   polyethylene glycol 17 gram packet; Commonly known as: Glycolax,   Miralax; Take 17 g by mouth once daily.     CHANGE how you take these medications     insulin lispro 100 unit/mL injection; 6 units subcutaneous 3x day before   meals + sliding scale " "0 unit(s) if Blood glucose is between   2   unit(s) if Blood glucose is between 151-200  4 unit(s) if Blood glucose is   between 201-250  6 unit(s) if Blood glucose is between 251-300  8 unit(s)   if Blood glucose is between 301-350  10 unit(s) if Blood glucose is   between 351-400; What changed: how much to take, how to take this, when to   take this, additional instructions   oxyCODONE 5 mg immediate release tablet; Commonly known as: Roxicodone;   Take 1 tablet (5 mg) by mouth every 4 hours if needed for severe pain (7 -   10) for up to 7 days.; What changed: medication strength, how much to   take, when to take this     CONTINUE taking these medications     BD Insulin Syringe Ultra-Fine 0.3 mL 31 gauge x 5/16\" syringe; Generic   drug: insulin syringe-needle U-100; Inject 1 each under the skin 4 times a   day before meals.   Blood Glucose Test strip; Generic drug: blood sugar diagnostic; Check   blood sugars once daily   blood-glucose meter misc; Check  blood sugar as needed   Creon 36,000-114,000- 180,000 unit capsule,delayed release(DR/EC)   capsule; Generic drug: pancrelipase (Lip-Prot-Amyl); Take 2-3 capsules by   mouth with meals and 1-2 capsules with snacks daily; for an average of 13   capsules per day.   FreeStyle Jef 3 [place] misc; Generic drug: blood-glucose   meter,continuous; Use with sensors as directed   * FreeStyle Jef 3 Sensor device; Generic drug: blood-glucose sensor;   Use as directed   * FreeStyle Jef 3 Plus Sensor device; Generic drug: blood-glucose   sensor; Use as directed   insulin glargine 100 unit/mL injection; Commonly known as: Lantus;   Inject 30 Units under the skin once every 24 hours. Take as directed per   insulin instructions.   lancets misc; Check blood sugars once daily   LORazepam 1 mg tablet; Commonly known as: Ativan; Take 1 tablet (1 mg)   by mouth as needed at bedtime for anxiety. Insomnia, or nausea   naloxone 4 mg/0.1 mL nasal spray; Commonly known as: " "Narcan; Administer   1 spray (4 mg) into affected nostril(s) if needed for opioid reversal. May   repeat every 2-3 minutes if needed, alternating nostrils, until medical   assistance becomes available.   ondansetron 8 mg tablet; Commonly known as: Zofran; Take 1 tablet (8 mg)   by mouth every 8 hours if needed for nausea or vomiting.   pantoprazole 40 mg EC tablet; Commonly known as: ProtoNix; Take 1 tablet   (40 mg) by mouth 2 times a day. Do not crush, chew, or split.   pen needle, diabetic 31 gauge x 5/16\" needle; Commonly known as: BD   Ultra-Fine Short Pen Needle; Use four times daily with insulin   prochlorperazine 10 mg tablet; Commonly known as: Compazine; Take 1   tablet (10 mg) by mouth every 6 hours if needed for nausea or vomiting.  * This list has 2 medication(s) that are the same as other medications   prescribed for you. Read the directions carefully, and ask your doctor or   other care provider to review them with you.     STOP taking these medications     chlorhexidine 0.12 % solution; Commonly known as: Peridex   chlorhexidine 4 % external liquid; Commonly known as: Hibiclens   lisinopril 5 mg tablet       Outpatient Follow-Up  Future Appointments   Date Time Provider Department [place]   12/20/2024  8:45 AM Marya Vasquez MD [id number] [facility]   12/24/2024  9:30 AM Roddy Gamble MD [id number] [facility]   1/14/2025  4:20 PM Shaw You MD [id number] [facility]   1/15/2025  7:30 AM MD VINNY [id number] [facility]   1/23/2025 11:30 AM INF 00 [place] [id number] [facility]   4/17/2025  1:30 PM MD Tatyana SungPC1 [facility]   5/14/2025 10:45 AM Marya Vasquez MD [id number] [facility]       Mary Kay Guzman, APRN-CNP  "

## 2024-12-18 NOTE — NURSING NOTE
Nate Alvarez discharged at 1:39 PM  and 12/18/24   Patient discharged home .  Discharge education and teaching completed with Nate Alvarez and significant other.

## 2024-12-18 NOTE — CARE PLAN
Problem: Skin  Goal: Decreased wound size/increased tissue granulation at next dressing change  Flowsheets (Taken 12/17/2024 2358)  Decreased wound size/increased tissue granulation at next dressing change:   Promote sleep for wound healing   Protective dressings over bony prominences   Utilize specialty bed per algorithm  Goal: Promote skin healing  Flowsheets (Taken 12/17/2024 2358)  Promote skin healing:   Assess skin/pad under line(s)/device(s)   Ensure correct size (line/device) and apply per  instructions   Protective dressings over bony prominences   Rotate device position/do not position patient on device   Turn/reposition every 2 hours/use positioning/transfer devices     Problem: Pain - Adult  Goal: Verbalizes/displays adequate comfort level or baseline comfort level  Flowsheets (Taken 12/17/2024 2358)  Verbalizes/displays adequate comfort level or baseline comfort level:   Encourage patient to monitor pain and request assistance   Assess pain using appropriate pain scale   Administer analgesics based on type and severity of pain and evaluate response   Implement non-pharmacological measures as appropriate and evaluate response   The patient's goals for the shift include      The clinical goals for the shift include pt will decrease in pain for shift pain level will be less than 4

## 2024-12-19 NOTE — PROGRESS NOTES
"Subjective   Nate Alvarez is a 54 y.o. male who presents for follow up for Type 2 diabetes mellitus. The initial diagnosis of diabetes was made in October 2023 .      He was diagnosed in April 2024 with invasive adenocarcinoma moderately differentiated after having a biopsy of a pancreatic mass to the head.  He has been completed 12 cycles of chemotherapy.  He underwent total pancreatectomy, splenectomy, distal gasserectomy with gastrojejunostomy, choledochojejunostomy, cholecystectomy and SMV resection on 12/12/2024. He was discharged two days ago.      Known complications due to diabetes included peripheral neuropathy, though this is largely from chemotherapy.      Cardiovascular risk factors include diabetes mellitus, hypertension, and male gender. The patient is on an ACE inhibitor or angiotensin II receptor blocker.  The patient has not been previously hospitalized due to diabetic ketoacidosis.     Current symptoms/problems include paresthesia of the feet. His clinical course has been stable.     Current diabetes regimen is as follows:   Lantus 30 units subcutaneous bedtime   Humalog 6-10 units TID AC     The patient is currently checking the blood glucose multiple times per day.    Patient is using: continuous glucose monitor                                                            Hypoglycemia frequency: 0%  Hypoglycemia awareness: No     MEALS:    Breakfast -  Ensure   Lunch - 1/4 sub, cheeseburger yandel one yhour    Dinner - chicken noodle soup  Beverages - water, iced tea, 2 sodas per week, minute maid fruit punch       Review of Systems   Constitutional:  Negative for appetite change.       Objective   /90 (BP Location: Left arm, Patient Position: Sitting, BP Cuff Size: Adult)   Pulse 78   Ht 1.905 m (6' 3\")   Wt 101 kg (221 lb 12.8 oz)   BMI 27.72 kg/m²   Physical Exam  Vitals and nursing note reviewed.   Constitutional:       General: He is not in acute distress.     Appearance: Normal " appearance. He is normal weight.   HENT:      Head: Normocephalic and atraumatic.      Nose: Nose normal.      Mouth/Throat:      Mouth: Mucous membranes are moist.   Eyes:      Extraocular Movements: Extraocular movements intact.   Cardiovascular:      Rate and Rhythm: Normal rate and regular rhythm.   Pulmonary:      Effort: Pulmonary effort is normal.      Breath sounds: Normal breath sounds.      Comments: Healing abdominal surgical scar   Musculoskeletal:         General: Normal range of motion.   Skin:     General: Skin is warm.   Neurological:      Mental Status: He is alert and oriented to person, place, and time.   Psychiatric:         Mood and Affect: Mood normal.         Lab Review  Lab Results   Component Value Date    HGBA1C 5.3 12/06/2024     Glucose (mg/dL)   Date Value   12/18/2024 240 (H)   12/17/2024 148 (H)   12/16/2024 57 (L)     POC HEMOGLOBIN A1c (%)   Date Value   09/25/2024 7.0 (A)   04/17/2024 9.3 (A)   01/11/2024 7.4 (A)     Hemoglobin A1C (%)   Date Value   12/06/2024 5.3   05/10/2024 9.1 (H)     Bicarbonate (mmol/L)   Date Value   12/18/2024 26   12/17/2024 26   12/16/2024 29     Urea Nitrogen (mg/dL)   Date Value   12/18/2024 15   12/17/2024 11   12/16/2024 10     Creatinine (mg/dL)   Date Value   12/18/2024 0.74   12/17/2024 0.72   12/16/2024 0.63     Lab Results   Component Value Date    CHOL 121 04/16/2024    CHOL 131 08/09/2022    CHOL 193 09/28/2020     Lab Results   Component Value Date    HDL 34.2 04/16/2024    HDL 31.2 (A) 08/09/2022    HDL 42.4 09/28/2020     Lab Results   Component Value Date    LDLCALC 59 04/16/2024     Lab Results   Component Value Date    TRIG 141 04/16/2024    TRIG 167 (H) 08/09/2022    TRIG 126 09/28/2020     Lab Results   Component Value Date    TSH 3.53 04/16/2024       Health Maintenance:   Foot Exam:  May 31, 2024  Eye Exam:  June 2022  Urine Albumin: April 17, 2024    CGM Interpretation  14 day CGM download was reviewed in detail as documented above  and will be attached to chart.  A minimum of 72 hours of glucose data was used to inform the management plan outlined below.    Sufficient data to analyze:  Yes; CGM active 52% of the time  25% of values is above target range, which is at goal.    75% of values is spent in target range, and thus at goal   0% of values is spent with hypoglycemia, and thus at goal   Hyperglycemia is occurring postprandially following breakfast and dinner     Assessment/Plan   54 year old male presents for follow up for type 3c diabetes mellitus.   He was diagnosed in April 2024 with invasive adenocarcinoma moderately differentiated after having a biopsy of a pancreatic mass to the head.  He has completed 12 cycles of chemotherapy.  He underwent total pancreatectomy, splenectomy, distal gasserectomy with gastrojejunostomy, choledochojejunostomy, cholecystectomy and SMV resection on 12/12/2024.His blood pressure is at goal.    Secondary diabetes mellitus (Multi)  To continue Lantus 30 units subcutaneous bedtime  To continue Humalog 6 units three times daily before meals   Please continue an insulin sliding scale with Humalog before meals as follows:   150-200mg/dL - 2 units   201-250mg/dL - 4 units   251-300 mg/dL - 6 untis   301-350mg/dL - 8 units   >351mg/dL - 10 units  To continue the use of your CGM for the intensive glucose monitoring due to the dynamic nature of insulin requirements, the narrow therapeutic index of insulin and the potentially fatal consequences of treatment  Patient not keen on insulin pump as of right now; can be revisited in the future once he heals from his most recent major surgery       Long-term insulin use (Multi)  Please rotate insulin injection sites    For follow up in 3 months

## 2024-12-19 NOTE — PATIENT INSTRUCTIONS
Thank you for choosing [facility]  for your health care needs.  If you have any questions, concerns or medical needs, please feel free to contact our office at [phone].    Please ensure you complete your blood work one week before the next scheduled appointment.    To continue Lantus 30 units subcutaneous bedtime  To continue Humalog 6 units three times daily before meals   Please continue an insulin sliding scale with Humalog before meals as follows:   150-200mg/dL - 2 units   201-250mg/dL - 4 units   251-300 mg/dL - 6 untis   301-350mg/dL - 8 units   >351mg/dL - 10 units  To continue the use of your CGM for the intensive glucose monitoring due to the dynamic nature of insulin requirements, the narrow therapeutic index of insulin and the potentially fatal consequences of treatment  For follow up in 3 months

## 2024-12-20 ENCOUNTER — OFFICE VISIT (OUTPATIENT)
Dept: ENDOCRINOLOGY | Facility: CLINIC | Age: 54
End: 2024-12-20
Payer: COMMERCIAL

## 2024-12-20 ENCOUNTER — TELEPHONE (OUTPATIENT)
Dept: SURGICAL ONCOLOGY | Facility: CLINIC | Age: 54
End: 2024-12-20

## 2024-12-20 VITALS
SYSTOLIC BLOOD PRESSURE: 148 MMHG | HEART RATE: 78 BPM | BODY MASS INDEX: 27.58 KG/M2 | WEIGHT: 221.8 LBS | DIASTOLIC BLOOD PRESSURE: 90 MMHG | HEIGHT: 75 IN

## 2024-12-20 DIAGNOSIS — Z79.4 LONG-TERM INSULIN USE (MULTI): ICD-10-CM

## 2024-12-20 DIAGNOSIS — E13.9 SECONDARY DIABETES MELLITUS (MULTI): Primary | ICD-10-CM

## 2024-12-20 DIAGNOSIS — C25.0 MALIGNANT NEOPLASM OF HEAD OF PANCREAS (MULTI): ICD-10-CM

## 2024-12-20 PROCEDURE — 3008F BODY MASS INDEX DOCD: CPT | Performed by: INTERNAL MEDICINE

## 2024-12-20 PROCEDURE — 99214 OFFICE O/P EST MOD 30 MIN: CPT | Performed by: INTERNAL MEDICINE

## 2024-12-20 PROCEDURE — 3080F DIAST BP >= 90 MM HG: CPT | Performed by: INTERNAL MEDICINE

## 2024-12-20 PROCEDURE — 95251 CONT GLUC MNTR ANALYSIS I&R: CPT | Performed by: INTERNAL MEDICINE

## 2024-12-20 PROCEDURE — 3044F HG A1C LEVEL LT 7.0%: CPT | Performed by: INTERNAL MEDICINE

## 2024-12-20 PROCEDURE — 3048F LDL-C <100 MG/DL: CPT | Performed by: INTERNAL MEDICINE

## 2024-12-20 PROCEDURE — 3077F SYST BP >= 140 MM HG: CPT | Performed by: INTERNAL MEDICINE

## 2024-12-20 RX ORDER — OXYCODONE HYDROCHLORIDE 5 MG/1
10 TABLET ORAL EVERY 4 HOURS PRN
Qty: 20 TABLET | Refills: 0 | Status: SHIPPED | OUTPATIENT
Start: 2024-12-20

## 2024-12-20 RX ORDER — INSULIN GLARGINE 100 [IU]/ML
30 INJECTION, SOLUTION SUBCUTANEOUS NIGHTLY
Qty: 10 EACH | Refills: 5 | Status: SHIPPED | OUTPATIENT
Start: 2024-12-20 | End: 2025-06-18

## 2024-12-20 ASSESSMENT — ENCOUNTER SYMPTOMS: APPETITE CHANGE: 0

## 2024-12-20 NOTE — TELEPHONE ENCOUNTER
I had returned the pt msg earlier today and we spoke regarding his questions. He is eating sm amts and felt he over ate today and had vomiting x1 and once when it went down the wrong way. He is moving his bowels and no diarrhea. He denies nausea. He is c/o abdominal pain as with his preopchemo and states he had been on oxycodone 40mg every 4-6 hrs while on chemo and been taking 10mg since discharge. Dr. Gamble was updated on the above and the pt was called back to instruct that he sent in a refill but to try and take 1 tablet and if he needs a refill next week may need to speak to med oncology as well. He alos is aware to call if continued vomiting.

## 2024-12-22 NOTE — ASSESSMENT & PLAN NOTE
To continue Lantus 30 units subcutaneous bedtime  To continue Humalog 6 units three times daily before meals   Please continue an insulin sliding scale with Humalog before meals as follows:   150-200mg/dL - 2 units   201-250mg/dL - 4 units   251-300 mg/dL - 6 untis   301-350mg/dL - 8 units   >351mg/dL - 10 units  To continue the use of your CGM for the intensive glucose monitoring due to the dynamic nature of insulin requirements, the narrow therapeutic index of insulin and the potentially fatal consequences of treatment  Patient not keen on insulin pump as of right now; can be revisited in the future once he heals from his most recent major surgery

## 2024-12-23 ENCOUNTER — TELEPHONE (OUTPATIENT)
Dept: ADMISSION | Facility: HOSPITAL | Age: 54
End: 2024-12-23
Payer: COMMERCIAL

## 2024-12-23 DIAGNOSIS — Z90.49 H/O WHIPPLE PROCEDURE: Primary | ICD-10-CM

## 2024-12-23 DIAGNOSIS — Z90.410 H/O WHIPPLE PROCEDURE: Primary | ICD-10-CM

## 2024-12-23 DIAGNOSIS — C25.0 MALIGNANT NEOPLASM OF HEAD OF PANCREAS (MULTI): ICD-10-CM

## 2024-12-23 RX ORDER — OXYCODONE HYDROCHLORIDE 5 MG/1
10 TABLET ORAL EVERY 4 HOURS PRN
Qty: 60 TABLET | Refills: 0 | Status: CANCELLED | OUTPATIENT
Start: 2024-12-23 | End: 2024-12-28

## 2024-12-23 NOTE — TELEPHONE ENCOUNTER
Pt requesting refill   Oxycodone 5mg. 2 tablets every 4 hrs prn  Pharmacy: [facility] on [address] in [place].   Last FUV 12/3, next FUV 1/14.   Pt states he called Dr. Gamble's office and was advised to call oncology team and Dr. Gamble is out of town.       Sent prescription.    Harsh Gil MD PGY5  General Surgery

## 2024-12-24 ENCOUNTER — APPOINTMENT (OUTPATIENT)
Dept: SURGICAL ONCOLOGY | Facility: CLINIC | Age: 54
End: 2024-12-24
Payer: COMMERCIAL

## 2024-12-24 DIAGNOSIS — C25.0 MALIGNANT NEOPLASM OF HEAD OF PANCREAS (MULTI): Primary | ICD-10-CM

## 2024-12-24 DIAGNOSIS — C25.0 MALIGNANT NEOPLASM OF HEAD OF PANCREAS (MULTI): ICD-10-CM

## 2024-12-24 RX ORDER — OXYCODONE HYDROCHLORIDE 10 MG/1
10 TABLET ORAL EVERY 6 HOURS PRN
Qty: 120 TABLET | Refills: 0 | Status: SHIPPED | OUTPATIENT
Start: 2024-12-24 | End: 2025-01-23

## 2024-12-24 RX ORDER — OXYCODONE HYDROCHLORIDE 5 MG/1
5 TABLET ORAL EVERY 4 HOURS PRN
Qty: 20 TABLET | Refills: 0 | Status: SHIPPED | OUTPATIENT
Start: 2024-12-24 | End: 2024-12-24

## 2024-12-25 LAB
LAB AP BLOCK FOR ADDITIONAL STUDIES: NORMAL
LABORATORY COMMENT REPORT: NORMAL
Lab: NORMAL
PATH REPORT.FINAL DX SPEC: NORMAL
PATH REPORT.GROSS SPEC: NORMAL
PATH REPORT.RELEVANT HX SPEC: NORMAL
PATH REPORT.TOTAL CANCER: NORMAL
PATHOLOGY SYNOPTIC REPORT: NORMAL

## 2025-01-01 NOTE — PROGRESS NOTES
Reason for visit:   POV #1   MO= Shelley You  Endo = Pedro    HPI:  12/12/24 Total Pancreatectomy with vein resection (Andrez-Alfonso).  This was done after TNAT.  Home POD 6.  Vaccinated in-house.    -----    FINAL DIAGNOSIS   A. LATERAL PORTAL LYMPH NODE, EXCISION:   -- FIBROADIPOSE TISSUE WITH NO MORPHOLOGIC EVIDENCE OF MALIGNANCY OR LYMPH NODE, SEE NOTE     Note: The specimen was entirely submitted for microscopic evaluation.     B. LYMPH NODE EXCISION:   -- ONE LYMPH NODE WITH NO EVIDENCE OF MALIGNANCY (0/1)     C. LYMPH NODE EXCISION:   -- ONE LYMPH NODE WITH NO EVIDENCE OF MALIGNANCY (0/1)     D. GALLBLADDER, CHOLECYSTECTOMY:   -- CHOLELITHIASIS AND CHOLESTEROLOSIS       E. PANCREATIC NECK MARGIN:   -- PANCREATIC TISSUE WITH NO EVIDENCE OF MALIGNANCY     F. WHIPPLE SPECIMEN, PANCREATICODUODENECTOMY:   -- PANCREATIC DUCTAL ADENOCARCINOMA, SEE SYNOPTIC REPORT AND NOTE  -- METASTATIC ADENOCARCINOMA TO ONE OUT OF NINETEEN LYMPH NODES (1/19)     Note: The inferior vascular margin (F4) shows involvement of large blood vessel wall by carcinoma. Per op note the vessel is superior mesenteric vein (SMV). The proximal, distal, bile duct and uncinate margins are not involved.     G. DISTAL PANCREAS, SPLEEN, DISTAL PANCREATECTOMY AND SPLENECTOMY:   -- PANCREATIC TISSUE WITH NO EVIDENCE OF MALIGNANCY  -- SIXTEEN LYMPH NODES WITH NO EVIDENCE OF MALIGNANCY (0/16)  -- SPLEEN WITH NO SIGNIFICANT PATHOLOGICAL FINDINGS     H. STOMACH, DISTAL GASTRECTOMY:      -- STOMACH TISSUE WITH NO EVIDENCE OF MALIGNANCY  -- ONE LYMPH NODE WITH NO EVIDENCE OF MALIGNANCY (0/1)   Electronically signed by Syd Polanco MD on 12/25/2024 at 1219        By the signature on this report, the individual or group listed as making the Final Interpretation/Diagnosis certifies that they have reviewed this case.    Case Summary Report   PANCREAS (EXOCRINE)   8th Edition - Protocol posted: 6/22/2022PANCREAS (EXOCRINE) - F, G  SPECIMEN   Procedure  Whipple  and distal pancreatectomy   TUMOR   Tumor Site  Pancreatic head   Histologic Type  Ductal adenocarcinoma (NOS)   Histologic Grade  G2, moderately differentiated   Tumor Size  Greatest Dimension (Centimeters): 2.5 cm   Additional Dimension (Centimeters)  2.2 cm     1.8 cm   Site(s) Involved by Direct Tumor Extension  Duodenal wall     Superior mesenteric vein     Portal vein   Treatment Effect  Present, with residual cancer showing evident tumor regression, but more than single cells or rare small groups of cancer cells (partial response, score 2)   Lymphovascular Invasion  Present   Perineural Invasion  Present   MARGINS   Margin Status for Invasive Carcinoma  Invasive carcinoma present at margin   Margin(s) Involved by Invasive Carcinoma  margin of SMV   Margin Status for Dysplasia and Intraepithelial Neoplasia  All margins negative for dysplasia and intraepithelial neoplasia   REGIONAL LYMPH NODES   Regional Lymph Node Status  Tumor present in regional lymph node(s)   Number of Lymph Nodes with Tumor  1   Number of Lymph Nodes Examined  38   PATHOLOGIC STAGE CLASSIFICATION  (pTNM, AJCC 8th Edition)   Reporting of pT, pN, and (when applicable) pM categories is based on information available to the pathologist at the time the report is issued. As per the AJCC (Chapter 1, 8th Ed.) it is the managing physician’s responsibility to establish the final pathologic stage based upon all pertinent information, including but potentially not limited to this pathology report.   TNM Descriptors  y (post-treatment)   pT Category  pT2   pN Category  pN1   ADDITIONAL FINDINGS   Additional Findings  Chronic pancreatitis        Saw his endo on 12/20    Mr. Alvarez is accompanied by his wife.  He is now just over 3 weeks out from his total pancreatectomy for pancreas cancer after total neoadjuvant chemotherapy.  His main issue postoperatively has been pain and the need for opioid pain medicine.  I will admit that I did not realize he  was taking about 40 mg of oxycodone per day while on chemotherapy and that certainly explains his need for postoperative opioids.  I certainly appreciate the help of my oncology colleagues in taking care of that for him.    He says that since the time of his discharge he overall feels a little bit better.  His bowels are moving and he is utilizing his Creon.  He has followed with his endocrinologist.  He will have an occasional episode of emesis.    PE: He does not look unwell.  He is not jaundiced.  His abdomen is soft with mild right sided tenderness.  His incision has healed well.  I removed his staples.  As he is 3 weeks out I did not apply Mastisol and Steri-Strips.    Impression / Plan:    Mr. Alvarez overall I think is doing reasonably well 3 weeks out from his total pancreatectomy.  I am hoping that his pain is simply his preoperative opioid dependence and I think that it is but I cannot say with 100% certainty.  I am going to check some labs today as well as a CT.  We will get his CAT scan done hopefully next week at [place] and I have asked him to call me a day after it is done so that I can review it with him.    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  We transected or cut through what certainly look to be normal vein.  In any event it is something that may warrant consideration of some adjuvant radiotherapy.  Again this gentleman received total neoadjuvant chemotherapy.    Depending on how other things sort out this gentleman's next visit with me will be in mid February for his second set of postsplenectomy vaccines.  He has follow-up with Dr. You in the next couple weeks.    This note has been dictated with voice recognition software and has not been reviewed for grammar or content errors.    Update 1/4:  CMP and CBC looks from clinic  yesterday.    Update 1/8:  CT done but not read.  To my eye no obstruction, no collections.  Some omental inflammation.  Looks good except Non-occl PV thrombus.  I have spoken to Dr You and subsequently pt.  Dr You will be prescribing therapeutic anticoagulation and pt will stop the extended DVT prophylaxis.  Pt is seeing Dr You next week.

## 2025-01-03 ENCOUNTER — APPOINTMENT (OUTPATIENT)
Dept: SURGERY | Facility: CLINIC | Age: 55
End: 2025-01-03
Payer: COMMERCIAL

## 2025-01-03 ENCOUNTER — LAB (OUTPATIENT)
Dept: LAB | Facility: LAB | Age: 55
End: 2025-01-03
Payer: COMMERCIAL

## 2025-01-03 VITALS
WEIGHT: 217.8 LBS | TEMPERATURE: 96.9 F | HEIGHT: 75 IN | HEART RATE: 69 BPM | SYSTOLIC BLOOD PRESSURE: 132 MMHG | DIASTOLIC BLOOD PRESSURE: 86 MMHG | BODY MASS INDEX: 27.08 KG/M2 | RESPIRATION RATE: 16 BRPM | OXYGEN SATURATION: 99 %

## 2025-01-03 DIAGNOSIS — C25.0 MALIGNANT NEOPLASM OF HEAD OF PANCREAS (MULTI): ICD-10-CM

## 2025-01-03 DIAGNOSIS — C25.0 MALIGNANT NEOPLASM OF HEAD OF PANCREAS (MULTI): Primary | ICD-10-CM

## 2025-01-03 PROBLEM — Z90.3: Status: ACTIVE | Noted: 2025-01-03

## 2025-01-03 PROBLEM — Z98.0 S/P BYPASS GASTROJEJUNOSTOMY: Status: ACTIVE | Noted: 2025-01-03

## 2025-01-03 PROBLEM — Z90.49 S/P CHOLECYSTECTOMY: Status: ACTIVE | Noted: 2025-01-03

## 2025-01-03 PROBLEM — Z90.410 HISTORY OF PANCREATECTOMY: Status: ACTIVE | Noted: 2025-01-03

## 2025-01-03 PROBLEM — Z90.81 H/O SPLENECTOMY: Status: ACTIVE | Noted: 2025-01-03

## 2025-01-03 LAB
ALBUMIN SERPL BCP-MCNC: 4 G/DL (ref 3.4–5)
ALP SERPL-CCNC: 105 U/L (ref 33–120)
ALT SERPL W P-5'-P-CCNC: 30 U/L (ref 10–52)
ANION GAP SERPL CALC-SCNC: 17 MMOL/L (ref 10–20)
AST SERPL W P-5'-P-CCNC: 41 U/L (ref 9–39)
BILIRUB SERPL-MCNC: 0.6 MG/DL (ref 0–1.2)
BUN SERPL-MCNC: 19 MG/DL (ref 6–23)
CALCIUM SERPL-MCNC: 10 MG/DL (ref 8.6–10.6)
CHLORIDE SERPL-SCNC: 101 MMOL/L (ref 98–107)
CO2 SERPL-SCNC: 26 MMOL/L (ref 21–32)
CREAT SERPL-MCNC: 0.66 MG/DL (ref 0.5–1.3)
EGFRCR SERPLBLD CKD-EPI 2021: >90 ML/MIN/1.73M*2
ERYTHROCYTE [DISTWIDTH] IN BLOOD BY AUTOMATED COUNT: 14.2 % (ref 11.5–14.5)
GLUCOSE SERPL-MCNC: 245 MG/DL (ref 74–99)
HCT VFR BLD AUTO: 37 % (ref 41–52)
HGB BLD-MCNC: 11.6 G/DL (ref 13.5–17.5)
MCH RBC QN AUTO: 29.4 PG (ref 26–34)
MCHC RBC AUTO-ENTMCNC: 31.4 G/DL (ref 32–36)
MCV RBC AUTO: 94 FL (ref 80–100)
NRBC BLD-RTO: 0 /100 WBCS (ref 0–0)
PLATELET # BLD AUTO: 752 X10*3/UL (ref 150–450)
POTASSIUM SERPL-SCNC: 5.2 MMOL/L (ref 3.5–5.3)
PROT SERPL-MCNC: 7.2 G/DL (ref 6.4–8.2)
RBC # BLD AUTO: 3.95 X10*6/UL (ref 4.5–5.9)
SODIUM SERPL-SCNC: 139 MMOL/L (ref 136–145)
WBC # BLD AUTO: 8.6 X10*3/UL (ref 4.4–11.3)

## 2025-01-03 PROCEDURE — 85027 COMPLETE CBC AUTOMATED: CPT

## 2025-01-03 PROCEDURE — 3008F BODY MASS INDEX DOCD: CPT | Performed by: SURGERY

## 2025-01-03 PROCEDURE — 3075F SYST BP GE 130 - 139MM HG: CPT | Performed by: SURGERY

## 2025-01-03 PROCEDURE — 3079F DIAST BP 80-89 MM HG: CPT | Performed by: SURGERY

## 2025-01-03 PROCEDURE — 80053 COMPREHEN METABOLIC PANEL: CPT

## 2025-01-03 PROCEDURE — 99024 POSTOP FOLLOW-UP VISIT: CPT | Performed by: SURGERY

## 2025-01-03 ASSESSMENT — PAIN SCALES - GENERAL: PAINLEVEL_OUTOF10: 4

## 2025-01-07 ENCOUNTER — APPOINTMENT (OUTPATIENT)
Dept: RADIOLOGY | Facility: CLINIC | Age: 55
End: 2025-01-07
Payer: COMMERCIAL

## 2025-01-07 ENCOUNTER — HOSPITAL ENCOUNTER (OUTPATIENT)
Dept: RADIOLOGY | Facility: CLINIC | Age: 55
Discharge: HOME | End: 2025-01-07
Payer: COMMERCIAL

## 2025-01-07 DIAGNOSIS — C25.0 MALIGNANT NEOPLASM OF HEAD OF PANCREAS (MULTI): ICD-10-CM

## 2025-01-07 PROCEDURE — 2550000001 HC RX 255 CONTRASTS: Performed by: SURGERY

## 2025-01-07 PROCEDURE — 74177 CT ABD & PELVIS W/CONTRAST: CPT

## 2025-01-07 PROCEDURE — 74177 CT ABD & PELVIS W/CONTRAST: CPT | Performed by: RADIOLOGY

## 2025-01-07 RX ADMIN — IOHEXOL 75 ML: 350 INJECTION, SOLUTION INTRAVENOUS at 10:29

## 2025-01-08 DIAGNOSIS — C25.0 MALIGNANT NEOPLASM OF HEAD OF PANCREAS (MULTI): Primary | ICD-10-CM

## 2025-01-08 PROBLEM — I81 PORTAL VEIN THROMBOSIS: Status: ACTIVE | Noted: 2025-01-08

## 2025-01-14 ENCOUNTER — TELEPHONE (OUTPATIENT)
Dept: ADMISSION | Facility: HOSPITAL | Age: 55
End: 2025-01-14
Payer: COMMERCIAL

## 2025-01-14 ENCOUNTER — TELEMEDICINE (OUTPATIENT)
Dept: HEMATOLOGY/ONCOLOGY | Facility: CLINIC | Age: 55
End: 2025-01-14
Payer: COMMERCIAL

## 2025-01-14 DIAGNOSIS — C25.0 MALIGNANT NEOPLASM OF HEAD OF PANCREAS (MULTI): ICD-10-CM

## 2025-01-14 PROCEDURE — 99214 OFFICE O/P EST MOD 30 MIN: CPT | Performed by: INTERNAL MEDICINE

## 2025-01-14 RX ORDER — OXYCODONE HYDROCHLORIDE 10 MG/1
10 TABLET ORAL EVERY 6 HOURS PRN
Qty: 120 TABLET | Refills: 0 | Status: SHIPPED | OUTPATIENT
Start: 2025-01-14 | End: 2025-02-13

## 2025-01-14 NOTE — TELEPHONE ENCOUNTER
Patient called in after hours, chief complaint: Telehealth apt at 4:20 today, has not heard anything about this (the time is currently 5:35)  Pt of Dr. You    Secure chat sent to both Dr. You and RN partner.   Awaiting response and will let the patient know.       
Per the RN, the visit is currently happening  
9

## 2025-01-14 NOTE — PROGRESS NOTES
Patient ID: Nate Alvarez is a 54 y.o. male from [place], [place].     Referring Physician: Shaw You [place]  [id number] [place] [address]    Primary Care Provider: Hazel Medeiros MD    Diagnosis:   Pancreatic cancer 5/2024    Primary Oncologic Surgeon:   Omar Gamble MD    Primary Medical Oncologist:  Borderline Resectable    Primary Radiation Oncologist:  MARSHALL     Current Therapy:    Oncologic Surgery History:  Total pancreatectomy 12/12/2024 [id number] possible + SMV margin however unclear since normal vein transected.      Oncologic Therapy History:  Neoadjuvant FOLFIRINOX x 12 cycles 6/2024-11/2024    Molecular Genetics:  Pending    Current Sites of Disease:  Head of the pancreas involving the gastroduodenal artery celiac axis and 180 degree involvement of the SMV    Oncologic Problem List:  Localized but borderline resectable pancreatic adenocarcinoma  New onset diabetes with hyperglycemia  Cancer cachexia      Oncologic Narrative:  54 y.o.  man from [place] who presented in May 2024 with difficult DM control (A1c 9.1 this month)and some ED visits for fatigue/weakness. Most recently at [facility] May 16-18. 70 lb wt loss mentioned (note some of the new DM meds) .  Tumor markers checked:  CEA: 4.9  Ca 19-9: >700    CT A/P Showed:   Edema surrounding the pancreatic tail compatible with acute pancreatitis. Dilatation of the pancreatic duct which measures 6 mm in diameter and an ill marginated area of hypodensity in the  pancreatic head highly concerning for pancreatic malignancy, and pancreatic protocol MRI is recommended for further evaluation. An  area of necrotizing pancreatitis/pancreatic necrosis is other consideration, however there is no surrounding edema in this region and given the ductal dilatation, findings highly concerning for an underlying malignancy.  Small nonobstructive left renal calculi.     MRI:  IMPRESSION:  1. Pancreatic head/uncinate process mass measuring 2.9 x 2.5 cm with  upstream pancreatic duct dilatation and parenchymal atrophy highly  concerning for primary pancreatic neoplasm (specifically adenocarcinoma). Surrounding changes of mild superimposed pancreatitis. The mass is in contact with the adjacent 3rd part of the duodenum but no upstream dilatation. No biliary obstruction. The  mass has 180 degree contact with the adjacent superior mesenteric vein and likely encasing the 1st right lateral branch. The mass is  likely encasing the gastroduodenal artery distal component. The  celiac axis, superior mesenteric artery and main portal vein are intact.  2. Few subcentimeter peripancreatic indeterminate lymph nodes noted.  3. Hepatic steatosis with segment 4A lesion measuring 0.7 cm with  imaging characteristics favoring hemangioma .     Chest CT:  IMPRESSION:  1. No suspicious pulmonary nodules. Few small calcific granulomas noted in the lingula.  2. No enlarged intrathoracic lymphadenopathy.  3. Left thyroid nodule measuring 1.1 cm. This could be further assessed by dedicated nonemergent thyroid ultrasound if clinically desired.  4. Subdiaphragmatic findings regarding the known pancreatic mass and duct dilatation as well as the hepatic observations are better evaluated in prior MRI dated 05/16/2024     EUS:    Result Text   Impression  Limited endoscopic views of the esophagus, stomach and duodenum were obtained. There was heme and gastritis noted in the stomach. The rest of the exam was unremarkable   A 27 mm by 23 mm irregular and hypoechoic T2N0 mass with poorly defined margins was visualized in the uncinate process of the head with apparent involvement of the superior mesenteric vein; fine needle biopsy was performed, final pathology is pending but preliminary evaluation was positive for malignancy. The pancreas upstream to the mass appeared atrophic with dilated pancreatic duct.   The bile duct appeared normal.  Visualized portions of the liver, spleen, left adrenal gland, left  kidney and celiac axis were normal on ultrasound examination.    No abnormal-appearing lymph nodes were identified in the abdomen.            FINAL DIAGNOSIS   A. PANCREAS HEAD MASS, BIOPSY:     -- Invasive adenocarcinoma, moderately differentiated      6/11/24 - Exp / lap  -no carcinomatosis     Past Medical History: Nate has a past medical history of Anxiety, Arthritis, BCC (basal cell carcinoma), eyelid, right, Diabetes mellitus (Multi), Gastroesophageal reflux disease without esophagitis (10/05/2023), Hemorrhoids (11/03/2023), HTN (hypertension), Leg cramps (10/05/2023), Oropharyngeal dysphagia (10/05/2023), RADHA (obstructive sleep apnea), Pancreatic cancer (Multi), Personal history of other malignant neoplasm of skin, Type 2 diabetes mellitus, and Vision loss.  Surgical History:  Nate has a past surgical history that includes Tonsillectomy; Vasectomy; Testicle surgery; Esophagogastroduodenoscopy; Colonoscopy; Eyelid carcinoma excision; Rotator cuff repair (Bilateral); Knee arthroscopy w/ debridement (Left); Pancreatectomy (12/12/2024); Splenectomy, total (12/12/2024); Gastrojejunostomy (12/12/2024); Cholecystectomy (12/12/2024); Choledochojejunostomy (12/12/2024); and Partial gastrectomy (12/12/2024).  Social History:  Nate reports that he has never smoked. He has quit using smokeless tobacco.  His smokeless tobacco use included chew. He reports that he does not currently use alcohol after a past usage of about 1.0 standard drink of alcohol per week. He reports that he does not use drugs.  Family History:    Family History   Problem Relation Name Age of Onset    Diabetes Mother      Ovarian cancer Mother      Liver cancer Father      Lung cancer Father      Colon cancer Father      Hypertension Father      Stroke Maternal Grandmother      Diabetes Maternal Grandmother      Liver cancer Maternal Grandfather      Lung cancer Paternal Grandfather       Family Oncology History:  Cancer-related family history  includes Colon cancer in his father; Liver cancer in his father and maternal grandfather; Lung cancer in his father and paternal grandfather; Ovarian cancer in his mother.    Mom with uterine caner - early 30s   Wife with bolton syndrome  -     - daughter with Bolton Syndrome - age 28     - son 21, has not followed up with genetic testing    SUBJECTIVE:  History of Present Illness:  Nate Alvarez is a 54 y.o. male who was referred by Shaw You MD and presents with chemotherapy follow up.     S/p 12th cycle of mFOLFIRINOX   S/p Pancreatectomy 12/12/24    Neuropathy started post op.     On post-op oxycodone QID.   Continued nausea and pain with meals.    ? + margin    A complete 10 point ROS is otherwise negative in detail.       OBJECTIVE:    VS / Pain:  There were no vitals taken for this visit.  BSA: There is no height or weight on file to calculate BSA.     Pain Scale: 0    Daily Weight  01/03/25 : 98.8 kg (217 lb 12.8 oz)  12/20/24 : 101 kg (221 lb 12.8 oz)  12/12/24 : 98 kg (216 lb 0.8 oz)      Physical Exam  Constitutional:       Appearance: Normal appearance.   HENT:      Head: Normocephalic.      Mouth/Throat:      Mouth: Mucous membranes are moist.      Pharynx: Oropharynx is clear.   Eyes:      Pupils: Pupils are equal, round, and reactive to light.   Cardiovascular:      Rate and Rhythm: Normal rate.      Pulses: Normal pulses.   Pulmonary:      Effort: Pulmonary effort is normal.   Abdominal:      General: Abdomen is flat. Bowel sounds are normal.   Musculoskeletal:      Cervical back: Neck supple.   Skin:     General: Skin is warm and dry.      Capillary Refill: Capillary refill takes less than 2 seconds.      Comments: Macular papular rash on bilateral arms   Neurological:      General: No focal deficit present.      Mental Status: He is alert.   Psychiatric:         Mood and Affect: Mood normal.         Performance Status: 1     Diagnostic Results     WBC   Date/Time Value Ref Range Status    01/03/2025 01:17 PM 8.6 4.4 - 11.3 x10*3/uL Final   12/18/2024 06:02 AM 15.9 (H) 4.4 - 11.3 x10*3/uL Final   12/17/2024 06:48 AM 13.9 (H) 4.4 - 11.3 x10*3/uL Final     Hemoglobin   Date Value Ref Range Status   01/03/2025 11.6 (L) 13.5 - 17.5 g/dL Final   12/18/2024 11.0 (L) 13.5 - 17.5 g/dL Final   12/17/2024 10.7 (L) 13.5 - 17.5 g/dL Final     MCV   Date/Time Value Ref Range Status   01/03/2025 01:17 PM 94 80 - 100 fL Final   12/18/2024 06:02 AM 92 80 - 100 fL Final   12/17/2024 06:48 AM 92 80 - 100 fL Final     Platelets   Date/Time Value Ref Range Status   01/03/2025 01:17  (H) 150 - 450 x10*3/uL Final   12/18/2024 06:02  (H) 150 - 450 x10*3/uL Final   12/17/2024 06:48  150 - 450 x10*3/uL Final     Neutrophils Absolute   Date/Time Value Ref Range Status   12/06/2024 11:28 AM 2.46 1.20 - 7.70 x10*3/uL Final     Comment:     Percent differential counts (%) should be interpreted in the context of the absolute cell counts (cells/uL).   11/11/2024 03:49 PM 3.74 1.20 - 7.70 x10*3/uL Final     Comment:     Percent differential counts (%) should be interpreted in the context of the absolute cell counts (cells/uL).   10/28/2024 11:44 AM 3.13 1.20 - 7.70 x10*3/uL Final     Comment:     Percent differential counts (%) should be interpreted in the context of the absolute cell counts (cells/uL).     Bilirubin, Total   Date/Time Value Ref Range Status   01/03/2025 01:17 PM 0.6 0.0 - 1.2 mg/dL Final   12/17/2024 06:47 AM 1.1 0.0 - 1.2 mg/dL Final   12/16/2024 05:53 AM 0.7 0.0 - 1.2 mg/dL Final     AST   Date/Time Value Ref Range Status   01/03/2025 01:17 PM 41 (H) 9 - 39 U/L Final   12/17/2024 06:47 AM 27 9 - 39 U/L Final   12/16/2024 05:53 AM 39 9 - 39 U/L Final     ALT   Date/Time Value Ref Range Status   01/03/2025 01:17 PM 30 10 - 52 U/L Final     Comment:     Patients treated with Sulfasalazine may generate falsely decreased results for ALT.   12/17/2024 06:47 AM 39 10 - 52 U/L Final     Comment:      Patients treated with Sulfasalazine may generate falsely decreased results for ALT.   12/16/2024 05:53 AM 42 10 - 52 U/L Final     Comment:     Patients treated with Sulfasalazine may generate falsely decreased results for ALT.     Creatinine   Date/Time Value Ref Range Status   01/03/2025 01:17 PM 0.66 0.50 - 1.30 mg/dL Final   12/18/2024 06:02 AM 0.74 0.50 - 1.30 mg/dL Final   12/17/2024 06:47 AM 0.72 0.50 - 1.30 mg/dL Final     Urea Nitrogen   Date/Time Value Ref Range Status   01/03/2025 01:17 PM 19 6 - 23 mg/dL Final   12/18/2024 06:02 AM 15 6 - 23 mg/dL Final   12/17/2024 06:47 AM 11 6 - 23 mg/dL Final     Albumin   Date/Time Value Ref Range Status   01/03/2025 01:17 PM 4.0 3.4 - 5.0 g/dL Final   12/18/2024 06:02 AM 3.5 3.4 - 5.0 g/dL Final   12/17/2024 06:47 AM 3.8 3.4 - 5.0 g/dL Final     Cancer AG 19-9   Date/Time Value Ref Range Status   12/06/2024 11:28 .69 (H) <35.00 U/mL Final   11/11/2024 03:49 PM 72.69 (H) <35.00 U/mL Final   10/29/2024 10:56 AM 45.15 (H) <35.00 U/mL Final     Carcinoembryonic AG   Date/Time Value Ref Range Status   05/17/2024 09:28 AM 4.9 ug/L Final     === 09/26/24 ===    CT CHEST ABDOMEN PELVIS W IV CONTRAST    - Impression -  Pancreatic cancer restaging scan as compared to prior CT from  07/29/2024:  1. Continued interval decrease in size of known pancreatic  head/uncinate process mass.  2. Stable to slightly decreased size of periportal lymph node.  Otherwise, no new abdominopelvic lymphadenopathy or evidence of  metastatic disease within the chest, abdomen, and pelvis.  3. Additional chronic findings as described above.    MACRO:  None    Signed by: Florencio Dennis 9/28/2024 8:34 AM  Dictation workstation:   [id number]      Assessment/Plan   This is a 54-year-old man with borderline resectable pancreatic adenocarcinoma.  He presented in May 2024 with greater than 50 pound weight loss and new onset diabetes.  Imaging, EUS and biopsy confirmed adenocarcinoma of the  pancreas involving the GDA, celiac axis, SMV.  Had 12 cycles of mFOLFIRINOX followed by Total pancreatectomy 12/12/2024 [id number] possible + SMV margin however unclear since normal vein transected.      Borderline resectable Pancreatic cancer:   Surveillance for now.  Consider RT if confirmed + margin.  Check NGS, consider AMAL trial if eligible.                  Pain - continue oxycodone      Diabetes:  Continue long and short acting insulin as managed by  Dr. Brandon You MD    [facility] [place]  Office: [phone]  Fax: [phone]

## 2025-01-15 ENCOUNTER — APPOINTMENT (OUTPATIENT)
Dept: PRIMARY CARE | Facility: CLINIC | Age: 55
End: 2025-01-15
Payer: COMMERCIAL

## 2025-01-16 ENCOUNTER — TELEPHONE (OUTPATIENT)
Dept: ADMISSION | Facility: HOSPITAL | Age: 55
End: 2025-01-16
Payer: COMMERCIAL

## 2025-01-16 NOTE — TELEPHONE ENCOUNTER
Patient states Dr You was going to send in a medication for numbness as discussed during follow up. Oneyda gagnon not received

## 2025-01-22 ENCOUNTER — TUMOR BOARD CONFERENCE (OUTPATIENT)
Dept: HEMATOLOGY/ONCOLOGY | Facility: HOSPITAL | Age: 55
End: 2025-01-22
Payer: COMMERCIAL

## 2025-01-23 ENCOUNTER — APPOINTMENT (OUTPATIENT)
Dept: HEMATOLOGY/ONCOLOGY | Facility: CLINIC | Age: 55
End: 2025-01-23
Payer: COMMERCIAL

## 2025-01-23 ENCOUNTER — NUTRITION (OUTPATIENT)
Dept: HEMATOLOGY/ONCOLOGY | Facility: CLINIC | Age: 55
End: 2025-01-23

## 2025-01-23 ENCOUNTER — LAB (OUTPATIENT)
Dept: HEMATOLOGY/ONCOLOGY | Facility: CLINIC | Age: 55
End: 2025-01-23
Payer: COMMERCIAL

## 2025-01-23 VITALS — WEIGHT: 214.5 LBS | HEIGHT: 74 IN | BODY MASS INDEX: 27.53 KG/M2

## 2025-01-23 VITALS
BODY MASS INDEX: 27.53 KG/M2 | WEIGHT: 214.5 LBS | TEMPERATURE: 97.2 F | OXYGEN SATURATION: 94 % | HEIGHT: 74 IN | RESPIRATION RATE: 16 BRPM | SYSTOLIC BLOOD PRESSURE: 131 MMHG | DIASTOLIC BLOOD PRESSURE: 85 MMHG | HEART RATE: 71 BPM

## 2025-01-23 DIAGNOSIS — C25.0 MALIGNANT NEOPLASM OF HEAD OF PANCREAS (MULTI): ICD-10-CM

## 2025-01-23 PROCEDURE — 2500000004 HC RX 250 GENERAL PHARMACY W/ HCPCS (ALT 636 FOR OP/ED): Performed by: NURSE PRACTITIONER

## 2025-01-23 PROCEDURE — 96523 IRRIG DRUG DELIVERY DEVICE: CPT

## 2025-01-23 RX ORDER — HEPARIN 100 UNIT/ML
500 SYRINGE INTRAVENOUS AS NEEDED
OUTPATIENT
Start: 2025-01-23

## 2025-01-23 RX ORDER — HEPARIN SODIUM,PORCINE/PF 10 UNIT/ML
50 SYRINGE (ML) INTRAVENOUS AS NEEDED
OUTPATIENT
Start: 2025-01-23

## 2025-01-23 RX ORDER — HEPARIN 100 UNIT/ML
500 SYRINGE INTRAVENOUS AS NEEDED
Status: DISCONTINUED | OUTPATIENT
Start: 2025-01-23 | End: 2025-01-23 | Stop reason: HOSPADM

## 2025-01-23 RX ADMIN — HEPARIN 500 UNITS: 100 SYRINGE at 11:46

## 2025-01-23 ASSESSMENT — PAIN SCALES - GENERAL: PAINLEVEL_OUTOF10: 0-NO PAIN

## 2025-01-23 NOTE — PROGRESS NOTES
Pt arrived awake, alert, oriented, no apparent distress with unlabored breaths. Pt reports feeling well today without s/sx of illness. Pt does report intermittent N/V, dietician at  for assessment. Pt here for medi port flush. Medi port site without s/sx of infection/infiltration, accessed without difficulty, flushed easily with + blood return. Port flushed well with saline/heparin locked prior to de-accessing, bleeding controlled and bandage applied to site. Pt with next appointment set for 2/11/25 at [facility]. Pt without further questions or concerns, discharged in stable condition.

## 2025-01-23 NOTE — PROGRESS NOTES
NUTRITION Follow Up NOTE    Nutrition Assessment     Reason for Visit:  Nate Alvarez is a 54 y.o. male who presented with Borderline Resectable Pancreatic cancer dx'd 5/2024     Oncologic Surgery History:  Total pancreatectomy, splenectomy, distal gastrectomy with gastrojejunostomy, choledochojejunostomy, cholecystectomy,  SNV resection/reconstruction with Heidi Gamble and Theresa 12/12/2024 [id number] possible + SMV margin however unclear since normal vein transected.     Note pt was not seen by RDN in house.  Discharge Diet instructions were as follows:   Return to previous diet  Diet type: Return to previous diet Diabetic/carbohydrate counted  Continue on the same type of diet and foods as you were eating before your admission. Drink plenty of water.    Per MD, pt c/o neuropathy post op.  On post-op oxycodone QID.     Also with continued nausea and pain with meals.     Diabetes:  Continues long and short acting insulin as managed by Dr. Brandon Vasquez    Oncologic Therapy History:  Neoadjuvant FOLFIRINOX x 12 cycles 6/2024-11/2024    Referred to this service for high risk dx and assessed 7/23.  Seen again today after above surgery.    PMH noted: GERD, HTN, oropharyngeal dysphagia (from fractured neck x 3), RADHA    Comprehensive metabolic panel                Component  Ref Range & Units 2 wk ago  (1/3/25) 1 mo ago  (12/18/24) 1 mo ago  (12/17/24) 1 mo ago  (12/16/24) 1 mo ago  (12/15/24) 1 mo ago  (12/14/24) 1 mo ago  (12/13/24) 1 mo ago  (12/13/24) 1 mo ago  (12/13/24)   Glucose  74 - 99 mg/dL 245 High  240 High  148 High  57 Low  80 234 High   183 High  183 High    Sodium  136 - 145 mmol/L 139 138 138 141 140 137  140 140   Potassium  3.5 - 5.3 mmol/L 5.2 4.4 3.9 4.0 3.8 4.2  4.4 4.4   Chloride  98 - 107 mmol/L 101 101 100 104 103 101  107 107   Bicarbonate  21 - 32 mmol/L 26 26 26 29 30 28  21 21   Anion Gap  10 - 20 mmol/L 17 15 16 12 11 12  16 16   Urea Nitrogen  6 - 23 mg/dL 19 15 11 10 10 11  13  "13   Creatinine  0.50 - 1.30 mg/dL 0.66 0.74 0.72 0.63 0.64 0.68  0.90 0.90   eGFR  >60 mL/min/1.73m*2 >90 >90 CM >90 CM >90 CM >90 CM >90 CM  >90 CM >90 CM   Comment: Calculations of estimated GFR are performed using the 2021 CKD-EPI Study Refit equation without the race variable for the IDMS-Traceable creatinine methods.  https://jasn.asnjournals.org/content/early/2021/09/22/ASN.3502326993   Calcium  8.6 - 10.6 mg/dL 10.0 9.1 9.4 8.7 8.4 Low  8.4 Low   8.5 Low  8.5 Low    Albumin  3.4 - 5.0 g/dL 4.0 3.5 3.8 3.2 Low  3.2 Low  3.2 Low  3.4  3.4   Alkaline Phosphatase  33 - 120 U/L 105  119 90 64 59 40     Total Protein  6.4 - 8.2 g/dL 7.2  6.8 5.7 Low  5.3 Low  5.0 Low  4.9 Low      AST  9 - 39 U/L 41 High   27 39 47 High  56 High  66 High      Bilirubin, Total  0.0 - 1.2 mg/dL 0.6  1.1 0.7 0.6 0.6 1.0     ALT  10 - 52 U/L 30  39 CM 42 CM 47 CM 57 High  CM 66 High  CM     Comment: Patients treated with Sulfasalazine may generate falsely decreased results for ALT.   Resulting Agency [facility] [place]             Specimen Collected: 01/03/25 13:17 Last Resulted: 01/03/25 21:38     Hemoglobin A1C          Component  Ref Range & Units 1 mo ago 8 mo ago 4 yr ago   Hemoglobin A1C  See comment % 5.3 9.1 High  R 5.4 R, CM   Estimated Average Glucose  Not Established mg/dL 105 214 108 R   Resulting Agency [facility]              Narrative  Performed by: [facility]  Diagnosis of Diabetes-Adults  Non-Diabetic: < or = 5.6%  Increased risk for developing diabetes: 5.7-6.4%  Diagnostic of diabetes: > or = 6.5%     Specimen Collected: 12/06/24 11:28 Last Resulted: 12/06/24 14:59     Anthropometrics:  Anthropometrics  Height: 188.1 cm (6' 2.06\")  Weight: 97.3 kg (214 lb 8 oz)  BMI (Calculated): 27.5  IBW/kg (Dietitian Calculated): 86.4 kg  Percent of IBW: 112.6 %  Weight Change  Weight History / % Weight Change: Loss of 14.2 kg (12.7%) in ~7 weeks  Significant Weight Loss: " Yes  Interpretation of Weight Loss: >10% in 6 months  Reported a UBW of ~300 #  Stated he had lost a lot of muscle mass.    Lowest wt post surgery was 204# (92.7 kg)  Wt Readings from Last 25 Encounters:   1/23/25 97.3 kg   12/3/24 111.5 kg- pre surgery wt   10/1/24 111.6 kg- Overall stable wt after small gain    9/19/24 111.9 kg- gain of 2.9 kg in 1 month   8/20/24 109 kg (240 lb)- Overall wt gain trend since significant loss with stable wt over the last month    8/5/24 109 kg - Today pt reports his wt varies 10 # between 235# and 245# on his home scale.   07/23/24 110 kg (242 lb 4.6 oz)   07/22/24 108 kg (237 lb)   07/12/24 108 kg (237 lb 10.5 oz)   07/11/24 107 kg (236 lb)   07/10/24 107 kg (234 lb 12.6 oz)   07/09/24 107 kg (236 lb 15.9 oz)   07/05/24 106 kg (234 lb)   06/27/24 106 kg (233 lb)   06/25/24 106 kg (233 lb 11 oz)   06/24/24 104 kg (229 lb 4.5 oz)   06/11/24 104 kg (229 lb 3.2 oz)   06/03/24 106 kg (232 lb 12.9 oz)   05/31/24 106 kg (234 lb)   05/29/24 99.8 kg (220 lb 0.3 oz)- Loss of 29.2 kg (22.6%) in 8 months- significant   05/28/24 105 kg (231 lb)   05/22/24 102 kg (224 lb)   05/17/24 102 kg (224 lb 13.9 oz)   05/13/24 104 kg (230 lb)   05/10/24 102 kg (225 lb)   04/17/24 117 kg (257 lb)   01/11/24 122 kg (268 lb)     10/05/23 127 kg (281 lb)   10/04/23 129 kg (285 lb)   04/13/23 131 kg (287 lb 14.4 oz)                Food And Nutrient Intake:  Food and Nutrient History  Food and Nutrient History: Reports he initiated a soft diet after discharge-  Mashed potatoes, puddings, canned fruits and eggs.  No steak, for example.  He recalled this RDN recommending a low fiber diet after surgery.  Eats twice daily- late morning and evening.  He sometimes snacks. Average BS is 140.  He does not over eat- does not want to be miserable.      Food Intake  Meal 1: 3-4 scrambled eggs  Fluid Intake: Water.  Note he was told not to have dairy.                                            Food Supplement Intake  Oral  "Nutrition Supplements:  (None)        Medication and Complementary/Alternative Medicine Use  Prescription Medication Use: Creon  He is taking 3 with meals and 1 with snacks.  Takes his PPI twice daily as directed          Nutrition Focused Physical Exam Findings:                     Physical Findings  Digestive System Findings: Vomiting, Abdominal pain, Early satiety  Every day, every meal, he has abdominal pain.  Even if does not eat he has pain on his right side that wraps around his back.    States his stomach/bowels are grumbling and making noise (70%) of the day- whether he has eaten or not  Has some mild nausea before emesis.  Does no have time to take medication.     Energy Needs  Calculated Energy Needs Using Equations  Height: 188.1 cm (6' 2.06\")  Estimated Energy Needs  Total Energy Estimated Needs in 24 hours (kCal): 2822 kCal  Energy Estimated Needs per kg Body Weight in 24 hours (kCal/kg): 29 kCal/kg  Estimated Fluid Needs  Total Fluid Estimated Needs in 24 Hours (mL): 2822 mL  Total Fluid Estimated Needs in 24 hours (mL/kg): 29 mL/kg  Estimated Protein Needs  Total Protein Estimated Needs in 24 Hours (g): 122 g  Protein Estimated Needs per kg Body Weight in 24 Hours (g/kg): 1.25 g/kg        Nutrition Diagnosis   Malnutrition Diagnosis  Patient has Malnutrition Diagnosis: Yes  Diagnosis Status: New  Malnutrition Diagnosis: Severe malnutrition related to acute disease or injury  Related to: significant surgical intervention  As Evidenced by: < or = to 50% of  estimated energy requirement for  > 5 days with significant wt loss as documented    Nutrition Diagnosis  Patient has Nutrition Diagnosis: Yes  Diagnosis Status (1): Active  Nutrition Diagnosis 1: Altered GI function  Related to (1): pathophysiology of ca ds now s/p chemo with side effects and s/p surgery  As Evidenced by (1): pt continued report of GI symptoms and need for PERT d/t EPI    Nutrition Interventions/Recommendations   Nutrition " Prescription  Individualized Nutrition Prescription Provided for : Low fiber MAURA- limited concentrated sweets    Food and Nutrition Delivery  Food and Nutrition Delivery  Meals & Snacks: Fiber-modified diet, Modification of diet for specific food and/or ingredient, Modification of schedule of oral intake  Goals: Small frequent meals and snacks- 4-5 per day. Slowly add fiber foods beginning in the next couple of weeks (8 weeks post op)  Medical Food Supplement: Commercial beverage medical food supplement therapy  Goals: Consider trial of ONS (1 enzyme per serving)  Goals: DMV  Other:: PERT  Goals: Continue consistent use.    Nutrition Education       Coordination of Care       Patient Instructions      Low-Fiber Diet (AND)  Mailed to pt     Nutrition Monitoring and Evaluation   Food and Nutrient Intake  Monitoring and Evaluation Plan: Fluid intake, Meal/snack pattern, Amount of food, Fiber intake  Fluid Intake: Estimated fluid intake  Criteria: Continue to meet daily needs  Amount of Food: Estimated amout of food  Criteria: Meet energy and protein needs  Meal/Snack Pattern: Estimated meal and snack pattern  Criteria: as above  Estimated fiber intake: Estimated fiber intake  Criteria: 8-13 g per day (Slowly increase after 2 weeks)  Anthropometric measurements  Monitoring and Evaluation Plan: Weight  Body Weight: Body weight  Criteria: maintanence  Biochemical Data, Medical Tests and Procedures  Monitoring and Evaluation Plan: Electrolyte/renal panel, Glucose/endocrine profile  Criteria: WNL  Glucose/Endocrine Profile: Glucose, casual  Criteria: < 180 two hours post prandial  Nutrition Focused Physical Findings  Monitoring and Evaluation Plan: Adipose, Digestive System, Muscle  Adipose Finding: Loss of subcutaneous fat  Criteria: No further loss  Digestive System Finding: Abdominal pain, Early satiety, Nausea, Vomiting  Criteria: Improve symptoms with low fiber diet, small frequent intake, medications per MD- PPI twice  daily, PERT regimen  Muscle Finding: Muscle atrophy  Criteria: No further loss          Time Spent  Prep time on day of patient encounter: 5 minutes  Time spent directly with patient, family or caregiver: 35 minutes  Additional Time Spent on Patient Care Activities: 0 minutes  Documentation Time: 20 minutes  Other Time Spent: 0 minutes  Total: 60 minutes

## 2025-01-28 LAB
DNA RANGE(S) EXAMINED NAR: NORMAL
GENE DIS ANL INTERP-IMP: POSITIVE
GENE DIS ASSESSED: NORMAL
GENE MUT TESTED BLD/T: 1.6 M/MB
MSI CA SPEC-IMP: NORMAL
REASON FOR STUDY: NORMAL
TEMPUS GERMLINE NOTE: NORMAL
TEMPUS PORTAL: NORMAL
TEMPUS TREATMENT IMPLICATIONS NOTE: NORMAL
TEMPUS TRIALCOUNT: 3
TEMPUS TRIALMATCHES1: NORMAL
TEMPUS TRIALMATCHES2: NORMAL
TEMPUS TRIALMATCHES3: NORMAL

## 2025-01-28 NOTE — PROGRESS NOTES
Subjective   Nate Alvarez is a 54 y.o. male   Cancer Diagnosis: Pancreatic  Treatment: FOLFIRINOX 6/3/2024 - 11/24/2024  Cumulative Dose: NA  Radiation: No    Risk Factors: DM, HTN, RADHA  Social Hx: Never Smoker    CT Chest 11/15/2024 No mention of coronaries  EKG 12/6/2024  BNP 5/17/2024 - 22  HSTrop 5/15/2024 <3  Lipid 4/16/2024 Total 121, LDL 59        Patient was initially evaluated in the setting of asymptomatic bradycardia as part of preoperative risk stratification.  Since then he has undergone total pancreatectomy December 2024.  Currently denies any chest discomfort or shortness of breath.  Denies any orthopnea/PND.  Denies any lower extremity edema.  Completed 5-FU based therapy between June and November 2024.  On surveillance at present    Review of Systems  A comprehensive review of systems was negative.     Past Medical History:   Diagnosis Date    Anxiety     Arthritis     BCC (basal cell carcinoma), eyelid, right     s/p resection    Diabetes mellitus (Multi)     Gastroesophageal reflux disease without esophagitis 10/05/2023    Hemorrhoids 11/03/2023    HTN (hypertension)     Borderline HTN, no meds    Leg cramps 10/05/2023    Oropharyngeal dysphagia 10/05/2023    RADHA (obstructive sleep apnea)     no PAP    Pancreatic cancer (Multi)     Dx 4/2024, Treated with chemotherapy completed 11/12/24    Personal history of other malignant neoplasm of skin     Type 2 diabetes mellitus     Vision loss     uses corrective lens       Past Surgical History:   Procedure Laterality Date    CHOLECYSTECTOMY  12/12/2024    Total pancreatectomy, splenectomy, distal gastrectomy with gastrojejunostomy, choledochojejunostomy, cholecystectomy with Dr. Roddy Gamble    CHOLEDOCHOJEJUNOSTOMY  12/12/2024    Total pancreatectomy, splenectomy, distal gastrectomy with gastrojejunostomy, choledochojejunostomy, cholecystectomy with Dr. Roddy Gamble    COLONOSCOPY      ESOPHAGOGASTRODUODENOSCOPY      EYELID CARCINOMA  EXCISION      GASTROJEJUNOSTOMY  12/12/2024    Total pancreatectomy, splenectomy, distal gastrectomy with gastrojejunostomy, choledochojejunostomy, cholecystectomy with Dr. Roddy Gamble    KNEE ARTHROSCOPY W/ DEBRIDEMENT Left     PANCREATECTOMY  12/12/2024    Total pancreatectomy, splenectomy, distal gastrectomy with gastrojejunostomy, choledochojejunostomy, cholecystectomy with Dr. Roddy Gamble    PARTIAL GASTRECTOMY  12/12/2024    Total pancreatectomy, splenectomy, distal gastrectomy with gastrojejunostomy, choledochojejunostomy, cholecystectomy with Dr. Roddy Gamble    ROTATOR CUFF REPAIR Bilateral     SPLENECTOMY, TOTAL  12/12/2024    Total pancreatectomy, splenectomy, distal gastrectomy with gastrojejunostomy, choledochojejunostomy, cholecystectomy with Dr. Roddy Gamble    TESTICLE SURGERY      Hydrocelectomy    TONSILLECTOMY      VASECTOMY         Allergies   Allergen Reactions    Farxiga [Dapagliflozin] Diarrhea    Glipizide GI Upset    Januvia [Sitagliptin] Diarrhea    Metformin Nausea/vomiting    Mounjaro [Tirzepatide] Nausea/vomiting    Tramadol Nausea/vomiting       Family History   Problem Relation Name Age of Onset    Diabetes Mother      Ovarian cancer Mother      Liver cancer Father      Lung cancer Father      Colon cancer Father      Hypertension Father      Stroke Maternal Grandmother      Diabetes Maternal Grandmother      Liver cancer Maternal Grandfather      Lung cancer Paternal Grandfather         Objective   Visit Vitals  /80   Pulse 58   Temp 36.4 °C (97.5 °F) (Core)   Resp 16   Wt 98.2 kg (216 lb 9.6 oz)   SpO2 99%   BMI 27.77 kg/m²   Smoking Status Never   BSA 2.27 m²       General: awake, alert and oriented. No acute distress.   Skin: Skin is warm, dry and intact without rashes or lesions. Appropriate color for ethnicity. Nail beds pink with no cyanosis or clubbing  HEENT: normocephalic, atraumatic; conjunctivae are clear without exudates or hemorrhage. Sclera is  "non-icteric. Eyelids are normal in appearance without swelling or lesions. Hearing intact. Nares are patent bilaterally. Moist mucous membranes.   Cardiovascular: Regular. No murmurs, gallops, or rubs are auscultated. S1 and S2 are heard and are of normal intensity. No JVD, no carotid bruits  Respiratory: Thorax symmetric. CTAB, breath sounds vesicular. No crackles, wheezes or ronchi.   Gastrointestinal: soft, non-distended, BS + x 4  Genitourinary: exam deferred  Musculoskeletal: moves all extremities  Extremities: pulses palpable bilaterally; no swelling or erythema; no edema  Neurological: alert & oriented x 3; no focal deficits  Psychiatric: appropriate mood and affect    Current Outpatient Medications   Medication Instructions    acetaminophen (TYLENOL) 650 mg, oral, Every 6 hours    [START ON 2/7/2025] apixaban (ELIQUIS) 5 mg, oral, 2 times daily    BD Insulin Syringe Ultra-Fine 0.3 mL 31 gauge x 5/16\" syringe 1 each, subcutaneous, 4 times daily before meals and nightly    blood sugar diagnostic (Blood Glucose Test) strip Check blood sugars once daily    blood-glucose meter misc Check  blood sugar as needed    blood-glucose sensor (FreeStyle Jef 3 Plus Sensor) device Use as directed    docusate sodium (COLACE) 100 mg, oral, 2 times daily    FreeStyle Jef 3 [place] misc Use with sensors as directed    insulin glargine (LANTUS) 30 Units, subcutaneous, Every 24 hours, Take as directed per insulin instructions.    insulin glargine (LANTUS) 30 Units, subcutaneous, Nightly, Take as directed per insulin instructions.    insulin lispro 100 unit/mL injection 6 units subcutaneous 3x day before meals + sliding scale  0 unit(s) if Blood glucose is between    2 unit(s) if Blood glucose is between 151-200   4 unit(s) if Blood glucose is between 201-250   6 unit(s) if Blood glucose is between 251-300   8 unit(s) if Blood glucose is between 301-350   10 unit(s) if Blood glucose is between 351-400    lancets misc " "Check blood sugars once daily    LORazepam (ATIVAN) 1 mg, oral, Nightly PRN, Insomnia, or nausea    methocarbamol (ROBAXIN) 500 mg, oral, 4 times daily    naloxone (NARCAN) 4 mg, nasal, As needed, May repeat every 2-3 minutes if needed, alternating nostrils, until medical assistance becomes available.    ondansetron (ZOFRAN) 8 mg, oral, Every 8 hours PRN    oxyCODONE (ROXICODONE) 10 mg, oral, Every 4 hours PRN    oxyCODONE (ROXICODONE) 10 mg, oral, Every 6 hours PRN    pancrelipase, Lip-Prot-Amyl, (Creon) 36,000-114,000- 180,000 unit capsule,delayed release(DR/EC) capsule Take 2-3 capsules by mouth with meals and 1-2 capsules with snacks daily; for an average of 13 capsules per day.    pantoprazole (PROTONIX) 40 mg, oral, 2 times daily, Do not crush, chew, or split.    pen needle, diabetic (BD Ultra-Fine Short Pen Needle) 31 gauge x 5/16\" needle Use four times daily with insulin    polyethylene glycol (GLYCOLAX, MIRALAX) 17 g, oral, Daily    prochlorperazine (COMPAZINE) 10 mg, oral, Every 6 hours PRN           Lab Review   Lab Results   Component Value Date    HGB 11.6 (L) 01/03/2025    HGB 11.0 (L) 12/18/2024    HGB 10.7 (L) 12/17/2024     (H) 01/03/2025    WBC 8.6 01/03/2025     01/03/2025    K 5.2 01/03/2025    CREATININE 0.66 01/03/2025    CREATININE 0.74 12/18/2024    CREATININE 0.72 12/17/2024    BUN 19 01/03/2025    CALCIUM 10.0 01/03/2025    INR 1.2 (H) 12/17/2024    BNP 22 05/15/2024    TROPHS <3 05/15/2024    TROPHS <3 05/15/2024    LDLF 66 08/09/2022        Assessment/Plan   Bradycardia appears to be asymptomatic with normal chronotropic competence seen on exercise stress test, and no structural abnormality seen on a transthoracic echocardiogram  Not on any cardiotoxic therapies now.  Tolerated 5-FU based therapies well in the future with no evidence of vasospasm  RTC 1 year     Chapito bAreu MD  Advanced Heart Failure/Transplant Cardiology  Cardio-Oncology  [facility] " [place]

## 2025-01-29 ENCOUNTER — OFFICE VISIT (OUTPATIENT)
Dept: CARDIOLOGY | Facility: CLINIC | Age: 55
End: 2025-01-29
Payer: COMMERCIAL

## 2025-01-29 VITALS
DIASTOLIC BLOOD PRESSURE: 80 MMHG | WEIGHT: 216.6 LBS | RESPIRATION RATE: 16 BRPM | HEART RATE: 58 BPM | SYSTOLIC BLOOD PRESSURE: 133 MMHG | OXYGEN SATURATION: 99 % | BODY MASS INDEX: 27.77 KG/M2 | TEMPERATURE: 97.5 F

## 2025-01-29 DIAGNOSIS — Z90.410 HISTORY OF PANCREATECTOMY: ICD-10-CM

## 2025-01-29 DIAGNOSIS — R00.1 SINUS BRADYCARDIA: Primary | ICD-10-CM

## 2025-01-29 PROCEDURE — 99214 OFFICE O/P EST MOD 30 MIN: CPT | Performed by: STUDENT IN AN ORGANIZED HEALTH CARE EDUCATION/TRAINING PROGRAM

## 2025-01-29 PROCEDURE — 3079F DIAST BP 80-89 MM HG: CPT | Performed by: STUDENT IN AN ORGANIZED HEALTH CARE EDUCATION/TRAINING PROGRAM

## 2025-01-29 PROCEDURE — 3075F SYST BP GE 130 - 139MM HG: CPT | Performed by: STUDENT IN AN ORGANIZED HEALTH CARE EDUCATION/TRAINING PROGRAM

## 2025-01-29 ASSESSMENT — ENCOUNTER SYMPTOMS
DEPRESSION: 0
LOSS OF SENSATION IN FEET: 0
OCCASIONAL FEELINGS OF UNSTEADINESS: 0

## 2025-01-29 ASSESSMENT — PAIN SCALES - GENERAL: PAINLEVEL_OUTOF10: 0-NO PAIN

## 2025-02-11 ENCOUNTER — APPOINTMENT (OUTPATIENT)
Dept: HEMATOLOGY/ONCOLOGY | Facility: CLINIC | Age: 55
End: 2025-02-11
Payer: COMMERCIAL

## 2025-02-18 ENCOUNTER — OFFICE VISIT (OUTPATIENT)
Dept: HEMATOLOGY/ONCOLOGY | Facility: CLINIC | Age: 55
End: 2025-02-18
Payer: COMMERCIAL

## 2025-02-18 ENCOUNTER — LAB (OUTPATIENT)
Dept: HEMATOLOGY/ONCOLOGY | Facility: CLINIC | Age: 55
End: 2025-02-18
Payer: COMMERCIAL

## 2025-02-18 ENCOUNTER — NUTRITION (OUTPATIENT)
Dept: HEMATOLOGY/ONCOLOGY | Facility: CLINIC | Age: 55
End: 2025-02-18

## 2025-02-18 VITALS — BODY MASS INDEX: 27.12 KG/M2 | HEIGHT: 74 IN | WEIGHT: 211.31 LBS

## 2025-02-18 VITALS
RESPIRATION RATE: 18 BRPM | SYSTOLIC BLOOD PRESSURE: 117 MMHG | TEMPERATURE: 97.3 F | DIASTOLIC BLOOD PRESSURE: 66 MMHG | OXYGEN SATURATION: 96 % | HEART RATE: 56 BPM | WEIGHT: 211.31 LBS | BODY MASS INDEX: 27.09 KG/M2

## 2025-02-18 DIAGNOSIS — K26.9 DUODENAL ULCER: ICD-10-CM

## 2025-02-18 DIAGNOSIS — C25.0 MALIGNANT NEOPLASM OF HEAD OF PANCREAS (MULTI): Primary | ICD-10-CM

## 2025-02-18 DIAGNOSIS — C25.0 MALIGNANT NEOPLASM OF HEAD OF PANCREAS (MULTI): ICD-10-CM

## 2025-02-18 LAB
BASOPHILS # BLD AUTO: 0.02 X10*3/UL (ref 0–0.1)
BASOPHILS NFR BLD AUTO: 0.3 %
CANCER AG19-9 SERPL-ACNC: 52.88 U/ML
EOSINOPHIL # BLD AUTO: 0.11 X10*3/UL (ref 0–0.7)
EOSINOPHIL NFR BLD AUTO: 1.7 %
ERYTHROCYTE [DISTWIDTH] IN BLOOD BY AUTOMATED COUNT: 15.6 % (ref 11.5–14.5)
HCT VFR BLD AUTO: 38.9 % (ref 41–52)
HGB BLD-MCNC: 12.3 G/DL (ref 13.5–17.5)
IMM GRANULOCYTES # BLD AUTO: 0 X10*3/UL (ref 0–0.7)
IMM GRANULOCYTES NFR BLD AUTO: 0 % (ref 0–0.9)
LYMPHOCYTES # BLD AUTO: 2.12 X10*3/UL (ref 1.2–4.8)
LYMPHOCYTES NFR BLD AUTO: 33.5 %
MCH RBC QN AUTO: 28.6 PG (ref 26–34)
MCHC RBC AUTO-ENTMCNC: 31.6 G/DL (ref 32–36)
MCV RBC AUTO: 91 FL (ref 80–100)
MONOCYTES # BLD AUTO: 0.57 X10*3/UL (ref 0.1–1)
MONOCYTES NFR BLD AUTO: 9 %
NEUTROPHILS # BLD AUTO: 3.5 X10*3/UL (ref 1.2–7.7)
NEUTROPHILS NFR BLD AUTO: 55.5 %
NRBC BLD-RTO: ABNORMAL /100{WBCS}
PLATELET # BLD AUTO: 272 X10*3/UL (ref 150–450)
RBC # BLD AUTO: 4.3 X10*6/UL (ref 4.5–5.9)
WBC # BLD AUTO: 6.3 X10*3/UL (ref 4.4–11.3)

## 2025-02-18 PROCEDURE — 80053 COMPREHEN METABOLIC PANEL: CPT

## 2025-02-18 PROCEDURE — 99215 OFFICE O/P EST HI 40 MIN: CPT | Performed by: NURSE PRACTITIONER

## 2025-02-18 PROCEDURE — 85025 COMPLETE CBC W/AUTO DIFF WBC: CPT

## 2025-02-18 PROCEDURE — 86301 IMMUNOASSAY TUMOR CA 19-9: CPT

## 2025-02-18 PROCEDURE — 36591 DRAW BLOOD OFF VENOUS DEVICE: CPT

## 2025-02-18 PROCEDURE — 2500000004 HC RX 250 GENERAL PHARMACY W/ HCPCS (ALT 636 FOR OP/ED): Performed by: NURSE PRACTITIONER

## 2025-02-18 PROCEDURE — 1036F TOBACCO NON-USER: CPT | Performed by: NURSE PRACTITIONER

## 2025-02-18 RX ORDER — HEPARIN 100 UNIT/ML
500 SYRINGE INTRAVENOUS AS NEEDED
Status: DISCONTINUED | OUTPATIENT
Start: 2025-02-18 | End: 2025-02-18 | Stop reason: HOSPADM

## 2025-02-18 RX ORDER — HEPARIN 100 UNIT/ML
500 SYRINGE INTRAVENOUS AS NEEDED
OUTPATIENT
Start: 2025-02-18

## 2025-02-18 RX ORDER — PANTOPRAZOLE SODIUM 40 MG/1
40 TABLET, DELAYED RELEASE ORAL 2 TIMES DAILY
Qty: 60 TABLET | Refills: 5 | Status: SHIPPED | OUTPATIENT
Start: 2025-02-18

## 2025-02-18 RX ORDER — OXYCODONE HYDROCHLORIDE 10 MG/1
10 TABLET ORAL EVERY 6 HOURS PRN
Qty: 120 TABLET | Refills: 0 | Status: SHIPPED | OUTPATIENT
Start: 2025-02-18 | End: 2025-03-20

## 2025-02-18 RX ORDER — PERPHENAZINE 16 MG
600 TABLET ORAL DAILY
Qty: 30 CAPSULE | Refills: 11 | Status: SHIPPED | OUTPATIENT
Start: 2025-02-18 | End: 2026-02-18

## 2025-02-18 RX ORDER — HEPARIN SODIUM,PORCINE/PF 10 UNIT/ML
50 SYRINGE (ML) INTRAVENOUS AS NEEDED
Status: DISCONTINUED | OUTPATIENT
Start: 2025-02-18 | End: 2025-02-18 | Stop reason: HOSPADM

## 2025-02-18 RX ORDER — HEPARIN SODIUM,PORCINE/PF 10 UNIT/ML
50 SYRINGE (ML) INTRAVENOUS AS NEEDED
OUTPATIENT
Start: 2025-02-18

## 2025-02-18 RX ADMIN — HEPARIN 500 UNITS: 100 SYRINGE at 12:54

## 2025-02-18 ASSESSMENT — PAIN SCALES - GENERAL: PAINLEVEL_OUTOF10: 4

## 2025-02-18 NOTE — PROGRESS NOTES
Patient ID: Nate Alvarez is a 54 y.o. male from [place], [place].     Referring Physician: Shaw You [place]  [id number] [place] [address]    Primary Care Provider: Hazel Medeiros MD    Diagnosis:   Pancreatic cancer 5/2024    Primary Oncologic Surgeon:   Omar Gamble MD    Primary Medical Oncologist:  Borderline Resectable    Primary Radiation Oncologist:  MARSHALL     Current Therapy: surveillance     Oncologic Surgery History:  Total pancreatectomy 12/12/2024 [id number] possible + SMV margin however unclear since normal vein transected     Oncologic Therapy History:  Neoadjuvant FOLFIRINOX x 12 cycles 6/2024-11/2024    Molecular Genetics:  Pending    Current Sites of Disease:  Head of the pancreas involving the gastroduodenal artery celiac axis and 180 degree involvement of the SMV    Oncologic Problem List:  Localized but borderline resectable pancreatic adenocarcinoma  New onset diabetes with hyperglycemia  Cancer cachexia      Oncologic Narrative:  54 y.o.  man from [place] who presented in May 2024 with difficult DM control (A1c 9.1 this month)and some ED visits for fatigue/weakness. Most recently at [facility] May 16-18. 70 lb wt loss mentioned (note some of the new DM meds) .  Tumor markers checked:  CEA: 4.9  Ca 19-9: >700    CT A/P Showed:   Edema surrounding the pancreatic tail compatible with acute pancreatitis. Dilatation of the pancreatic duct which measures 6 mm in diameter and an ill marginated area of hypodensity in the  pancreatic head highly concerning for pancreatic malignancy, and pancreatic protocol MRI is recommended for further evaluation. An  area of necrotizing pancreatitis/pancreatic necrosis is other consideration, however there is no surrounding edema in this region and given the ductal dilatation, findings highly concerning for an underlying malignancy.  Small nonobstructive left renal calculi.     MRI:  IMPRESSION:  1. Pancreatic head/uncinate process mass measuring 2.9 x 2.5  cm with upstream pancreatic duct dilatation and parenchymal atrophy highly  concerning for primary pancreatic neoplasm (specifically adenocarcinoma). Surrounding changes of mild superimposed pancreatitis. The mass is in contact with the adjacent 3rd part of the duodenum but no upstream dilatation. No biliary obstruction. The  mass has 180 degree contact with the adjacent superior mesenteric vein and likely encasing the 1st right lateral branch. The mass is  likely encasing the gastroduodenal artery distal component. The  celiac axis, superior mesenteric artery and main portal vein are intact.  2. Few subcentimeter peripancreatic indeterminate lymph nodes noted.  3. Hepatic steatosis with segment 4A lesion measuring 0.7 cm with  imaging characteristics favoring hemangioma .     Chest CT:  IMPRESSION:  1. No suspicious pulmonary nodules. Few small calcific granulomas noted in the lingula.  2. No enlarged intrathoracic lymphadenopathy.  3. Left thyroid nodule measuring 1.1 cm. This could be further assessed by dedicated nonemergent thyroid ultrasound if clinically desired.  4. Subdiaphragmatic findings regarding the known pancreatic mass and duct dilatation as well as the hepatic observations are better evaluated in prior MRI dated 05/16/2024     EUS:    Result Text   Impression  Limited endoscopic views of the esophagus, stomach and duodenum were obtained. There was heme and gastritis noted in the stomach. The rest of the exam was unremarkable   A 27 mm by 23 mm irregular and hypoechoic T2N0 mass with poorly defined margins was visualized in the uncinate process of the head with apparent involvement of the superior mesenteric vein; fine needle biopsy was performed, final pathology is pending but preliminary evaluation was positive for malignancy. The pancreas upstream to the mass appeared atrophic with dilated pancreatic duct.   The bile duct appeared normal.  Visualized portions of the liver, spleen, left adrenal  gland, left kidney and celiac axis were normal on ultrasound examination.    No abnormal-appearing lymph nodes were identified in the abdomen.            FINAL DIAGNOSIS   A. PANCREAS HEAD MASS, BIOPSY:     -- Invasive adenocarcinoma, moderately differentiated      6/11/24 - Exp / lap  -no carcinomatosis     Past Medical History: Nate has a past medical history of Anxiety, Arthritis, BCC (basal cell carcinoma), eyelid, right, Diabetes mellitus (Multi), Gastroesophageal reflux disease without esophagitis (10/05/2023), Hemorrhoids (11/03/2023), HTN (hypertension), Leg cramps (10/05/2023), Oropharyngeal dysphagia (10/05/2023), RADHA (obstructive sleep apnea), Pancreatic cancer (Multi), Personal history of other malignant neoplasm of skin, Type 2 diabetes mellitus, and Vision loss.  Surgical History:  Nate has a past surgical history that includes Tonsillectomy; Vasectomy; Testicle surgery; Esophagogastroduodenoscopy; Colonoscopy; Eyelid carcinoma excision; Rotator cuff repair (Bilateral); Knee arthroscopy w/ debridement (Left); Pancreatectomy (12/12/2024); Splenectomy, total (12/12/2024); Gastrojejunostomy (12/12/2024); Cholecystectomy (12/12/2024); Choledochojejunostomy (12/12/2024); and Partial gastrectomy (12/12/2024).  Social History:  Nate reports that he has never smoked. He has quit using smokeless tobacco.  His smokeless tobacco use included chew. He reports that he does not currently use alcohol after a past usage of about 1.0 standard drink of alcohol per week. He reports that he does not use drugs.  Family History:    Family History   Problem Relation Name Age of Onset    Diabetes Mother      Ovarian cancer Mother      Liver cancer Father      Lung cancer Father      Colon cancer Father      Hypertension Father      Stroke Maternal Grandmother      Diabetes Maternal Grandmother      Liver cancer Maternal Grandfather      Lung cancer Paternal Grandfather       Family Oncology History:  Cancer-related  family history includes Colon cancer in his father; Liver cancer in his father and maternal grandfather; Lung cancer in his father and paternal grandfather; Ovarian cancer in his mother.    Mom with uterine caner - early 30s   Wife with bolton syndrome  -     - daughter with Bolton Syndrome - age 28     - son 21, has not followed up with genetic testing    SUBJECTIVE:  History of Present Illness:  Nate Alvarez is a 54 y.o. male who was referred by Shaw You MD and presents with chemotherapy follow up.     S/p 12th cycle of mFOLFIRINOX   S/p Pancreatectomy 12/12/24    Still having back pain which is his biggest complaint  -  mid upper back    - needing oxycodone 10mg QID, hasn't been able to cut back      + emesis every 2 - 3 days   - not eating much  - wt is down about 10lbs since surgery   Taking creon with meals which he started in Jan for abdominal pain with eating.   Hasn't noticed a difference when taking the creon.     Trying to stay active, doing things around the house.   Walking on treadmill        Blood sugars  - around 160 in the evenings.     Neuropathy is still an issue,  Feet are painful at bottom    OBJECTIVE:    VS / Pain:  There were no vitals taken for this visit.  BSA: There is no height or weight on file to calculate BSA.     Pain Scale: 0    Daily Weight  01/29/25 : 98.2 kg (216 lb 9.6 oz)  01/23/25 : 97.3 kg (214 lb 8 oz)  01/23/25 : 97.3 kg (214 lb 8 oz)    Daily Weight  02/18/25 : 95.8 kg (211 lb 5 oz)  01/29/25 : 98.2 kg (216 lb 9.6 oz)  01/23/25 : 97.3 kg (214 lb 8 oz)  01/23/25 : 97.3 kg (214 lb 8 oz)  01/03/25 : 98.8 kg (217 lb 12.8 oz)  12/20/24 : 101 kg (221 lb 12.8 oz)  12/12/24 : 98 kg (216 lb 0.8 oz)  12/11/24 : 98 kg (216 lb)  12/10/24 : 98 kg (216 lb)  12/06/24 : 98 kg (216 lb)  12/03/24 : 112 kg (245 lb 13 oz)  11/22/24 : 110 kg (243 lb 3.2 oz)       Physical Exam  Constitutional:       Appearance: Normal appearance.   HENT:      Head: Normocephalic.      Mouth/Throat:       Mouth: Mucous membranes are moist.      Pharynx: Oropharynx is clear.   Eyes:      Pupils: Pupils are equal, round, and reactive to light.   Cardiovascular:      Rate and Rhythm: Normal rate.      Pulses: Normal pulses.   Pulmonary:      Effort: Pulmonary effort is normal.   Abdominal:      General: Abdomen is flat. Bowel sounds are normal.   Musculoskeletal:      Cervical back: Neck supple.   Skin:     General: Skin is warm and dry.      Capillary Refill: Capillary refill takes less than 2 seconds.   Neurological:      General: No focal deficit present.      Mental Status: He is alert.   Psychiatric:         Mood and Affect: Mood normal.         Performance Status: 1     Diagnostic Results     WBC   Date/Time Value Ref Range Status   01/03/2025 01:17 PM 8.6 4.4 - 11.3 x10*3/uL Final   12/18/2024 06:02 AM 15.9 (H) 4.4 - 11.3 x10*3/uL Final   12/17/2024 06:48 AM 13.9 (H) 4.4 - 11.3 x10*3/uL Final     Hemoglobin   Date Value Ref Range Status   01/03/2025 11.6 (L) 13.5 - 17.5 g/dL Final   12/18/2024 11.0 (L) 13.5 - 17.5 g/dL Final   12/17/2024 10.7 (L) 13.5 - 17.5 g/dL Final     MCV   Date/Time Value Ref Range Status   01/03/2025 01:17 PM 94 80 - 100 fL Final   12/18/2024 06:02 AM 92 80 - 100 fL Final   12/17/2024 06:48 AM 92 80 - 100 fL Final     Platelets   Date/Time Value Ref Range Status   01/03/2025 01:17  (H) 150 - 450 x10*3/uL Final   12/18/2024 06:02  (H) 150 - 450 x10*3/uL Final   12/17/2024 06:48  150 - 450 x10*3/uL Final     Neutrophils Absolute   Date/Time Value Ref Range Status   12/06/2024 11:28 AM 2.46 1.20 - 7.70 x10*3/uL Final     Comment:     Percent differential counts (%) should be interpreted in the context of the absolute cell counts (cells/uL).   11/11/2024 03:49 PM 3.74 1.20 - 7.70 x10*3/uL Final     Comment:     Percent differential counts (%) should be interpreted in the context of the absolute cell counts (cells/uL).   10/28/2024 11:44 AM 3.13 1.20 - 7.70 x10*3/uL Final      Comment:     Percent differential counts (%) should be interpreted in the context of the absolute cell counts (cells/uL).     Bilirubin, Total   Date/Time Value Ref Range Status   01/03/2025 01:17 PM 0.6 0.0 - 1.2 mg/dL Final   12/17/2024 06:47 AM 1.1 0.0 - 1.2 mg/dL Final   12/16/2024 05:53 AM 0.7 0.0 - 1.2 mg/dL Final     AST   Date/Time Value Ref Range Status   01/03/2025 01:17 PM 41 (H) 9 - 39 U/L Final   12/17/2024 06:47 AM 27 9 - 39 U/L Final   12/16/2024 05:53 AM 39 9 - 39 U/L Final     ALT   Date/Time Value Ref Range Status   01/03/2025 01:17 PM 30 10 - 52 U/L Final     Comment:     Patients treated with Sulfasalazine may generate falsely decreased results for ALT.   12/17/2024 06:47 AM 39 10 - 52 U/L Final     Comment:     Patients treated with Sulfasalazine may generate falsely decreased results for ALT.   12/16/2024 05:53 AM 42 10 - 52 U/L Final     Comment:     Patients treated with Sulfasalazine may generate falsely decreased results for ALT.     Creatinine   Date/Time Value Ref Range Status   01/03/2025 01:17 PM 0.66 0.50 - 1.30 mg/dL Final   12/18/2024 06:02 AM 0.74 0.50 - 1.30 mg/dL Final   12/17/2024 06:47 AM 0.72 0.50 - 1.30 mg/dL Final     Urea Nitrogen   Date/Time Value Ref Range Status   01/03/2025 01:17 PM 19 6 - 23 mg/dL Final   12/18/2024 06:02 AM 15 6 - 23 mg/dL Final   12/17/2024 06:47 AM 11 6 - 23 mg/dL Final     Albumin   Date/Time Value Ref Range Status   01/03/2025 01:17 PM 4.0 3.4 - 5.0 g/dL Final   12/18/2024 06:02 AM 3.5 3.4 - 5.0 g/dL Final   12/17/2024 06:47 AM 3.8 3.4 - 5.0 g/dL Final     Cancer AG 19-9   Date/Time Value Ref Range Status   12/06/2024 11:28 .69 (H) <35.00 U/mL Final   11/11/2024 03:49 PM 72.69 (H) <35.00 U/mL Final   10/29/2024 10:56 AM 45.15 (H) <35.00 U/mL Final     Carcinoembryonic AG   Date/Time Value Ref Range Status   05/17/2024 09:28 AM 4.9 ug/L Final     === 01/07/25 ===    CT ABDOMEN PELVIS W IV CONTRAST    - Impression -  1. Status post total  pancreatectomy. Status post partial gastrectomy  with [place]-en-Y. Induration in the region of the resected pancreatic  head consistent with scarring.  2. 2.6 x 1.3 cm focal thrombus proximal portal vein.  3. Air in the biliary system, iatrogenic.    Assessment/Plan   This is a 54-year-old man with borderline resectable pancreatic adenocarcinoma.  He presented in May 2024 with greater than 50 pound weight loss and new onset diabetes.  Imaging, EUS and biopsy confirmed adenocarcinoma of the pancreas involving the GDA, celiac axis, SMV.  Had 12 cycles of mFOLFIRINOX followed by Total pancreatectomy 12/12/2024 [id number] possible + SMV margin however unclear since normal vein transected.      Borderline resectable Pancreatic cancer:   Surveillance for now.   - port flush in 4 weeks    - scans & labs in 8 weeks prior to seeing Dr. You   Follow ca19 - 9 level   Consider RT if confirmed + margin. - + Margin confirmed at tumor board.  Recommend radiation therapy  - referral to Dr. Espinoza     -Check NGS - to see if available for AMAL trial  - not eligible as no GAVY11R or G12V mutation               Pain - refill oxycodone  - will try to cut back at next visit, if unable to wean down will refer to pain management.     Neuropathy - trial of alpha lipoic acid     Diabetes:  Continue long and short acting insulin as managed by  [facility]/ [facility]  Office: [phone]  Fax: [phone]

## 2025-02-18 NOTE — PROGRESS NOTES
NUTRITION Follow Up NOTE    Nutrition Assessment     Reason for Visit:  Nate Alvarez is a 54 y.o. male who presented with Borderline Resectable Pancreatic cancer dx'd 5/2024     Oncologic Surgery History:  Total pancreatectomy, splenectomy, distal gastrectomy with gastrojejunostomy, choledochojejunostomy, cholecystectomy,  SNV resection/reconstruction with Heidi Gamble and Theresa 12/12/2024 [id number] possible + SMV margin however unclear since normal vein transected.     Diabetes:  Continues long and short acting insulin as managed by Dr. Brandon Vasquez    Oncologic Therapy History:  Neoadjuvant FOLFIRINOX x 12 cycles 6/2024-11/2024    Referred to this service for high risk dx and assessed 7/23/24.  Seen again today in the clinic after CNP in the presence of his wife.    PMH noted: GERD, HTN, oropharyngeal dysphagia (from fractured neck x 3), RADHA    New labs pending  Comprehensive metabolic panel                Component  Ref Range & Units 2 wk ago  (1/3/25) 1 mo ago  (12/18/24) 1 mo ago  (12/17/24) 1 mo ago  (12/16/24) 1 mo ago  (12/15/24) 1 mo ago  (12/14/24) 1 mo ago  (12/13/24) 1 mo ago  (12/13/24) 1 mo ago  (12/13/24)   Glucose  74 - 99 mg/dL 245 High  240 High  148 High  57 Low  80 234 High   183 High  183 High    Sodium  136 - 145 mmol/L 139 138 138 141 140 137  140 140   Potassium  3.5 - 5.3 mmol/L 5.2 4.4 3.9 4.0 3.8 4.2  4.4 4.4   Chloride  98 - 107 mmol/L 101 101 100 104 103 101  107 107   Bicarbonate  21 - 32 mmol/L 26 26 26 29 30 28  21 21   Anion Gap  10 - 20 mmol/L 17 15 16 12 11 12  16 16   Urea Nitrogen  6 - 23 mg/dL 19 15 11 10 10 11  13 13   Creatinine  0.50 - 1.30 mg/dL 0.66 0.74 0.72 0.63 0.64 0.68  0.90 0.90   eGFR  >60 mL/min/1.73m*2 >90 >90 CM >90 CM >90 CM >90 CM >90 CM  >90 CM >90 CM   Comment: Calculations of estimated GFR are performed using the 2021 CKD-EPI Study Refit equation without the race variable for the IDMS-Traceable creatinine  "methods.  https://jasn.asnjournals.org/content/early/2021/09/22/ASN.3626060022   Calcium  8.6 - 10.6 mg/dL 10.0 9.1 9.4 8.7 8.4 Low  8.4 Low   8.5 Low  8.5 Low    Albumin  3.4 - 5.0 g/dL 4.0 3.5 3.8 3.2 Low  3.2 Low  3.2 Low  3.4  3.4   Alkaline Phosphatase  33 - 120 U/L 105  119 90 64 59 40     Total Protein  6.4 - 8.2 g/dL 7.2  6.8 5.7 Low  5.3 Low  5.0 Low  4.9 Low      AST  9 - 39 U/L 41 High   27 39 47 High  56 High  66 High      Bilirubin, Total  0.0 - 1.2 mg/dL 0.6  1.1 0.7 0.6 0.6 1.0     ALT  10 - 52 U/L 30  39 CM 42 CM 47 CM 57 High  CM 66 High  CM     Comment: Patients treated with Sulfasalazine may generate falsely decreased results for ALT.   Resulting Agency [facility]             Specimen Collected: 01/03/25 13:17 Last Resulted: 01/03/25 21:38     Hemoglobin A1C          Component  Ref Range & Units 1 mo ago 8 mo ago 4 yr ago   Hemoglobin A1C  See comment % 5.3 9.1 High  R 5.4 R, CM   Estimated Average Glucose  Not Established mg/dL 105 214 108 R   Resulting Agency [facility]              Narrative  Performed by: [facility]  Diagnosis of Diabetes-Adults  Non-Diabetic: < or = 5.6%  Increased risk for developing diabetes: 5.7-6.4%  Diagnostic of diabetes: > or = 6.5%     Specimen Collected: 12/06/24 11:28 Last Resulted: 12/06/24 14:59     Anthropometrics:  Anthropometrics  Height: 188.1 cm (6' 2.06\")  Weight: 95.8 kg (211 lb 5 oz)  BMI (Calculated): 27.09  IBW/kg (Dietitian Calculated): 86.4 kg  Weight Change  Weight History / % Weight Change: Additional loss for a total of 15.6 kg (14%) since surgery (11 weeks)  Significant Weight Loss: Yes  Interpretation of Weight Loss: >7.5% in 3 months  Reported a UBW of ~300 #  Stated he had lost a lot of muscle mass.    Lowest wt post surgery was 204# (92.7 kg)   Wt Readings from Last 25 Encounters:   2/18/25 95.9 kg   1/23/25 97.3 kg- Loss of 14.2 kg (12.7%) in ~7 weeks - significant   12/3/24 111.5 " kg- pre surgery wt    10/1/24 111.6 kg- Overall stable wt after small gain    9/19/24 111.9 kg- gain of 2.9 kg in 1 month   8/20/24 109 kg (240 lb)- Overall wt gain trend since significant loss with stable wt over the last month    8/5/24 109 kg - Today pt reports his wt varies 10 # between 235# and 245# on his home scale.   07/23/24 110 kg (242 lb 4.6 oz)   07/22/24 108 kg (237 lb)   07/12/24 108 kg (237 lb 10.5 oz)   07/11/24 107 kg (236 lb)   07/10/24 107 kg (234 lb 12.6 oz)   07/09/24 107 kg (236 lb 15.9 oz)   07/05/24 106 kg (234 lb)   06/27/24 106 kg (233 lb)   06/25/24 106 kg (233 lb 11 oz)   06/24/24 104 kg (229 lb 4.5 oz)   06/11/24 104 kg (229 lb 3.2 oz)   06/03/24 106 kg (232 lb 12.9 oz)   05/31/24 106 kg (234 lb)   05/29/24 99.8 kg (220 lb 0.3 oz)- Loss of 29.2 kg (22.6%) in 8 months- significant   05/28/24 105 kg (231 lb)   05/22/24 102 kg (224 lb)   05/17/24 102 kg (224 lb 13.9 oz)   05/13/24 104 kg (230 lb)   05/10/24 102 kg (225 lb)   04/17/24 117 kg (257 lb)   01/11/24 122 kg (268 lb)     10/05/23 127 kg (281 lb)   10/04/23 129 kg (285 lb)   04/13/23 131 kg (287 lb 14.4 oz)                Food And Nutrient Intake:  Food and Nutrient History  Food and Nutrient History: He feels hungry, however, he continues to eat only twice daily and smaller than his normal portions.  Reports if her eats more, he will vomit.  He knows his limits.  Admits he does not eat because of the pain, discomfort and GI distress it causes. Afraid to snack in the afternoon, as he does not want to spoil his dinner. He noticed he was able to tolerate a fish sandwich from [employer], noting he never ate [employer] before.  He can also tolerate a double cheeseburger- but not at the same time as the fish sandwich.  Energy Intake: Poor < 50 %      Food Intake  Amount of Food: 24 hr recall:  Meal 1: 3 eggs and a waffle  Meal 2: no lunch  Meal 3: 4-5 oz pc of meat and a few bites of vegetables  Fluid Intake: Adequate- drinks mostly  "water, some milk                                            Food Supplement Intake  Oral Nutrition Supplements:  (He forgets about them.  Wife states they have some at home.)        Medication and Complementary/Alternative Medicine Use  Prescription Medication Use: Taking his PPI twice daily as directed; taking Creon  Nutrition-Related Complementary/Alternative Medicine Use: Pt is not working- states he cannot.  Reports he does nothing all day.    He takes 3-4 each Creon capsules with his meals and 1 if he snacks.            Nutrition Focused Physical Exam Findings:      Subcutaneous Fat Loss  Orbital Fat Pads: Mild-Moderate (slight dark circles and slight hollowing) (mild)  Buccal Fat Pads: Mild-Moderate (flat cheeks, minimal bounce) (mild)  Triceps: Defer  Ribs: Defer    Muscle Wasting  Temporalis: Mild-Moderate (slight depression)  Pectoralis (Clavicular Region): Defer  Deltoid/Trapezius: Defer  Interosseous: Defer  Trapezius/Infraspinatus/Supraspinatus (Scapular Region): Defer  Quadriceps: Defer  Gastrocnemius: Defer               GI Symptoms:  Abdominal pain; Early satiety; Nausea; Vomiting;   Has some mild nausea before emesis, but often when he eats too much, everything comes immediately right back up.    He does not think the Creon is contributing to these symptoms.    States his stomach/bowels continue to grumble and making noise (70%) of the day- whether he has eaten or not.  Reports bowels are normal.     Energy Needs  Calculated Energy Needs Using Equations  Height: 188.1 cm (6' 2.06\")  Estimated Energy Needs  Total Energy Estimated Needs in 24 hours (kCal): 2822 kCal  Energy Estimated Needs per kg Body Weight in 24 hours (kCal/kg): 29 kCal/kg  Method for Estimating Needs: Based on actual BW  Estimated Fluid Needs  Total Fluid Estimated Needs in 24 Hours (mL): 2822 mL  Total Fluid Estimated Needs in 24 hours (mL/kg): 29 mL/kg  Method for Estimating 24 Hour Fluid Needs: Based on actual BW  Estimated " Protein Needs  Total Protein Estimated Needs in 24 Hours (g): 122 g  Protein Estimated Needs per kg Body Weight in 24 Hours (g/kg): 1.25 g/kg  Method for Estimating 24 Hour Protein Needs: Based on actual BW        Nutrition Diagnosis   Malnutrition Diagnosis  Patient has Malnutrition Diagnosis: Yes  Diagnosis Status: Active  Malnutrition Diagnosis: Severe malnutrition related to acute disease or injury  Related to: significant surgical intervention (with chronic GI symptoms)  As Evidenced by: < or = to 50% of  estimated energy requirement for  > 5 days with significant wt loss as documented    Nutrition Diagnosis  Patient has Nutrition Diagnosis: Yes  Diagnosis Status (1): Active  Nutrition Diagnosis 1: Altered GI function  Related to (1): pathophysiology of ca ds now s/p chemo with side effects and s/p surgery  As Evidenced by (1): pt continued report of GI symptoms and need for PERT d/t EPI  Additional Assessment Information (1): Pt seems to be experiencing delayed gastric emptying. Unclear etiology post surgery.  Note he is on highest dose of PPI twice daily and still with complaints.  He is not overeating- cannot eat normal portions for post whipple pt. (Pt to follow up with Dr. Gomez nextg week)    Nutrition Interventions/Recommendations   Nutrition Prescription  Individualized Nutrition Prescription Provided for : Low-moderate fiber MAURA- limited concentrated sweets    Food and Nutrition Delivery  Food and Nutrition Delivery  Meals & Snacks: Modification of diet for specific food and/or ingredient, Modification of schedule of oral intake  Goals: Small frequent meals and snacks- 4-5 per day. Slowly add fiber foods daily and advance to regular MAURA over the next 2-3 weeks  Medical Food Supplement: Commercial beverage medical food supplement therapy  Goals: 1 each daily for snack with 1 each Creon capsule; advance to 2 each daily as tolerated  Goals: DMV  Other:: PERT  Goals: Can discontinue for 2-3 days and see  if GI symptoms of vomiting improve (This is not likely the etiology, however.  Pt had tolerated Creon in the past- prior to ipple.)    Nutrition Education       Coordination of Care  Coordination of Nutrition Care by a Nutrition Professional  Goals: Improve GI symptoms and in turn, intake, which is sub-optimal at this time (Pt may benefit from GI consult)    There are no Patient Instructions on file for this visit.    Nutrition Monitoring and Evaluation   Food and Nutrient Intake  Monitoring and Evaluation Plan: Fluid intake, Meal/snack pattern, Amount of food, Fiber intake  Fluid Intake: Estimated fluid intake  Criteria: Continue to meet daily needs  Amount of Food: Estimated amout of food  Criteria: Meet energy and protein needs  Meal/Snack Pattern: Estimated meal and snack pattern  Criteria: as above  Estimated fiber intake: Estimated fiber intake  Criteria: Increase slowly from 13 g daily to > 20 g per day as tolerated  Anthropometric measurements  Monitoring and Evaluation Plan: Weight  Body Weight: Body weight  Criteria: maintanence  Biochemical Data, Medical Tests and Procedures  Monitoring and Evaluation Plan: Electrolyte/renal panel, Glucose/endocrine profile  Criteria: WNL  Glucose/Endocrine Profile: Glucose, casual  Criteria: < 180 two hours post prandial  Nutrition Focused Physical Findings  Monitoring and Evaluation Plan: Adipose, Digestive System, Muscle  Adipose Finding: Loss of subcutaneous fat  Criteria: No further loss  Digestive System Finding: Abdominal pain, Early satiety, Nausea, Vomiting  Criteria: Improve symptoms with small frequent intake, medications per MD- PPI twice daily, PERT regimen if tolerated  Muscle Finding: Muscle atrophy  Criteria: No further loss          Time Spent  Prep time on day of patient encounter: 5 minutes  Time spent directly with patient, family or caregiver: 35 minutes  Additional Time Spent on Patient Care Activities: 0 minutes  Documentation Time: 15  minutes  Other Time Spent: 0 minutes  Total: 55 minutes

## 2025-02-19 LAB
ALBUMIN SERPL BCP-MCNC: 4 G/DL (ref 3.4–5)
ALP SERPL-CCNC: 67 U/L (ref 33–120)
ALT SERPL W P-5'-P-CCNC: 24 U/L (ref 10–52)
ANION GAP SERPL CALC-SCNC: 14 MMOL/L (ref 10–20)
AST SERPL W P-5'-P-CCNC: 28 U/L (ref 9–39)
BILIRUB SERPL-MCNC: 0.5 MG/DL (ref 0–1.2)
BUN SERPL-MCNC: 20 MG/DL (ref 6–23)
CALCIUM SERPL-MCNC: 9.5 MG/DL (ref 8.6–10.6)
CHLORIDE SERPL-SCNC: 103 MMOL/L (ref 98–107)
CO2 SERPL-SCNC: 27 MMOL/L (ref 21–32)
CREAT SERPL-MCNC: 0.62 MG/DL (ref 0.5–1.3)
EGFRCR SERPLBLD CKD-EPI 2021: >90 ML/MIN/1.73M*2
GLUCOSE SERPL-MCNC: 201 MG/DL (ref 74–99)
POTASSIUM SERPL-SCNC: 4.3 MMOL/L (ref 3.5–5.3)
PROT SERPL-MCNC: 6.4 G/DL (ref 6.4–8.2)
SODIUM SERPL-SCNC: 140 MMOL/L (ref 136–145)

## 2025-02-21 ENCOUNTER — APPOINTMENT (OUTPATIENT)
Dept: SURGERY | Facility: CLINIC | Age: 55
End: 2025-02-21
Payer: COMMERCIAL

## 2025-02-21 DIAGNOSIS — C25.0 MALIGNANT NEOPLASM OF HEAD OF PANCREAS (MULTI): Primary | ICD-10-CM

## 2025-02-21 NOTE — PROGRESS NOTES
"Reason for visit:  FUV / Final Set of Vaccines    MO= Shelley You Endo = Pedro     HPI:  12/12/24 Total Pancreatectomy with vein resection (Andrez-Alfonso).  This was done after TNAT.  Home POD 6.  Vaccinated in-house.     Summary from last office visit 1/3/2025:    \"Mr. Alvarez overall I think is doing reasonably well 3 weeks out from his total pancreatectomy.  I am hoping that his pain is simply his preoperative opioid dependence and I think that it is but I cannot say with 100% certainty.  I am going to check some labs today as well as a CT.  We will get his CAT scan done hopefully next week at [place] and I have asked him to call me a day after it is done so that I can review it with him.     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  We transected or cut through what certainly look to be normal vein.  In any event it is something that may warrant consideration of some adjuvant radiotherapy.  Again this gentleman received total neoadjuvant chemotherapy.     Depending on how other things sort out this gentleman's next visit with me will be in mid February for his second set of postsplenectomy vaccines.  He has follow-up with Dr. You in the next couple weeks.     This note has been dictated with voice recognition software and has not been reviewed for grammar or content errors.     Update 1/4:  CMP and CBC looks from clinic yesterday.     Update 1/8:  CT done but not read.  To my eye no obstruction, no collections.  Some omental inflammation.  Looks good except Non-occl PV thrombus.  I have spoken to Dr You and subsequently pt.  Dr You will be prescribing therapeutic anticoagulation and pt will stop the extended DVT prophylaxis.  Pt is seeing Dr You next week.\"           --------    Has had FU with Avelino/Gama.  Referral to GUSTABO Hurst) " made.    Mr. Alvarez is definitely better than when I last saw him in terms of his pain level how he is feeling overall and is eating but he still a few times a week will vomit in the morning the food that he ate the night before.  He does not really describe regurgitation throughout the day he does not spit up any bile to suggest alkaline reflux gastritis.  He is taking his proton pump inhibitor.    PE: He looks well and in no distress.  His abdomen is soft and nontender with a nicely healed incision.    Impression / Plan:    This gentleman is doing reasonably well after his total pancreatectomy as noted above.  He is getting his second set of postsplenectomy vaccines today.    He does describe the potential for some delayed gastric emptying type symptoms and he not only has diabetes but he is post total pancreatectomy.  He does not really describe alkaline reflux gastritis or heartburn type symptoms and so we will do a nuclear medicine gastric emptying study.  Pending the results of that we may consider some sort of promotility agent.  We may consider an endoscopy.  However we will first proceed with a nuclear medicine gastric emptying study.  I think that that would be more useful than a Gastrografin upper GI.    He will be seeing Dr. Espinoza next week to talk about adjuvant radiation therapy and I used pictorial aids to describe for him how his inferior SMV margin was microscopically positive and that that is the main target of his adjuvant radiation therapy.    I will follow-up with this patient after his nuclear medicine study.    This note has been dictated with voice recognition software and has not been reviewed for grammar or content errors.

## 2025-02-26 ENCOUNTER — APPOINTMENT (OUTPATIENT)
Dept: SURGERY | Facility: CLINIC | Age: 55
End: 2025-02-26
Payer: COMMERCIAL

## 2025-02-26 VITALS
TEMPERATURE: 97.8 F | SYSTOLIC BLOOD PRESSURE: 119 MMHG | BODY MASS INDEX: 27.8 KG/M2 | OXYGEN SATURATION: 97 % | HEART RATE: 54 BPM | HEIGHT: 74 IN | WEIGHT: 216.6 LBS | DIASTOLIC BLOOD PRESSURE: 72 MMHG

## 2025-02-26 DIAGNOSIS — R11.2 NAUSEA AND VOMITING, UNSPECIFIED VOMITING TYPE: ICD-10-CM

## 2025-02-26 DIAGNOSIS — Z90.81 POST-SPLENECTOMY: ICD-10-CM

## 2025-02-26 DIAGNOSIS — C25.0 MALIGNANT NEOPLASM OF HEAD OF PANCREAS (MULTI): Primary | ICD-10-CM

## 2025-02-26 PROCEDURE — 90620 MENB-4C VACCINE IM: CPT | Performed by: SURGERY

## 2025-02-26 PROCEDURE — 90472 IMMUNIZATION ADMIN EACH ADD: CPT | Performed by: SURGERY

## 2025-02-26 PROCEDURE — 90471 IMMUNIZATION ADMIN: CPT | Performed by: SURGERY

## 2025-02-26 PROCEDURE — 1036F TOBACCO NON-USER: CPT | Performed by: SURGERY

## 2025-02-26 PROCEDURE — 3078F DIAST BP <80 MM HG: CPT | Performed by: SURGERY

## 2025-02-26 PROCEDURE — 99024 POSTOP FOLLOW-UP VISIT: CPT | Performed by: SURGERY

## 2025-02-26 PROCEDURE — 3008F BODY MASS INDEX DOCD: CPT | Performed by: SURGERY

## 2025-02-26 PROCEDURE — 90734 MENACWYD/MENACWYCRM VACC IM: CPT | Performed by: SURGERY

## 2025-02-26 PROCEDURE — 3074F SYST BP LT 130 MM HG: CPT | Performed by: SURGERY

## 2025-02-27 ENCOUNTER — NUTRITION (OUTPATIENT)
Dept: HEMATOLOGY/ONCOLOGY | Facility: CLINIC | Age: 55
End: 2025-02-27
Payer: COMMERCIAL

## 2025-02-27 ENCOUNTER — APPOINTMENT (OUTPATIENT)
Dept: HEMATOLOGY/ONCOLOGY | Facility: CLINIC | Age: 55
End: 2025-02-27
Payer: COMMERCIAL

## 2025-02-27 VITALS — BODY MASS INDEX: 27.8 KG/M2 | HEIGHT: 74 IN | WEIGHT: 216.6 LBS

## 2025-02-27 NOTE — PROGRESS NOTES
NUTRITION Follow Up NOTE    Nutrition Assessment     Reason for Visit:  Nate Alvarez is a 54 y.o. male who presented with Borderline Resectable Pancreatic cancer dx'd 5/2024     Oncologic Surgery History:  Total pancreatectomy, splenectomy, distal gastrectomy with gastrojejunostomy, choledochojejunostomy, cholecystectomy,  SNV resection/reconstruction with Heidi Gamble and Theresa 12/12/2024 [id number] possible + SMV margin however unclear since normal vein transected.     Diabetes:  Continues long and short acting insulin as managed by Dr. Brandon Vasquez    Oncologic Therapy History:  Neoadjuvant FOLFIRINOX x 12 cycles 6/2024-11/2024    Pending RT consult     Referred to this service for high risk dx and assessed 7/23/24. Phoned today for follow up.  Spoke with pt via phone.  I am compliant with HIPAA regulations.     Pt is s/p follow up with Dr. Gamble yesterday.  MD feels pt with potential for delayed gastric emptying and has ordered a nuclear medicine gastric emptying study.  May may consider an endoscopy thereafter as needed.    PMH noted: GERD, HTN, oropharyngeal dysphagia (from fractured neck x 3), RADHA    Comprehensive metabolic panel              Component  Ref Range & Units 9 d ago  (2/18/25) 1 mo ago  (1/3/25) 2 mo ago  (12/18/24) 2 mo ago  (12/17/24) 2 mo ago  (12/16/24) 2 mo ago  (12/15/24) 2 mo ago  (12/14/24)   Glucose  74 - 99 mg/dL 201 High  245 High  240 High  148 High  57 Low  80 234 High    Sodium  136 - 145 mmol/L 140 139 138 138 141 140 137   Potassium  3.5 - 5.3 mmol/L 4.3 5.2 4.4 3.9 4.0 3.8 4.2   Chloride  98 - 107 mmol/L 103 101 101 100 104 103 101   Bicarbonate  21 - 32 mmol/L 27 26 26 26 29 30 28   Anion Gap  10 - 20 mmol/L 14 17 15 16 12 11 12   Urea Nitrogen  6 - 23 mg/dL 20 19 15 11 10 10 11   Creatinine  0.50 - 1.30 mg/dL 0.62 0.66 0.74 0.72 0.63 0.64 0.68   eGFR  >60 mL/min/1.73m*2 >90 >90 CM >90 CM >90 CM >90 CM >90 CM >90 CM   Comment: Calculations of estimated GFR are  "performed using the 2021 CKD-EPI Study Refit equation without the race variable for the IDMS-Traceable creatinine methods.  https://jasn.asnjournals.org/content/early/2021/09/22/ASN.8712674378   Calcium  8.6 - 10.6 mg/dL 9.5 10.0 9.1 9.4 8.7 8.4 Low  8.4 Low    Albumin  3.4 - 5.0 g/dL 4.0 4.0 3.5 3.8 3.2 Low  3.2 Low  3.2 Low    Alkaline Phosphatase  33 - 120 U/L 67 105  119 90 64 59   Total Protein  6.4 - 8.2 g/dL 6.4 7.2  6.8 5.7 Low  5.3 Low  5.0 Low    AST  9 - 39 U/L 28 41 High   27 39 47 High  56 High    Bilirubin, Total  0.0 - 1.2 mg/dL 0.5 0.6  1.1 0.7 0.6 0.6   ALT  10 - 52 U/L 24 30 CM  39 CM 42 CM 47 CM 57 High  CM   Comment: Patients treated with Sulfasalazine may generate falsely decreased results for ALT.   Resulting Agency [facility]             Specimen Collected: 02/18/25 12:53 Last Resulted: 02/19/25 00:17         Hemoglobin A1C          Component  Ref Range & Units 1 mo ago 8 mo ago 4 yr ago   Hemoglobin A1C  See comment % 5.3 9.1 High  R 5.4 R, CM   Estimated Average Glucose  Not Established mg/dL 105 214 108 R   Resulting Agency [facility]              Narrative  Performed by: [facility]  Diagnosis of Diabetes-Adults  Non-Diabetic: < or = 5.6%  Increased risk for developing diabetes: 5.7-6.4%  Diagnostic of diabetes: > or = 6.5%     Specimen Collected: 12/06/24 11:28 Last Resulted: 12/06/24 14:59     Anthropometrics:  Anthropometrics  Height: 188.1 cm (6' 2.06\")  Weight: 98.2 kg (216 lb 9.6 oz)  BMI (Calculated): 27.77  IBW/kg (Dietitian Calculated): 86.4 kg  Percent of IBW: 113.7 %  Weight Change  Weight History / % Weight Change: Gain of 2.3 kg in ~ 1 week  Significant Weight Loss:  (recent h/o)  Reported a UBW of ~300 #  Stated he had lost a lot of muscle mass.    Lowest wt post surgery was 204# (92.7 kg)   Wt Readings from Last 25 Encounters:   2/26/25 98.2 kg (216#)- pt states he is 210# on his home scale   2/18/25 95.9 kg- Additional loss " for a total of 15.6 kg (14%) since surgery (11 weeks) - significant   1/23/25 97.3 kg- Loss of 14.2 kg (12.7%) in ~7 weeks - significant   12/3/24 111.5 kg- pre surgery wt    10/1/24 111.6 kg- Overall stable wt after small gain    9/19/24 111.9 kg- gain of 2.9 kg in 1 month   8/20/24 109 kg (240 lb)- Overall wt gain trend since significant loss with stable wt over the last month    8/5/24 109 kg - Today pt reports his wt varies 10 # between 235# and 245# on his home scale.   07/23/24 110 kg (242 lb 4.6 oz)   07/22/24 108 kg (237 lb)   07/12/24 108 kg (237 lb 10.5 oz)   07/11/24 107 kg (236 lb)   07/10/24 107 kg (234 lb 12.6 oz)   07/09/24 107 kg (236 lb 15.9 oz)   07/05/24 106 kg (234 lb)   06/27/24 106 kg (233 lb)   06/25/24 106 kg (233 lb 11 oz)   06/24/24 104 kg (229 lb 4.5 oz)   06/11/24 104 kg (229 lb 3.2 oz)   06/03/24 106 kg (232 lb 12.9 oz)   05/31/24 106 kg (234 lb)   05/29/24 99.8 kg (220 lb 0.3 oz)- Loss of 29.2 kg (22.6%) in 8 months- significant   05/28/24 105 kg (231 lb)   05/22/24 102 kg (224 lb)   05/17/24 102 kg (224 lb 13.9 oz)   05/13/24 104 kg (230 lb)   05/10/24 102 kg (225 lb)   04/17/24 117 kg (257 lb)   01/11/24 122 kg (268 lb)     10/05/23 127 kg (281 lb)   10/04/23 129 kg (285 lb)   04/13/23 131 kg (287 lb 14.4 oz)                Food And Nutrient Intake:  Food and Nutrient History  Food and Nutrient History: Reports he continues to eat the same- 2 meals daily and occasional snack.  His BS is normally between 80 and 100 in the a.m. Reports he had a side salad recently and tolerated it.  He has had some fresh fruit recently- apples, oranges, kiwi, and has tolerated.  Energy Intake: Fair 50-75 %      Food Intake  Fluid Intake: 3-4 each 16.9 oz water bottles, OJ in the morning, only occasional milk and no pop                                            Food Supplement Intake  Oral Nutrition Supplements:  (NONE)        Medication and Complementary/Alternative Medicine Use  Prescription  "Medication Use: Taking his PPI twice daily as directed; taking Creon  He takes 3-4 each Creon capsules with his meals and 1 if he snacks.            Nutrition Focused Physical Exam Findings:  Deferred d/t phone follow up                   Physical Findings  Digestive System Findings: Abdominal pain, Early satiety, Nausea, Vomiting  Pt reports his symptoms have not changed in the last week:  Has some mild nausea before emesis, but often when he eats too much, everything comes immediately right back up.    He does not think the Creon is contributing to these symptoms.    States his stomach/bowels continue to grumble and making noise (70%) of the day- whether he has eaten or not.  Reports bowels are normal.     Energy Needs  Calculated Energy Needs Using Equations  Height: 188.1 cm (6' 2.06\")  Estimated Energy Needs  Total Energy Estimated Needs in 24 hours (kCal): 2822 kCal  Energy Estimated Needs per kg Body Weight in 24 hours (kCal/kg): 29 kCal/kg  Method for Estimating Needs: Based on actual BW  Estimated Fluid Needs  Total Fluid Estimated Needs in 24 Hours (mL): 2822 mL  Total Fluid Estimated Needs in 24 hours (mL/kg): 29 mL/kg  Method for Estimating 24 Hour Fluid Needs: Based on actual BW  Estimated Protein Needs  Total Protein Estimated Needs in 24 Hours (g): 122 g  Protein Estimated Needs per kg Body Weight in 24 Hours (g/kg): 1.25 g/kg  Method for Estimating 24 Hour Protein Needs: Based on actual BW        Nutrition Diagnosis   Malnutrition Diagnosis  Patient has Malnutrition Diagnosis: Yes  Diagnosis Status: Active  Malnutrition Diagnosis: Severe malnutrition related to acute disease or injury  Related to: significant surgical intervention  As Evidenced by: h/o intake at < or = to 50% of  estimated energy requirement for  > 5 days with significant wt loss as documented    Nutrition Diagnosis  Patient has Nutrition Diagnosis: Yes  Diagnosis Status (1): Active  Nutrition Diagnosis 1: Altered GI " function  Related to (1): pathophysiology of ca ds now s/p chemo with side effects and s/p surgery  As Evidenced by (1): pt continued report of GI symptoms and need for PERT d/t EPI  Additional Assessment Information (1): Still feel pt may be experiencing delayed gastric emptying. Unclear etiology post surgery.  Note he is on highest dose of PPI twice daily and still with complaints.  He is not overeating- cannot eat normal portions for post whipple pt.  Agree with nuclear medicine gastric emptying study.    Nutrition Interventions/Recommendations   Nutrition Prescription  Individualized Nutrition Prescription Provided for : Limited concentrated sweets MAURA    Food and Nutrition Delivery  Food and Nutrition Delivery  Goals: Can liberalize fiber as tolerated until regular diet acheived  Medical Food Supplement: Commercial beverage medical food supplement therapy  Goals: Encouraged if desired  Goals: DMV  Other:: PERT  Goals: Consistent use with all meals and snacks.    Nutrition Education       Coordination of Care  Coordination of Nutrition Care by a Nutrition Professional  Collaboration and referral of nutrition care: Collaboration by nutrition professional with other nutrition professionals  Goals: Continuity of nutrition care during RT at [facility]    There are no Patient Instructions on file for this visit.    Nutrition Monitoring and Evaluation   Food and Nutrient Intake  Monitoring and Evaluation Plan: Amount of food, Meal/snack pattern, Fluid intake  Fluid Intake: Estimated fluid intake  Criteria: Continue to meet daily needs  Amount of Food: Estimated amout of food  Criteria: Meet energy and protein needs  Meal/Snack Pattern: Estimated meal and snack pattern  Criteria: Attempt to add 2 snacks to daily regimen of 2 meals  Anthropometric measurements  Monitoring and Evaluation Plan: Weight  Body Weight: Body weight  Criteria: maintanence  Biochemical Data, Medical Tests and Procedures  Monitoring and Evaluation  Plan: Electrolyte/renal panel, Glucose/endocrine profile  Criteria: WNL  Glucose/Endocrine Profile: Glucose, casual  Criteria: < 180 two hours postprandial  Nutrition Focused Physical Findings  Monitoring and Evaluation Plan: Adipose, Digestive System, Muscle  Adipose Finding: Loss of subcutaneous fat  Criteria: No further losses  Digestive System Finding: Abdominal pain, Early satiety, Nausea, Vomiting  Criteria: follow up on outcome of gastric emptying study  Muscle Finding: Muscle atrophy  Criteria: No further loss          Time Spent  Prep time on day of patient encounter: 5 minutes  Time spent directly with patient, family or caregiver: 15 minutes  Additional Time Spent on Patient Care Activities: 0 minutes  Documentation Time: 10 minutes  Other Time Spent: 0 minutes  Total: 30 minutes

## 2025-02-28 ENCOUNTER — TELEPHONE (OUTPATIENT)
Dept: RADIATION ONCOLOGY | Facility: HOSPITAL | Age: 55
End: 2025-02-28
Payer: COMMERCIAL

## 2025-03-03 ENCOUNTER — NUTRITION (OUTPATIENT)
Dept: HEMATOLOGY/ONCOLOGY | Facility: HOSPITAL | Age: 55
End: 2025-03-03

## 2025-03-03 ENCOUNTER — HOSPITAL ENCOUNTER (OUTPATIENT)
Dept: RADIATION ONCOLOGY | Facility: HOSPITAL | Age: 55
Setting detail: RADIATION/ONCOLOGY SERIES
Discharge: HOME | End: 2025-03-03
Payer: COMMERCIAL

## 2025-03-03 ENCOUNTER — SOCIAL WORK (OUTPATIENT)
Dept: CASE MANAGEMENT | Facility: HOSPITAL | Age: 55
End: 2025-03-03

## 2025-03-03 ENCOUNTER — DOCUMENTATION (OUTPATIENT)
Dept: RADIATION ONCOLOGY | Facility: HOSPITAL | Age: 55
End: 2025-03-03

## 2025-03-03 VITALS
SYSTOLIC BLOOD PRESSURE: 121 MMHG | BODY MASS INDEX: 26.94 KG/M2 | WEIGHT: 210.1 LBS | TEMPERATURE: 97.3 F | RESPIRATION RATE: 18 BRPM | OXYGEN SATURATION: 99 % | DIASTOLIC BLOOD PRESSURE: 81 MMHG | HEART RATE: 72 BPM

## 2025-03-03 DIAGNOSIS — C25.0 MALIGNANT NEOPLASM OF HEAD OF PANCREAS (MULTI): ICD-10-CM

## 2025-03-03 PROCEDURE — G2211 COMPLEX E/M VISIT ADD ON: HCPCS | Performed by: RADIOLOGY

## 2025-03-03 PROCEDURE — 99215 OFFICE O/P EST HI 40 MIN: CPT | Performed by: RADIOLOGY

## 2025-03-03 PROCEDURE — 99205 OFFICE O/P NEW HI 60 MIN: CPT | Performed by: RADIOLOGY

## 2025-03-03 ASSESSMENT — ENCOUNTER SYMPTOMS
OCCASIONAL FEELINGS OF UNSTEADINESS: 0
ABDOMINAL PAIN: 1
NAUSEA: 1
DECREASED CONCENTRATION: 0
FATIGUE: 1
BACK PAIN: 1
UNEXPECTED WEIGHT CHANGE: 1
NECK STIFFNESS: 0
ABDOMINAL DISTENTION: 0
MYALGIAS: 0
SLEEP DISTURBANCE: 0
TROUBLE SWALLOWING: 0
SPEECH DIFFICULTY: 0
FLANK PAIN: 0
LIGHT-HEADEDNESS: 0
EYE PROBLEMS: 1
APPETITE CHANGE: 1
VOMITING: 1
CONFUSION: 1
EXTREMITY WEAKNESS: 0
SEIZURES: 0
HEADACHES: 1
DEPRESSION: 0
FEVER: 0
CHILLS: 0
RECTAL PAIN: 0
NERVOUS/ANXIOUS: 0
DIARRHEA: 0
NECK PAIN: 0
DIZZINESS: 0
CONSTIPATION: 0
BRUISES/BLEEDS EASILY: 0
SORE THROAT: 0
RESPIRATORY NEGATIVE: 1
ARTHRALGIAS: 0
NUMBNESS: 1
CARDIOVASCULAR NEGATIVE: 1
DIAPHORESIS: 0
LOSS OF SENSATION IN FEET: 1
VOICE CHANGE: 1
BLOOD IN STOOL: 1

## 2025-03-03 ASSESSMENT — PAIN SCALES - GENERAL
PAINLEVEL_OUTOF10: 4
PAINLEVEL_OUTOF10: 4

## 2025-03-03 NOTE — PROGRESS NOTES
NUTRITION Follow Up NOTE    Nutrition Assessment     Reason for Visit:  Nate Alvarez is a 54 y.o. male who presented with Borderline Resectable Pancreatic cancer dx'd 5/2024     Oncologic Surgery History:  Total pancreatectomy, splenectomy, distal gastrectomy with gastrojejunostomy, choledochojejunostomy, cholecystectomy,  SNV resection/reconstruction with Hiedi Gamble and Theresa 12/12/2024 [id number] possible + SMV margin however unclear since normal vein transected.     Diabetes:  Continues long and short acting insulin as managed by Dr. Brandon Vasquez    Oncologic Therapy History:  Neoadjuvant FOLFIRINOX x 12 cycles 6/2024-11/2024    Pt with RT consult today and I am meeting him in clinic- his spouse is present     Pt is s/p follow up with Dr. Gamble   MD feels pt with potential for delayed gastric emptying and has ordered a nuclear medicine gastric emptying study- this will take place 3-.   May may consider an endoscopy thereafter as needed.    PMH noted: GERD, HTN, oropharyngeal dysphagia (from fractured neck x 3), RADHA      Lab Results   Component Value Date/Time    GLUCOSE 201 (H) 02/18/2025 1253     02/18/2025 1253    K 4.3 02/18/2025 1253     02/18/2025 1253    CO2 27 02/18/2025 1253    ANIONGAP 14 02/18/2025 1253    BUN 20 02/18/2025 1253    CREATININE 0.62 02/18/2025 1253    EGFR >90 02/18/2025 1253    CALCIUM 9.5 02/18/2025 1253    ALBUMIN 4.0 02/18/2025 1253    ALKPHOS 67 02/18/2025 1253    PROT 6.4 02/18/2025 1253    AST 28 02/18/2025 1253    BILITOT 0.5 02/18/2025 1253    ALT 24 02/18/2025 1253    MG 2.21 12/18/2024 0602    PHOS 2.6 12/18/2024 0602     Lab Results   Component Value Date/Time    VITD25 13 (L) 04/16/2024 0740       Lab Results   Component Value Date    HGBA1C 5.3 12/06/2024            Anthropometrics:     HT: 188.1 cm  BMI: 26.94  IBW: 86.4 kg  110% IBW      Wt Readings from Last 10 Encounters:   03/03/25 95.3 kg (210 lb 1.6 oz)   02/27/25 98.2 kg (216 lb  9.6 oz)   02/26/25 98.2 kg (216 lb 9.6 oz)   02/18/25 95.8 kg (211 lb 5 oz)   02/18/25 95.8 kg (211 lb 5 oz)   01/29/25 98.2 kg (216 lb 9.6 oz)   01/23/25 97.3 kg (214 lb 8 oz)   01/23/25 97.3 kg (214 lb 8 oz)   01/03/25 98.8 kg (217 lb 12.8 oz)   12/20/24 101 kg (221 lb 12.8 oz)     Reported a UBW of ~300 #  Stated he had lost a lot of muscle mass.    Lowest wt post surgery was 204# (92.7 kg)   Wt Readings from Last 25 Encounters:   2/26/25 98.2 kg (216#)- pt states he is 210# on his home scale   2/18/25 95.9 kg- Additional loss for a total of 15.6 kg (14%) since surgery (11 weeks) - significant   1/23/25 97.3 kg- Loss of 14.2 kg (12.7%) in ~7 weeks - significant   12/3/24 111.5 kg- pre surgery wt    10/1/24 111.6 kg- Overall stable wt after small gain    9/19/24 111.9 kg- gain of 2.9 kg in 1 month   8/20/24 109 kg (240 lb)- Overall wt gain trend since significant loss with stable wt over the last month    8/5/24 109 kg - Today pt reports his wt varies 10 # between 235# and 245# on his home scale.   07/23/24 110 kg (242 lb 4.6 oz)   07/22/24 108 kg (237 lb)   07/12/24 108 kg (237 lb 10.5 oz)   07/11/24 107 kg (236 lb)   07/10/24 107 kg (234 lb 12.6 oz)   07/09/24 107 kg (236 lb 15.9 oz)   07/05/24 106 kg (234 lb)   06/27/24 106 kg (233 lb)   06/25/24 106 kg (233 lb 11 oz)   06/24/24 104 kg (229 lb 4.5 oz)   06/11/24 104 kg (229 lb 3.2 oz)   06/03/24 106 kg (232 lb 12.9 oz)   05/31/24 106 kg (234 lb)   05/29/24 99.8 kg (220 lb 0.3 oz)- Loss of 29.2 kg (22.6%) in 8 months- significant   05/28/24 105 kg (231 lb)   05/22/24 102 kg (224 lb)   05/17/24 102 kg (224 lb 13.9 oz)   05/13/24 104 kg (230 lb)   05/10/24 102 kg (225 lb)   04/17/24 117 kg (257 lb)   01/11/24 122 kg (268 lb)     10/05/23 127 kg (281 lb)   10/04/23 129 kg (285 lb)   04/13/23 131 kg (287 lb 14.4 oz)         Meds- ALA, protonix, Lantus 30 units, lispro 6 units + SSI, creon 36,000 lipasse units, comapzine, zofran   Has a rand 3     Food And Nutrient  Intake:     Pt reports the last 4 days have been hard  He is reporting his pain at a 4/10 per RN but pt appears uncomfortable- unsure if this pain related or nausea rleated or both    We discussed his B sean under better control and he reported this is most likely due to his poor po intake     He reports he has Ensure at home    He is taking his Zofran  He is restarting his protonix  He told the RN that he is eating 1-2 times pe day and has not vomitd in the past 5 days    He did report liquids are better tolerated than solids  Asked pt to focus on liquids- feel this would be the most beneficial until tst on 3-  We discussed  -- Clear liquid supplement  -- Prostat  --- Diabetic ONS  Samples provded                                                              He takes 3-4 each Creon capsules with his meals and 1 if he snacks.            Nutrition Focused Physical Exam Findings:  Deferred time and comfort level                           Energy Needs     Dosing weight: 86.4 to 95.3 kg  Calories per day: 2590 to 2860  determined by 30 kcal/kg  Protein (g) per day: 100 to 115 determined by 1.2 g/kg  Estimated fluid needs: 2590 +  determined by 1 kcal/mL       Nutrition Diagnosis   Malnutrition Diagnosis  Patient has Malnutrition Diagnosis: Yes  Diagnosis Status: Active  Malnutrition Diagnosis: Severe malnutrition related to acute disease or injury  Related to: diagnosis, surgery and current symptoms impactin po intake  As Evidenced by: h/o intake at < or = to 50% of  estimated energy requirement for an extendd period of time with ongoing weight loss         Nutrition Interventions/Recommendations   Nutrition Prescription       Food and Nutrition Delivery  Food and Nutrition Delivery  Medical Food Supplement: Commercial beverage medical food supplement therapy  Goals: prostat- Ensue Clear- Glucerna 1.5, Glucerna  Other:: PERT  Goals: Consistent use with all meals and snacks.as indicated    Nutrition Education        Coordination of Care   Rad onc and RDN at [facility]     There are no Patient Instructions on file for this visit.    Nutrition Monitoring and Evaluation   Food and Nutrient Intake  Monitoring and Evaluation Plan: Amount of food, Meal/snack pattern, Fluid intake  Fluid Intake: Estimated fluid intake  Criteria: improvement  Amount of Food: Estimated amout of food  Criteria: improvement  Anthropometric measurements  Body Weight: Body weight  Criteria: maintanence  Biochemical Data, Medical Tests and Procedures  Monitoring and Evaluation Plan: Electrolyte/renal panel, Glucose/endocrine profile  Criteria: WNL  Glucose/Endocrine Profile: Glucose, casual  Criteria: < 180 two hours postprandial

## 2025-03-03 NOTE — PROGRESS NOTES
Radiation Oncology Nursing Note    Prior Radiotherapy:  No  No radiation treatments to show. (Treatments may have been administered in another system.)     Current Systemic Treatment:  No     Presence of Pacemaker or ICD:  No    History of Autoimmune or Connective Tissue Disorders:  Yes, describe: diabetes    Pain: The patient's current pain level was assessed.  They report currently having a pain of 4 out of 10.  They feel their pain is under control with the use of pain medications.  Back and abdominal pain taking 10 mg oxycodone 4 times a day.   Usually gets to 6 before taking oxycodone.     Review of Systems:  Review of Systems   Constitutional:  Positive for appetite change (poor appetite, eats 1-2 times per day.), fatigue and unexpected weight change (100 # loss over 14 months.). Negative for chills, diaphoresis and fever.   HENT:   Positive for tinnitus and voice change (softer and raspy). Negative for hearing loss, mouth sores, nosebleeds, sore throat and trouble swallowing.    Eyes:  Positive for eye problems (wears corrective lens.).   Respiratory: Negative.     Cardiovascular: Negative.    Gastrointestinal:  Positive for abdominal pain, blood in stool (occ black in stool), nausea and vomiting (5 days since vomiting.). Negative for abdominal distention, constipation, diarrhea and rectal pain.   Genitourinary:          Occasional bladder pain and slower to  start and throughout urination.   Musculoskeletal:  Positive for back pain (between shoulder blades and down into his hips.  achy and sharp and stabbing;). Negative for arthralgias, flank pain, gait problem, myalgias, neck pain and neck stiffness.   Skin: Negative.    Neurological:  Positive for headaches (low grade headaches last for about a day.) and numbness (hands and feet.). Negative for dizziness, extremity weakness, gait problem, light-headedness, seizures and speech difficulty.   Hematological:  Does not bruise/bleed easily.    Psychiatric/Behavioral:  Positive for confusion. Negative for decreased concentration, depression, sleep disturbance and suicidal ideas. The patient is not nervous/anxious.         Occasionally foggy brain; decreased memory after chemo.

## 2025-03-03 NOTE — PROGRESS NOTES
Lodging Referral    Referral Source: Janet Gonzáles RN  Reason for Lodging: Distance  Dates Needed: 3/31-5/17 Tentatively  Ambulation: Independently  Caregiver: Janice Alvarez, Spouse  Special Accommodations: NA  Lodging Location: [facility]  Referral submitted via fax to [facility] ([phone])    Backup Lodging needed: No  Referral submitted No      Pt agreeable to plan. SW will continue to follow as needed.      Sofie Vergara, MSW, LSW

## 2025-03-03 NOTE — PROGRESS NOTES
Staff Physician: Mary Kay Espinoza MD  Referring Physician: Shaw You MD  Date of Service: 3/3/2025    RADIATION ONCOLOGY CONSULT NOTE    IDENTIFYING DATA:   Cancer Staging   Malignant neoplasm of head of pancreas (Multi)  Staging form: Exocrine Pancreas, AJCC 8th Edition  - Pathologic stage from 12/12/2024: Stage IIB (ypT2, pN1, cM0) - Signed by Mary Kay Espinoza MD on 3/3/2025    Problem List Items Addressed This Visit       Malignant neoplasm of head of pancreas (Multi)    Relevant Orders    Referral to Radiation Oncology    Rad Onc Intent to Treat       Mr. Nate Alvarez is a 54-year-old man with [id number] pancreas adencarcinoma, status post neoadjuvant mFOLFIRINOX x12, then total pancreatectomy to +SMV margin and 1/19 LN+. He is a good candidate for adjuvant chemoradiation.    HISTORY OF PRESENT ILLNESS:  Mr. Nate Alvarez states that he was first diagnosed with pancreas cancer in May, 2024, after presenting with worsening glycemic control and ED visits for fatigue, weakness, and 70lb weight loss. At that time, CA 19-9 was checked and was >700, CEA 4.9. CT-AP 05/15/2024 showed a pancreas tail lesion, hypodense, with duct dilation upstream and surrounding edema. MRCP 05/16/2024 and CT-chest 05/18/2024 redemonstrated the tail mass concerning for malignancy and showed no distant disease, respectively. MRI also showed hepatic steatosis with segment 4A lesion measuring 0.7 cm with imaging characteristics favoring hemangioma. EUS 05/17/2024 ([facility]) showed invasive adenocarcinoma, G2, pMMR. Ex lap 6/11/2024 was without carcinomatosis. He initiated mFOLFIRINOX [id number] from 6/2024-11/2024 ([facility]). He then underwent total pancreatectomy on 12/12/2024 to [id number] disease with a +SMV margin, 1/19 LN+, inferior vascular margin with involvement of large blood vessel wall by carcinoma. It is unclear if this represents true +SM since the vein was transected, but remains concerning and still represents large vessel  invasion. Postop he is recovering well. Most recent CA 19-9 is 52.9, which has been mostly stable postoperatively (trend below). He notes ongoing midback pain, present since just ahead of surgery. CT-CAP 01/07/2025 was without distant or recurrent disease, with proximal PV thrombus present. He is here with his wife.          PAST MEDICAL HISTORY:  Past Medical History:   Diagnosis Date    Anxiety     Arthritis     BCC (basal cell carcinoma), eyelid, right     s/p resection    Diabetes mellitus (Multi)     Gastroesophageal reflux disease without esophagitis 10/05/2023    Hemorrhoids 11/03/2023    HTN (hypertension)     Borderline HTN, no meds    Leg cramps 10/05/2023    Oropharyngeal dysphagia 10/05/2023    RADHA (obstructive sleep apnea)     no PAP    Pancreatic cancer (Multi)     Dx 4/2024, Treated with chemotherapy completed 11/12/24    Personal history of other malignant neoplasm of skin     Type 2 diabetes mellitus     Vision loss     uses corrective lens     PAST SURGICAL HISTORY:  Past Surgical History:   Procedure Laterality Date    CHOLECYSTECTOMY  12/12/2024    Total pancreatectomy, splenectomy, distal gastrectomy with gastrojejunostomy, choledochojejunostomy, cholecystectomy with Dr. Roddy Gamble    CHOLEDOCHOJEJUNOSTOMY  12/12/2024    Total pancreatectomy, splenectomy, distal gastrectomy with gastrojejunostomy, choledochojejunostomy, cholecystectomy with Dr. Roddy Gamble    COLONOSCOPY      ESOPHAGOGASTRODUODENOSCOPY      EYELID CARCINOMA EXCISION      GASTROJEJUNOSTOMY  12/12/2024    Total pancreatectomy, splenectomy, distal gastrectomy with gastrojejunostomy, choledochojejunostomy, cholecystectomy with Dr. Roddy aGmble    KNEE ARTHROSCOPY W/ DEBRIDEMENT Left     PANCREATECTOMY  12/12/2024    Total pancreatectomy, splenectomy, distal gastrectomy with gastrojejunostomy, choledochojejunostomy, cholecystectomy with Dr. Roddy Gamble    PARTIAL GASTRECTOMY  12/12/2024    Total pancreatectomy,  "splenectomy, distal gastrectomy with gastrojejunostomy, choledochojejunostomy, cholecystectomy with Dr. Roddy Gamble    ROTATOR CUFF REPAIR Bilateral     SPLENECTOMY, TOTAL  12/12/2024    Total pancreatectomy, splenectomy, distal gastrectomy with gastrojejunostomy, choledochojejunostomy, cholecystectomy with Dr. Roddy Gamble    TESTICLE SURGERY      Hydrocelectomy    TONSILLECTOMY      VASECTOMY       ALLERGIES:  Allergies   Allergen Reactions    Farxiga [Dapagliflozin] Diarrhea    Glipizide GI Upset    Januvia [Sitagliptin] Diarrhea    Metformin Nausea/vomiting    Mounjaro [Tirzepatide] Nausea/vomiting    Tramadol Nausea/vomiting     MEDICATIONS:    Current Outpatient Medications:     alpha lipoic acid 600 mg capsule, Take 600 mg by mouth once daily., Disp: 30 capsule, Rfl: 11    apixaban (Eliquis) 5 mg tablet, Take 1 tablet (5 mg) by mouth 2 times a day. Do not fill before February 7, 2025., Disp: 60 tablet, Rfl: 11    BD Insulin Syringe Ultra-Fine 0.3 mL 31 gauge x 5/16\" syringe, Inject 1 each under the skin 4 times a day before meals., Disp: 200 each, Rfl: 11    blood sugar diagnostic (Blood Glucose Test) strip, Check blood sugars once daily, Disp: 100 strip, Rfl: 3    blood-glucose meter misc, Check  blood sugar as needed, Disp: 1 each, Rfl: 0    blood-glucose sensor (FreeStyle Jef 3 Plus Sensor) device, Use as directed, Disp: 2 each, Rfl: 11    FreeStyle Jef 3 [place] misc, Use with sensors as directed, Disp: 1 each, Rfl: 0    insulin glargine (Lantus) 100 unit/mL (3 mL) pen, Inject 30 Units under the skin once daily at bedtime. Take as directed per insulin instructions., Disp: 10 each, Rfl: 5    insulin glargine (Lantus) 100 unit/mL injection, Inject 30 Units under the skin once every 24 hours. Take as directed per insulin instructions., Disp: , Rfl:     insulin lispro 100 unit/mL injection, 6 units subcutaneous 3x day before meals + sliding scale 0 unit(s) if Blood glucose is between   2 " "unit(s) if Blood glucose is between 151-200  4 unit(s) if Blood glucose is between 201-250  6 unit(s) if Blood glucose is between 251-300  8 unit(s) if Blood glucose is between 301-350  10 unit(s) if Blood glucose is between 351-400, Disp: , Rfl:     lancets misc, Check blood sugars once daily, Disp: 100 each, Rfl: 3    ondansetron (Zofran) 8 mg tablet, Take 1 tablet (8 mg) by mouth every 8 hours if needed for nausea or vomiting., Disp: 30 tablet, Rfl: 5    oxyCODONE (Roxicodone) 10 mg immediate release tablet, Take 1 tablet (10 mg) by mouth every 6 hours if needed for severe pain (7 - 10)., Disp: 120 tablet, Rfl: 0    pancrelipase, Lip-Prot-Amyl, (Creon) 36,000-114,000- 180,000 unit capsule,delayed release(DR/EC) capsule, Take 2-3 capsules by mouth with meals and 1-2 capsules with snacks daily; for an average of 13 capsules per day., Disp: 400 capsule, Rfl: 3    acetaminophen (Tylenol) 325 mg tablet, Take 2 tablets (650 mg) by mouth every 6 hours. (Patient not taking: Reported on 3/3/2025), Disp: , Rfl:     docusate sodium (Colace) 100 mg capsule, Take 1 capsule (100 mg) by mouth 2 times a day. (Patient not taking: Reported on 3/3/2025), Disp: , Rfl:     LORazepam (Ativan) 1 mg tablet, Take 1 tablet (1 mg) by mouth as needed at bedtime for anxiety. Insomnia, or nausea (Patient not taking: Reported on 3/3/2025), Disp: 30 tablet, Rfl: 2    naloxone (Narcan) 4 mg/0.1 mL nasal spray, Administer 1 spray (4 mg) into affected nostril(s) if needed for opioid reversal. May repeat every 2-3 minutes if needed, alternating nostrils, until medical assistance becomes available. (Patient not taking: Reported on 3/3/2025), Disp: 2 each, Rfl: 0    pantoprazole (ProtoNix) 40 mg EC tablet, Take 1 tablet (40 mg) by mouth 2 times a day. Do not crush, chew, or split. (Patient not taking: Reported on 3/3/2025), Disp: 60 tablet, Rfl: 5    pen needle, diabetic (BD Ultra-Fine Short Pen Needle) 31 gauge x 5/16\" needle, Use four times daily " with insulin, Disp: 400 each, Rfl: 1    polyethylene glycol (Glycolax, Miralax) 17 gram packet, Take 17 g by mouth once daily., Disp: , Rfl:     prochlorperazine (Compazine) 10 mg tablet, Take 1 tablet (10 mg) by mouth every 6 hours if needed for nausea or vomiting. (Patient not taking: Reported on 3/3/2025), Disp: 30 tablet, Rfl: 5  No current facility-administered medications for this encounter.    Facility-Administered Medications Ordered in Other Encounters:     heparin flush 100 unit/mL syringe 500 Units, 500 Units, intra-catheter, PRN, Adrienne Malloy, APRN-CNP, 500 Units at 08/05/24 1344   SOCIAL HISTORY:  Social History     Tobacco Use    Smoking status: Never     Passive exposure: Past    Smokeless tobacco: Former     Types: Chew    Tobacco comments:     [place] 30 years ago   Substance Use Topics    Alcohol use: Not Currently     Alcohol/week: 1.0 standard drink of alcohol     Types: 1 Standard drinks or equivalent per week     Comment: very occasional     FAMILY HISTORY:  Family History   Problem Relation Name Age of Onset    Diabetes Mother      Ovarian cancer Mother      Liver cancer Father      Lung cancer Father      Colon cancer Father      Hypertension Father      Stroke Maternal Grandmother      Diabetes Maternal Grandmother      Liver cancer Maternal Grandfather      Lung cancer Paternal Grandfather      Melanoma Paternal Grandfather         REVIEW OF SYSTEMS:  Except for the symptoms described above, the review of systems is negative. Specifically, except as noted in the HPI, when asked the patient expressed no complaints relative to constitutional (fever, weight loss), eyes, ears, nose, mouth, throat, neurologic, cardiovascular, pulmonary, breast, GI, , skin, musculoskeletal, endocrine, hematologic/lymphatic, or immunologic systems.    RADIATION SCREENING QUESTIONS:  Prior radiation therapy: No  Pacemaker: No  Other implantable devices: No  Connective tissue disease: No    PERFORMANCE  STATUS:  Karnofsky Performance Score/ECO, Able to carry on normal activity; minor signs or symptoms of disease (ECOG equivalent 0)    PHYSICAL EXAMINATION:  /81   Pulse 72   Temp 36.3 °C (97.3 °F) (Temporal)   Resp 18   Wt 95.3 kg (210 lb 1.6 oz)   SpO2 99%   BMI 26.94 kg/m²   Pain score: 4/10  General: no acute distress, engaged in conversation. No jaundice.  HEENT: Normocephalic, atraumatic. Extraocular movements are intact.   Neck: supple with trachea at midline, no palpable adenopathy.   Pulmonary: Breathing comfortably on room air, no respiratory distress  Cardiovascular: Regular rate, no cyanosis, well-perfused  Abdomen: Soft, nontender, nondistended.   Extremities: No lower extremity edema or cyanosis.   Musculoskeletal: Normal range of motion. Able to raise both arms above head without issues  Skin: Without rash or obvious lesions.   Neurologic: Alert and oriented x3. Cranial nerves grossly intact.     LABORATORY AND IMAGING DATA:  Imaging: All imaging was personally reviewed and interpreted in clinic. Findings as per HPI and EMR.    Laboratory/Pathology:  All pertinent labs and pathology were personally reviewed and interpreted in clinic. Findings as per HPI and EMR.    IMPRESSION:  Mr. Alvarez has [id number] pancreas adencarcinoma, status post neoadjuvant mFOLFIRINOX x12, then total pancreatectomy to +SMV margin and  LN+. He is a good candidate for adjuvant chemoradiation.    PLAN:  For Nate Alvarez, I would recommend a course of adjuvant chemoradiation therapy as part of curative intent treatment. I discussed with the patient that the workup to date was diagnostic of a locally advanced pancreatic cancer.  I explained that after surgery for pancreatic cancer, chemotherapy is almost always recommended, but that the use of adjuvant radiation is somewhat controversial. I explained that several  and older American trials suggest it has no benefit, while analyses from [place] and  [place] suggest it does, particularly in the setting of patients who have positive surgical margins and/or involved lymph nodes. We discussed that a national prospective randomized trial was recently completed to investigate this and showed a DFS benefit to all comers and an overall survival benefit in node negative patients. Given his pathology features of +SMV and minimal (1/19) natalie involvement, I said I would recommend radiation therapy to reduce the chance of a local recurrence. We then reviewed the logistics of CT simulation and delivery of once-daily treatments over six weeks.  I said I would recommend fasting for 2 hours before radiation treatments and taking an anti-emetic immediately prior to each treatment.  We reviewed acute and chronic toxicities associated with radiation therapy which could include but would not be limited to: fatigue, loss of appetite, nausea, vomiting, dehydration, diarrhea, bowel ulceration, bowel obstruction, and chronic pain.    After our discussion and all questions were answered, he decided he would like to proceed with treatment. We will coordinate with medical oncology for the timing of his radiation treatment planning simulation, with treatment to follow thereafter. He was encouraged to contact me any time with any questions or concerns that arise in the interim.      PAIN PLAN: The patient reports their pain is well-controlled on their current regimen.  Their pain regimen is currently managed by Medical Oncology.  Mid back pain is stable    Thank you for the opportunity to participate in the care of this pleasant man.    Mary Kay Espinoza MD  3/3/2025  , Radiation Oncology

## 2025-03-06 ENCOUNTER — APPOINTMENT (OUTPATIENT)
Dept: RADIATION ONCOLOGY | Facility: HOSPITAL | Age: 55
End: 2025-03-06
Payer: COMMERCIAL

## 2025-03-06 ENCOUNTER — HOSPITAL ENCOUNTER (OUTPATIENT)
Dept: RADIATION ONCOLOGY | Facility: HOSPITAL | Age: 55
Setting detail: RADIATION/ONCOLOGY SERIES
Discharge: HOME | End: 2025-03-06
Payer: COMMERCIAL

## 2025-03-06 ENCOUNTER — HOSPITAL ENCOUNTER (OUTPATIENT)
Dept: RADIOLOGY | Facility: EXTERNAL LOCATION | Age: 55
Discharge: HOME | End: 2025-03-06

## 2025-03-06 DIAGNOSIS — C25.0 MALIGNANT NEOPLASM OF HEAD OF PANCREAS (MULTI): Primary | ICD-10-CM

## 2025-03-06 DIAGNOSIS — C25.0 MALIGNANT NEOPLASM OF HEAD OF PANCREAS (MULTI): ICD-10-CM

## 2025-03-06 PROCEDURE — 77263 THER RADIOLOGY TX PLNG CPLX: CPT | Performed by: RADIOLOGY

## 2025-03-06 PROCEDURE — 77470 SPECIAL RADIATION TREATMENT: CPT | Performed by: RADIOLOGY

## 2025-03-06 RX ORDER — HEPARIN 100 UNIT/ML
SYRINGE INTRAVENOUS
Status: DISCONTINUED
Start: 2025-03-06 | End: 2025-03-07 | Stop reason: HOSPADM

## 2025-03-06 NOTE — PROGRESS NOTES
Procedure for CT Simulation with IV Contrast    Safety Checks  Patient identified using 2 identifiers  Allergies reviewed:   Contrast allergy:     Physician order for IV contrast verified in [facility]:   Consent verified:   Fall risk:      Creatinine and GFR within normal limits:   Lab Results   Component Value Date    CREATININE 0.62 02/18/2025         IV Contrast screening form completed and reviewed with patient.  Patient verbalized understanding of CT Sim/IV Contrast process and signed the screening form.  Written education material provided.  Emphasized importance to increase water, 8oz/hour, for the remainder of the day to help flush the kidneys.  Patient verbalized understanding of all education/instructions.  Denied further questions, at this time.    IV Start Time:   Angiocath location:   Angiocath size:   Positive blood return noted    Time contrast administered:   Contrast reaction:   Mental status: Alert and oriented    IV Contrast Name:   Lot:     Patient Disposition  IV site free of signs of infiltration or phlebitis  Patient denies pain at injection site  IV flushed with 10mL 0.9% Normal Saline  Removal Time:   Patient safely discharged from [facility] Radiation Oncology Department    Carried out orders of  , under  supervision    Chico Henry RN

## 2025-03-07 ENCOUNTER — NUTRITION (OUTPATIENT)
Dept: HEMATOLOGY/ONCOLOGY | Facility: HOSPITAL | Age: 55
End: 2025-03-07
Payer: COMMERCIAL

## 2025-03-07 DIAGNOSIS — C25.0 MALIGNANT NEOPLASM OF HEAD OF PANCREAS (MULTI): ICD-10-CM

## 2025-03-07 RX ORDER — PROCHLORPERAZINE MALEATE 10 MG
10 TABLET ORAL EVERY 6 HOURS PRN
Qty: 30 TABLET | Refills: 5 | Status: SHIPPED | OUTPATIENT
Start: 2025-03-07

## 2025-03-07 NOTE — PROGRESS NOTES
NUTRITION COMMUNICATION NOTE    Nate Alvarez     REASON FOR COMMUNICATION:     Pt was seen yesterday in RT for his sim.  He is looking much more comfortable  He reports currently without nausea  He is taking compazine and continues with pantoprazole  He has not vomited in 8 days  He is eating better  Weight is stable  Has not used any of the supplements provided  Eating a regular diet currently    Will follow results of gastric emptying study on 3- and follow through RT once it commences.  Pt and spouse are aware

## 2025-03-12 ENCOUNTER — HOSPITAL ENCOUNTER (OUTPATIENT)
Dept: RADIOLOGY | Facility: HOSPITAL | Age: 55
Discharge: HOME | End: 2025-03-12
Payer: COMMERCIAL

## 2025-03-12 ENCOUNTER — DOCUMENTATION (OUTPATIENT)
Dept: SURGICAL ONCOLOGY | Facility: CLINIC | Age: 55
End: 2025-03-12
Payer: COMMERCIAL

## 2025-03-12 DIAGNOSIS — Z90.81 POST-SPLENECTOMY: ICD-10-CM

## 2025-03-12 DIAGNOSIS — R11.2 NAUSEA AND VOMITING, UNSPECIFIED VOMITING TYPE: ICD-10-CM

## 2025-03-12 DIAGNOSIS — C25.0 MALIGNANT NEOPLASM OF HEAD OF PANCREAS (MULTI): ICD-10-CM

## 2025-03-12 PROCEDURE — A9541 TC99M SULFUR COLLOID: HCPCS | Performed by: SURGERY

## 2025-03-12 PROCEDURE — 3430000001 HC RX 343 DIAGNOSTIC RADIOPHARMACEUTICALS: Performed by: SURGERY

## 2025-03-12 PROCEDURE — 78264 GASTRIC EMPTYING IMG STUDY: CPT

## 2025-03-12 RX ORDER — TECHNETIUM TC 99M SULFUR COLLOID 2 MG
1 KIT MISCELLANEOUS
Status: COMPLETED | OUTPATIENT
Start: 2025-03-12 | End: 2025-03-12

## 2025-03-12 RX ADMIN — TECHNETIUM TC 99M SULFUR COLLOID 1 MILLICURIE: KIT at 08:39

## 2025-03-12 NOTE — PROGRESS NOTES
Mr Antonio had his nuc med gastric emptying study today    IMPRESSION:  Rapid gastric emptying of solids, which can be a source of  gastrointestinal symptoms. No evidence of gastroparesis.    I called and spoke to him.  He is doing much better since his visit with me.  No vomiting.  He has made some adjustments to his eating and clearly is better.  Thus, no intervention.    He has seen Dr Espinoza in RO and plans being made for XRT

## 2025-03-13 ENCOUNTER — HOSPITAL ENCOUNTER (OUTPATIENT)
Dept: RADIATION ONCOLOGY | Facility: HOSPITAL | Age: 55
Setting detail: RADIATION/ONCOLOGY SERIES
Discharge: HOME | End: 2025-03-13
Payer: COMMERCIAL

## 2025-03-13 PROCEDURE — 77301 RADIOTHERAPY DOSE PLAN IMRT: CPT | Performed by: RADIOLOGY

## 2025-03-13 PROCEDURE — 77338 DESIGN MLC DEVICE FOR IMRT: CPT | Performed by: RADIOLOGY

## 2025-03-13 PROCEDURE — 77300 RADIATION THERAPY DOSE PLAN: CPT | Performed by: RADIOLOGY

## 2025-03-13 PROCEDURE — 77293 RESPIRATOR MOTION MGMT SIMUL: CPT | Performed by: RADIOLOGY

## 2025-03-14 ENCOUNTER — TELEPHONE (OUTPATIENT)
Dept: ADMISSION | Facility: HOSPITAL | Age: 55
End: 2025-03-14
Payer: COMMERCIAL

## 2025-03-14 ENCOUNTER — SPECIALTY PHARMACY (OUTPATIENT)
Dept: PHARMACY | Facility: CLINIC | Age: 55
End: 2025-03-14

## 2025-03-14 DIAGNOSIS — C25.0 MALIGNANT NEOPLASM OF HEAD OF PANCREAS (MULTI): Primary | ICD-10-CM

## 2025-03-14 DIAGNOSIS — C25.0 MALIGNANT NEOPLASM OF HEAD OF PANCREAS (MULTI): ICD-10-CM

## 2025-03-14 RX ORDER — CAPECITABINE 500 MG/1
TABLET, FILM COATED ORAL
Qty: 210 TABLET | Refills: 0 | Status: SHIPPED | OUTPATIENT
Start: 2025-03-14 | End: 2025-03-14 | Stop reason: SDUPTHER

## 2025-03-14 RX ORDER — CAPECITABINE 500 MG/1
TABLET, FILM COATED ORAL
Qty: 210 TABLET | Refills: 0 | Status: SHIPPED | OUTPATIENT
Start: 2025-03-14

## 2025-03-14 NOTE — TELEPHONE ENCOUNTER
New Rx for capecitabine sent to [facility] for this patient for ChemoRT.

## 2025-03-14 NOTE — TELEPHONE ENCOUNTER
Patient left a message on the refill line inquiring when team plans to send in oral chemo pills as he is unable to schedule his radiation until these are received.

## 2025-03-17 ENCOUNTER — TELEPHONE (OUTPATIENT)
Dept: ADMISSION | Facility: HOSPITAL | Age: 55
End: 2025-03-17
Payer: COMMERCIAL

## 2025-03-17 ENCOUNTER — APPOINTMENT (OUTPATIENT)
Dept: RADIATION ONCOLOGY | Facility: HOSPITAL | Age: 55
End: 2025-03-17
Payer: COMMERCIAL

## 2025-03-17 NOTE — TELEPHONE ENCOUNTER
Nate Alvarez called the refill line for Oxycodone 10mg. Would like refills to be sent to [facility] pharmacy on file. Message sent to  team to send in.

## 2025-03-18 ENCOUNTER — APPOINTMENT (OUTPATIENT)
Dept: RADIATION ONCOLOGY | Facility: HOSPITAL | Age: 55
End: 2025-03-18
Payer: COMMERCIAL

## 2025-03-19 ENCOUNTER — APPOINTMENT (OUTPATIENT)
Dept: RADIATION ONCOLOGY | Facility: HOSPITAL | Age: 55
End: 2025-03-19
Payer: COMMERCIAL

## 2025-03-19 NOTE — PROGRESS NOTES
"Subjective   Nate Alvarez is a 54 y.o. male who presents for follow up for Type 2 diabetes mellitus. The initial diagnosis of diabetes was made in October 2023 .      He was diagnosed in April 2024 with invasive adenocarcinoma moderately differentiated after having a biopsy of a pancreatic mass to the head.  He has been completed 12 cycles of chemotherapy.  He underwent total pancreatectomy, splenectomy, distal gasserectomy with gastrojejunostomy, choledochojejunostomy, cholecystectomy and SMV resection on 12/12/2024. He is to start radiation therapy at the end of hte month.      Known complications due to diabetes included peripheral neuropathy, though this is largely from chemotherapy.      Cardiovascular risk factors include diabetes mellitus, hypertension, and male gender. The patient is on an ACE inhibitor or angiotensin II receptor blocker.  The patient has not been previously hospitalized due to diabetic ketoacidosis.     Current symptoms/problems include paresthesia of the feet. His clinical course has been stable.     Current diabetes regimen is as follows:   Lantus 28 units subcutaneous bedtime   Humalog 6 units TID AC     The patient is currently checking the blood glucose multiple times per day.    Patient is using: continuous glucose monitor                                                              Hypoglycemia frequency: 3%  Hypoglycemia awareness: No     MEALS:  stops eating after 6:30pm     Review of Systems   Gastrointestinal:  Positive for vomiting (stopped two weeks ago).       Objective   /76   Pulse (!) 46   Ht 1.88 m (6' 2\")   Wt 95.9 kg (211 lb 6.4 oz)   BMI 27.14 kg/m²   Physical Exam  Vitals and nursing note reviewed.   Constitutional:       General: He is not in acute distress.     Appearance: Normal appearance. He is normal weight.   HENT:      Head: Normocephalic and atraumatic.      Nose: Nose normal.      Mouth/Throat:      Mouth: Mucous membranes are moist.   Eyes:     "  Extraocular Movements: Extraocular movements intact.   Cardiovascular:      Rate and Rhythm: Normal rate and regular rhythm.   Pulmonary:      Effort: Pulmonary effort is normal.      Breath sounds: Normal breath sounds.   Abdominal:      Comments: Healed surgical scar   Musculoskeletal:         General: Normal range of motion.   Skin:     General: Skin is warm.   Neurological:      Mental Status: He is alert and oriented to person, place, and time.   Psychiatric:         Mood and Affect: Mood normal.         Lab Review  Lab Results   Component Value Date    HGBA1C 5.3 12/06/2024     Glucose (mg/dL)   Date Value   02/18/2025 201 (H)   01/03/2025 245 (H)   12/18/2024 240 (H)     POC HEMOGLOBIN A1c (%)   Date Value   09/25/2024 7.0 (A)   04/17/2024 9.3 (A)   01/11/2024 7.4 (A)     Hemoglobin A1C (%)   Date Value   12/06/2024 5.3   05/10/2024 9.1 (H)     Bicarbonate (mmol/L)   Date Value   02/18/2025 27   01/03/2025 26   12/18/2024 26     Urea Nitrogen (mg/dL)   Date Value   02/18/2025 20   01/03/2025 19   12/18/2024 15     Creatinine (mg/dL)   Date Value   02/18/2025 0.62   01/03/2025 0.66   12/18/2024 0.74     Lab Results   Component Value Date    CHOL 121 04/16/2024    CHOL 131 08/09/2022    CHOL 193 09/28/2020     Lab Results   Component Value Date    HDL 34.2 04/16/2024    HDL 31.2 (A) 08/09/2022    HDL 42.4 09/28/2020     Lab Results   Component Value Date    LDLCALC 59 04/16/2024     Lab Results   Component Value Date    TRIG 141 04/16/2024    TRIG 167 (H) 08/09/2022    TRIG 126 09/28/2020     Lab Results   Component Value Date    TSH 3.53 04/16/2024       Health Maintenance:   Foot Exam:  May 31, 2024  Eye Exam:  June 2022  Urine Albumin: April 17, 2024    CGM Interpretation  14 day CGM download was reviewed in detail as documented above and will be attached to chart.  A minimum of 72 hours of glucose data was used to inform the management plan outlined below.    Sufficient data to analyze:  Yes; CGM active  96% of the time  21% of values is above target range, which is at goal.    76% of values is spent in target range, and thus at goal   3% of values is spent with hypoglycemia, and thus at goal       Assessment/Plan   54 year old male presents for follow up for type 3c diabetes mellitus.   He was diagnosed in April 2024 with invasive adenocarcinoma moderately differentiated after having a biopsy of a pancreatic mass to the head.  He has completed 12 cycles of chemotherapy.  He underwent total pancreatectomy, splenectomy, distal gasserectomy with gastrojejunostomy, choledochojejunostomy, cholecystectomy and SMV resection on 12/12/2024.  He will start radiation therapy at the end of the month.    Secondary diabetes mellitus (Multi)  To continue Lantus 28 units subcutaneous bedtime  To continue Humalog 6 units three times daily before meals   Please continue an insulin sliding scale with Humalog before meals as follows:   150-200mg/dL - 2 units   201-250mg/dL - 4 units   251-300 mg/dL - 6 untis   301-350mg/dL - 8 units   >351mg/dL - 10 units  Insulin requirements will decrease with weight loss  To continue the use of your CGM for the intensive glucose monitoring due to the dynamic nature of insulin requirements, the narrow therapeutic index of insulin and the potentially fatal consequences of treatment  Counseled that the time in range should be >70%, and thus you are at goal   To obtain blood tests at the next blood draw       Long-term insulin use (Multi)  Please rotate insulin injection sites    For follow up in 3 months

## 2025-03-19 NOTE — PATIENT INSTRUCTIONS
Thank you for choosing [facility]  for your health care needs.  If you have any questions, concerns or medical needs, please feel free to contact our office at [phone].    Please ensure you complete your blood work one week before the next scheduled appointment.    To continue Lantus 28 units subcutaneous bedtime  To continue Humalog 6 units three times daily before meals   Please continue an insulin sliding scale with Humalog before meals as follows:   150-200mg/dL - 2 units   201-250mg/dL - 4 units   251-300 mg/dL - 6 untis   301-350mg/dL - 8 units   >351mg/dL - 10 units  Insulin requirements will decrease with weight loss  To continue the use of your CGM for the intensive glucose monitoring due to the dynamic nature of insulin requirements, the narrow therapeutic index of insulin and the potentially fatal consequences of treatment  Counseled that the time in range should be >70%, and thus you are at goal   To obtain blood tests at the next blood draw   For follow up in 3 months

## 2025-03-20 ENCOUNTER — APPOINTMENT (OUTPATIENT)
Dept: ENDOCRINOLOGY | Facility: CLINIC | Age: 55
End: 2025-03-20
Payer: COMMERCIAL

## 2025-03-20 ENCOUNTER — TELEPHONE (OUTPATIENT)
Dept: HEMATOLOGY/ONCOLOGY | Facility: HOSPITAL | Age: 55
End: 2025-03-20

## 2025-03-20 ENCOUNTER — APPOINTMENT (OUTPATIENT)
Dept: RADIATION ONCOLOGY | Facility: HOSPITAL | Age: 55
End: 2025-03-20
Payer: COMMERCIAL

## 2025-03-20 ENCOUNTER — SPECIALTY PHARMACY (OUTPATIENT)
Dept: HEMATOLOGY/ONCOLOGY | Facility: HOSPITAL | Age: 55
End: 2025-03-20

## 2025-03-20 VITALS
HEART RATE: 46 BPM | WEIGHT: 211.4 LBS | HEIGHT: 74 IN | DIASTOLIC BLOOD PRESSURE: 76 MMHG | BODY MASS INDEX: 27.13 KG/M2 | SYSTOLIC BLOOD PRESSURE: 110 MMHG

## 2025-03-20 DIAGNOSIS — Z79.4 LONG-TERM INSULIN USE (MULTI): ICD-10-CM

## 2025-03-20 DIAGNOSIS — C25.0 MALIGNANT NEOPLASM OF HEAD OF PANCREAS (MULTI): ICD-10-CM

## 2025-03-20 DIAGNOSIS — E13.9 SECONDARY DIABETES MELLITUS (MULTI): Primary | ICD-10-CM

## 2025-03-20 PROCEDURE — 3008F BODY MASS INDEX DOCD: CPT | Performed by: INTERNAL MEDICINE

## 2025-03-20 PROCEDURE — 99214 OFFICE O/P EST MOD 30 MIN: CPT | Performed by: INTERNAL MEDICINE

## 2025-03-20 PROCEDURE — 3078F DIAST BP <80 MM HG: CPT | Performed by: INTERNAL MEDICINE

## 2025-03-20 PROCEDURE — 3074F SYST BP LT 130 MM HG: CPT | Performed by: INTERNAL MEDICINE

## 2025-03-20 PROCEDURE — 95251 CONT GLUC MNTR ANALYSIS I&R: CPT | Performed by: INTERNAL MEDICINE

## 2025-03-20 RX ORDER — OXYCODONE HYDROCHLORIDE 10 MG/1
10 TABLET ORAL EVERY 6 HOURS PRN
Qty: 120 TABLET | Refills: 0 | Status: CANCELLED | OUTPATIENT
Start: 2025-03-20 | End: 2025-04-19

## 2025-03-20 ASSESSMENT — ENCOUNTER SYMPTOMS: VOMITING: 1

## 2025-03-20 NOTE — TELEPHONE ENCOUNTER
Spoke with Jason Lawrence at [facility], they confirmed they have received the Capecitabine Rx. Rep spoke with [facility]. They will dispense #105 tablets for a 3 week supply with one refill. Confirmed that someone from [facility] will be reaching out to the pt by end of day today or tomorrow to schedule delivery.    Shira Downing PharmD, [facility]  Clinical Pharmacy Specialist  [address] [place] [address]  Ph: [phone]

## 2025-03-20 NOTE — TELEPHONE ENCOUNTER
Patient stated that they had not yet received Capecitabine from [facility]. Called [facility] to confirm rx status. They stated there was a quantity limit issue. The plan will only allow a dispense quantity of #140 or #420 tablets at a time. They are unable to send a partial quantity based on the day supply of the rx. Reached out to oncology [facility] for assistance.     Shira Downing, PharmD, [facility]  Clinical Pharmacy Specialist  [address] [place] [address]  Ph: [phone]

## 2025-03-20 NOTE — PROGRESS NOTES
ONCOLOGY CLINICAL PHARMACY NOTE     Subjective  Nate Alvarez is a 54 y.o. male with pancreatic cancer, here for education.        Treatment history  Treatment Details   Treatment goal [No plan goal]   Plan Name mFOLFIRINOX (Fluorouracil Continuous Infusion / Leucovorin / Irinotecan / Oxaliplatin), 14 Day Cycles   Status Active   Start Date 6/11/2024   End Date 11/14/2024   Provider Shaw You MD   Chemotherapy fosaprepitant (Emend) 150 mg in sodium chloride 0.9% 250 mL IV, 150 mg, intravenous, Once, 11 of 11 cycles  Administration: 150 mg (6/25/2024), 150 mg (7/10/2024), 150 mg (7/23/2024), 150 mg (8/7/2024), 150 mg (8/20/2024), 150 mg (9/4/2024), 150 mg (9/17/2024), 147.0588 mg (10/1/2024), 150 mg (10/15/2024), 150 mg (10/29/2024), 150 mg (11/13/2024)    fluorouracil (Adrucil) 2,400 mg/m2 = 5,650 mg in sodium chloride 0.9% 138 mL IV via Home Infusion, 2,400 mg/m2 = 5,650 mg, intravenous, Once, 12 of 12 cycles  Administration: 5,650 mg (6/11/2024), 5,650 mg (6/25/2024), 5,650 mg (7/10/2024), 5,650 mg (7/23/2024), 5,650 mg (8/7/2024), 5,650 mg (8/20/2024), 5,650 mg (9/4/2024), 5,650 mg (9/17/2024), 5,650 mg (10/1/2024), 5,650 mg (10/15/2024), 5,650 mg (10/29/2024), 5,650 mg (11/13/2024)    methylPREDNISolone sod succinate (SOLU-Medrol) 40 mg/mL injection 40 mg, 40 mg, intravenous, As needed, 12 of 12 cycles    palonosetron (Aloxi) injection 250 mcg, 250 mcg, intravenous, Once, 12 of 12 cycles  Administration: 250 mcg (6/11/2024), 250 mcg (6/25/2024), 250 mcg (7/10/2024), 250 mcg (7/23/2024), 250 mcg (8/7/2024), 250 mcg (8/20/2024), 250 mcg (9/4/2024), 250 mcg (9/17/2024), 250 mcg (10/1/2024), 250 mcg (10/15/2024), 250 mcg (10/29/2024), 250 mcg (11/13/2024)    irinotecan (Camptosar) 350 mg in dextrose 5% 517.5 mL IV, 150 mg/m2 = 350 mg, intravenous, Once, 12 of 12 cycles  Administration: 350 mg (6/11/2024), 350 mg (6/25/2024), 350 mg (7/10/2024), 350 mg (7/23/2024), 350 mg (8/7/2024), 350 mg (8/20/2024), 350  mg (9/4/2024), 350 mg (9/17/2024), 350 mg (10/1/2024), 350 mg (10/15/2024), 350 mg (10/29/2024), 350 mg (11/13/2024)    OXALIplatin (Eloxatin) 200 mg in dextrose 5% 540 mL IV, 85 mg/m2 = 200 mg, intravenous, Once, 12 of 12 cycles  Administration: 200 mg (6/11/2024), 200 mg (6/25/2024), 200 mg (7/10/2024), 200 mg (7/23/2024), 200 mg (8/7/2024), 200 mg (8/20/2024), 200 mg (9/4/2024), 200 mg (9/17/2024), 200 mg (10/1/2024), 200 mg (10/15/2024), 200 mg (10/29/2024), 200 mg (11/13/2024)    LORazepam (Ativan) injection 1 mg, 1 mg, intravenous, As needed, 12 of 12 cycles       Treatment Details   Treatment goal [No plan goal]   Plan Name Venous Access Orders   Status Active   Start Date 6/13/2024   End Date Until discontinued   Provider LEE May   Chemotherapy [No matching medication found in this treatment plan]        Objective  There were no vitals taken for this visit.  Lab Results   Component Value Date    WBC 6.3 02/18/2025    HGB 12.3 (L) 02/18/2025    HCT 38.9 (L) 02/18/2025    MCV 91 02/18/2025     02/18/2025      Lab Results   Component Value Date    GLUCOSE 201 (H) 02/18/2025    CALCIUM 9.5 02/18/2025     02/18/2025    K 4.3 02/18/2025    CO2 27 02/18/2025     02/18/2025    BUN 20 02/18/2025    CREATININE 0.62 02/18/2025     Lab Results   Component Value Date    ALT 24 02/18/2025    AST 28 02/18/2025    ALKPHOS 67 02/18/2025    BILITOT 0.5 02/18/2025       Allergies and Medications   Allergies   Allergen Reactions    Farxiga [Dapagliflozin] Diarrhea    Glipizide GI Upset    Januvia [Sitagliptin] Diarrhea    Metformin Nausea/vomiting    Mounjaro [Tirzepatide] Nausea/vomiting    Tramadol Nausea/vomiting       Current Outpatient Medications:     acetaminophen (Tylenol) 325 mg tablet, Take 2 tablets (650 mg) by mouth every 6 hours. (Patient not taking: Reported on 3/20/2025), Disp: , Rfl:     alpha lipoic acid 600 mg capsule, Take 600 mg by mouth once daily., Disp: 30 capsule,  "Rfl: 11    apixaban (Eliquis) 5 mg tablet, Take 1 tablet (5 mg) by mouth 2 times a day. Do not fill before February 7, 2025., Disp: 60 tablet, Rfl: 11    BD Insulin Syringe Ultra-Fine 0.3 mL 31 gauge x 5/16\" syringe, Inject 1 each under the skin 4 times a day before meals., Disp: 200 each, Rfl: 11    blood sugar diagnostic (Blood Glucose Test) strip, Check blood sugars once daily, Disp: 100 strip, Rfl: 3    blood-glucose meter misc, Check  blood sugar as needed, Disp: 1 each, Rfl: 0    blood-glucose sensor (FreeStyle Jef 3 Plus Sensor) device, Use as directed, Disp: 2 each, Rfl: 11    capecitabine (Xeloda) 500 mg tablet, Take 3 tablets (1500 mg) by mouth with breakfast and 4 tablets (2000 mg) by mouth with dinner only on days of radiation (Monday through Friday).  Swallow whole with water. Do not crush or cut., Disp: 210 tablet, Rfl: 0    docusate sodium (Colace) 100 mg capsule, Take 1 capsule (100 mg) by mouth 2 times a day. (Patient not taking: Reported on 3/20/2025), Disp: , Rfl:     FreeStyle Jef 3 [place] misc, Use with sensors as directed, Disp: 1 each, Rfl: 0    insulin glargine (Lantus) 100 unit/mL (3 mL) pen, Inject 30 Units under the skin once daily at bedtime. Take as directed per insulin instructions., Disp: 10 each, Rfl: 5    insulin glargine (Lantus) 100 unit/mL injection, Inject 30 Units under the skin once every 24 hours. Take as directed per insulin instructions., Disp: , Rfl:     insulin lispro 100 unit/mL injection, 6 units subcutaneous 3x day before meals + sliding scale 0 unit(s) if Blood glucose is between   2 unit(s) if Blood glucose is between 151-200  4 unit(s) if Blood glucose is between 201-250  6 unit(s) if Blood glucose is between 251-300  8 unit(s) if Blood glucose is between 301-350  10 unit(s) if Blood glucose is between 351-400, Disp: , Rfl:     lancets misc, Check blood sugars once daily, Disp: 100 each, Rfl: 3    LORazepam (Ativan) 1 mg tablet, Take 1 tablet (1 mg) by " "mouth as needed at bedtime for anxiety. Insomnia, or nausea (Patient not taking: Reported on 3/3/2025), Disp: 30 tablet, Rfl: 2    naloxone (Narcan) 4 mg/0.1 mL nasal spray, Administer 1 spray (4 mg) into affected nostril(s) if needed for opioid reversal. May repeat every 2-3 minutes if needed, alternating nostrils, until medical assistance becomes available. (Patient not taking: Reported on 3/20/2025), Disp: 2 each, Rfl: 0    ondansetron (Zofran) 8 mg tablet, Take 1 tablet (8 mg) by mouth every 8 hours if needed for nausea or vomiting., Disp: 30 tablet, Rfl: 5    oxyCODONE (Roxicodone) 10 mg immediate release tablet, Take 1 tablet (10 mg) by mouth every 6 hours if needed for severe pain (7 - 10)., Disp: 120 tablet, Rfl: 0    pancrelipase, Lip-Prot-Amyl, (Creon) 36,000-114,000- 180,000 unit capsule,delayed release(DR/EC) capsule, Take 2-3 capsules by mouth with meals and 1-2 capsules with snacks daily; for an average of 13 capsules per day., Disp: 400 capsule, Rfl: 3    pantoprazole (ProtoNix) 40 mg EC tablet, Take 1 tablet (40 mg) by mouth 2 times a day. Do not crush, chew, or split. (Patient not taking: Reported on 3/20/2025), Disp: 60 tablet, Rfl: 5    pen needle, diabetic (BD Ultra-Fine Short Pen Needle) 31 gauge x 5/16\" needle, Use four times daily with insulin, Disp: 400 each, Rfl: 1    polyethylene glycol (Glycolax, Miralax) 17 gram packet, Take 17 g by mouth once daily., Disp: , Rfl:     prochlorperazine (Compazine) 10 mg tablet, Take 1 tablet (10 mg) by mouth every 6 hours if needed for nausea or vomiting., Disp: 30 tablet, Rfl: 5  No current facility-administered medications for this visit.    Facility-Administered Medications Ordered in Other Visits:     heparin flush 100 unit/mL syringe 500 Units, 500 Units, intra-catheter, PRN, Adrienne Malloy, JUAN-CNP, 500 Units at 08/05/24 1344    Assessment and Plan  Nate Alvarez is a 54 y.o. male with pancreatic cancer, to be treated with Chemotherapy with " Radiation.    Drug Reviewed: Capecitabine    Reviewed drug, dose, frequency, administration, treatment cycle, duration of therapy, and missed doses. Counseled on potential side effects including but not limited to chemotherapy side effects: neutropenia, infection risk, fatigue, weakness, low energy, n/v, diarrhea, and Hand Foot Syndrome . Discussed techniques to mitigate severity of side effects such as blood count checks, temperature checks, antiemetic use, loperamide use with max dose of 8 tabs per 24 hours, staying hydrated if having diarrhea, and moisturizer for prevention of Hand Foot Syndrome .  Provided medication/regimen handout via email. All questions answered and contact information was given to patient.       Shira Downing, RadhaD

## 2025-03-21 ENCOUNTER — APPOINTMENT (OUTPATIENT)
Dept: RADIATION ONCOLOGY | Facility: HOSPITAL | Age: 55
End: 2025-03-21
Payer: COMMERCIAL

## 2025-03-21 ENCOUNTER — TELEPHONE (OUTPATIENT)
Dept: HEMATOLOGY/ONCOLOGY | Facility: HOSPITAL | Age: 55
End: 2025-03-21
Payer: COMMERCIAL

## 2025-03-21 DIAGNOSIS — C25.0 MALIGNANT NEOPLASM OF HEAD OF PANCREAS (MULTI): ICD-10-CM

## 2025-03-21 RX ORDER — OXYCODONE HYDROCHLORIDE 10 MG/1
10 TABLET ORAL EVERY 6 HOURS PRN
Qty: 120 TABLET | Refills: 0 | Status: SHIPPED | OUTPATIENT
Start: 2025-03-21 | End: 2025-04-20

## 2025-03-23 RX ORDER — INSULIN GLARGINE 100 [IU]/ML
28 INJECTION, SOLUTION SUBCUTANEOUS NIGHTLY
Qty: 10 EACH | Refills: 5 | Status: SHIPPED | OUTPATIENT
Start: 2025-03-23 | End: 2025-09-19

## 2025-03-23 NOTE — ASSESSMENT & PLAN NOTE
To continue Lantus 28 units subcutaneous bedtime  To continue Humalog 6 units three times daily before meals   Please continue an insulin sliding scale with Humalog before meals as follows:   150-200mg/dL - 2 units   201-250mg/dL - 4 units   251-300 mg/dL - 6 untis   301-350mg/dL - 8 units   >351mg/dL - 10 units  Insulin requirements will decrease with weight loss  To continue the use of your CGM for the intensive glucose monitoring due to the dynamic nature of insulin requirements, the narrow therapeutic index of insulin and the potentially fatal consequences of treatment  Counseled that the time in range should be >70%, and thus you are at goal   To obtain blood tests at the next blood draw

## 2025-03-24 ENCOUNTER — APPOINTMENT (OUTPATIENT)
Dept: RADIATION ONCOLOGY | Facility: HOSPITAL | Age: 55
End: 2025-03-24
Payer: COMMERCIAL

## 2025-03-25 ENCOUNTER — APPOINTMENT (OUTPATIENT)
Dept: RADIATION ONCOLOGY | Facility: HOSPITAL | Age: 55
End: 2025-03-25
Payer: COMMERCIAL

## 2025-03-26 ENCOUNTER — APPOINTMENT (OUTPATIENT)
Dept: RADIATION ONCOLOGY | Facility: HOSPITAL | Age: 55
End: 2025-03-26
Payer: COMMERCIAL

## 2025-03-27 ENCOUNTER — APPOINTMENT (OUTPATIENT)
Dept: RADIATION ONCOLOGY | Facility: HOSPITAL | Age: 55
End: 2025-03-27
Payer: COMMERCIAL

## 2025-03-28 ENCOUNTER — APPOINTMENT (OUTPATIENT)
Dept: RADIATION ONCOLOGY | Facility: HOSPITAL | Age: 55
End: 2025-03-28
Payer: COMMERCIAL

## 2025-03-31 ENCOUNTER — HOSPITAL ENCOUNTER (OUTPATIENT)
Dept: RADIATION ONCOLOGY | Facility: HOSPITAL | Age: 55
Setting detail: RADIATION/ONCOLOGY SERIES
Discharge: HOME | End: 2025-03-31
Payer: COMMERCIAL

## 2025-03-31 DIAGNOSIS — Z51.0 ENCOUNTER FOR ANTINEOPLASTIC RADIATION THERAPY: ICD-10-CM

## 2025-03-31 DIAGNOSIS — C25.0 MALIGNANT NEOPLASM OF HEAD OF PANCREAS (MULTI): ICD-10-CM

## 2025-03-31 LAB
RAD ONC MSQ ACTUAL FRACTIONS DELIVERED: 1
RAD ONC MSQ ACTUAL SESSION DELIVERED DOSE: 180 CGRAY
RAD ONC MSQ ACTUAL TOTAL DOSE: 180 CGRAY
RAD ONC MSQ ELAPSED DAYS: 0
RAD ONC MSQ LAST DATE: NORMAL
RAD ONC MSQ PRESCRIBED FRACTIONAL DOSE: 180 CGRAY
RAD ONC MSQ PRESCRIBED NUMBER OF FRACTIONS: 28
RAD ONC MSQ PRESCRIBED TECHNIQUE: NORMAL
RAD ONC MSQ PRESCRIBED TOTAL DOSE: 5040 CGRAY
RAD ONC MSQ PRESCRIPTION PATTERN COMMENT: NORMAL
RAD ONC MSQ START DATE: NORMAL
RAD ONC MSQ TREATMENT COURSE NUMBER: 1
RAD ONC MSQ TREATMENT SITE: NORMAL

## 2025-03-31 PROCEDURE — 77386 HC INTENSITY-MODULATED RADIATION THERAPY (IMRT), COMPLEX: CPT | Performed by: STUDENT IN AN ORGANIZED HEALTH CARE EDUCATION/TRAINING PROGRAM

## 2025-04-01 ENCOUNTER — HOSPITAL ENCOUNTER (OUTPATIENT)
Dept: RADIATION ONCOLOGY | Facility: HOSPITAL | Age: 55
Setting detail: RADIATION/ONCOLOGY SERIES
Discharge: HOME | End: 2025-04-01
Payer: COMMERCIAL

## 2025-04-01 VITALS
SYSTOLIC BLOOD PRESSURE: 104 MMHG | RESPIRATION RATE: 18 BRPM | OXYGEN SATURATION: 95 % | BODY MASS INDEX: 26.87 KG/M2 | HEIGHT: 74 IN | DIASTOLIC BLOOD PRESSURE: 69 MMHG | TEMPERATURE: 97.3 F | WEIGHT: 209.4 LBS | HEART RATE: 81 BPM

## 2025-04-01 DIAGNOSIS — C25.0 MALIGNANT NEOPLASM OF HEAD OF PANCREAS (MULTI): ICD-10-CM

## 2025-04-01 DIAGNOSIS — Z51.0 ENCOUNTER FOR ANTINEOPLASTIC RADIATION THERAPY: ICD-10-CM

## 2025-04-01 LAB
RAD ONC MSQ ACTUAL FRACTIONS DELIVERED: 2
RAD ONC MSQ ACTUAL SESSION DELIVERED DOSE: 180 CGRAY
RAD ONC MSQ ACTUAL TOTAL DOSE: 360 CGRAY
RAD ONC MSQ ELAPSED DAYS: 1
RAD ONC MSQ LAST DATE: NORMAL
RAD ONC MSQ PRESCRIBED FRACTIONAL DOSE: 180 CGRAY
RAD ONC MSQ PRESCRIBED NUMBER OF FRACTIONS: 28
RAD ONC MSQ PRESCRIBED TECHNIQUE: NORMAL
RAD ONC MSQ PRESCRIBED TOTAL DOSE: 5040 CGRAY
RAD ONC MSQ PRESCRIPTION PATTERN COMMENT: NORMAL
RAD ONC MSQ START DATE: NORMAL
RAD ONC MSQ TREATMENT COURSE NUMBER: 1
RAD ONC MSQ TREATMENT SITE: NORMAL

## 2025-04-01 PROCEDURE — 77386 HC INTENSITY-MODULATED RADIATION THERAPY (IMRT), COMPLEX: CPT | Performed by: RADIOLOGY

## 2025-04-01 NOTE — PROGRESS NOTES
RADIATION ONCOLOGY ON-TREATMENT VISIT NOTE  Patient Name:  Nate Alvarez  MRN:  [id number]  :  1970    Radiation Oncologist: Mary Kay Espinoza MD  Referring Provider: Shaw You MD  Primary Care Provider: Hazel Medeiros MD  Care Team: Patient Care Team:  Hazel Medeiros MD as PCP - General (Family Medicine)  Hazel Medeiros MD as PCP - [facility] PCP  Darcy Kauffman MD as Consulting Physician (Sleep Medicine)  Shaw You MD as Consulting Physician (Hematology and Oncology)  JUAN May-CNP as Nurse Practitioner (Hematology and Oncology)  Roddy Gamble MD as Surgeon (Surgical Oncology)  Mercy Romero RDN, TOMMY as Dietitian (Nutrition)    Date of Service: 2025     Mr. Nate Alvarez is a 54-year-old man with [id number] pancreas adencarcinoma, status post neoadjuvant mFOLFIRINOX x12cy, then total pancreatectomy to +SMV margin and  LN+. He is undergoing adjuvant chemoradiation to 50.4Gy/28fx with concurrent capecitabine.   Malignant neoplasm of head of pancreas (Multi), Pathologic: Stage IIB (ypT2, pN1, cM0)    Specialty Problems          Radiation Oncology Problems    Basal cell carcinoma (BCC) of face        Malignant neoplasm of head of pancreas (Multi)           Treatment Summary:  Radiation Therapy    Treatment Period Technique Fraction Dose Fractions Total Dose   Course 1 3/31/2025-2025  (days elapsed: 1)         pancbed 3/31/2025-2025 VMAT 180 / 180 cGy  360 / 5,040 cGy       SUBJECTIVE: Feeling okay overall, some fatigue but mild. Occasional queasiness with cape, does not relate it to RT timing. Using compazine, also has zofran at home that he'll sub in for the compazine starting in the next couple of days in case the compazine is worsening his fatigue. Has baseline mid epigastric pain ever since surgery that is no worse or better since RT start, unrelated to treatment.    OBJECTIVE:   Vital Signs:  /69   Pulse 81   Temp 36.3 °C (97.3 °F) (Temporal)  "  Resp 18   Ht 1.88 m (6' 2\")   Wt 95 kg (209 lb 6.4 oz)   SpO2 95%   BMI 26.89 kg/m²    Pain Scale:   Pain score: 1/10  Well appearing, NAD, VSS, wt stable.    Toxicity Assessment          4/1/2025    14:56   Toxicity Assessment   Adverse Events Reviewed (WDL) No (Exceptions to WDL)   Treatment Site Abdomen   Anorexia Grade 1   Anxiety Grade 0   Dehydration Grade 1   Depression Grade 0   Diarrhea Grade 0   Fatigue Grade 1   Nausea Grade 1   Pain Grade 1       abd and right to back and down spine; ache and radiates out.   Tumor Pain Grade 1   Vomiting Grade 0   Abdominal Pain Grade 1   Bloating Grade 0   Constipation Grade 0        ASSESSMENT/PLAN:  The patient is tolerating radiation therapy as anticipated.  Continue per current treatment plan.        "

## 2025-04-02 ENCOUNTER — HOSPITAL ENCOUNTER (OUTPATIENT)
Dept: RADIATION ONCOLOGY | Facility: HOSPITAL | Age: 55
Setting detail: RADIATION/ONCOLOGY SERIES
Discharge: HOME | End: 2025-04-02
Payer: COMMERCIAL

## 2025-04-02 DIAGNOSIS — Z51.0 ENCOUNTER FOR ANTINEOPLASTIC RADIATION THERAPY: ICD-10-CM

## 2025-04-02 DIAGNOSIS — C25.0 MALIGNANT NEOPLASM OF HEAD OF PANCREAS (MULTI): ICD-10-CM

## 2025-04-02 LAB
RAD ONC MSQ ACTUAL FRACTIONS DELIVERED: 3
RAD ONC MSQ ACTUAL SESSION DELIVERED DOSE: 180 CGRAY
RAD ONC MSQ ACTUAL TOTAL DOSE: 540 CGRAY
RAD ONC MSQ ELAPSED DAYS: 2
RAD ONC MSQ LAST DATE: NORMAL
RAD ONC MSQ PRESCRIBED FRACTIONAL DOSE: 180 CGRAY
RAD ONC MSQ PRESCRIBED NUMBER OF FRACTIONS: 28
RAD ONC MSQ PRESCRIBED TECHNIQUE: NORMAL
RAD ONC MSQ PRESCRIBED TOTAL DOSE: 5040 CGRAY
RAD ONC MSQ PRESCRIPTION PATTERN COMMENT: NORMAL
RAD ONC MSQ START DATE: NORMAL
RAD ONC MSQ TREATMENT COURSE NUMBER: 1
RAD ONC MSQ TREATMENT SITE: NORMAL

## 2025-04-02 PROCEDURE — 77386 HC INTENSITY-MODULATED RADIATION THERAPY (IMRT), COMPLEX: CPT | Performed by: RADIOLOGY

## 2025-04-02 PROCEDURE — 77336 RADIATION PHYSICS CONSULT: CPT | Performed by: RADIOLOGY

## 2025-04-03 ENCOUNTER — HOSPITAL ENCOUNTER (OUTPATIENT)
Dept: RADIATION ONCOLOGY | Facility: HOSPITAL | Age: 55
Setting detail: RADIATION/ONCOLOGY SERIES
Discharge: HOME | End: 2025-04-03
Payer: COMMERCIAL

## 2025-04-03 DIAGNOSIS — Z51.0 ENCOUNTER FOR ANTINEOPLASTIC RADIATION THERAPY: ICD-10-CM

## 2025-04-03 DIAGNOSIS — C25.0 MALIGNANT NEOPLASM OF HEAD OF PANCREAS (MULTI): ICD-10-CM

## 2025-04-03 LAB
RAD ONC MSQ ACTUAL FRACTIONS DELIVERED: 4
RAD ONC MSQ ACTUAL SESSION DELIVERED DOSE: 180 CGRAY
RAD ONC MSQ ACTUAL TOTAL DOSE: 720 CGRAY
RAD ONC MSQ ELAPSED DAYS: 3
RAD ONC MSQ LAST DATE: NORMAL
RAD ONC MSQ PRESCRIBED FRACTIONAL DOSE: 180 CGRAY
RAD ONC MSQ PRESCRIBED NUMBER OF FRACTIONS: 28
RAD ONC MSQ PRESCRIBED TECHNIQUE: NORMAL
RAD ONC MSQ PRESCRIBED TOTAL DOSE: 5040 CGRAY
RAD ONC MSQ PRESCRIPTION PATTERN COMMENT: NORMAL
RAD ONC MSQ START DATE: NORMAL
RAD ONC MSQ TREATMENT COURSE NUMBER: 1
RAD ONC MSQ TREATMENT SITE: NORMAL

## 2025-04-03 PROCEDURE — 77386 HC INTENSITY-MODULATED RADIATION THERAPY (IMRT), COMPLEX: CPT | Performed by: RADIOLOGY

## 2025-04-04 ENCOUNTER — HOSPITAL ENCOUNTER (OUTPATIENT)
Dept: RADIATION ONCOLOGY | Facility: HOSPITAL | Age: 55
Setting detail: RADIATION/ONCOLOGY SERIES
Discharge: HOME | End: 2025-04-04
Payer: COMMERCIAL

## 2025-04-04 DIAGNOSIS — C25.0 MALIGNANT NEOPLASM OF HEAD OF PANCREAS (MULTI): ICD-10-CM

## 2025-04-04 DIAGNOSIS — Z51.0 ENCOUNTER FOR ANTINEOPLASTIC RADIATION THERAPY: ICD-10-CM

## 2025-04-04 LAB
RAD ONC MSQ ACTUAL FRACTIONS DELIVERED: 5
RAD ONC MSQ ACTUAL SESSION DELIVERED DOSE: 180 CGRAY
RAD ONC MSQ ACTUAL TOTAL DOSE: 900 CGRAY
RAD ONC MSQ ELAPSED DAYS: 4
RAD ONC MSQ LAST DATE: NORMAL
RAD ONC MSQ PRESCRIBED FRACTIONAL DOSE: 180 CGRAY
RAD ONC MSQ PRESCRIBED NUMBER OF FRACTIONS: 28
RAD ONC MSQ PRESCRIBED TECHNIQUE: NORMAL
RAD ONC MSQ PRESCRIBED TOTAL DOSE: 5040 CGRAY
RAD ONC MSQ PRESCRIPTION PATTERN COMMENT: NORMAL
RAD ONC MSQ START DATE: NORMAL
RAD ONC MSQ TREATMENT COURSE NUMBER: 1
RAD ONC MSQ TREATMENT SITE: NORMAL

## 2025-04-04 PROCEDURE — 77386 HC INTENSITY-MODULATED RADIATION THERAPY (IMRT), COMPLEX: CPT | Performed by: RADIOLOGY

## 2025-04-07 ENCOUNTER — HOSPITAL ENCOUNTER (OUTPATIENT)
Dept: RADIATION ONCOLOGY | Facility: HOSPITAL | Age: 55
Setting detail: RADIATION/ONCOLOGY SERIES
Discharge: HOME | End: 2025-04-07
Payer: COMMERCIAL

## 2025-04-07 VITALS
BODY MASS INDEX: 26.44 KG/M2 | DIASTOLIC BLOOD PRESSURE: 67 MMHG | SYSTOLIC BLOOD PRESSURE: 101 MMHG | WEIGHT: 205.9 LBS | OXYGEN SATURATION: 95 % | HEART RATE: 61 BPM | TEMPERATURE: 97 F | RESPIRATION RATE: 18 BRPM

## 2025-04-07 DIAGNOSIS — C25.0 MALIGNANT NEOPLASM OF HEAD OF PANCREAS (MULTI): ICD-10-CM

## 2025-04-07 DIAGNOSIS — Z51.0 ENCOUNTER FOR ANTINEOPLASTIC RADIATION THERAPY: ICD-10-CM

## 2025-04-07 LAB
RAD ONC MSQ ACTUAL FRACTIONS DELIVERED: 6
RAD ONC MSQ ACTUAL SESSION DELIVERED DOSE: 180 CGRAY
RAD ONC MSQ ACTUAL TOTAL DOSE: 1080 CGRAY
RAD ONC MSQ ELAPSED DAYS: 7
RAD ONC MSQ LAST DATE: NORMAL
RAD ONC MSQ PRESCRIBED FRACTIONAL DOSE: 180 CGRAY
RAD ONC MSQ PRESCRIBED NUMBER OF FRACTIONS: 28
RAD ONC MSQ PRESCRIBED TECHNIQUE: NORMAL
RAD ONC MSQ PRESCRIBED TOTAL DOSE: 5040 CGRAY
RAD ONC MSQ PRESCRIPTION PATTERN COMMENT: NORMAL
RAD ONC MSQ START DATE: NORMAL
RAD ONC MSQ TREATMENT COURSE NUMBER: 1
RAD ONC MSQ TREATMENT SITE: NORMAL

## 2025-04-07 PROCEDURE — 77386 HC INTENSITY-MODULATED RADIATION THERAPY (IMRT), COMPLEX: CPT | Performed by: RADIOLOGY

## 2025-04-07 RX ORDER — LOPERAMIDE HYDROCHLORIDE 2 MG/1
2 CAPSULE ORAL 4 TIMES DAILY PRN
COMMUNITY

## 2025-04-07 NOTE — PROGRESS NOTES
RADIATION ONCOLOGY ON-TREATMENT VISIT NOTE  Patient Name:  Nate Alvarez  MRN:  [id number]  :  1970    Radiation Oncologist: Mary Kay Espinoza MD  Referring Provider: Shaw You MD  Primary Care Provider: Hazel Medeiros MD  Care Team: Patient Care Team:  Hazel Medeiros MD as PCP - General (Family Medicine)  Hazel Medeiros MD as PCP - [facility] PCP  Darcy Kauffman MD as Consulting Physician (Sleep Medicine)  Shaw You MD as Consulting Physician (Hematology and Oncology)  JUAN May-CNP as Nurse Practitioner (Hematology and Oncology)  Roddy Gamble MD as Surgeon (Surgical Oncology)  Mercy Romero RDN, TOMMY as Dietitian (Nutrition)    Date of Service: 2025     Mr. Nate Alvarez is a 54-year-old man with [id number] pancreas adencarcinoma, status post neoadjuvant mFOLFIRINOX x12cy, then total pancreatectomy to +SMV margin and  LN+. He is undergoing adjuvant chemoradiation to 50.4Gy/28fx with concurrent capecitabine.   Malignant neoplasm of head of pancreas (Multi), Pathologic: Stage IIB (ypT2, pN1, cM0)    Specialty Problems          Radiation Oncology Problems    Basal cell carcinoma (BCC) of face        Malignant neoplasm of head of pancreas (Multi)           Treatment Summary:  Radiation Therapy    Treatment Period Technique Fraction Dose Fractions Total Dose   Course 1 3/31/2025-2025  (days elapsed: 7)         pancbed 3/31/2025-2025 VMAT 180 / 180 cGy  1080 / 5,040 cGy       SUBJECTIVE: Feels okay, has baseline nausea worst in the mornings. Minimal post treatment flare improved by zofran. Has been using compazine in the mornings and will try zofran instead. Occasional diarrhea improved with imodium. Staying hydrated     OBJECTIVE:   Vital Signs:  /67   Pulse 61   Temp 36.1 °C (97 °F) (Temporal)   Resp 18   Wt 93.4 kg (205 lb 14.4 oz)   SpO2 95%   BMI 26.44 kg/m²    Pain Scale:   Pain score: 0/10  Well appearing NAD VSS    Toxicity Assessment           4/1/2025    14:56 4/7/2025    15:02   Toxicity Assessment   Adverse Events Reviewed (WDL) No (Exceptions to WDL) No (Exceptions to WDL)   Treatment Site Abdomen Abdomen   Anorexia Grade 1 Grade 1   Anxiety Grade 0 Grade 0   Dehydration Grade 1 Grade 0   Depression Grade 0 Grade 0   Diarrhea Grade 0 Grade 1       taking imodium as need   Fatigue Grade 1 Grade 1   Nausea Grade 1 Grade 1   Pain Grade 1       abd and right to back and down spine; ache and radiates out. Grade 2       taking oxycodone  abd and raps around and mid back (lumbar) and radiates to hip ;   Tumor Pain Grade 1 Grade 2   Vomiting Grade 0 Grade 1   Abdominal Pain Grade 1 Grade 2   Bloating Grade 0 Grade 0   Constipation Grade 0 Grade 0        ASSESSMENT/PLAN:  The patient is tolerating radiation therapy as anticipated.  Continue per current treatment plan.

## 2025-04-08 ENCOUNTER — APPOINTMENT (OUTPATIENT)
Dept: RADIATION ONCOLOGY | Facility: HOSPITAL | Age: 55
End: 2025-04-08
Payer: COMMERCIAL

## 2025-04-08 ENCOUNTER — HOSPITAL ENCOUNTER (OUTPATIENT)
Dept: RADIATION ONCOLOGY | Facility: HOSPITAL | Age: 55
Setting detail: RADIATION/ONCOLOGY SERIES
Discharge: HOME | End: 2025-04-08
Payer: COMMERCIAL

## 2025-04-08 DIAGNOSIS — C25.0 MALIGNANT NEOPLASM OF HEAD OF PANCREAS (MULTI): ICD-10-CM

## 2025-04-08 DIAGNOSIS — Z51.0 ENCOUNTER FOR ANTINEOPLASTIC RADIATION THERAPY: ICD-10-CM

## 2025-04-08 LAB
RAD ONC MSQ ACTUAL FRACTIONS DELIVERED: 7
RAD ONC MSQ ACTUAL SESSION DELIVERED DOSE: 180 CGRAY
RAD ONC MSQ ACTUAL TOTAL DOSE: 1260 CGRAY
RAD ONC MSQ ELAPSED DAYS: 8
RAD ONC MSQ LAST DATE: NORMAL
RAD ONC MSQ PRESCRIBED FRACTIONAL DOSE: 180 CGRAY
RAD ONC MSQ PRESCRIBED NUMBER OF FRACTIONS: 28
RAD ONC MSQ PRESCRIBED TECHNIQUE: NORMAL
RAD ONC MSQ PRESCRIBED TOTAL DOSE: 5040 CGRAY
RAD ONC MSQ PRESCRIPTION PATTERN COMMENT: NORMAL
RAD ONC MSQ START DATE: NORMAL
RAD ONC MSQ TREATMENT COURSE NUMBER: 1
RAD ONC MSQ TREATMENT SITE: NORMAL

## 2025-04-08 PROCEDURE — 77386 HC INTENSITY-MODULATED RADIATION THERAPY (IMRT), COMPLEX: CPT | Performed by: RADIOLOGY

## 2025-04-09 ENCOUNTER — HOSPITAL ENCOUNTER (OUTPATIENT)
Dept: RADIATION ONCOLOGY | Facility: HOSPITAL | Age: 55
Setting detail: RADIATION/ONCOLOGY SERIES
Discharge: HOME | End: 2025-04-09
Payer: COMMERCIAL

## 2025-04-09 DIAGNOSIS — C25.0 MALIGNANT NEOPLASM OF HEAD OF PANCREAS (MULTI): ICD-10-CM

## 2025-04-09 DIAGNOSIS — Z51.0 ENCOUNTER FOR ANTINEOPLASTIC RADIATION THERAPY: ICD-10-CM

## 2025-04-09 LAB
RAD ONC MSQ ACTUAL FRACTIONS DELIVERED: 8
RAD ONC MSQ ACTUAL SESSION DELIVERED DOSE: 180 CGRAY
RAD ONC MSQ ACTUAL TOTAL DOSE: 1440 CGRAY
RAD ONC MSQ ELAPSED DAYS: 9
RAD ONC MSQ LAST DATE: NORMAL
RAD ONC MSQ PRESCRIBED FRACTIONAL DOSE: 180 CGRAY
RAD ONC MSQ PRESCRIBED NUMBER OF FRACTIONS: 28
RAD ONC MSQ PRESCRIBED TECHNIQUE: NORMAL
RAD ONC MSQ PRESCRIBED TOTAL DOSE: 5040 CGRAY
RAD ONC MSQ PRESCRIPTION PATTERN COMMENT: NORMAL
RAD ONC MSQ START DATE: NORMAL
RAD ONC MSQ TREATMENT COURSE NUMBER: 1
RAD ONC MSQ TREATMENT SITE: NORMAL

## 2025-04-09 PROCEDURE — 77386 HC INTENSITY-MODULATED RADIATION THERAPY (IMRT), COMPLEX: CPT | Performed by: RADIOLOGY

## 2025-04-09 PROCEDURE — 77336 RADIATION PHYSICS CONSULT: CPT | Performed by: RADIOLOGY

## 2025-04-10 ENCOUNTER — HOSPITAL ENCOUNTER (OUTPATIENT)
Dept: RADIATION ONCOLOGY | Facility: HOSPITAL | Age: 55
Setting detail: RADIATION/ONCOLOGY SERIES
Discharge: HOME | End: 2025-04-10
Payer: COMMERCIAL

## 2025-04-10 DIAGNOSIS — C25.0 MALIGNANT NEOPLASM OF HEAD OF PANCREAS (MULTI): ICD-10-CM

## 2025-04-10 DIAGNOSIS — Z51.0 ENCOUNTER FOR ANTINEOPLASTIC RADIATION THERAPY: ICD-10-CM

## 2025-04-10 LAB
RAD ONC MSQ ACTUAL FRACTIONS DELIVERED: 9
RAD ONC MSQ ACTUAL SESSION DELIVERED DOSE: 180 CGRAY
RAD ONC MSQ ACTUAL TOTAL DOSE: 1620 CGRAY
RAD ONC MSQ ELAPSED DAYS: 10
RAD ONC MSQ LAST DATE: NORMAL
RAD ONC MSQ PRESCRIBED FRACTIONAL DOSE: 180 CGRAY
RAD ONC MSQ PRESCRIBED NUMBER OF FRACTIONS: 28
RAD ONC MSQ PRESCRIBED TECHNIQUE: NORMAL
RAD ONC MSQ PRESCRIBED TOTAL DOSE: 5040 CGRAY
RAD ONC MSQ PRESCRIPTION PATTERN COMMENT: NORMAL
RAD ONC MSQ START DATE: NORMAL
RAD ONC MSQ TREATMENT COURSE NUMBER: 1
RAD ONC MSQ TREATMENT SITE: NORMAL

## 2025-04-10 PROCEDURE — 77386 HC INTENSITY-MODULATED RADIATION THERAPY (IMRT), COMPLEX: CPT | Performed by: RADIOLOGY

## 2025-04-11 ENCOUNTER — HOSPITAL ENCOUNTER (OUTPATIENT)
Dept: RADIATION ONCOLOGY | Facility: HOSPITAL | Age: 55
Setting detail: RADIATION/ONCOLOGY SERIES
Discharge: HOME | End: 2025-04-11
Payer: COMMERCIAL

## 2025-04-11 DIAGNOSIS — Z51.0 ENCOUNTER FOR ANTINEOPLASTIC RADIATION THERAPY: ICD-10-CM

## 2025-04-11 DIAGNOSIS — C25.0 MALIGNANT NEOPLASM OF HEAD OF PANCREAS (MULTI): ICD-10-CM

## 2025-04-11 LAB
RAD ONC MSQ ACTUAL FRACTIONS DELIVERED: 10
RAD ONC MSQ ACTUAL SESSION DELIVERED DOSE: 180 CGRAY
RAD ONC MSQ ACTUAL TOTAL DOSE: 1800 CGRAY
RAD ONC MSQ ELAPSED DAYS: 11
RAD ONC MSQ LAST DATE: NORMAL
RAD ONC MSQ PRESCRIBED FRACTIONAL DOSE: 180 CGRAY
RAD ONC MSQ PRESCRIBED NUMBER OF FRACTIONS: 28
RAD ONC MSQ PRESCRIBED TECHNIQUE: NORMAL
RAD ONC MSQ PRESCRIBED TOTAL DOSE: 5040 CGRAY
RAD ONC MSQ PRESCRIPTION PATTERN COMMENT: NORMAL
RAD ONC MSQ START DATE: NORMAL
RAD ONC MSQ TREATMENT COURSE NUMBER: 1
RAD ONC MSQ TREATMENT SITE: NORMAL

## 2025-04-11 PROCEDURE — 77386 HC INTENSITY-MODULATED RADIATION THERAPY (IMRT), COMPLEX: CPT | Performed by: RADIOLOGY

## 2025-04-14 ENCOUNTER — HOSPITAL ENCOUNTER (OUTPATIENT)
Dept: RADIATION ONCOLOGY | Facility: HOSPITAL | Age: 55
Setting detail: RADIATION/ONCOLOGY SERIES
Discharge: HOME | End: 2025-04-14
Payer: COMMERCIAL

## 2025-04-14 ENCOUNTER — SPECIALTY PHARMACY (OUTPATIENT)
Dept: HEMATOLOGY/ONCOLOGY | Facility: HOSPITAL | Age: 55
End: 2025-04-14
Payer: COMMERCIAL

## 2025-04-14 DIAGNOSIS — C25.0 MALIGNANT NEOPLASM OF HEAD OF PANCREAS (MULTI): ICD-10-CM

## 2025-04-14 DIAGNOSIS — Z51.0 ENCOUNTER FOR ANTINEOPLASTIC RADIATION THERAPY: ICD-10-CM

## 2025-04-14 LAB
RAD ONC MSQ ACTUAL FRACTIONS DELIVERED: 11
RAD ONC MSQ ACTUAL SESSION DELIVERED DOSE: 180 CGRAY
RAD ONC MSQ ACTUAL TOTAL DOSE: 1980 CGRAY
RAD ONC MSQ ELAPSED DAYS: 14
RAD ONC MSQ LAST DATE: NORMAL
RAD ONC MSQ PRESCRIBED FRACTIONAL DOSE: 180 CGRAY
RAD ONC MSQ PRESCRIBED NUMBER OF FRACTIONS: 28
RAD ONC MSQ PRESCRIBED TECHNIQUE: NORMAL
RAD ONC MSQ PRESCRIBED TOTAL DOSE: 5040 CGRAY
RAD ONC MSQ PRESCRIPTION PATTERN COMMENT: NORMAL
RAD ONC MSQ START DATE: NORMAL
RAD ONC MSQ TREATMENT COURSE NUMBER: 1
RAD ONC MSQ TREATMENT SITE: NORMAL

## 2025-04-14 PROCEDURE — 77386 HC INTENSITY-MODULATED RADIATION THERAPY (IMRT), COMPLEX: CPT | Performed by: RADIOLOGY

## 2025-04-14 NOTE — PROGRESS NOTES
[facility] Clinical Note  Patient Reassessment     Introduction  Nate Alvarez is a 54 y.o. male who is on the specialty pharmacy service for management of: Oncology Core.      [facility] supplied medication: Capecitabine    Duration of therapy: Patient/Prescriber Specific- Until RT completed (last scheduled RT 5/7/25)    Discussion  Nate was contacted on 4/14/2025 at 11:05 AM for a pharmacy visit with encounter number [id number] from:   [facility]  [address]  [address]  [facility] [address]  Dept: [phone]  Loc: [phone]  Nate consented to a/an Telephone visit, which was performed.    Efficacy  Patient has developed new symptoms of condition: No  Patient/caregiver feels medication is affecting the disease state: yes    Goals  Provided education on goals and possible outcomes of therapy:  Adherence with therapy  Timely completion of appropriate labs  Timely and appropriate follow up with provider  Identify and address medication interactions with presciption medications, OTC medications and supplements  Optimize or maintain quality of life  Oncology: Curative with treatment  Manage side effects (ex: nausea/vomiting, constipation, fatigue) in conjunction with care team  Patient has documented target(s) for goals of therapy: No    Tolerance  Patient has experienced side effects from this medication: Yes - nausea, diarrhea - managed with PRN antiemetics and antidiarrheals   Changes to current therapy regimen: No    The follow-up timeline was discussed. Every person responds to and reacts to therapy differently. Patient should be assessed for efficacy and tolerability in approximately: 8-12 weeks    Adherence  Patient Information  Informant: Self (Patient)  Demonstrates Understanding of Importance of Adherence: Yes  Does the patient have any barriers to self-administration (including physical and mental?): No  Medication  "Information  Medication: capecitabine (Xeloda)  Patient Reported Missed Doses in the Last 4 Weeks: 1 (Forgot)  Estimated Medication Adherence Level: Good  Barriers to Adherence: No Problems identified   The importance of adherence was discussed and patient/caregiver was advised to take the medication as prescribed by their provider. Encouraged patient/caregiver to call physician's office or specialty pharmacy if they have a question regarding a missed dose.    General Assessment  Changes to home medications, OTCs or supplements: No  Current Outpatient Medications   Medication Sig Dispense Refill    acetaminophen (Tylenol) 325 mg tablet Take 2 tablets (650 mg) by mouth every 6 hours. (Patient not taking: Reported on 4/7/2025)      alpha lipoic acid 600 mg capsule Take 600 mg by mouth once daily. (Patient not taking: Reported on 4/7/2025) 30 capsule 11    apixaban (Eliquis) 5 mg tablet Take 1 tablet (5 mg) by mouth 2 times a day. Do not fill before February 7, 2025. 60 tablet 11    BD Insulin Syringe Ultra-Fine 0.3 mL 31 gauge x 5/16\" syringe Inject 1 each under the skin 4 times a day before meals. 200 each 11    blood sugar diagnostic (Blood Glucose Test) strip Check blood sugars once daily 100 strip 3    blood-glucose meter misc Check  blood sugar as needed 1 each 0    blood-glucose sensor (FreeStyle Jef 3 Plus Sensor) device Use as directed 2 each 11    capecitabine (Xeloda) 500 mg tablet Take 3 tablets (1500 mg) by mouth with breakfast and 4 tablets (2000 mg) by mouth with dinner only on days of radiation (Monday through Friday).  Swallow whole with water. Do not crush or cut. 210 tablet 0    docusate sodium (Colace) 100 mg capsule Take 1 capsule (100 mg) by mouth 2 times a day. (Patient not taking: Reported on 4/7/2025)      FreeStyle Ejf 3 [place] misc Use with sensors as directed 1 each 0    insulin glargine (Lantus) 100 unit/mL (3 mL) pen Inject 28 Units under the skin once daily at bedtime. Take as " "directed per insulin instructions. 10 each 5    insulin lispro 100 unit/mL injection 6 units subcutaneous 3x day before meals + sliding scale  0 unit(s) if Blood glucose is between    2 unit(s) if Blood glucose is between 151-200   4 unit(s) if Blood glucose is between 201-250   6 unit(s) if Blood glucose is between 251-300   8 unit(s) if Blood glucose is between 301-350   10 unit(s) if Blood glucose is between 351-400      lancets misc Check blood sugars once daily 100 each 3    loperamide (Imodium) 2 mg capsule Take 1 capsule (2 mg) by mouth 4 times a day as needed for diarrhea.      LORazepam (Ativan) 1 mg tablet Take 1 tablet (1 mg) by mouth as needed at bedtime for anxiety. Insomnia, or nausea (Patient not taking: Reported on 4/7/2025) 30 tablet 2    naloxone (Narcan) 4 mg/0.1 mL nasal spray Administer 1 spray (4 mg) into affected nostril(s) if needed for opioid reversal. May repeat every 2-3 minutes if needed, alternating nostrils, until medical assistance becomes available. (Patient not taking: Reported on 4/7/2025) 2 each 0    ondansetron (Zofran) 8 mg tablet Take 1 tablet (8 mg) by mouth every 8 hours if needed for nausea or vomiting. 30 tablet 5    oxyCODONE (Roxicodone) 10 mg immediate release tablet Take 1 tablet (10 mg) by mouth every 6 hours if needed for severe pain (7 - 10). 120 tablet 0    pancrelipase, Lip-Prot-Amyl, (Creon) 36,000-114,000- 180,000 unit capsule,delayed release(DR/EC) capsule Take 2-3 capsules by mouth with meals and 1-2 capsules with snacks daily; for an average of 13 capsules per day. 400 capsule 3    pantoprazole (ProtoNix) 40 mg EC tablet Take 1 tablet (40 mg) by mouth 2 times a day. Do not crush, chew, or split. 60 tablet 5    pen needle, diabetic (BD Ultra-Fine Short Pen Needle) 31 gauge x 5/16\" needle Use four times daily with insulin 400 each 1    polyethylene glycol (Glycolax, Miralax) 17 gram packet Take 17 g by mouth once daily. (Patient not taking: Reported on " 4/7/2025)      prochlorperazine (Compazine) 10 mg tablet Take 1 tablet (10 mg) by mouth every 6 hours if needed for nausea or vomiting. (Patient not taking: Reported on 4/7/2025) 30 tablet 5     No current facility-administered medications for this visit.     Facility-Administered Medications Ordered in Other Visits   Medication Dose Route Frequency Provider Last Rate Last Admin    heparin flush 100 unit/mL syringe 500 Units  500 Units intra-catheter PRN LEE May   500 Units at 08/05/24 1344     Reported new allergies: No  Reported new medical conditions: No  Additional monitoring reviewed: Oncology - CBC-diff:   Lab Results   Component Value Date    WBC 6.3 02/18/2025    RBC 4.30 (L) 02/18/2025    HGB 12.3 (L) 02/18/2025    HCT 38.9 (L) 02/18/2025    MCV 91 02/18/2025    MCHC 31.6 (L) 02/18/2025     02/18/2025    RDW 15.6 (H) 02/18/2025    NEUTOPHILPCT 55.5 02/18/2025    IGPCT 0.0 02/18/2025    LYMPHOPCT 33.5 02/18/2025    MONOPCT 9.0 02/18/2025    EOSPCT 1.7 02/18/2025    BASOPCT 0.3 02/18/2025    NEUTROABS 3.50 02/18/2025    LYMPHSABS 2.12 02/18/2025    MONOSABS 0.57 02/18/2025    EOSABS 0.11 02/18/2025    BASOSABS 0.02 02/18/2025    and CMP:   Lab Results   Component Value Date    GLUCOSE 201 (H) 02/18/2025     02/18/2025    K 4.3 02/18/2025     02/18/2025    CO2 27 02/18/2025    ANIONGAP 14 02/18/2025    BUN 20 02/18/2025    CREATININE 0.62 02/18/2025    CALCIUM 9.5 02/18/2025    ALBUMIN 4.0 02/18/2025    ALKPHOS 67 02/18/2025    PROT 6.4 02/18/2025    AST 28 02/18/2025    BILITOT 0.5 02/18/2025    ALT 24 02/18/2025     Is laboratory follow up needed? No    Advised to contact the pharmacy if there are any changes to the patient's medication list, including prescriptions, OTC medications, herbal products, or supplements.    Impression/Plan  This patient has not been identified as high risk due to Lack of high risk qualifiers.  The following action was taken: N/A.          Provided contact information ([phone]) for [facility] and reviewed dispensing process, refill timeline and patient management follow up. Confirmed understanding of education conducted during assessment. All questions and concerns were addressed and patient/caregiver was encouraged to reach out for additional questions or concerns.    Based on the patient's diagnosis, medication list, progress towards goals, adherence, tolerance, and medication list, medication remains appropriate: Therapy remains appropriate (I attest)    Shira Downing, RadhaD

## 2025-04-15 ENCOUNTER — HOSPITAL ENCOUNTER (OUTPATIENT)
Dept: RADIATION ONCOLOGY | Facility: HOSPITAL | Age: 55
Setting detail: RADIATION/ONCOLOGY SERIES
Discharge: HOME | End: 2025-04-15
Payer: COMMERCIAL

## 2025-04-15 VITALS
HEART RATE: 81 BPM | SYSTOLIC BLOOD PRESSURE: 94 MMHG | HEIGHT: 74 IN | BODY MASS INDEX: 25.93 KG/M2 | TEMPERATURE: 97.5 F | DIASTOLIC BLOOD PRESSURE: 66 MMHG | WEIGHT: 202 LBS | RESPIRATION RATE: 18 BRPM | OXYGEN SATURATION: 96 %

## 2025-04-15 DIAGNOSIS — Z51.0 ENCOUNTER FOR ANTINEOPLASTIC RADIATION THERAPY: ICD-10-CM

## 2025-04-15 DIAGNOSIS — C25.0 MALIGNANT NEOPLASM OF HEAD OF PANCREAS (MULTI): ICD-10-CM

## 2025-04-15 LAB
RAD ONC MSQ ACTUAL FRACTIONS DELIVERED: 12
RAD ONC MSQ ACTUAL SESSION DELIVERED DOSE: 180 CGRAY
RAD ONC MSQ ACTUAL TOTAL DOSE: 2160 CGRAY
RAD ONC MSQ ELAPSED DAYS: 15
RAD ONC MSQ LAST DATE: NORMAL
RAD ONC MSQ PRESCRIBED FRACTIONAL DOSE: 180 CGRAY
RAD ONC MSQ PRESCRIBED NUMBER OF FRACTIONS: 28
RAD ONC MSQ PRESCRIBED TECHNIQUE: NORMAL
RAD ONC MSQ PRESCRIBED TOTAL DOSE: 5040 CGRAY
RAD ONC MSQ PRESCRIPTION PATTERN COMMENT: NORMAL
RAD ONC MSQ START DATE: NORMAL
RAD ONC MSQ TREATMENT COURSE NUMBER: 1
RAD ONC MSQ TREATMENT SITE: NORMAL

## 2025-04-15 PROCEDURE — 77386 HC INTENSITY-MODULATED RADIATION THERAPY (IMRT), COMPLEX: CPT | Performed by: RADIOLOGY

## 2025-04-15 NOTE — PROGRESS NOTES
"RADIATION ONCOLOGY ON-TREATMENT VISIT NOTE  Patient Name:  Nate Alvarez  MRN:  [id number]  :  1970    Radiation Oncologist: Mary Kay Espinoza MD  Referring Provider: Shaw You MD  Primary Care Provider: Hazel Medeiros MD  Care Team: Patient Care Team:  Hazel Medeiros MD as PCP - General (Family Medicine)  Hazel Medeiros MD as PCP - [facility] PCP  Darcy Kauffman MD as Consulting Physician (Sleep Medicine)  Shaw You MD as Consulting Physician (Hematology and Oncology)  UJAN May-CNP as Nurse Practitioner (Hematology and Oncology)  Roddy Gamble MD as Surgeon (Surgical Oncology)  Mercy Romero RDN, TOMMY as Dietitian (Nutrition)    Date of Service: 4/15/2025     Mr. Nate Alvarez is a 54-year-old man with [id number] pancreas adencarcinoma, status post neoadjuvant mFOLFIRINOX x12cy, then total pancreatectomy to +SMV margin and  LN+. He is undergoing adjuvant chemoradiation to 50.4Gy/28fx with concurrent capecitabine.   Malignant neoplasm of head of pancreas (Multi), Pathologic: Stage IIB (ypT2, pN1, cM0)    Specialty Problems          Radiation Oncology Problems    Basal cell carcinoma (BCC) of face        Malignant neoplasm of head of pancreas (Multi)           Treatment Summary:  Radiation Therapy    Treatment Period Technique Fraction Dose Fractions Total Dose   Course 1 3/31/2025-4/15/2025  (days elapsed: 15)         pancbed 3/31/2025-4/15/2025 VMAT 180 / 180 cGy  2160 / 5,040 cGy       SUBJECTIVE: Feeling okay, when he tries to drink a lot of water (or eat a lot) at once, he will regurgitate it. Just now starting to try snacking and more frequent meals; has been up til now eating just twice per day. Down 3# this week.     OBJECTIVE:   Vital Signs:  BP 94/66   Pulse 81   Temp 36.4 °C (97.5 °F) (Temporal)   Resp 18   Ht 1.88 m (6' 2\")   Wt 91.6 kg (202 lb)   SpO2 96%   BMI 25.94 kg/m²    Pain Scale:   Pain score: 0/10  Well appearing in NAD, BP somewhat " soft but VSS on whole, agrees to hydrate.    Toxicity Assessment          4/1/2025    14:56 4/7/2025    15:02 4/15/2025    14:53   Toxicity Assessment   Adverse Events Reviewed (WDL) No (Exceptions to WDL) No (Exceptions to WDL) No (Exceptions to WDL)   Treatment Site Abdomen Abdomen Abdomen   Anorexia Grade 1 Grade 1 Grade 1   Anxiety Grade 0 Grade 0 Grade 0   Dehydration Grade 1 Grade 0 Grade 1   Depression Grade 0 Grade 0 Grade 0   Diarrhea Grade 0 Grade 1       taking imodium as need Grade 0   Fatigue Grade 1 Grade 1 Grade 1   Nausea Grade 1 Grade 1 Grade 1   Pain Grade 1       abd and right to back and down spine; ache and radiates out. Grade 2       taking oxycodone  abd and raps around and mid back (lumbar) and radiates to hip ; Grade 2       oxy q 6 hours as needed.  abd and back and down into hips; ache not stabby   Tumor Pain Grade 1 Grade 2 Grade 2   Vomiting Grade 0 Grade 1 Grade 1   Abdominal Pain Grade 1 Grade 2 Grade 2   Bloating Grade 0 Grade 0 Grade 0   Constipation Grade 0 Grade 0 Grade 0        ASSESSMENT/PLAN:  The patient is tolerating radiation therapy as anticipated.  Continue per current treatment plan.

## 2025-04-16 ENCOUNTER — NUTRITION (OUTPATIENT)
Dept: HEMATOLOGY/ONCOLOGY | Facility: HOSPITAL | Age: 55
End: 2025-04-16

## 2025-04-16 ENCOUNTER — TELEPHONE (OUTPATIENT)
Dept: ADMISSION | Facility: HOSPITAL | Age: 55
End: 2025-04-16

## 2025-04-16 ENCOUNTER — APPOINTMENT (OUTPATIENT)
Dept: HEMATOLOGY/ONCOLOGY | Facility: CLINIC | Age: 55
End: 2025-04-16
Payer: COMMERCIAL

## 2025-04-16 ENCOUNTER — HOSPITAL ENCOUNTER (OUTPATIENT)
Dept: RADIATION ONCOLOGY | Facility: HOSPITAL | Age: 55
Setting detail: RADIATION/ONCOLOGY SERIES
Discharge: HOME | End: 2025-04-16
Payer: COMMERCIAL

## 2025-04-16 DIAGNOSIS — Z51.0 ENCOUNTER FOR ANTINEOPLASTIC RADIATION THERAPY: ICD-10-CM

## 2025-04-16 DIAGNOSIS — C25.0 MALIGNANT NEOPLASM OF HEAD OF PANCREAS (MULTI): ICD-10-CM

## 2025-04-16 LAB
RAD ONC MSQ ACTUAL FRACTIONS DELIVERED: 13
RAD ONC MSQ ACTUAL SESSION DELIVERED DOSE: 180 CGRAY
RAD ONC MSQ ACTUAL TOTAL DOSE: 2340 CGRAY
RAD ONC MSQ ELAPSED DAYS: 16
RAD ONC MSQ LAST DATE: NORMAL
RAD ONC MSQ PRESCRIBED FRACTIONAL DOSE: 180 CGRAY
RAD ONC MSQ PRESCRIBED NUMBER OF FRACTIONS: 28
RAD ONC MSQ PRESCRIBED TECHNIQUE: NORMAL
RAD ONC MSQ PRESCRIBED TOTAL DOSE: 5040 CGRAY
RAD ONC MSQ PRESCRIPTION PATTERN COMMENT: NORMAL
RAD ONC MSQ START DATE: NORMAL
RAD ONC MSQ TREATMENT COURSE NUMBER: 1
RAD ONC MSQ TREATMENT SITE: NORMAL

## 2025-04-16 PROCEDURE — 77336 RADIATION PHYSICS CONSULT: CPT | Performed by: RADIOLOGY

## 2025-04-16 PROCEDURE — 77386 HC INTENSITY-MODULATED RADIATION THERAPY (IMRT), COMPLEX: CPT | Performed by: RADIOLOGY

## 2025-04-16 NOTE — TELEPHONE ENCOUNTER
Pt is requesting a refill of oxycodone 10mg q6 prn, #120.  Last prescription was written 3/21/25.  Preferred pharmacy is [facility] in [place].

## 2025-04-17 ENCOUNTER — APPOINTMENT (OUTPATIENT)
Dept: PRIMARY CARE | Facility: CLINIC | Age: 55
End: 2025-04-17
Payer: COMMERCIAL

## 2025-04-17 ENCOUNTER — HOSPITAL ENCOUNTER (OUTPATIENT)
Dept: RADIATION ONCOLOGY | Facility: HOSPITAL | Age: 55
Setting detail: RADIATION/ONCOLOGY SERIES
Discharge: HOME | End: 2025-04-17
Payer: COMMERCIAL

## 2025-04-17 ENCOUNTER — APPOINTMENT (OUTPATIENT)
Dept: RADIOLOGY | Facility: HOSPITAL | Age: 55
End: 2025-04-17
Payer: COMMERCIAL

## 2025-04-17 DIAGNOSIS — Z51.0 ENCOUNTER FOR ANTINEOPLASTIC RADIATION THERAPY: ICD-10-CM

## 2025-04-17 DIAGNOSIS — C25.0 MALIGNANT NEOPLASM OF HEAD OF PANCREAS (MULTI): ICD-10-CM

## 2025-04-17 LAB
RAD ONC MSQ ACTUAL FRACTIONS DELIVERED: 14
RAD ONC MSQ ACTUAL SESSION DELIVERED DOSE: 180 CGRAY
RAD ONC MSQ ACTUAL TOTAL DOSE: 2520 CGRAY
RAD ONC MSQ ELAPSED DAYS: 17
RAD ONC MSQ LAST DATE: NORMAL
RAD ONC MSQ PRESCRIBED FRACTIONAL DOSE: 180 CGRAY
RAD ONC MSQ PRESCRIBED NUMBER OF FRACTIONS: 28
RAD ONC MSQ PRESCRIBED TECHNIQUE: NORMAL
RAD ONC MSQ PRESCRIBED TOTAL DOSE: 5040 CGRAY
RAD ONC MSQ PRESCRIPTION PATTERN COMMENT: NORMAL
RAD ONC MSQ START DATE: NORMAL
RAD ONC MSQ TREATMENT COURSE NUMBER: 1
RAD ONC MSQ TREATMENT SITE: NORMAL

## 2025-04-17 PROCEDURE — 77386 HC INTENSITY-MODULATED RADIATION THERAPY (IMRT), COMPLEX: CPT | Performed by: RADIOLOGY

## 2025-04-18 ENCOUNTER — HOSPITAL ENCOUNTER (OUTPATIENT)
Dept: RADIATION ONCOLOGY | Facility: HOSPITAL | Age: 55
Setting detail: RADIATION/ONCOLOGY SERIES
Discharge: HOME | End: 2025-04-18
Payer: COMMERCIAL

## 2025-04-18 DIAGNOSIS — C25.0 MALIGNANT NEOPLASM OF HEAD OF PANCREAS (MULTI): ICD-10-CM

## 2025-04-18 DIAGNOSIS — Z51.0 ENCOUNTER FOR ANTINEOPLASTIC RADIATION THERAPY: ICD-10-CM

## 2025-04-18 LAB
RAD ONC MSQ ACTUAL FRACTIONS DELIVERED: 15
RAD ONC MSQ ACTUAL SESSION DELIVERED DOSE: 180 CGRAY
RAD ONC MSQ ACTUAL TOTAL DOSE: 2700 CGRAY
RAD ONC MSQ ELAPSED DAYS: 18
RAD ONC MSQ LAST DATE: NORMAL
RAD ONC MSQ PRESCRIBED FRACTIONAL DOSE: 180 CGRAY
RAD ONC MSQ PRESCRIBED NUMBER OF FRACTIONS: 28
RAD ONC MSQ PRESCRIBED TECHNIQUE: NORMAL
RAD ONC MSQ PRESCRIBED TOTAL DOSE: 5040 CGRAY
RAD ONC MSQ PRESCRIPTION PATTERN COMMENT: NORMAL
RAD ONC MSQ START DATE: NORMAL
RAD ONC MSQ TREATMENT COURSE NUMBER: 1
RAD ONC MSQ TREATMENT SITE: NORMAL

## 2025-04-18 PROCEDURE — 77386 HC INTENSITY-MODULATED RADIATION THERAPY (IMRT), COMPLEX: CPT | Performed by: RADIOLOGY

## 2025-04-18 RX ORDER — OXYCODONE HYDROCHLORIDE 10 MG/1
10 TABLET ORAL EVERY 6 HOURS PRN
Qty: 120 TABLET | Refills: 0 | Status: SHIPPED | OUTPATIENT
Start: 2025-04-18 | End: 2025-05-18

## 2025-04-18 NOTE — PROGRESS NOTES
NUTRITION Follow Up NOTE    Nutrition Assessment     Reason for Visit:  Nate Alvarez is a 54 y.o. male who presented with Borderline Resectable Pancreatic cancer dx'd 5/2024     Oncologic Surgery History:  Total pancreatectomy, splenectomy, distal gastrectomy with gastrojejunostomy, choledochojejunostomy, cholecystectomy,  SNV resection/reconstruction with Heidi Gamble and Theresa 12/12/2024 [id number] possible + SMV margin however unclear since normal vein transected.     Diabetes:  Continues long and short acting insulin as managed by Dr. Brandon Vasquez    Oncologic Therapy History:  Neoadjuvant FOLFIRINOX x 12 cycles 6/2024-11/2024    Pt with RT today 12/28 fractions - his spouse is present     Ordered a nuclear medicine gastric emptying study- this will take place 3-.     IMPRESSION:  Rapid gastric emptying of solids, which can be a source of  gastrointestinal symptoms. No evidence of gastroparesis.    PMH noted: GERD, HTN, oropharyngeal dysphagia (from fractured neck x 3), RADHA      Lab Results   Component Value Date/Time    GLUCOSE 201 (H) 02/18/2025 1253     02/18/2025 1253    K 4.3 02/18/2025 1253     02/18/2025 1253    CO2 27 02/18/2025 1253    ANIONGAP 14 02/18/2025 1253    BUN 20 02/18/2025 1253    CREATININE 0.62 02/18/2025 1253    EGFR >90 02/18/2025 1253    CALCIUM 9.5 02/18/2025 1253    ALBUMIN 4.0 02/18/2025 1253    ALKPHOS 67 02/18/2025 1253    PROT 6.4 02/18/2025 1253    AST 28 02/18/2025 1253    BILITOT 0.5 02/18/2025 1253    ALT 24 02/18/2025 1253    MG 2.21 12/18/2024 0602    PHOS 2.6 12/18/2024 0602     Lab Results   Component Value Date/Time    VITD25 13 (L) 04/16/2024 0740       Lab Results   Component Value Date    HGBA1C 5.3 12/06/2024            Anthropometrics:     HT: 188.1 cm  BMI: 26.94  IBW: 86.4 kg  110% IBW      Wt Readings from Last 10 Encounters:   04/15/25 91.6 kg (202 lb)   04/07/25 93.4 kg (205 lb 14.4 oz)   04/01/25 95 kg (209 lb 6.4 oz)   03/20/25  weakness 95.9 kg (211 lb 6.4 oz)   03/03/25 95.3 kg (210 lb 1.6 oz)   02/27/25 98.2 kg (216 lb 9.6 oz)   02/26/25 98.2 kg (216 lb 9.6 oz)   02/18/25 95.8 kg (211 lb 5 oz)   02/18/25 95.8 kg (211 lb 5 oz)   01/29/25 98.2 kg (216 lb 9.6 oz)     Reported a UBW of ~300 #  Stated he had lost a lot of muscle mass.    Lowest wt post surgery was 204# (92.7 kg)     Meds- ALA, protonix, Lantus 30 units, lispro 6 units + SSI, creon 36,000 lipasse units, comapzine, zofran   Has a rand 3     Food And Nutrient Intake:     Pt reports the is back to vomiting and with weight loss  Plan is to focus on supplements  Try to pace is eating   Try to eat / drink every 1.5 to 2 hours  Focus on taking anti-emetics                                                              He takes 3-4 each Creon capsules with his meals and 1 if he snacks.            Nutrition Focused Physical Exam Findings:  Deferred time and comfort level                           Energy Needs     Dosing weight: 86.4 to 95.3 kg  Calories per day: 2590 to 2860  determined by 30 kcal/kg  Protein (g) per day: 100 to 115 determined by 1.2 g/kg  Estimated fluid needs: 2590 +  determined by 1 kcal/mL       Nutrition Diagnosis             Nutrition Interventions/Recommendations   Nutrition Prescription       Food and Nutrition Delivery       Nutrition Education       Coordination of Care   Rad onc and RDN at [facility]     There are no Patient Instructions on file for this visit.    Nutrition Monitoring and Evaluation   Food and Nutrient Intake  Monitoring and Evaluation Plan: Energy intake, Fluid intake, Protein intake  Energy Intake: Estimated energy intake  Criteria: 75% of needs  Fluid Intake: Estimated fluid intake  Criteria: 75 % of needs  Estimated protein intake: Estimated protein intake  Criteria: 75% of needs  Anthropometric measurements  Monitoring and Evaluation Plan: Weight  Body Weight: Body weight  Criteria: maintain  Biochemical Data, Medical Tests and Procedures  Monitoring  and Evaluation Plan: Electrolyte/renal panel, Glucose/endocrine profile  Electrolyte and Renal Panel: BUN, Chloride, Creatinine, Magnesium, Phosphorus, Potassium, Sodium  Criteria: WNL  Glucose/Endocrine Profile: Glucose, casual  Criteria: WNL    Will follow weekly

## 2025-04-21 ENCOUNTER — HOSPITAL ENCOUNTER (OUTPATIENT)
Dept: RADIATION ONCOLOGY | Facility: HOSPITAL | Age: 55
Setting detail: RADIATION/ONCOLOGY SERIES
Discharge: HOME | End: 2025-04-21
Payer: COMMERCIAL

## 2025-04-21 DIAGNOSIS — C25.0 MALIGNANT NEOPLASM OF HEAD OF PANCREAS (MULTI): ICD-10-CM

## 2025-04-21 DIAGNOSIS — Z51.0 ENCOUNTER FOR ANTINEOPLASTIC RADIATION THERAPY: ICD-10-CM

## 2025-04-21 LAB
RAD ONC MSQ ACTUAL FRACTIONS DELIVERED: 16
RAD ONC MSQ ACTUAL SESSION DELIVERED DOSE: 180 CGRAY
RAD ONC MSQ ACTUAL TOTAL DOSE: 2880 CGRAY
RAD ONC MSQ ELAPSED DAYS: 21
RAD ONC MSQ LAST DATE: NORMAL
RAD ONC MSQ PRESCRIBED FRACTIONAL DOSE: 180 CGRAY
RAD ONC MSQ PRESCRIBED NUMBER OF FRACTIONS: 28
RAD ONC MSQ PRESCRIBED TECHNIQUE: NORMAL
RAD ONC MSQ PRESCRIBED TOTAL DOSE: 5040 CGRAY
RAD ONC MSQ PRESCRIPTION PATTERN COMMENT: NORMAL
RAD ONC MSQ START DATE: NORMAL
RAD ONC MSQ TREATMENT COURSE NUMBER: 1
RAD ONC MSQ TREATMENT SITE: NORMAL

## 2025-04-21 PROCEDURE — 77386 HC INTENSITY-MODULATED RADIATION THERAPY (IMRT), COMPLEX: CPT | Performed by: RADIOLOGY

## 2025-04-22 ENCOUNTER — HOSPITAL ENCOUNTER (OUTPATIENT)
Dept: RADIATION ONCOLOGY | Facility: HOSPITAL | Age: 55
Setting detail: RADIATION/ONCOLOGY SERIES
Discharge: HOME | End: 2025-04-22
Payer: COMMERCIAL

## 2025-04-22 ENCOUNTER — PHARMACY VISIT (OUTPATIENT)
Dept: PHARMACY | Facility: CLINIC | Age: 55
End: 2025-04-22
Payer: COMMERCIAL

## 2025-04-22 VITALS
TEMPERATURE: 97.3 F | DIASTOLIC BLOOD PRESSURE: 71 MMHG | OXYGEN SATURATION: 96 % | HEART RATE: 79 BPM | SYSTOLIC BLOOD PRESSURE: 124 MMHG | WEIGHT: 198.3 LBS | RESPIRATION RATE: 18 BRPM | BODY MASS INDEX: 25.46 KG/M2

## 2025-04-22 DIAGNOSIS — Z51.0 ENCOUNTER FOR ANTINEOPLASTIC RADIATION THERAPY: ICD-10-CM

## 2025-04-22 DIAGNOSIS — R11.2 REFRACTORY NAUSEA AND VOMITING: Primary | ICD-10-CM

## 2025-04-22 DIAGNOSIS — C25.0 MALIGNANT NEOPLASM OF HEAD OF PANCREAS (MULTI): ICD-10-CM

## 2025-04-22 LAB
RAD ONC MSQ ACTUAL FRACTIONS DELIVERED: 17
RAD ONC MSQ ACTUAL SESSION DELIVERED DOSE: 180 CGRAY
RAD ONC MSQ ACTUAL TOTAL DOSE: 3060 CGRAY
RAD ONC MSQ ELAPSED DAYS: 22
RAD ONC MSQ LAST DATE: NORMAL
RAD ONC MSQ PRESCRIBED FRACTIONAL DOSE: 180 CGRAY
RAD ONC MSQ PRESCRIBED NUMBER OF FRACTIONS: 28
RAD ONC MSQ PRESCRIBED TECHNIQUE: NORMAL
RAD ONC MSQ PRESCRIBED TOTAL DOSE: 5040 CGRAY
RAD ONC MSQ PRESCRIPTION PATTERN COMMENT: NORMAL
RAD ONC MSQ START DATE: NORMAL
RAD ONC MSQ TREATMENT COURSE NUMBER: 1
RAD ONC MSQ TREATMENT SITE: NORMAL

## 2025-04-22 PROCEDURE — RXMED WILLOW AMBULATORY MEDICATION CHARGE

## 2025-04-22 PROCEDURE — 77336 RADIATION PHYSICS CONSULT: CPT | Performed by: RADIOLOGY

## 2025-04-22 PROCEDURE — 77386 HC INTENSITY-MODULATED RADIATION THERAPY (IMRT), COMPLEX: CPT | Performed by: RADIOLOGY

## 2025-04-22 RX ORDER — OLANZAPINE 5 MG/1
5 TABLET, FILM COATED ORAL NIGHTLY
Qty: 30 TABLET | Refills: 0 | Status: SHIPPED | OUTPATIENT
Start: 2025-04-22

## 2025-04-22 ASSESSMENT — PAIN SCALES - GENERAL: PAINLEVEL_OUTOF10: 2

## 2025-04-22 NOTE — PROGRESS NOTES
RADIATION ONCOLOGY ON-TREATMENT VISIT NOTE  Patient Name:  Nate Alvarez  MRN:  [id number]  :  1970    Radiation Oncologist: Mary Kay Espinoza MD  Referring Provider: Shaw You MD  Primary Care Provider: Hazel Medeiros MD  Care Team: Patient Care Team:  Hazel Medeiros MD as PCP - General (Family Medicine)  Hazel Medeiros MD as PCP - [facility] PCP  Darcy Kauffman MD as Consulting Physician (Sleep Medicine)  Shaw You MD as Consulting Physician (Hematology and Oncology)  JUAN May-CNP as Nurse Practitioner (Hematology and Oncology)  Roddy Gamble MD as Surgeon (Surgical Oncology)  Mercy Romero RDN, TOMMY as Dietitian (Nutrition)    Date of Service: 2025     Mr. Nate Alvarez is a 54-year-old man with [id number] pancreas adencarcinoma, status post neoadjuvant mFOLFIRINOX x12cy, then total pancreatectomy to +SMV margin and  LN+. He is undergoing adjuvant chemoradiation to 50.4Gy/28fx with concurrent capecitabine.   Malignant neoplasm of head of pancreas (Multi), Pathologic: Stage IIB (ypT2, pN1, cM0)    Specialty Problems          Radiation Oncology Problems    Basal cell carcinoma (BCC) of face        Malignant neoplasm of head of pancreas (Multi)           Treatment Summary:  Radiation Therapy    Treatment Period Technique Fraction Dose Fractions Total Dose   Course 1 3/31/2025-2025  (days elapsed: )         pancbed 3/31/2025-2025 VMAT 180 / 180 cGy  3,060 / 5,040 cGy       SUBJECTIVE: having some ongoing nausea, zofran helping more than compazine, fighting to stay ahead with fluids and calories. Otherwise feeling okay.    OBJECTIVE:   Vital Signs:  /71   Pulse 79   Temp 36.3 °C (97.3 °F) (Temporal)   Resp 18   Wt 89.9 kg (198 lb 4.8 oz)   SpO2 96%   BMI 25.46 kg/m²    Pain Scale:   Pain score: 0/10  In NAD, VSS, fatigued but well appearing. Wt 198 from 207 at RT start    Toxicity Assessment          2025    14:56 2025    15:02  4/15/2025    14:53 4/22/2025    14:43   Toxicity Assessment   Adverse Events Reviewed (WDL) No (Exceptions to WDL) No (Exceptions to WDL) No (Exceptions to WDL) No (Exceptions to WDL)   Treatment Site Abdomen Abdomen Abdomen Abdomen   Anorexia Grade 1 Grade 1 Grade 1 Grade 1   Anxiety Grade 0 Grade 0 Grade 0 Grade 0   Dehydration Grade 1 Grade 0 Grade 1 Grade 0   Depression Grade 0 Grade 0 Grade 0 Grade 0   Diarrhea Grade 0 Grade 1       taking imodium as need Grade 0 Grade 0   Fatigue Grade 1 Grade 1 Grade 1 Grade 1   Nausea Grade 1 Grade 1 Grade 1 Grade 2   Pain Grade 1       abd and right to back and down spine; ache and radiates out. Grade 2       taking oxycodone  abd and raps around and mid back (lumbar) and radiates to hip ; Grade 2       oxy q 6 hours as needed.  abd and back and down into hips; ache not stabby Grade 1       oxycodone  q 6 hours   Tumor Pain Grade 1 Grade 2 Grade 2 Grade 2   Vomiting Grade 0 Grade 1 Grade 1 Grade 1   Abdominal Pain Grade 1 Grade 2 Grade 2 Grade 2   Bloating Grade 0 Grade 0 Grade 0 Grade 0   Constipation Grade 0 Grade 0 Grade 0 Grade 0        ASSESSMENT/PLAN:  The patient is tolerating radiation therapy as anticipated.  Continue per current treatment plan.    Zyprexa 5mg at bedtime to [facility] pharmacy; dc compazine for now and use zofran as needed through the day.

## 2025-04-23 ENCOUNTER — HOSPITAL ENCOUNTER (OUTPATIENT)
Dept: RADIATION ONCOLOGY | Facility: HOSPITAL | Age: 55
Setting detail: RADIATION/ONCOLOGY SERIES
Discharge: HOME | End: 2025-04-23
Payer: COMMERCIAL

## 2025-04-23 DIAGNOSIS — C25.0 MALIGNANT NEOPLASM OF HEAD OF PANCREAS (MULTI): ICD-10-CM

## 2025-04-23 DIAGNOSIS — Z51.0 ENCOUNTER FOR ANTINEOPLASTIC RADIATION THERAPY: ICD-10-CM

## 2025-04-23 LAB
RAD ONC MSQ ACTUAL FRACTIONS DELIVERED: 18
RAD ONC MSQ ACTUAL SESSION DELIVERED DOSE: 180 CGRAY
RAD ONC MSQ ACTUAL TOTAL DOSE: 3240 CGRAY
RAD ONC MSQ ELAPSED DAYS: 23
RAD ONC MSQ LAST DATE: NORMAL
RAD ONC MSQ PRESCRIBED FRACTIONAL DOSE: 180 CGRAY
RAD ONC MSQ PRESCRIBED NUMBER OF FRACTIONS: 28
RAD ONC MSQ PRESCRIBED TECHNIQUE: NORMAL
RAD ONC MSQ PRESCRIBED TOTAL DOSE: 5040 CGRAY
RAD ONC MSQ PRESCRIPTION PATTERN COMMENT: NORMAL
RAD ONC MSQ START DATE: NORMAL
RAD ONC MSQ TREATMENT COURSE NUMBER: 1
RAD ONC MSQ TREATMENT SITE: NORMAL

## 2025-04-23 PROCEDURE — 77386 HC INTENSITY-MODULATED RADIATION THERAPY (IMRT), COMPLEX: CPT | Performed by: RADIOLOGY

## 2025-04-24 ENCOUNTER — HOSPITAL ENCOUNTER (OUTPATIENT)
Dept: RADIATION ONCOLOGY | Facility: HOSPITAL | Age: 55
Setting detail: RADIATION/ONCOLOGY SERIES
Discharge: HOME | End: 2025-04-24
Payer: COMMERCIAL

## 2025-04-24 DIAGNOSIS — C25.0 MALIGNANT NEOPLASM OF HEAD OF PANCREAS (MULTI): ICD-10-CM

## 2025-04-24 DIAGNOSIS — Z51.0 ENCOUNTER FOR ANTINEOPLASTIC RADIATION THERAPY: ICD-10-CM

## 2025-04-24 LAB
RAD ONC MSQ ACTUAL FRACTIONS DELIVERED: 19
RAD ONC MSQ ACTUAL SESSION DELIVERED DOSE: 180 CGRAY
RAD ONC MSQ ACTUAL TOTAL DOSE: 3420 CGRAY
RAD ONC MSQ ELAPSED DAYS: 24
RAD ONC MSQ LAST DATE: NORMAL
RAD ONC MSQ PRESCRIBED FRACTIONAL DOSE: 180 CGRAY
RAD ONC MSQ PRESCRIBED NUMBER OF FRACTIONS: 28
RAD ONC MSQ PRESCRIBED TECHNIQUE: NORMAL
RAD ONC MSQ PRESCRIBED TOTAL DOSE: 5040 CGRAY
RAD ONC MSQ PRESCRIPTION PATTERN COMMENT: NORMAL
RAD ONC MSQ START DATE: NORMAL
RAD ONC MSQ TREATMENT COURSE NUMBER: 1
RAD ONC MSQ TREATMENT SITE: NORMAL

## 2025-04-24 PROCEDURE — 77386 HC INTENSITY-MODULATED RADIATION THERAPY (IMRT), COMPLEX: CPT | Performed by: RADIOLOGY

## 2025-04-25 ENCOUNTER — HOSPITAL ENCOUNTER (OUTPATIENT)
Dept: RADIATION ONCOLOGY | Facility: HOSPITAL | Age: 55
Setting detail: RADIATION/ONCOLOGY SERIES
Discharge: HOME | End: 2025-04-25
Payer: COMMERCIAL

## 2025-04-25 DIAGNOSIS — C25.0 MALIGNANT NEOPLASM OF HEAD OF PANCREAS (MULTI): ICD-10-CM

## 2025-04-25 DIAGNOSIS — Z51.0 ENCOUNTER FOR ANTINEOPLASTIC RADIATION THERAPY: ICD-10-CM

## 2025-04-25 LAB
RAD ONC MSQ ACTUAL FRACTIONS DELIVERED: 20
RAD ONC MSQ ACTUAL SESSION DELIVERED DOSE: 180 CGRAY
RAD ONC MSQ ACTUAL TOTAL DOSE: 3600 CGRAY
RAD ONC MSQ ELAPSED DAYS: 25
RAD ONC MSQ LAST DATE: NORMAL
RAD ONC MSQ PRESCRIBED FRACTIONAL DOSE: 180 CGRAY
RAD ONC MSQ PRESCRIBED NUMBER OF FRACTIONS: 28
RAD ONC MSQ PRESCRIBED TECHNIQUE: NORMAL
RAD ONC MSQ PRESCRIBED TOTAL DOSE: 5040 CGRAY
RAD ONC MSQ PRESCRIPTION PATTERN COMMENT: NORMAL
RAD ONC MSQ START DATE: NORMAL
RAD ONC MSQ TREATMENT COURSE NUMBER: 1
RAD ONC MSQ TREATMENT SITE: NORMAL

## 2025-04-25 PROCEDURE — 77386 HC INTENSITY-MODULATED RADIATION THERAPY (IMRT), COMPLEX: CPT | Performed by: RADIOLOGY

## 2025-04-28 ENCOUNTER — HOSPITAL ENCOUNTER (OUTPATIENT)
Dept: RADIATION ONCOLOGY | Facility: HOSPITAL | Age: 55
Setting detail: RADIATION/ONCOLOGY SERIES
Discharge: HOME | End: 2025-04-28
Payer: COMMERCIAL

## 2025-04-28 VITALS
RESPIRATION RATE: 18 BRPM | HEART RATE: 88 BPM | DIASTOLIC BLOOD PRESSURE: 69 MMHG | TEMPERATURE: 97.9 F | WEIGHT: 195.9 LBS | SYSTOLIC BLOOD PRESSURE: 115 MMHG | OXYGEN SATURATION: 96 % | BODY MASS INDEX: 25.15 KG/M2

## 2025-04-28 DIAGNOSIS — C25.0 MALIGNANT NEOPLASM OF HEAD OF PANCREAS (MULTI): ICD-10-CM

## 2025-04-28 DIAGNOSIS — Z51.0 ENCOUNTER FOR ANTINEOPLASTIC RADIATION THERAPY: ICD-10-CM

## 2025-04-28 LAB
RAD ONC MSQ ACTUAL FRACTIONS DELIVERED: 21
RAD ONC MSQ ACTUAL SESSION DELIVERED DOSE: 180 CGRAY
RAD ONC MSQ ACTUAL TOTAL DOSE: 3780 CGRAY
RAD ONC MSQ ELAPSED DAYS: 28
RAD ONC MSQ LAST DATE: NORMAL
RAD ONC MSQ PRESCRIBED FRACTIONAL DOSE: 180 CGRAY
RAD ONC MSQ PRESCRIBED NUMBER OF FRACTIONS: 28
RAD ONC MSQ PRESCRIBED TECHNIQUE: NORMAL
RAD ONC MSQ PRESCRIBED TOTAL DOSE: 5040 CGRAY
RAD ONC MSQ PRESCRIPTION PATTERN COMMENT: NORMAL
RAD ONC MSQ START DATE: NORMAL
RAD ONC MSQ TREATMENT COURSE NUMBER: 1
RAD ONC MSQ TREATMENT SITE: NORMAL

## 2025-04-28 PROCEDURE — 77386 HC INTENSITY-MODULATED RADIATION THERAPY (IMRT), COMPLEX: CPT

## 2025-04-28 ASSESSMENT — PAIN SCALES - GENERAL: PAINLEVEL_OUTOF10: 2

## 2025-04-28 NOTE — PROGRESS NOTES
RADIATION ONCOLOGY ON-TREATMENT VISIT NOTE  Patient Name:  Nate Alvarez  MRN:  [id number]  :  1970    Radiation Oncologist: Mary Kay Espinoza MD  Referring Provider: Shaw You MD  Primary Care Provider: Hazel Medeiros MD  Care Team: Patient Care Team:  Hazel Medeiros MD as PCP - General (Family Medicine)  Hazel Medeiros MD as PCP - [facility] PCP  Darcy Kauffman MD as Consulting Physician (Sleep Medicine)  Shaw You MD as Consulting Physician (Hematology and Oncology)  JUAN May-CNP as Nurse Practitioner (Hematology and Oncology)  Roddy Gamble MD as Surgeon (Surgical Oncology)  Mercy Romero RDN, TOMMY as Dietitian (Nutrition)    Date of Service: 2025     Mr. Nate Alvarez is a 54-year-old man with [id number] pancreas adencarcinoma, status post neoadjuvant mFOLFIRINOX x12cy, then total pancreatectomy to +SMV margin and  LN+. He is undergoing adjuvant chemoradiation to 50.4Gy/28fx with concurrent capecitabine.   Malignant neoplasm of head of pancreas (Multi), Pathologic: Stage IIB (ypT2, pN1, cM0)    Specialty Problems          Radiation Oncology Problems    Basal cell carcinoma (BCC) of face        Malignant neoplasm of head of pancreas (Multi)           Treatment Summary:  Radiation Therapy    Treatment Period Technique Fraction Dose Fractions Total Dose   Course 1 3/31/2025-2025  (days elapsed: 28)         pancbed 3/31/2025-2025 VMAT 180 / 180 cGy  3,780 / 5,040 cGy       SUBJECTIVE: Nausea better with zyprexa, only 5x emesis this past week, much improved. Energy better than last week. Down 2 lbs, improved trajectory with wt.    OBJECTIVE:   Vital Signs:  /69   Pulse 88   Temp 36.6 °C (97.9 °F) (Temporal)   Resp 18   Wt 88.9 kg (195 lb 14.4 oz)   SpO2 96%   BMI 25.15 kg/m²    Pain Scale:   Pain score: 0/10  In NAD, VSS, fatigued but well appearing. Wt 198 from 207 at RT start    Toxicity Assessment          2025    14:56 2025     15:02 4/15/2025    14:53 4/22/2025    14:43 4/28/2025    14:57   Toxicity Assessment   Adverse Events Reviewed (WDL) No (Exceptions to WDL) No (Exceptions to WDL) No (Exceptions to WDL) No (Exceptions to WDL) No (Exceptions to WDL)   Treatment Site Abdomen Abdomen Abdomen Abdomen Abdomen   Anorexia Grade 1 Grade 1 Grade 1 Grade 1 Grade 1   Anxiety Grade 0 Grade 0 Grade 0 Grade 0 Grade 0   Dehydration Grade 1 Grade 0 Grade 1 Grade 0 Grade 0   Depression Grade 0 Grade 0 Grade 0 Grade 0 Grade 0   Diarrhea Grade 0 Grade 1       taking imodium as need Grade 0 Grade 0 Grade 1   Fatigue Grade 1 Grade 1 Grade 1 Grade 1 Grade 1       able to do some yard work   Nausea Grade 1 Grade 1 Grade 1 Grade 2 Grade 1   Pain Grade 1       abd and right to back and down spine; ache and radiates out. Grade 2       taking oxycodone  abd and raps around and mid back (lumbar) and radiates to hip ; Grade 2       oxy q 6 hours as needed.  abd and back and down into hips; ache not stabby Grade 1       oxycodone  q 6 hours Grade 1   Tumor Pain Grade 1 Grade 2 Grade 2 Grade 2 Grade 1   Vomiting Grade 0 Grade 1 Grade 1 Grade 1 Grade 1   Abdominal Pain Grade 1 Grade 2 Grade 2 Grade 2 Grade 1   Bloating Grade 0 Grade 0 Grade 0 Grade 0 Grade 0   Constipation Grade 0 Grade 0 Grade 0 Grade 0 Grade 0        ASSESSMENT/PLAN:  The patient is tolerating radiation therapy as anticipated.  Continue per current treatment plan.

## 2025-04-29 ENCOUNTER — OFFICE VISIT (OUTPATIENT)
Dept: HEMATOLOGY/ONCOLOGY | Facility: CLINIC | Age: 55
End: 2025-04-29
Payer: COMMERCIAL

## 2025-04-29 ENCOUNTER — HOSPITAL ENCOUNTER (OUTPATIENT)
Dept: RADIATION ONCOLOGY | Facility: HOSPITAL | Age: 55
Setting detail: RADIATION/ONCOLOGY SERIES
Discharge: HOME | End: 2025-04-29
Payer: COMMERCIAL

## 2025-04-29 ENCOUNTER — APPOINTMENT (OUTPATIENT)
Dept: RADIATION ONCOLOGY | Facility: HOSPITAL | Age: 55
End: 2025-04-29
Payer: COMMERCIAL

## 2025-04-29 DIAGNOSIS — Z51.0 ENCOUNTER FOR ANTINEOPLASTIC RADIATION THERAPY: ICD-10-CM

## 2025-04-29 DIAGNOSIS — C25.0 MALIGNANT NEOPLASM OF HEAD OF PANCREAS (MULTI): ICD-10-CM

## 2025-04-29 DIAGNOSIS — C25.0 MALIGNANT NEOPLASM OF HEAD OF PANCREAS (MULTI): Primary | ICD-10-CM

## 2025-04-29 LAB
RAD ONC MSQ ACTUAL FRACTIONS DELIVERED: 22
RAD ONC MSQ ACTUAL SESSION DELIVERED DOSE: 180 CGRAY
RAD ONC MSQ ACTUAL TOTAL DOSE: 3960 CGRAY
RAD ONC MSQ ELAPSED DAYS: 29
RAD ONC MSQ LAST DATE: NORMAL
RAD ONC MSQ PRESCRIBED FRACTIONAL DOSE: 180 CGRAY
RAD ONC MSQ PRESCRIBED NUMBER OF FRACTIONS: 28
RAD ONC MSQ PRESCRIBED TECHNIQUE: NORMAL
RAD ONC MSQ PRESCRIBED TOTAL DOSE: 5040 CGRAY
RAD ONC MSQ PRESCRIPTION PATTERN COMMENT: NORMAL
RAD ONC MSQ START DATE: NORMAL
RAD ONC MSQ TREATMENT COURSE NUMBER: 1
RAD ONC MSQ TREATMENT SITE: NORMAL

## 2025-04-29 PROCEDURE — 1036F TOBACCO NON-USER: CPT | Performed by: NURSE PRACTITIONER

## 2025-04-29 PROCEDURE — 77386 HC INTENSITY-MODULATED RADIATION THERAPY (IMRT), COMPLEX: CPT

## 2025-04-29 NOTE — PROGRESS NOTES
Patient ID: Nate Alvarez is a 54 y.o. male from [place], [place].     Referring Physician: Adrienne Malloy, APRN-CNP  [address] [place] [address]    Primary Care Provider: Hazel Medeiros MD    Diagnosis:   Pancreatic cancer 5/2024    Primary Oncologic Surgeon:   Omar Gamble MD    Primary Medical Oncologist:  Borderline Resectable    Primary Radiation Oncologist:  Dr. Espinoza     Current Therapy: adjuvant chemoxrt     Oncologic Surgery History:  Total pancreatectomy 12/12/2024 [id number] possible + SMV margin however unclear since normal vein transected     Oncologic Therapy History:  Neoadjuvant FOLFIRINOX x 12 cycles 6/2024-11/2024    Molecular Genetics:  Pending    Current Sites of Disease:  Head of the pancreas involving the gastroduodenal artery celiac axis and 180 degree involvement of the SMV    Oncologic Problem List:  Localized but borderline resectable pancreatic adenocarcinoma  New onset diabetes with hyperglycemia  Cancer cachexia      Oncologic Narrative:  54 y.o.  man from [place] who presented in May 2024 with difficult DM control (A1c 9.1 this month)and some ED visits for fatigue/weakness. Most recently at [facility] May 16-18. 70 lb wt loss mentioned (note some of the new DM meds) .  Tumor markers checked:  CEA: 4.9  Ca 19-9: >700    CT A/P Showed:   Edema surrounding the pancreatic tail compatible with acute pancreatitis. Dilatation of the pancreatic duct which measures 6 mm in diameter and an ill marginated area of hypodensity in the  pancreatic head highly concerning for pancreatic malignancy, and pancreatic protocol MRI is recommended for further evaluation. An  area of necrotizing pancreatitis/pancreatic necrosis is other consideration, however there is no surrounding edema in this region and given the ductal dilatation, findings highly concerning for an underlying malignancy.  Small nonobstructive left renal calculi.     MRI:  IMPRESSION:  1. Pancreatic head/uncinate process  mass measuring 2.9 x 2.5 cm with upstream pancreatic duct dilatation and parenchymal atrophy highly  concerning for primary pancreatic neoplasm (specifically adenocarcinoma). Surrounding changes of mild superimposed pancreatitis. The mass is in contact with the adjacent 3rd part of the duodenum but no upstream dilatation. No biliary obstruction. The  mass has 180 degree contact with the adjacent superior mesenteric vein and likely encasing the 1st right lateral branch. The mass is  likely encasing the gastroduodenal artery distal component. The  celiac axis, superior mesenteric artery and main portal vein are intact.  2. Few subcentimeter peripancreatic indeterminate lymph nodes noted.  3. Hepatic steatosis with segment 4A lesion measuring 0.7 cm with  imaging characteristics favoring hemangioma .     Chest CT:  IMPRESSION:  1. No suspicious pulmonary nodules. Few small calcific granulomas noted in the lingula.  2. No enlarged intrathoracic lymphadenopathy.  3. Left thyroid nodule measuring 1.1 cm. This could be further assessed by dedicated nonemergent thyroid ultrasound if clinically desired.  4. Subdiaphragmatic findings regarding the known pancreatic mass and duct dilatation as well as the hepatic observations are better evaluated in prior MRI dated 05/16/2024     EUS:    Result Text   Impression  Limited endoscopic views of the esophagus, stomach and duodenum were obtained. There was heme and gastritis noted in the stomach. The rest of the exam was unremarkable   A 27 mm by 23 mm irregular and hypoechoic T2N0 mass with poorly defined margins was visualized in the uncinate process of the head with apparent involvement of the superior mesenteric vein; fine needle biopsy was performed, final pathology is pending but preliminary evaluation was positive for malignancy. The pancreas upstream to the mass appeared atrophic with dilated pancreatic duct.   The bile duct appeared normal.  Visualized portions of the liver,  spleen, left adrenal gland, left kidney and celiac axis were normal on ultrasound examination.    No abnormal-appearing lymph nodes were identified in the abdomen.            FINAL DIAGNOSIS   A. PANCREAS HEAD MASS, BIOPSY:     -- Invasive adenocarcinoma, moderately differentiated      6/11/24 - Exp / lap  -no carcinomatosis     Past Medical History: Nate has a past medical history of Anxiety, Arthritis, BCC (basal cell carcinoma), eyelid, right, Diabetes mellitus (Multi), Gastroesophageal reflux disease without esophagitis (10/05/2023), Hemorrhoids (11/03/2023), HTN (hypertension), Leg cramps (10/05/2023), Oropharyngeal dysphagia (10/05/2023), RADHA (obstructive sleep apnea), Pancreatic cancer (Multi), Personal history of other malignant neoplasm of skin, Type 2 diabetes mellitus, and Vision loss.  Surgical History:  Nate has a past surgical history that includes Tonsillectomy; Vasectomy; Testicle surgery; Esophagogastroduodenoscopy; Colonoscopy; Eyelid carcinoma excision; Rotator cuff repair (Bilateral); Knee arthroscopy w/ debridement (Left); Pancreatectomy (12/12/2024); Splenectomy, total (12/12/2024); Gastrojejunostomy (12/12/2024); Cholecystectomy (12/12/2024); Choledochojejunostomy (12/12/2024); and Partial gastrectomy (12/12/2024).  Social History:  Nate reports that he has never smoked. He has been exposed to tobacco smoke. He has quit using smokeless tobacco.  His smokeless tobacco use included chew. He reports that he does not currently use alcohol after a past usage of about 1.0 standard drink of alcohol per week. He reports that he does not currently use drugs after having used the following drugs: Cocaine and Marijuana.  Family History:    Family History   Problem Relation Name Age of Onset    Diabetes Mother      Ovarian cancer Mother      Liver cancer Father      Lung cancer Father      Colon cancer Father      Hypertension Father      Stroke Maternal Grandmother      Diabetes Maternal  Grandmother      Liver cancer Maternal Grandfather      Lung cancer Paternal Grandfather      Melanoma Paternal Grandfather       Family Oncology History:  Cancer-related family history includes Colon cancer in his father; Liver cancer in his father and maternal grandfather; Lung cancer in his father and paternal grandfather; Melanoma in his paternal grandfather; Ovarian cancer in his mother.    Mom with uterine caner - early 30s   Wife with galvan syndrome  -     - daughter with Galvan Syndrome - age 28     - son 21, has not followed up with genetic testing    SUBJECTIVE:  History of Present Illness:  Nate Alvarez is a 54 y.o. male who was referred by Shaw You MD and presents with chemoradiation follow up.     S/p 12th cycle of mFOLFIRINOX   S/p Pancreatectomy 12/12/24            Started chemoxrt with Dr. Espinoza 3/31/25   - plan for 28fx   Cape dosing is 3 tabs AM & 4 tabs PM on days of radiation   On zyprexa for nausea  - needs labs today   OBJECTIVE:    VS / Pain:  There were no vitals taken for this visit.  BSA: There is no height or weight on file to calculate BSA.     Pain Scale: 0    Daily Weight  04/28/25 : 88.9 kg (195 lb 14.4 oz)  04/22/25 : 89.9 kg (198 lb 4.8 oz)  04/15/25 : 91.6 kg (202 lb)    Daily Weight  04/28/25 : 88.9 kg (195 lb 14.4 oz)  04/22/25 : 89.9 kg (198 lb 4.8 oz)  04/15/25 : 91.6 kg (202 lb)  04/07/25 : 93.4 kg (205 lb 14.4 oz)  04/01/25 : 95 kg (209 lb 6.4 oz)  03/20/25 : 95.9 kg (211 lb 6.4 oz)  03/03/25 : 95.3 kg (210 lb 1.6 oz)  02/27/25 : 98.2 kg (216 lb 9.6 oz)  02/26/25 : 98.2 kg (216 lb 9.6 oz)  02/18/25 : 95.8 kg (211 lb 5 oz)  02/18/25 : 95.8 kg (211 lb 5 oz)  01/29/25 : 98.2 kg (216 lb 9.6 oz)       Physical Exam  Constitutional:       Appearance: Normal appearance.   HENT:      Head: Normocephalic.      Mouth/Throat:      Mouth: Mucous membranes are moist.      Pharynx: Oropharynx is clear.   Eyes:      Pupils: Pupils are equal, round, and reactive to light.    Cardiovascular:      Rate and Rhythm: Normal rate.      Pulses: Normal pulses.   Pulmonary:      Effort: Pulmonary effort is normal.   Abdominal:      General: Abdomen is flat. Bowel sounds are normal.   Musculoskeletal:      Cervical back: Neck supple.   Skin:     General: Skin is warm and dry.      Capillary Refill: Capillary refill takes less than 2 seconds.   Neurological:      General: No focal deficit present.      Mental Status: He is alert.   Psychiatric:         Mood and Affect: Mood normal.         Performance Status: 1     Diagnostic Results     WBC   Date/Time Value Ref Range Status   02/18/2025 12:53 PM 6.3 4.4 - 11.3 x10*3/uL Final   01/03/2025 01:17 PM 8.6 4.4 - 11.3 x10*3/uL Final   12/18/2024 06:02 AM 15.9 (H) 4.4 - 11.3 x10*3/uL Final     Hemoglobin   Date Value Ref Range Status   02/18/2025 12.3 (L) 13.5 - 17.5 g/dL Final   01/03/2025 11.6 (L) 13.5 - 17.5 g/dL Final   12/18/2024 11.0 (L) 13.5 - 17.5 g/dL Final     MCV   Date/Time Value Ref Range Status   02/18/2025 12:53 PM 91 80 - 100 fL Final   01/03/2025 01:17 PM 94 80 - 100 fL Final   12/18/2024 06:02 AM 92 80 - 100 fL Final     Platelets   Date/Time Value Ref Range Status   02/18/2025 12:53  150 - 450 x10*3/uL Final   01/03/2025 01:17  (H) 150 - 450 x10*3/uL Final   12/18/2024 06:02  (H) 150 - 450 x10*3/uL Final     Neutrophils Absolute   Date/Time Value Ref Range Status   02/18/2025 12:53 PM 3.50 1.20 - 7.70 x10*3/uL Final     Comment:     Percent differential counts (%) should be interpreted in the context of the absolute cell counts (cells/uL).   12/06/2024 11:28 AM 2.46 1.20 - 7.70 x10*3/uL Final     Comment:     Percent differential counts (%) should be interpreted in the context of the absolute cell counts (cells/uL).   11/11/2024 03:49 PM 3.74 1.20 - 7.70 x10*3/uL Final     Comment:     Percent differential counts (%) should be interpreted in the context of the absolute cell counts (cells/uL).     Bilirubin, Total    Date/Time Value Ref Range Status   02/18/2025 12:53 PM 0.5 0.0 - 1.2 mg/dL Final   01/03/2025 01:17 PM 0.6 0.0 - 1.2 mg/dL Final   12/17/2024 06:47 AM 1.1 0.0 - 1.2 mg/dL Final     AST   Date/Time Value Ref Range Status   02/18/2025 12:53 PM 28 9 - 39 U/L Final   01/03/2025 01:17 PM 41 (H) 9 - 39 U/L Final   12/17/2024 06:47 AM 27 9 - 39 U/L Final     ALT   Date/Time Value Ref Range Status   02/18/2025 12:53 PM 24 10 - 52 U/L Final     Comment:     Patients treated with Sulfasalazine may generate falsely decreased results for ALT.   01/03/2025 01:17 PM 30 10 - 52 U/L Final     Comment:     Patients treated with Sulfasalazine may generate falsely decreased results for ALT.   12/17/2024 06:47 AM 39 10 - 52 U/L Final     Comment:     Patients treated with Sulfasalazine may generate falsely decreased results for ALT.     Creatinine   Date/Time Value Ref Range Status   02/18/2025 12:53 PM 0.62 0.50 - 1.30 mg/dL Final   01/03/2025 01:17 PM 0.66 0.50 - 1.30 mg/dL Final   12/18/2024 06:02 AM 0.74 0.50 - 1.30 mg/dL Final     Urea Nitrogen   Date/Time Value Ref Range Status   02/18/2025 12:53 PM 20 6 - 23 mg/dL Final   01/03/2025 01:17 PM 19 6 - 23 mg/dL Final   12/18/2024 06:02 AM 15 6 - 23 mg/dL Final     Albumin   Date/Time Value Ref Range Status   02/18/2025 12:53 PM 4.0 3.4 - 5.0 g/dL Final   01/03/2025 01:17 PM 4.0 3.4 - 5.0 g/dL Final   12/18/2024 06:02 AM 3.5 3.4 - 5.0 g/dL Final     Cancer AG 19-9   Date/Time Value Ref Range Status   02/18/2025 12:53 PM 52.88 (H) <35.00 U/mL Final   12/06/2024 11:28 .69 (H) <35.00 U/mL Final   11/11/2024 03:49 PM 72.69 (H) <35.00 U/mL Final     Carcinoembryonic AG   Date/Time Value Ref Range Status   05/17/2024 09:28 AM 4.9 ug/L Final     === 01/07/25 ===    CT ABDOMEN PELVIS W IV CONTRAST    - Impression -  1. Status post total pancreatectomy. Status post partial gastrectomy  with [place]-en-Y. Induration in the region of the resected pancreatic  head consistent with  scarring.  2. 2.6 x 1.3 cm focal thrombus proximal portal vein.  3. Air in the biliary system, iatrogenic.    Assessment/Plan   This is a 54-year-old man with borderline resectable pancreatic adenocarcinoma.  He presented in May 2024 with greater than 50 pound weight loss and new onset diabetes.  Imaging, EUS and biopsy confirmed adenocarcinoma of the pancreas involving the GDA, celiac axis, SMV.  Had 12 cycles of mFOLFIRINOX followed by Total pancreatectomy 12/12/2024 [id number] possible + SMV margin however unclear since normal vein transected.      Borderline resectable Pancreatic cancer:   Surveillance for now.   - port flush in 4 weeks    - scans & labs in 8 weeks prior to seeing Dr. You   Follow ca19 - 9 level   Consider RT if confirmed + margin. - + Margin confirmed at tumor board.  Recommend radiation therapy  - referral to Dr. Espinoza     -Check NGS - to see if available for AMAL trial  - not eligible as no HUCR11D or G12V mutation               Pain - refill oxycodone  - will try to cut back at next visit, if unable to wean down will refer to pain management.     Neuropathy - trial of alpha lipoic acid     Diabetes:  Continue long and short acting insulin as managed by  [facility]/ [facility]  Office: [phone]  Fax: [phone]

## 2025-04-30 ENCOUNTER — HOSPITAL ENCOUNTER (OUTPATIENT)
Dept: RADIATION ONCOLOGY | Facility: HOSPITAL | Age: 55
Setting detail: RADIATION/ONCOLOGY SERIES
Discharge: HOME | End: 2025-04-30
Payer: COMMERCIAL

## 2025-04-30 DIAGNOSIS — C25.0 MALIGNANT NEOPLASM OF HEAD OF PANCREAS (MULTI): ICD-10-CM

## 2025-04-30 DIAGNOSIS — Z51.0 ENCOUNTER FOR ANTINEOPLASTIC RADIATION THERAPY: ICD-10-CM

## 2025-04-30 LAB
RAD ONC MSQ ACTUAL FRACTIONS DELIVERED: 23
RAD ONC MSQ ACTUAL SESSION DELIVERED DOSE: 180 CGRAY
RAD ONC MSQ ACTUAL TOTAL DOSE: 4140 CGRAY
RAD ONC MSQ ELAPSED DAYS: 30
RAD ONC MSQ LAST DATE: NORMAL
RAD ONC MSQ PRESCRIBED FRACTIONAL DOSE: 180 CGRAY
RAD ONC MSQ PRESCRIBED NUMBER OF FRACTIONS: 28
RAD ONC MSQ PRESCRIBED TECHNIQUE: NORMAL
RAD ONC MSQ PRESCRIBED TOTAL DOSE: 5040 CGRAY
RAD ONC MSQ PRESCRIPTION PATTERN COMMENT: NORMAL
RAD ONC MSQ START DATE: NORMAL
RAD ONC MSQ TREATMENT COURSE NUMBER: 1
RAD ONC MSQ TREATMENT SITE: NORMAL

## 2025-04-30 PROCEDURE — 77386 HC INTENSITY-MODULATED RADIATION THERAPY (IMRT), COMPLEX: CPT

## 2025-04-30 PROCEDURE — 77336 RADIATION PHYSICS CONSULT: CPT

## 2025-05-01 ENCOUNTER — LAB (OUTPATIENT)
Dept: HEMATOLOGY/ONCOLOGY | Facility: HOSPITAL | Age: 55
End: 2025-05-01
Payer: COMMERCIAL

## 2025-05-01 ENCOUNTER — HOSPITAL ENCOUNTER (OUTPATIENT)
Dept: RADIATION ONCOLOGY | Facility: HOSPITAL | Age: 55
Setting detail: RADIATION/ONCOLOGY SERIES
Discharge: HOME | End: 2025-05-01
Payer: COMMERCIAL

## 2025-05-01 DIAGNOSIS — C25.0 MALIGNANT NEOPLASM OF HEAD OF PANCREAS (MULTI): ICD-10-CM

## 2025-05-01 DIAGNOSIS — Z51.0 ENCOUNTER FOR ANTINEOPLASTIC RADIATION THERAPY: ICD-10-CM

## 2025-05-01 LAB
ALBUMIN SERPL BCP-MCNC: 3.6 G/DL (ref 3.4–5)
ALP SERPL-CCNC: 85 U/L (ref 33–120)
ALT SERPL W P-5'-P-CCNC: 15 U/L (ref 10–52)
ANION GAP SERPL CALC-SCNC: 11 MMOL/L (ref 10–20)
AST SERPL W P-5'-P-CCNC: 22 U/L (ref 9–39)
BASOPHILS # BLD AUTO: 0.04 X10*3/UL (ref 0–0.1)
BASOPHILS NFR BLD AUTO: 0.9 %
BILIRUB SERPL-MCNC: 0.5 MG/DL (ref 0–1.2)
BUN SERPL-MCNC: 14 MG/DL (ref 6–23)
CALCIUM SERPL-MCNC: 9.1 MG/DL (ref 8.6–10.3)
CANCER AG19-9 SERPL-ACNC: 52.68 U/ML
CHLORIDE SERPL-SCNC: 103 MMOL/L (ref 98–107)
CO2 SERPL-SCNC: 28 MMOL/L (ref 21–32)
CREAT SERPL-MCNC: 0.66 MG/DL (ref 0.5–1.3)
EGFRCR SERPLBLD CKD-EPI 2021: >90 ML/MIN/1.73M*2
EOSINOPHIL # BLD AUTO: 0.28 X10*3/UL (ref 0–0.7)
EOSINOPHIL NFR BLD AUTO: 6.5 %
ERYTHROCYTE [DISTWIDTH] IN BLOOD BY AUTOMATED COUNT: 18.9 % (ref 11.5–14.5)
GLUCOSE SERPL-MCNC: 347 MG/DL (ref 74–99)
HCT VFR BLD AUTO: 33.8 % (ref 41–52)
HGB BLD-MCNC: 11.2 G/DL (ref 13.5–17.5)
IMM GRANULOCYTES # BLD AUTO: 0.01 X10*3/UL (ref 0–0.7)
IMM GRANULOCYTES NFR BLD AUTO: 0.2 % (ref 0–0.9)
LYMPHOCYTES # BLD AUTO: 0.41 X10*3/UL (ref 1.2–4.8)
LYMPHOCYTES NFR BLD AUTO: 9.6 %
MCH RBC QN AUTO: 30.7 PG (ref 26–34)
MCHC RBC AUTO-ENTMCNC: 33.1 G/DL (ref 32–36)
MCV RBC AUTO: 93 FL (ref 80–100)
MONOCYTES # BLD AUTO: 0.58 X10*3/UL (ref 0.1–1)
MONOCYTES NFR BLD AUTO: 13.6 %
NEUTROPHILS # BLD AUTO: 2.96 X10*3/UL (ref 1.2–7.7)
NEUTROPHILS NFR BLD AUTO: 69.2 %
NRBC BLD-RTO: 0 /100 WBCS (ref 0–0)
PLATELET # BLD AUTO: 310 X10*3/UL (ref 150–450)
POTASSIUM SERPL-SCNC: 4.4 MMOL/L (ref 3.5–5.3)
PROT SERPL-MCNC: 5.9 G/DL (ref 6.4–8.2)
RAD ONC MSQ ACTUAL FRACTIONS DELIVERED: 24
RAD ONC MSQ ACTUAL SESSION DELIVERED DOSE: 180 CGRAY
RAD ONC MSQ ACTUAL TOTAL DOSE: 4320 CGRAY
RAD ONC MSQ ELAPSED DAYS: 31
RAD ONC MSQ LAST DATE: NORMAL
RAD ONC MSQ PRESCRIBED FRACTIONAL DOSE: 180 CGRAY
RAD ONC MSQ PRESCRIBED NUMBER OF FRACTIONS: 28
RAD ONC MSQ PRESCRIBED TECHNIQUE: NORMAL
RAD ONC MSQ PRESCRIBED TOTAL DOSE: 5040 CGRAY
RAD ONC MSQ PRESCRIPTION PATTERN COMMENT: NORMAL
RAD ONC MSQ START DATE: NORMAL
RAD ONC MSQ TREATMENT COURSE NUMBER: 1
RAD ONC MSQ TREATMENT SITE: NORMAL
RBC # BLD AUTO: 3.65 X10*6/UL (ref 4.5–5.9)
SODIUM SERPL-SCNC: 138 MMOL/L (ref 136–145)
WBC # BLD AUTO: 4.3 X10*3/UL (ref 4.4–11.3)

## 2025-05-01 PROCEDURE — 80053 COMPREHEN METABOLIC PANEL: CPT

## 2025-05-01 PROCEDURE — 85025 COMPLETE CBC W/AUTO DIFF WBC: CPT

## 2025-05-01 PROCEDURE — 36591 DRAW BLOOD OFF VENOUS DEVICE: CPT

## 2025-05-01 PROCEDURE — 2500000004 HC RX 250 GENERAL PHARMACY W/ HCPCS (ALT 636 FOR OP/ED): Mod: JZ | Performed by: NURSE PRACTITIONER

## 2025-05-01 PROCEDURE — 77386 HC INTENSITY-MODULATED RADIATION THERAPY (IMRT), COMPLEX: CPT

## 2025-05-01 PROCEDURE — 86301 IMMUNOASSAY TUMOR CA 19-9: CPT

## 2025-05-01 RX ORDER — HEPARIN 100 UNIT/ML
500 SYRINGE INTRAVENOUS AS NEEDED
Status: DISCONTINUED | OUTPATIENT
Start: 2025-05-01 | End: 2025-05-01 | Stop reason: HOSPADM

## 2025-05-01 RX ORDER — HEPARIN SODIUM,PORCINE/PF 10 UNIT/ML
50 SYRINGE (ML) INTRAVENOUS AS NEEDED
OUTPATIENT
Start: 2025-05-01

## 2025-05-01 RX ORDER — HEPARIN 100 UNIT/ML
500 SYRINGE INTRAVENOUS AS NEEDED
OUTPATIENT
Start: 2025-05-01

## 2025-05-01 RX ADMIN — HEPARIN 500 UNITS: 100 SYRINGE at 14:21

## 2025-05-02 ENCOUNTER — HOSPITAL ENCOUNTER (OUTPATIENT)
Dept: RADIATION ONCOLOGY | Facility: HOSPITAL | Age: 55
Setting detail: RADIATION/ONCOLOGY SERIES
Discharge: HOME | End: 2025-05-02
Payer: COMMERCIAL

## 2025-05-02 DIAGNOSIS — C25.0 MALIGNANT NEOPLASM OF HEAD OF PANCREAS (MULTI): ICD-10-CM

## 2025-05-02 DIAGNOSIS — Z51.0 ENCOUNTER FOR ANTINEOPLASTIC RADIATION THERAPY: ICD-10-CM

## 2025-05-02 LAB
RAD ONC MSQ ACTUAL FRACTIONS DELIVERED: 25
RAD ONC MSQ ACTUAL SESSION DELIVERED DOSE: 180 CGRAY
RAD ONC MSQ ACTUAL TOTAL DOSE: 4500 CGRAY
RAD ONC MSQ ELAPSED DAYS: 32
RAD ONC MSQ LAST DATE: NORMAL
RAD ONC MSQ PRESCRIBED FRACTIONAL DOSE: 180 CGRAY
RAD ONC MSQ PRESCRIBED NUMBER OF FRACTIONS: 28
RAD ONC MSQ PRESCRIBED TECHNIQUE: NORMAL
RAD ONC MSQ PRESCRIBED TOTAL DOSE: 5040 CGRAY
RAD ONC MSQ PRESCRIPTION PATTERN COMMENT: NORMAL
RAD ONC MSQ START DATE: NORMAL
RAD ONC MSQ TREATMENT COURSE NUMBER: 1
RAD ONC MSQ TREATMENT SITE: NORMAL

## 2025-05-02 PROCEDURE — 77386 HC INTENSITY-MODULATED RADIATION THERAPY (IMRT), COMPLEX: CPT

## 2025-05-05 ENCOUNTER — HOSPITAL ENCOUNTER (OUTPATIENT)
Dept: RADIATION ONCOLOGY | Facility: HOSPITAL | Age: 55
Setting detail: RADIATION/ONCOLOGY SERIES
Discharge: HOME | End: 2025-05-05
Payer: COMMERCIAL

## 2025-05-05 ENCOUNTER — APPOINTMENT (OUTPATIENT)
Dept: RADIATION ONCOLOGY | Facility: HOSPITAL | Age: 55
End: 2025-05-05
Payer: COMMERCIAL

## 2025-05-05 VITALS
OXYGEN SATURATION: 97 % | WEIGHT: 197.6 LBS | DIASTOLIC BLOOD PRESSURE: 70 MMHG | RESPIRATION RATE: 18 BRPM | SYSTOLIC BLOOD PRESSURE: 112 MMHG | HEART RATE: 58 BPM | BODY MASS INDEX: 25.37 KG/M2 | TEMPERATURE: 96.6 F

## 2025-05-05 DIAGNOSIS — C25.0 MALIGNANT NEOPLASM OF HEAD OF PANCREAS (MULTI): ICD-10-CM

## 2025-05-05 DIAGNOSIS — Z51.0 ENCOUNTER FOR ANTINEOPLASTIC RADIATION THERAPY: ICD-10-CM

## 2025-05-05 LAB
RAD ONC MSQ ACTUAL FRACTIONS DELIVERED: 26
RAD ONC MSQ ACTUAL SESSION DELIVERED DOSE: 180 CGRAY
RAD ONC MSQ ACTUAL TOTAL DOSE: 4680 CGRAY
RAD ONC MSQ ELAPSED DAYS: 35
RAD ONC MSQ LAST DATE: NORMAL
RAD ONC MSQ PRESCRIBED FRACTIONAL DOSE: 180 CGRAY
RAD ONC MSQ PRESCRIBED NUMBER OF FRACTIONS: 28
RAD ONC MSQ PRESCRIBED TECHNIQUE: NORMAL
RAD ONC MSQ PRESCRIBED TOTAL DOSE: 5040 CGRAY
RAD ONC MSQ PRESCRIPTION PATTERN COMMENT: NORMAL
RAD ONC MSQ START DATE: NORMAL
RAD ONC MSQ TREATMENT COURSE NUMBER: 1
RAD ONC MSQ TREATMENT SITE: NORMAL

## 2025-05-05 PROCEDURE — 77386 HC INTENSITY-MODULATED RADIATION THERAPY (IMRT), COMPLEX: CPT

## 2025-05-05 ASSESSMENT — PAIN SCALES - GENERAL: PAINLEVEL_OUTOF10: 2

## 2025-05-05 NOTE — PROGRESS NOTES
RADIATION ONCOLOGY ON-TREATMENT VISIT NOTE  Patient Name:  Nate Alvarez  MRN:  [id number]  :  1970    Radiation Oncologist: Mary Kay Espinoza MD  Referring Provider: Shaw You MD  Primary Care Provider: Hazel Medeiros MD  Care Team: Patient Care Team:  Hazel Medeiros MD as PCP - General (Family Medicine)  Hazel Medeiros MD as PCP - [facility] PCP  Darcy Kauffman MD as Consulting Physician (Sleep Medicine)  Shaw You MD as Consulting Physician (Hematology and Oncology)  JUAN May-CNP as Nurse Practitioner (Hematology and Oncology)  Roddy Gamble MD as Surgeon (Surgical Oncology)  Mercy Romero RDN, TOMMY as Dietitian (Nutrition)    Date of Service: 2025     Mr. Nate Alvarez is a 54-year-old man with [id number] pancreas adencarcinoma, status post neoadjuvant mFOLFIRINOX x12cy, then total pancreatectomy to +SMV margin and  LN+. He is undergoing adjuvant chemoradiation to 50.4Gy/28fx with concurrent capecitabine.   Malignant neoplasm of head of pancreas (Multi), Pathologic: Stage IIB (ypT2, pN1, cM0)    Specialty Problems          Radiation Oncology Problems    Basal cell carcinoma (BCC) of face        Malignant neoplasm of head of pancreas (Multi)           Treatment Summary:  Radiation Therapy    Treatment Period Technique Fraction Dose Fractions Total Dose   Course 1 3/31/2025-2025  (days elapsed: 35)         pancbed 3/31/2025-2025 VMAT 180 / 180 cGy  4,680 / 5,040 cGy       SUBJECTIVE: Nausea better with zyprexa, reports continued nausea with emesis ~1x per day, similar to last week, which is much improved compared to weeks prior. Energy better than last week. Down 1 lbs compared to last week, improved trajectory with wt.    OBJECTIVE:   Vital Signs:  /70   Pulse 58   Temp 35.9 °C (96.6 °F) (Temporal)   Resp 18   Wt 89.6 kg (197 lb 9.6 oz)   SpO2 97%   BMI 25.37 kg/m²    Pain Scale:   Pain score: 0/10  In NAD, VSS, fatigued but well  appearing. Wt 197 from 207 at RT start    Toxicity Assessment          4/1/2025    14:56 4/7/2025    15:02 4/15/2025    14:53 4/22/2025    14:43 4/28/2025    14:57 5/5/2025    14:40   Toxicity Assessment   Adverse Events Reviewed (WDL) No (Exceptions to WDL) No (Exceptions to WDL) No (Exceptions to WDL) No (Exceptions to WDL) No (Exceptions to WDL) No (Exceptions to WDL)   Treatment Site Abdomen Abdomen Abdomen Abdomen Abdomen Abdomen   Anorexia Grade 1 Grade 1 Grade 1 Grade 1 Grade 1 Grade 1   Anxiety Grade 0 Grade 0 Grade 0 Grade 0 Grade 0 Grade 0   Dehydration Grade 1 Grade 0 Grade 1 Grade 0 Grade 0 Grade 0   Depression Grade 0 Grade 0 Grade 0 Grade 0 Grade 0 Grade 0   Diarrhea Grade 0 Grade 1       taking imodium as need Grade 0 Grade 0 Grade 1 Grade 1   Fatigue Grade 1 Grade 1 Grade 1 Grade 1 Grade 1       able to do some yard work Grade 1   Nausea Grade 1 Grade 1 Grade 1 Grade 2 Grade 1 Grade 2   Pain Grade 1       abd and right to back and down spine; ache and radiates out. Grade 2       taking oxycodone  abd and raps around and mid back (lumbar) and radiates to hip ; Grade 2       oxy q 6 hours as needed.  abd and back and down into hips; ache not stabby Grade 1       oxycodone  q 6 hours Grade 1 Grade 1   Tumor Pain Grade 1 Grade 2 Grade 2 Grade 2 Grade 1 Grade 1   Vomiting Grade 0 Grade 1 Grade 1 Grade 1 Grade 1 Grade 1   Abdominal Pain Grade 1 Grade 2 Grade 2 Grade 2 Grade 1 Grade 1   Bloating Grade 0 Grade 0 Grade 0 Grade 0 Grade 0 Grade 1       if he eats too much fiber.   Constipation Grade 0 Grade 0 Grade 0 Grade 0 Grade 0 Grade 0        ASSESSMENT/PLAN:  The patient is tolerating radiation therapy as anticipated.  Continue per current treatment plan.     Only 2 fractions remain with anticipated completion 5/7. Will see in follow up August 4th.     I saw and evaluated the patient. I personally obtained the key and critical portions of the history and physical exam or was physically present for key and  critical portions performed by the resident/fellow. I reviewed the resident/fellow's documentation and discussed the patient with the resident/fellow. I agree with the resident/fellow's medical decision making as documented in the note.    Covering for Dr. Espinoza.    Eric Mckenzie MD  [facility]ly [facility]  [facility]  - Department of Radiation Oncology  Phone: [phone]  Fax: [phone]  Epic secure chat preferred

## 2025-05-06 ENCOUNTER — APPOINTMENT (OUTPATIENT)
Dept: RADIATION ONCOLOGY | Facility: HOSPITAL | Age: 55
End: 2025-05-06
Payer: COMMERCIAL

## 2025-05-06 ENCOUNTER — HOSPITAL ENCOUNTER (OUTPATIENT)
Dept: RADIATION ONCOLOGY | Facility: HOSPITAL | Age: 55
Setting detail: RADIATION/ONCOLOGY SERIES
Discharge: HOME | End: 2025-05-06
Payer: COMMERCIAL

## 2025-05-06 DIAGNOSIS — Z51.0 ENCOUNTER FOR ANTINEOPLASTIC RADIATION THERAPY: ICD-10-CM

## 2025-05-06 DIAGNOSIS — C25.0 MALIGNANT NEOPLASM OF HEAD OF PANCREAS (MULTI): ICD-10-CM

## 2025-05-06 LAB
RAD ONC MSQ ACTUAL FRACTIONS DELIVERED: 27
RAD ONC MSQ ACTUAL SESSION DELIVERED DOSE: 180 CGRAY
RAD ONC MSQ ACTUAL TOTAL DOSE: 4860 CGRAY
RAD ONC MSQ ELAPSED DAYS: 36
RAD ONC MSQ LAST DATE: NORMAL
RAD ONC MSQ PRESCRIBED FRACTIONAL DOSE: 180 CGRAY
RAD ONC MSQ PRESCRIBED NUMBER OF FRACTIONS: 28
RAD ONC MSQ PRESCRIBED TECHNIQUE: NORMAL
RAD ONC MSQ PRESCRIBED TOTAL DOSE: 5040 CGRAY
RAD ONC MSQ PRESCRIPTION PATTERN COMMENT: NORMAL
RAD ONC MSQ START DATE: NORMAL
RAD ONC MSQ TREATMENT COURSE NUMBER: 1
RAD ONC MSQ TREATMENT SITE: NORMAL

## 2025-05-06 PROCEDURE — 77386 HC INTENSITY-MODULATED RADIATION THERAPY (IMRT), COMPLEX: CPT

## 2025-05-07 ENCOUNTER — HOSPITAL ENCOUNTER (OUTPATIENT)
Dept: RADIATION ONCOLOGY | Facility: HOSPITAL | Age: 55
Setting detail: RADIATION/ONCOLOGY SERIES
Discharge: HOME | End: 2025-05-07
Payer: COMMERCIAL

## 2025-05-07 ENCOUNTER — DOCUMENTATION (OUTPATIENT)
Dept: RADIATION ONCOLOGY | Facility: HOSPITAL | Age: 55
End: 2025-05-07
Payer: COMMERCIAL

## 2025-05-07 DIAGNOSIS — C25.0 MALIGNANT NEOPLASM OF HEAD OF PANCREAS (MULTI): ICD-10-CM

## 2025-05-07 DIAGNOSIS — Z51.0 ENCOUNTER FOR ANTINEOPLASTIC RADIATION THERAPY: ICD-10-CM

## 2025-05-07 LAB
RAD ONC MSQ ACTUAL FRACTIONS DELIVERED: 28
RAD ONC MSQ ACTUAL SESSION DELIVERED DOSE: 180 CGRAY
RAD ONC MSQ ACTUAL TOTAL DOSE: 5040 CGRAY
RAD ONC MSQ ELAPSED DAYS: 37
RAD ONC MSQ LAST DATE: NORMAL
RAD ONC MSQ PRESCRIBED FRACTIONAL DOSE: 180 CGRAY
RAD ONC MSQ PRESCRIBED NUMBER OF FRACTIONS: 28
RAD ONC MSQ PRESCRIBED TECHNIQUE: NORMAL
RAD ONC MSQ PRESCRIBED TOTAL DOSE: 5040 CGRAY
RAD ONC MSQ PRESCRIPTION PATTERN COMMENT: NORMAL
RAD ONC MSQ START DATE: NORMAL
RAD ONC MSQ TREATMENT COURSE NUMBER: 1
RAD ONC MSQ TREATMENT SITE: NORMAL

## 2025-05-07 PROCEDURE — 77336 RADIATION PHYSICS CONSULT: CPT

## 2025-05-07 PROCEDURE — 77386 HC INTENSITY-MODULATED RADIATION THERAPY (IMRT), COMPLEX: CPT

## 2025-05-08 ENCOUNTER — APPOINTMENT (OUTPATIENT)
Dept: HEMATOLOGY/ONCOLOGY | Facility: CLINIC | Age: 55
End: 2025-05-08
Payer: COMMERCIAL

## 2025-05-08 NOTE — PROGRESS NOTES
RADIATION COMPLETION OF THERAPY NOTE    Patient Name:  Nate Alvarez  MRN:  [id number]  :  1970    Radiation Oncologist: Mary Kay Espinoza MD  Referring Provider: Shaw You MD  Primary Care Provider: Hazel Medeiros MD    Brief History: Nate Alvarez is a 54 y.o. male with Malignant neoplasm of head of pancreas (Multi), Pathologic: Stage IIB (ypT2, pN1, cM0).  The patient completed radiotherapy as outlined below.    Radiation Treatment Summary:    Radiation Oncology   Radiation Treatments       Active   No active radiation treatments to show.     Completed   pancbed (Started on 3/31/2025)   Most recent fraction: 180 cGy given on 2025   Total given: 5,040 cGy / 5,040 cGy  (28 of 28 fractions)   Elapsed Days: 37   Technique: VMAT                       Concurrent Chemotherapy:  mFOLFIRINOX (Fluorouracil Continuous Infusion / Leucovorin / Irinotecan / Oxaliplatin), 14 Day Cycles   Treatment goal [No plan goal]   Treatment line [No plan line of treatment]   Status Active   Start Date 2024   End Date 2024   Treatment Medications fluorouracil (Adrucil) 2,400 mg/m2 = 5,650 mg in sodium chloride 0.9% 138 mL IV via Home Infusion, 2,400 mg/m2 = 5,650 mg, intravenous, Once, 12 of 12 cycles  Administration: 5,650 mg (2024), 5,650 mg (2024), 5,650 mg (7/10/2024), 5,650 mg (2024), 5,650 mg (2024), 5,650 mg (2024), 5,650 mg (2024), 5,650 mg (2024), 5,650 mg (10/1/2024), 5,650 mg (10/15/2024), 5,650 mg (10/29/2024), 5,650 mg (2024)    methylPREDNISolone sod succinate (SOLU-Medrol) 40 mg/mL injection 40 mg, 40 mg, intravenous, As needed, 12 of 12 cycles    irinotecan (Camptosar) 350 mg in dextrose 5% 517.5 mL IV, 150 mg/m2 = 350 mg, intravenous, Once, 12 of 12 cycles  Administration: 350 mg (2024), 350 mg (2024), 350 mg (7/10/2024), 350 mg (2024), 350 mg (2024), 350 mg (2024), 350 mg (2024), 350 mg (2024), 350 mg (10/1/2024), 350 mg  (10/15/2024), 350 mg (10/29/2024), 350 mg (11/13/2024)    OXALIplatin (Eloxatin) 200 mg in dextrose 5% 540 mL IV, 85 mg/m2 = 200 mg, intravenous, Once, 12 of 12 cycles  Administration: 200 mg (6/11/2024), 200 mg (6/25/2024), 200 mg (7/10/2024), 200 mg (7/23/2024), 200 mg (8/7/2024), 200 mg (8/20/2024), 200 mg (9/4/2024), 200 mg (9/17/2024), 200 mg (10/1/2024), 200 mg (10/15/2024), 200 mg (10/29/2024), 200 mg (11/13/2024)         CTCAE Toxicity Overview:   Toxicity Assessment          4/1/2025    14:56 4/7/2025    15:02 4/15/2025    14:53 4/22/2025    14:43 4/28/2025    14:57 5/5/2025    14:40   Toxicity Assessment   Adverse Events Reviewed (WDL) No (Exceptions to WDL) No (Exceptions to WDL) No (Exceptions to WDL) No (Exceptions to WDL) No (Exceptions to WDL) No (Exceptions to WDL)   Treatment Site Abdomen Abdomen Abdomen Abdomen Abdomen Abdomen   Anorexia Grade 1 Grade 1 Grade 1 Grade 1 Grade 1 Grade 1   Anxiety Grade 0 Grade 0 Grade 0 Grade 0 Grade 0 Grade 0   Dehydration Grade 1 Grade 0 Grade 1 Grade 0 Grade 0 Grade 0   Depression Grade 0 Grade 0 Grade 0 Grade 0 Grade 0 Grade 0   Diarrhea Grade 0 Grade 1       taking imodium as need Grade 0 Grade 0 Grade 1 Grade 1   Fatigue Grade 1 Grade 1 Grade 1 Grade 1 Grade 1       able to do some yard work Grade 1   Nausea Grade 1 Grade 1 Grade 1 Grade 2 Grade 1 Grade 2   Pain Grade 1       abd and right to back and down spine; ache and radiates out. Grade 2       taking oxycodone  abd and raps around and mid back (lumbar) and radiates to hip ; Grade 2       oxy q 6 hours as needed.  abd and back and down into hips; ache not stabby Grade 1       oxycodone  q 6 hours Grade 1 Grade 1   Tumor Pain Grade 1 Grade 2 Grade 2 Grade 2 Grade 1 Grade 1   Vomiting Grade 0 Grade 1 Grade 1 Grade 1 Grade 1 Grade 1   Abdominal Pain Grade 1 Grade 2 Grade 2 Grade 2 Grade 1 Grade 1   Bloating Grade 0 Grade 0 Grade 0 Grade 0 Grade 0 Grade 1       if he eats too much fiber.   Constipation Grade 0  Grade 0 Grade 0 Grade 0 Grade 0 Grade 0     Patient Disposition:    Future Appointments       Date / Time Provider Department Dept Phone    5/14/2025 10:45 AM Marya Vasquez MD [facility] [phone]    7/31/2025 3:00 PM (Arrive by 2:45 PM) Hazel Medeiros MD [facility] [phone]    8/4/2025 11:00 AM (Arrive by 10:45 AM) [facility] [phone]    8/4/2025 12:00 PM Mary Kay Espinoza MD [facility] [phone]    8/21/2025 2:30 PM Marya Vasquez MD [facility] [phone]    1/29/2026 11:00 AM Teresita Garcia, APRN-CNP [facility] [phone]

## 2025-05-09 ENCOUNTER — APPOINTMENT (OUTPATIENT)
Dept: HEMATOLOGY/ONCOLOGY | Facility: CLINIC | Age: 55
End: 2025-05-09
Payer: COMMERCIAL

## 2025-05-09 ENCOUNTER — APPOINTMENT (OUTPATIENT)
Dept: RADIOLOGY | Facility: HOSPITAL | Age: 55
End: 2025-05-09
Payer: COMMERCIAL

## 2025-05-13 NOTE — PATIENT INSTRUCTIONS
Thank you for choosing [facility]  for your health care needs.  If you have any questions, concerns or medical needs, please feel free to contact our office at [phone].    Please ensure you complete your blood work one week before the next scheduled appointment.    To continue Lantus 25 units subcutaneous bedtime  To continue Humalog 6 units three times daily before meals   Please continue an insulin sliding scale with Humalog before meals as follows:   150-200mg/dL - 2 units   201-250mg/dL - 4 units   251-300 mg/dL - 6 untis   301-350mg/dL - 8 units   >351mg/dL - 10 units  Insulin requirements will decrease with weight loss  To continue the use of your CGM for the intensive glucose monitoring due to the dynamic nature of insulin requirements, the narrow therapeutic index of insulin and the potentially fatal consequences of treatment  Counseled that the time in range should be >70%, and thus you are at goal   To obtain blood tests at the next blood draw   For follow up in 3 months

## 2025-05-13 NOTE — PROGRESS NOTES
"Subjective   Nate Alvarez is a 54 y.o. male who presents for follow up for Type 2 diabetes mellitus. The initial diagnosis of diabetes was made in October 2023.      He was diagnosed in April 2024 with invasive adenocarcinoma moderately differentiated after having a biopsy of a pancreatic mass to the head.  He has been completed 12 cycles of chemotherapy.  He underwent total pancreatectomy, splenectomy, distal gasserectomy with gastrojejunostomy, choledochojejunostomy, cholecystectomy and SMV resection on 12/12/2024. He has completed radiation and chemotherapy recently.      Known complications due to diabetes included peripheral neuropathy, though this is largely from chemotherapy.      Cardiovascular risk factors include diabetes mellitus, hypertension, and male gender. The patient is on an ACE inhibitor or angiotensin II receptor blocker.  The patient has not been previously hospitalized due to diabetic ketoacidosis.     Current symptoms/problems include paresthesia of the feet. His clinical course has been stable.     Current diabetes regimen is as follows:   Lantus 25 units subcutaneous bedtime   Humalog 6 units TID AC     The patient is currently checking the blood glucose multiple times per day.  4-5 times per day   Progresive hyperglycemia 200-225mg/dL     Patient is using: continuous glucose monitor  Broke reader ten days ago     Hypoglycemia frequency: Yes  Hypoglycemia awareness: If in the 40s     MEALS:  Breakfast - omits   Lunch - sandwich, egg  Dinner - spaghetti, meatloaf       Review of Systems   Gastrointestinal:  Positive for nausea.   All other systems reviewed and are negative.      Objective   /68   Pulse (!) 49   Ht 1.88 m (6' 2\")   Wt 88.5 kg (195 lb)   BMI 25.04 kg/m²   Physical Exam  Vitals and nursing note reviewed.   Constitutional:       General: He is not in acute distress.     Appearance: Normal appearance. He is normal weight.   HENT:      Head: Normocephalic and " atraumatic.      Nose: Nose normal.      Mouth/Throat:      Mouth: Mucous membranes are moist.   Eyes:      Extraocular Movements: Extraocular movements intact.   Cardiovascular:      Rate and Rhythm: Normal rate and regular rhythm.   Pulmonary:      Effort: Pulmonary effort is normal.      Breath sounds: Normal breath sounds.   Musculoskeletal:         General: Normal range of motion.   Skin:     General: Skin is warm.   Neurological:      Mental Status: He is alert and oriented to person, place, and time.   Psychiatric:         Mood and Affect: Mood normal.         Lab Review  Lab Results   Component Value Date    HGBA1C 5.3 12/06/2024     Glucose (mg/dL)   Date Value   05/01/2025 347 (H)   02/18/2025 201 (H)   01/03/2025 245 (H)     POC HEMOGLOBIN A1c (%)   Date Value   09/25/2024 7.0 (A)   04/17/2024 9.3 (A)   01/11/2024 7.4 (A)     Hemoglobin A1C (%)   Date Value   12/06/2024 5.3   05/10/2024 9.1 (H)     Bicarbonate (mmol/L)   Date Value   05/01/2025 28   02/18/2025 27   01/03/2025 26     Urea Nitrogen (mg/dL)   Date Value   05/01/2025 14   02/18/2025 20   01/03/2025 19     Creatinine (mg/dL)   Date Value   05/01/2025 0.66   02/18/2025 0.62   01/03/2025 0.66     Lab Results   Component Value Date    CHOL 121 04/16/2024    CHOL 131 08/09/2022    CHOL 193 09/28/2020     Lab Results   Component Value Date    HDL 34.2 04/16/2024    HDL 31.2 (A) 08/09/2022    HDL 42.4 09/28/2020     Lab Results   Component Value Date    LDLCALC 59 04/16/2024     Lab Results   Component Value Date    TRIG 141 04/16/2024    TRIG 167 (H) 08/09/2022    TRIG 126 09/28/2020     Lab Results   Component Value Date    TSH 3.53 04/16/2024       Health Maintenance:   Foot Exam:  May 31, 2024  Eye Exam:  June 2022  Urine Albumin: April 17, 2024    Assessment/Plan   54 year old male presents for follow up for type 3c diabetes mellitus.   He was diagnosed in April 2024 with invasive adenocarcinoma moderately differentiated after having a biopsy  of a pancreatic mass to the head.  He underwent total pancreatectomy, splenectomy, distal gasserectomy with gastrojejunostomy, choledochojejunostomy, cholecystectomy and SMV resection on 12/12/2024.  He completed radiation and chemotherapy.      Secondary diabetes mellitus (Multi)  To continue Lantus 25 units subcutaneous bedtime  To continue Humalog 6 units three times daily before meals   Please continue an insulin sliding scale with Humalog before meals as follows:   150-200mg/dL - 2 units   201-250mg/dL - 4 units   251-300 mg/dL - 6 untis   301-350mg/dL - 8 units   >351mg/dL - 10 units  Insulin requirements will decrease with weight loss  To continue the use of your CGM for the intensive glucose monitoring due to the dynamic nature of insulin requirements, the narrow therapeutic index of insulin and the potentially fatal consequences of treatment  Counseled that the time in range should be >70%, and thus you are at goal   To obtain blood tests at the next blood draw   For follow up in 3 months

## 2025-05-14 ENCOUNTER — APPOINTMENT (OUTPATIENT)
Dept: ENDOCRINOLOGY | Facility: CLINIC | Age: 55
End: 2025-05-14
Payer: COMMERCIAL

## 2025-05-14 VITALS
HEART RATE: 49 BPM | DIASTOLIC BLOOD PRESSURE: 68 MMHG | HEIGHT: 74 IN | BODY MASS INDEX: 25.03 KG/M2 | WEIGHT: 195 LBS | SYSTOLIC BLOOD PRESSURE: 100 MMHG

## 2025-05-14 DIAGNOSIS — E13.9 SECONDARY DIABETES MELLITUS (MULTI): ICD-10-CM

## 2025-05-14 PROCEDURE — 99213 OFFICE O/P EST LOW 20 MIN: CPT | Performed by: INTERNAL MEDICINE

## 2025-05-14 PROCEDURE — 3008F BODY MASS INDEX DOCD: CPT | Performed by: INTERNAL MEDICINE

## 2025-05-14 PROCEDURE — 3074F SYST BP LT 130 MM HG: CPT | Performed by: INTERNAL MEDICINE

## 2025-05-14 PROCEDURE — 3078F DIAST BP <80 MM HG: CPT | Performed by: INTERNAL MEDICINE

## 2025-05-14 ASSESSMENT — ENCOUNTER SYMPTOMS: NAUSEA: 1

## 2025-05-16 ENCOUNTER — TELEPHONE (OUTPATIENT)
Dept: HEMATOLOGY/ONCOLOGY | Facility: HOSPITAL | Age: 55
End: 2025-05-16
Payer: COMMERCIAL

## 2025-05-16 DIAGNOSIS — C25.0 MALIGNANT NEOPLASM OF HEAD OF PANCREAS (MULTI): ICD-10-CM

## 2025-05-16 RX ORDER — OXYCODONE HYDROCHLORIDE 10 MG/1
10 TABLET ORAL EVERY 6 HOURS PRN
Qty: 120 TABLET | Refills: 0 | Status: SHIPPED | OUTPATIENT
Start: 2025-05-16 | End: 2025-06-15

## 2025-05-16 NOTE — TELEPHONE ENCOUNTER
I called and spoke with the patient about his oxycodone being refilled. I let him know that Adrienne had additionally entered orders for a FUV with him in June and that scheduling would likely be calling him to get this appointment scheduled soon. The patient requested a return to work letter from Adrienne stating that he would be able to return to work on June 9th. I let him know that we would work on this and that the letter, once available, would be accessible through his [employer]. Additionally, I let him know that I would send him a [employer] message once this letter was completed. The patient expressed understanding and had no further questions at this time.

## 2025-05-16 NOTE — TELEPHONE ENCOUNTER
Received refill request    Caller:  Nate    Medication: Oxycodone 10mg    Pharmacy: [facility]    Last Appt: 5/1/25    Next Appt: No future appt - no active appt requests    Donna WOODN, RN CMSRN

## 2025-05-18 NOTE — ASSESSMENT & PLAN NOTE
To continue Lantus 25 units subcutaneous bedtime  To continue Humalog 6 units three times daily before meals   Please continue an insulin sliding scale with Humalog before meals as follows:   150-200mg/dL - 2 units   201-250mg/dL - 4 units   251-300 mg/dL - 6 untis   301-350mg/dL - 8 units   >351mg/dL - 10 units  Insulin requirements will decrease with weight loss  To continue the use of your CGM for the intensive glucose monitoring due to the dynamic nature of insulin requirements, the narrow therapeutic index of insulin and the potentially fatal consequences of treatment  Counseled that the time in range should be >70%, and thus you are at goal   To obtain blood tests at the next blood draw   For follow up in 3 months

## 2025-05-22 DIAGNOSIS — E13.9 SECONDARY DIABETES MELLITUS (MULTI): Primary | ICD-10-CM

## 2025-05-22 DIAGNOSIS — C25.0 MALIGNANT NEOPLASM OF HEAD OF PANCREAS (MULTI): ICD-10-CM

## 2025-05-22 RX ORDER — INSULIN LISPRO 100 [IU]/ML
INJECTION, SOLUTION INTRAVENOUS; SUBCUTANEOUS
Qty: 10 ML | Status: CANCELLED | OUTPATIENT
Start: 2025-05-22

## 2025-05-22 RX ORDER — INSULIN LISPRO 100 [IU]/ML
6 INJECTION, SOLUTION INTRAVENOUS; SUBCUTANEOUS
Qty: 15 ML | Refills: 5 | Status: SHIPPED | OUTPATIENT
Start: 2025-05-22 | End: 2025-11-18

## 2025-05-22 NOTE — TELEPHONE ENCOUNTER
(Next appt 8/21/2025)    Patient need refill on lispro to be sent to [facility] pharmacy.

## 2025-06-02 ENCOUNTER — TELEPHONE (OUTPATIENT)
Dept: ADMISSION | Facility: HOSPITAL | Age: 55
End: 2025-06-02
Payer: COMMERCIAL

## 2025-06-02 NOTE — TELEPHONE ENCOUNTER
I called Nate back and he would like a return to work letter for 6/9 with no restrictions. He works as a  with no heavy lifting involved per Nate. Please email his letter to him at [email or web address]. Bhanu dent and SONNY

## 2025-06-02 NOTE — TELEPHONE ENCOUNTER
Nate called and left V/M on nurse triage line about needing a return to work note. I will call him back.

## 2025-06-03 NOTE — TELEPHONE ENCOUNTER
I called Nate to follow up on V/M I left about where to find his return to work letter. He did find it on my chart and no further needs at this time. Thanks you.

## 2025-06-13 ENCOUNTER — LAB (OUTPATIENT)
Dept: HEMATOLOGY/ONCOLOGY | Facility: CLINIC | Age: 55
End: 2025-06-13
Payer: COMMERCIAL

## 2025-06-13 VITALS
HEART RATE: 67 BPM | OXYGEN SATURATION: 99 % | RESPIRATION RATE: 18 BRPM | SYSTOLIC BLOOD PRESSURE: 106 MMHG | DIASTOLIC BLOOD PRESSURE: 69 MMHG | TEMPERATURE: 97.7 F

## 2025-06-13 DIAGNOSIS — C25.0 MALIGNANT NEOPLASM OF HEAD OF PANCREAS (MULTI): ICD-10-CM

## 2025-06-13 LAB
BASOPHILS # BLD AUTO: 0.05 X10*3/UL (ref 0–0.1)
BASOPHILS NFR BLD AUTO: 1.2 %
EOSINOPHIL # BLD AUTO: 0.08 X10*3/UL (ref 0–0.7)
EOSINOPHIL NFR BLD AUTO: 1.9 %
ERYTHROCYTE [DISTWIDTH] IN BLOOD BY AUTOMATED COUNT: 18 % (ref 11.5–14.5)
HCT VFR BLD AUTO: 38.3 % (ref 41–52)
HGB BLD-MCNC: 12.7 G/DL (ref 13.5–17.5)
IMM GRANULOCYTES # BLD AUTO: 0 X10*3/UL (ref 0–0.7)
IMM GRANULOCYTES NFR BLD AUTO: 0 % (ref 0–0.9)
LYMPHOCYTES # BLD AUTO: 0.54 X10*3/UL (ref 1.2–4.8)
LYMPHOCYTES NFR BLD AUTO: 12.9 %
MCH RBC QN AUTO: 33.2 PG (ref 26–34)
MCHC RBC AUTO-ENTMCNC: 33.2 G/DL (ref 32–36)
MCV RBC AUTO: 100 FL (ref 80–100)
MONOCYTES # BLD AUTO: 0.63 X10*3/UL (ref 0.1–1)
MONOCYTES NFR BLD AUTO: 15.1 %
NEUTROPHILS # BLD AUTO: 2.88 X10*3/UL (ref 1.2–7.7)
NEUTROPHILS NFR BLD AUTO: 68.9 %
NRBC BLD-RTO: ABNORMAL /100{WBCS}
PLATELET # BLD AUTO: 283 X10*3/UL (ref 150–450)
RBC # BLD AUTO: 3.82 X10*6/UL (ref 4.5–5.9)
WBC # BLD AUTO: 4.2 X10*3/UL (ref 4.4–11.3)

## 2025-06-13 PROCEDURE — 36415 COLL VENOUS BLD VENIPUNCTURE: CPT

## 2025-06-13 PROCEDURE — 80053 COMPREHEN METABOLIC PANEL: CPT

## 2025-06-13 PROCEDURE — 36591 DRAW BLOOD OFF VENOUS DEVICE: CPT

## 2025-06-13 PROCEDURE — 2500000004 HC RX 250 GENERAL PHARMACY W/ HCPCS (ALT 636 FOR OP/ED): Performed by: NURSE PRACTITIONER

## 2025-06-13 PROCEDURE — 86301 IMMUNOASSAY TUMOR CA 19-9: CPT

## 2025-06-13 PROCEDURE — 85025 COMPLETE CBC W/AUTO DIFF WBC: CPT

## 2025-06-13 RX ORDER — HEPARIN SODIUM,PORCINE/PF 10 UNIT/ML
50 SYRINGE (ML) INTRAVENOUS AS NEEDED
Status: CANCELLED | OUTPATIENT
Start: 2025-06-13

## 2025-06-13 RX ORDER — HEPARIN SODIUM,PORCINE/PF 10 UNIT/ML
50 SYRINGE (ML) INTRAVENOUS AS NEEDED
OUTPATIENT
Start: 2025-06-13

## 2025-06-13 RX ORDER — HEPARIN 100 UNIT/ML
500 SYRINGE INTRAVENOUS AS NEEDED
OUTPATIENT
Start: 2025-06-13

## 2025-06-13 RX ORDER — HEPARIN SODIUM,PORCINE/PF 10 UNIT/ML
50 SYRINGE (ML) INTRAVENOUS AS NEEDED
Status: DISCONTINUED | OUTPATIENT
Start: 2025-06-13 | End: 2025-06-13 | Stop reason: HOSPADM

## 2025-06-13 RX ORDER — HEPARIN 100 UNIT/ML
500 SYRINGE INTRAVENOUS AS NEEDED
Status: DISCONTINUED | OUTPATIENT
Start: 2025-06-13 | End: 2025-06-13 | Stop reason: HOSPADM

## 2025-06-13 RX ORDER — HEPARIN 100 UNIT/ML
500 SYRINGE INTRAVENOUS AS NEEDED
Status: CANCELLED | OUTPATIENT
Start: 2025-06-13

## 2025-06-13 RX ADMIN — HEPARIN 500 UNITS: 100 SYRINGE at 10:50

## 2025-06-13 ASSESSMENT — PAIN SCALES - GENERAL: PAINLEVEL_OUTOF10: 0-NO PAIN

## 2025-06-14 LAB
ALBUMIN SERPL BCP-MCNC: 3.9 G/DL (ref 3.4–5)
ALP SERPL-CCNC: 154 U/L (ref 33–120)
ALT SERPL W P-5'-P-CCNC: 29 U/L (ref 10–52)
ANION GAP SERPL CALC-SCNC: 13 MMOL/L (ref 10–20)
AST SERPL W P-5'-P-CCNC: 39 U/L (ref 9–39)
BILIRUB SERPL-MCNC: 0.6 MG/DL (ref 0–1.2)
BUN SERPL-MCNC: 14 MG/DL (ref 6–23)
CALCIUM SERPL-MCNC: 9.9 MG/DL (ref 8.6–10.6)
CANCER AG19-9 SERPL-ACNC: 45.8 U/ML
CHLORIDE SERPL-SCNC: 102 MMOL/L (ref 98–107)
CO2 SERPL-SCNC: 26 MMOL/L (ref 21–32)
CREAT SERPL-MCNC: 0.69 MG/DL (ref 0.5–1.3)
EGFRCR SERPLBLD CKD-EPI 2021: >90 ML/MIN/1.73M*2
GLUCOSE SERPL-MCNC: 377 MG/DL (ref 74–99)
POTASSIUM SERPL-SCNC: 4.4 MMOL/L (ref 3.5–5.3)
PROT SERPL-MCNC: 6.2 G/DL (ref 6.4–8.2)
SODIUM SERPL-SCNC: 137 MMOL/L (ref 136–145)

## 2025-07-10 ENCOUNTER — TELEPHONE (OUTPATIENT)
Dept: HEMATOLOGY/ONCOLOGY | Facility: HOSPITAL | Age: 55
End: 2025-07-10
Payer: COMMERCIAL

## 2025-07-10 NOTE — TELEPHONE ENCOUNTER
Refill requests received for the following medications:    Oxycodone 10mg  Ondansetron 8mg    Preferred pharmacy is [facility] in [place].    Message sent to team.

## 2025-07-11 DIAGNOSIS — C25.0 MALIGNANT NEOPLASM OF HEAD OF PANCREAS (MULTI): ICD-10-CM

## 2025-07-11 RX ORDER — ONDANSETRON 8 MG/1
8 TABLET, FILM COATED ORAL EVERY 8 HOURS PRN
Qty: 60 TABLET | Refills: 2 | Status: SHIPPED | OUTPATIENT
Start: 2025-07-11

## 2025-07-11 RX ORDER — OXYCODONE HYDROCHLORIDE 10 MG/1
10 TABLET ORAL EVERY 6 HOURS PRN
Qty: 120 TABLET | Refills: 0 | Status: SHIPPED | OUTPATIENT
Start: 2025-07-11 | End: 2025-08-10

## 2025-07-31 ENCOUNTER — APPOINTMENT (OUTPATIENT)
Dept: PRIMARY CARE | Facility: CLINIC | Age: 55
End: 2025-07-31
Payer: COMMERCIAL

## 2025-07-31 VITALS
WEIGHT: 188 LBS | HEART RATE: 70 BPM | RESPIRATION RATE: 16 BRPM | DIASTOLIC BLOOD PRESSURE: 70 MMHG | SYSTOLIC BLOOD PRESSURE: 120 MMHG | HEIGHT: 76 IN | BODY MASS INDEX: 22.89 KG/M2 | OXYGEN SATURATION: 96 %

## 2025-07-31 DIAGNOSIS — Z90.81 H/O SPLENECTOMY: ICD-10-CM

## 2025-07-31 DIAGNOSIS — Z13.220 LIPID SCREENING: ICD-10-CM

## 2025-07-31 DIAGNOSIS — Z12.5 SCREENING FOR PROSTATE CANCER: ICD-10-CM

## 2025-07-31 DIAGNOSIS — C25.0 MALIGNANT NEOPLASM OF HEAD OF PANCREAS (MULTI): ICD-10-CM

## 2025-07-31 DIAGNOSIS — Z00.00 WELL ADULT EXAM: Primary | ICD-10-CM

## 2025-07-31 DIAGNOSIS — I10 ESSENTIAL HYPERTENSION: ICD-10-CM

## 2025-07-31 DIAGNOSIS — Z79.4 LONG-TERM INSULIN USE (MULTI): ICD-10-CM

## 2025-07-31 DIAGNOSIS — C25.0 MALIGNANT NEOPLASM OF HEAD OF PANCREAS (MULTI): Primary | ICD-10-CM

## 2025-07-31 DIAGNOSIS — E55.9 VITAMIN D DEFICIENCY: ICD-10-CM

## 2025-07-31 DIAGNOSIS — E13.9 SECONDARY DIABETES MELLITUS (MULTI): ICD-10-CM

## 2025-07-31 DIAGNOSIS — Z98.0 S/P BYPASS GASTROJEJUNOSTOMY: ICD-10-CM

## 2025-07-31 PROCEDURE — 3078F DIAST BP <80 MM HG: CPT | Performed by: FAMILY MEDICINE

## 2025-07-31 PROCEDURE — 3074F SYST BP LT 130 MM HG: CPT | Performed by: FAMILY MEDICINE

## 2025-07-31 PROCEDURE — 99396 PREV VISIT EST AGE 40-64: CPT | Performed by: FAMILY MEDICINE

## 2025-07-31 PROCEDURE — 99214 OFFICE O/P EST MOD 30 MIN: CPT | Performed by: FAMILY MEDICINE

## 2025-07-31 PROCEDURE — 3008F BODY MASS INDEX DOCD: CPT | Performed by: FAMILY MEDICINE

## 2025-07-31 ASSESSMENT — PATIENT HEALTH QUESTIONNAIRE - PHQ9
1. LITTLE INTEREST OR PLEASURE IN DOING THINGS: NOT AT ALL
SUM OF ALL RESPONSES TO PHQ9 QUESTIONS 1 AND 2: 0
2. FEELING DOWN, DEPRESSED OR HOPELESS: NOT AT ALL

## 2025-08-01 ENCOUNTER — TELEPHONE (OUTPATIENT)
Dept: RADIATION ONCOLOGY | Facility: HOSPITAL | Age: 55
End: 2025-08-01
Payer: COMMERCIAL

## 2025-08-03 NOTE — PROGRESS NOTES
Staff Physician: Mary Kay Espinoza MD, MSCI  Resident Physician: Vitaly Reyse MD, MPH PGY-5  Referring Physician: Mary Kay Espinoza MD  Date of Service: 8/4/2025  RADIATION ONCOLOGY FOLLOW UP NOTE:  IDENTIFYING DATA:   Cancer Staging   Malignant neoplasm of head of pancreas (Multi)  Staging form: Exocrine Pancreas, AJCC 8th Edition  - Pathologic stage from 12/12/2024: Stage IIB (ypT2, pN1, cM0) - Signed by Mary Kay Espinoza MD on 3/3/2025    Problem List Items Addressed This Visit       Malignant neoplasm of head of pancreas (Multi)    Relevant Orders    Clinic Appointment Request Follow Up; MARY KAY ESPINOZA; [facility] [address] (Completed)       Mr. Nate Alvarez is a 55-year-old man with [id number] pancreas adencarcinoma, status post neoadjuvant mFOLFIRINOX x12cy, then total pancreatectomy to +SMV margin and 1/19 LN+. He completed adjuvant chemoradiation to 50.4Gy/28fx with concurrent capecitabine COT 5/7/25.  He presents today for routine interval follow-up.    INTERVAL HISTORY:  Since we last saw Nate Alvarez in clinic at [facility], he has felt well. His weight is relatively stable from last visit and his appetite is unchanged. She denies any significant symptoms at this time. Specifically,  abdominal pain, pain after eating, nausea, vomiting, early satiety, tarry or sticky stool, steatorrhea, diarrhea, jaundice, dark urine, or light stools. She has had no new medical problems or medications since our last visit. His last imaging, comprising CT CAP on 8/4/25, demonstrates no new disease in the chest or abdomen, but some soft-tissue fullness at the resection bed which may reflect post-treatment changes, pending final radiology read. Ca 19-9 trend is below.        PAST MEDICAL, SURGICAL, FAMILY, AND SOCIAL HISTORY:  Medical History[1]  Surgical History[2]  ALLERGIES:  Allergies[3]  MEDICATIONS:  Current Medications[4]     REVIEW OF SYSTEMS:  Except for the symptoms described in the interval history, the review of systems  is negative. Specifically, except as noted, when asked the patient expressed no complaints relative to constitutional (fever, weight loss), eyes, ears, nose, mouth, throat, neurologic, cardiovascular, pulmonary, breast, GI, , skin, musculoskeletal, endocrine, hematologic/lymphatic, or immunologic systems.    PERFORMANCE STATUS:  Karnofsky Performance Score/ECO, Normal activity with effort; some signs or symptoms of disease (ECOG equivalent 1)    PHYSICAL EXAMINATION:  /72   Pulse 60   Temp 36.3 °C (97.3 °F) (Skin)   Resp 18   Wt 83.7 kg (184 lb 9.6 oz)   SpO2 98%   BMI 22.47 kg/m²   Pain score: 0/10  General: no acute distress, engaged in conversation. No jaundice.  HEENT: Normocephalic, atraumatic. Extraocular movements are intact.   Neck: supple with trachea at midline, no palpable adenopathy.   Pulmonary: Breathing comfortably on room air, no respiratory distress  Cardiovascular: Regular rate, no cyanosis, well-perfused  Abdomen: Soft, nontender, nondistended.   Extremities: No lower extremity edema or cyanosis.   Musculoskeletal: Normal range of motion. Able to raise both arms above head without issues  Skin: Without rash or obvious lesions.   Neurologic: Alert and oriented x3. Cranial nerves grossly intact.     DIAGNOSTIC REPORTS REVIEWED:  Imaging: All imaging was personally reviewed and interpreted in clinic. Findings as per interval history and EMR.  Laboratory/Pathology:  All pertinent labs and pathology were personally reviewed and interpreted in clinic. Findings as per interval history and EMR.     IMPRESSION:  Ms. Alvarez has a rise in CA-19-9 to 223 from 45, pending final radiology read for his restaging scans, with overall no clinical concerns for adverse long-term reactions to RT at this time.     PLAN:  Given his rise in CA-19-9, we discussed that we will review Mr. Alvarez CT CAP at pancreas tumor board for imaging review to evaluate for any disease recurrence, and also will plan  for a 2 week repeat CA-19-9 to see trend. We will follow-up with the patient for final plans.     PAIN PLAN: The patient reports their pain is well-controlled on their current regimen.  Their pain regimen is currently managed by Primary Care.      DISEASE STATUS: Controlled  NEW METACHRONOUS CANCER: No    Thank you for the opportunity to participate in the ongoing care of this pleasant man.    The patient was evaluated by and discussed with attending physician, Dr. Mary Kay Espinoza, who repeated all horta aspects of the HPI and physical exam.    Vitaly Reyes MD, MPH  PGY-5 Radiation Oncology         [1]   Past Medical History:  Diagnosis Date    Anxiety     Arthritis     BCC (basal cell carcinoma), eyelid, right     s/p resection    Diabetes mellitus (Multi)     Gastroesophageal reflux disease without esophagitis 10/05/2023    Hemorrhoids 11/03/2023    HTN (hypertension)     Borderline HTN, no meds    Leg cramps 10/05/2023    Oropharyngeal dysphagia 10/05/2023    RADHA (obstructive sleep apnea)     no PAP    Pancreatic cancer (Multi)     Dx 4/2024, Treated with chemotherapy completed 11/12/24    Personal history of other malignant neoplasm of skin     Type 2 diabetes mellitus     Vision loss     uses corrective lens   [2]   Past Surgical History:  Procedure Laterality Date    CHOLECYSTECTOMY  12/12/2024    Total pancreatectomy, splenectomy, distal gastrectomy with gastrojejunostomy, choledochojejunostomy, cholecystectomy with Dr. Roddy Gamble    CHOLEDOCHOJEJUNOSTOMY  12/12/2024    Total pancreatectomy, splenectomy, distal gastrectomy with gastrojejunostomy, choledochojejunostomy, cholecystectomy with Dr. Roddy Gamble    COLONOSCOPY      ESOPHAGOGASTRODUODENOSCOPY      EYELID CARCINOMA EXCISION      GASTROJEJUNOSTOMY  12/12/2024    Total pancreatectomy, splenectomy, distal gastrectomy with gastrojejunostomy, choledochojejunostomy, cholecystectomy with Dr. Roddy Gamble    KNEE ARTHROSCOPY W/ DEBRIDEMENT Left      PANCREATECTOMY  12/12/2024    Total pancreatectomy, splenectomy, distal gastrectomy with gastrojejunostomy, choledochojejunostomy, cholecystectomy with Dr. Roddy Gamble    PARTIAL GASTRECTOMY  12/12/2024    Total pancreatectomy, splenectomy, distal gastrectomy with gastrojejunostomy, choledochojejunostomy, cholecystectomy with Dr. Roddy Gamble    ROTATOR CUFF REPAIR Bilateral     SPLENECTOMY, TOTAL  12/12/2024    Total pancreatectomy, splenectomy, distal gastrectomy with gastrojejunostomy, choledochojejunostomy, cholecystectomy with Dr. Roddy Gamble    TESTICLE SURGERY      Hydrocelectomy    TONSILLECTOMY      VASECTOMY     [3]   Allergies  Allergen Reactions    Farxiga [Dapagliflozin] Diarrhea    Glipizide GI Upset    Januvia [Sitagliptin] Diarrhea    Metformin Nausea/vomiting    Mounjaro [Tirzepatide] Nausea/vomiting    Tramadol Nausea/vomiting   [4]   Current Outpatient Medications:     acetaminophen (Tylenol) 325 mg tablet, Take 2 tablets (650 mg) by mouth every 6 hours. (Patient taking differently: Take 2 tablets (650 mg) by mouth if needed for mild pain (1 - 3).), Disp: , Rfl:     alpha lipoic acid 600 mg capsule, Take 600 mg by mouth once daily., Disp: 30 capsule, Rfl: 11    insulin glargine (Lantus) 100 unit/mL (3 mL) pen, Inject 28 Units under the skin once daily at bedtime. Take as directed per insulin instructions., Disp: 10 each, Rfl: 5    insulin lispro (HumaLOG) 100 unit/mL pen, Inject 6 Units under the skin 3 times daily (morning, midday, late afternoon). Max of 48 units per day with sliding scale, Disp: 15 mL, Rfl: 5    insulin lispro 100 unit/mL injection, 6 units subcutaneous 3x day before meals + sliding scale 0 unit(s) if Blood glucose is between   2 unit(s) if Blood glucose is between 151-200  4 unit(s) if Blood glucose is between 201-250  6 unit(s) if Blood glucose is between 251-300  8 unit(s) if Blood glucose is between 301-350  10 unit(s) if Blood glucose is between  "351-400, Disp: , Rfl:     ondansetron (Zofran) 8 mg tablet, Take 1 tablet (8 mg) by mouth every 8 hours if needed for nausea or vomiting., Disp: 60 tablet, Rfl: 2    oxyCODONE (Roxicodone) 10 mg immediate release tablet, Take 1 tablet (10 mg) by mouth every 6 hours if needed for severe pain (7 - 10)., Disp: 120 tablet, Rfl: 0    pancrelipase, Lip-Prot-Amyl, (Creon) 36,000-114,000- 180,000 unit capsule,delayed release(DR/EC) capsule, Take 2-3 capsules by mouth with meals and 1-2 capsules with snacks daily; for an average of 13 capsules per day., Disp: 400 capsule, Rfl: 3    pantoprazole (ProtoNix) 40 mg EC tablet, Take 1 tablet (40 mg) by mouth 2 times a day. Do not crush, chew, or split., Disp: 60 tablet, Rfl: 5    prochlorperazine (Compazine) 10 mg tablet, Take 1 tablet (10 mg) by mouth every 6 hours if needed for nausea or vomiting., Disp: 30 tablet, Rfl: 5    apixaban (Eliquis) 5 mg tablet, Take 1 tablet (5 mg) by mouth 2 times a day. Do not fill before February 7, 2025. (Patient not taking: Reported on 8/4/2025), Disp: 60 tablet, Rfl: 11    blood sugar diagnostic (Blood Glucose Test) strip, Check blood sugars once daily, Disp: 100 strip, Rfl: 3    blood-glucose meter misc, Check  blood sugar as needed, Disp: 1 each, Rfl: 0    blood-glucose sensor (FreeStyle Jef 3 Plus Sensor) device, Use as directed, Disp: 2 each, Rfl: 11    FreeStyle Jef 3 [place] misc, Use with sensors as directed, Disp: 1 each, Rfl: 0    lancets misc, Check blood sugars once daily, Disp: 100 each, Rfl: 3    naloxone (Narcan) 4 mg/0.1 mL nasal spray, Administer 1 spray (4 mg) into affected nostril(s) if needed for opioid reversal. May repeat every 2-3 minutes if needed, alternating nostrils, until medical assistance becomes available. (Patient not taking: Reported on 8/4/2025), Disp: 2 each, Rfl: 0    pen needle, diabetic (BD Ultra-Fine Short Pen Needle) 31 gauge x 5/16\" needle, Use four times daily with insulin, Disp: 400 each, Rfl: 1  No " current facility-administered medications for this encounter.    Facility-Administered Medications Ordered in Other Encounters:     heparin flush 100 unit/mL syringe 500 Units, 500 Units, intra-catheter, PRN, JUAN May-CNP, 500 Units at 08/05/24 1343

## 2025-08-04 ENCOUNTER — LAB (OUTPATIENT)
Dept: HEMATOLOGY/ONCOLOGY | Facility: HOSPITAL | Age: 55
End: 2025-08-04
Payer: COMMERCIAL

## 2025-08-04 ENCOUNTER — HOSPITAL ENCOUNTER (OUTPATIENT)
Dept: RADIOLOGY | Facility: HOSPITAL | Age: 55
Discharge: HOME | End: 2025-08-04
Payer: COMMERCIAL

## 2025-08-04 ENCOUNTER — HOSPITAL ENCOUNTER (OUTPATIENT)
Dept: RADIATION ONCOLOGY | Facility: HOSPITAL | Age: 55
Setting detail: RADIATION/ONCOLOGY SERIES
Discharge: HOME | End: 2025-08-04
Payer: COMMERCIAL

## 2025-08-04 VITALS
RESPIRATION RATE: 18 BRPM | SYSTOLIC BLOOD PRESSURE: 137 MMHG | WEIGHT: 184.6 LBS | OXYGEN SATURATION: 98 % | BODY MASS INDEX: 22.47 KG/M2 | HEART RATE: 60 BPM | DIASTOLIC BLOOD PRESSURE: 72 MMHG | TEMPERATURE: 97.3 F

## 2025-08-04 DIAGNOSIS — C25.0 MALIGNANT NEOPLASM OF HEAD OF PANCREAS (MULTI): ICD-10-CM

## 2025-08-04 LAB
ALBUMIN SERPL BCP-MCNC: 3.8 G/DL (ref 3.4–5)
ALP SERPL-CCNC: 131 U/L (ref 33–120)
ALT SERPL W P-5'-P-CCNC: 35 U/L (ref 10–52)
ANION GAP SERPL CALC-SCNC: 12 MMOL/L (ref 10–20)
AST SERPL W P-5'-P-CCNC: 44 U/L (ref 9–39)
BASOPHILS # BLD AUTO: 0.07 X10*3/UL (ref 0–0.1)
BASOPHILS NFR BLD AUTO: 1.4 %
BILIRUB SERPL-MCNC: 1 MG/DL (ref 0–1.2)
BUN SERPL-MCNC: 16 MG/DL (ref 6–23)
CALCIUM SERPL-MCNC: 9.6 MG/DL (ref 8.6–10.3)
CANCER AG19-9 SERPL-ACNC: 223.58 U/ML
CHLORIDE SERPL-SCNC: 101 MMOL/L (ref 98–107)
CO2 SERPL-SCNC: 28 MMOL/L (ref 21–32)
CREAT SERPL-MCNC: 0.62 MG/DL (ref 0.5–1.3)
EGFRCR SERPLBLD CKD-EPI 2021: >90 ML/MIN/1.73M*2
EOSINOPHIL # BLD AUTO: 0.14 X10*3/UL (ref 0–0.7)
EOSINOPHIL NFR BLD AUTO: 2.7 %
ERYTHROCYTE [DISTWIDTH] IN BLOOD BY AUTOMATED COUNT: 13.4 % (ref 11.5–14.5)
GLUCOSE SERPL-MCNC: 254 MG/DL (ref 74–99)
HCT VFR BLD AUTO: 38.9 % (ref 41–52)
HGB BLD-MCNC: 13.1 G/DL (ref 13.5–17.5)
IMM GRANULOCYTES # BLD AUTO: 0.01 X10*3/UL (ref 0–0.7)
IMM GRANULOCYTES NFR BLD AUTO: 0.2 % (ref 0–0.9)
LYMPHOCYTES # BLD AUTO: 0.62 X10*3/UL (ref 1.2–4.8)
LYMPHOCYTES NFR BLD AUTO: 12.1 %
MCH RBC QN AUTO: 33.1 PG (ref 26–34)
MCHC RBC AUTO-ENTMCNC: 33.7 G/DL (ref 32–36)
MCV RBC AUTO: 98 FL (ref 80–100)
MONOCYTES # BLD AUTO: 0.57 X10*3/UL (ref 0.1–1)
MONOCYTES NFR BLD AUTO: 11.1 %
NEUTROPHILS # BLD AUTO: 3.73 X10*3/UL (ref 1.2–7.7)
NEUTROPHILS NFR BLD AUTO: 72.5 %
NRBC BLD-RTO: 0 /100 WBCS (ref 0–0)
PLATELET # BLD AUTO: 270 X10*3/UL (ref 150–450)
POTASSIUM SERPL-SCNC: 4.4 MMOL/L (ref 3.5–5.3)
PROT SERPL-MCNC: 6.5 G/DL (ref 6.4–8.2)
RBC # BLD AUTO: 3.96 X10*6/UL (ref 4.5–5.9)
SODIUM SERPL-SCNC: 137 MMOL/L (ref 136–145)
WBC # BLD AUTO: 5.1 X10*3/UL (ref 4.4–11.3)

## 2025-08-04 PROCEDURE — 74177 CT ABD & PELVIS W/CONTRAST: CPT

## 2025-08-04 PROCEDURE — 86301 IMMUNOASSAY TUMOR CA 19-9: CPT

## 2025-08-04 PROCEDURE — 99213 OFFICE O/P EST LOW 20 MIN: CPT | Mod: GC | Performed by: RADIOLOGY

## 2025-08-04 PROCEDURE — 85025 COMPLETE CBC W/AUTO DIFF WBC: CPT

## 2025-08-04 PROCEDURE — 71260 CT THORAX DX C+: CPT

## 2025-08-04 PROCEDURE — 99213 OFFICE O/P EST LOW 20 MIN: CPT | Performed by: RADIOLOGY

## 2025-08-04 PROCEDURE — 36591 DRAW BLOOD OFF VENOUS DEVICE: CPT

## 2025-08-04 PROCEDURE — 84075 ASSAY ALKALINE PHOSPHATASE: CPT

## 2025-08-04 PROCEDURE — 2550000001 HC RX 255 CONTRASTS: Performed by: RADIOLOGY

## 2025-08-04 RX ADMIN — IOHEXOL 75 ML: 350 INJECTION, SOLUTION INTRAVENOUS at 11:20

## 2025-08-04 ASSESSMENT — ENCOUNTER SYMPTOMS
SHORTNESS OF BREATH: 1
CARDIOVASCULAR NEGATIVE: 1
NAUSEA: 1
HEADACHES: 1
FLANK PAIN: 0
LOSS OF SENSATION IN FEET: 1
SLEEP DISTURBANCE: 0
ARTHRALGIAS: 0
ABDOMINAL PAIN: 1
OCCASIONAL FEELINGS OF UNSTEADINESS: 0
DIZZINESS: 0
CHILLS: 0
WHEEZING: 0
BACK PAIN: 1
DIAPHORESIS: 0
BRUISES/BLEEDS EASILY: 0
LIGHT-HEADEDNESS: 1
DEPRESSION: 0
CONSTIPATION: 0
DIARRHEA: 0
NECK PAIN: 0
NECK STIFFNESS: 0
NERVOUS/ANXIOUS: 0
FREQUENCY: 0
FEVER: 0
DECREASED CONCENTRATION: 0
ABDOMINAL DISTENTION: 0
SORE THROAT: 0
FATIGUE: 1
DIFFICULTY URINATING: 1
VOMITING: 1
HEMOPTYSIS: 0
CONFUSION: 1
CHEST TIGHTNESS: 0
BLOOD IN STOOL: 0
DYSURIA: 0
TROUBLE SWALLOWING: 0
APPETITE CHANGE: 1
MYALGIAS: 0
UNEXPECTED WEIGHT CHANGE: 0
VOICE CHANGE: 0
COUGH: 0
RECTAL PAIN: 0
HEMATURIA: 0
EYE PROBLEMS: 1

## 2025-08-04 NOTE — PROGRESS NOTES
Radiation Oncology Nursing Note    Pain: The patient's current pain level was assessed.  They report currently having a pain of 5 out of 10.  They feel their pain is under control with the use of pain medications.  Took oxycodone around 0800; pain prior 7.  Back pain and left and right side of abd wrapping around to the back. Started 5 months ago.     Review of Systems:  Review of Systems   Constitutional:  Positive for appetite change (up and down;  back up to 180+) and fatigue. Negative for chills, diaphoresis, fever and unexpected weight change.   HENT:   Positive for tinnitus. Negative for hearing loss, mouth sores, nosebleeds, sore throat, trouble swallowing and voice change.    Eyes:  Positive for eye problems (wearing readers).   Respiratory:  Positive for shortness of breath (with walking 5-6 flights of stairs). Negative for chest tightness, cough, hemoptysis and wheezing.    Cardiovascular: Negative.    Gastrointestinal:  Positive for abdominal pain, nausea and vomiting. Negative for abdominal distention, blood in stool, constipation, diarrhea and rectal pain.        Oily again    Genitourinary:  Positive for difficulty urinating (difficult to start and flow is slower). Negative for bladder incontinence, dyspareunia, dysuria, frequency, hematuria, nocturia, pelvic pain and penile discharge.         Rarely burns about once a month     Musculoskeletal:  Positive for back pain. Negative for arthralgias, flank pain, gait problem, myalgias, neck pain and neck stiffness.   Skin: Negative.    Neurological:  Positive for headaches (mild occas) and light-headedness. Negative for dizziness and gait problem.   Hematological:  Does not bruise/bleed easily.   Psychiatric/Behavioral:  Positive for confusion (a little chemo brain at times, memory is worse since chemo). Negative for decreased concentration, depression, sleep disturbance and suicidal ideas. The patient is not nervous/anxious.

## 2025-08-05 DIAGNOSIS — C25.0 MALIGNANT NEOPLASM OF HEAD OF PANCREAS (MULTI): ICD-10-CM

## 2025-08-05 RX ORDER — PANCRELIPASE 36000; 180000; 114000 [USP'U]/1; [USP'U]/1; [USP'U]/1
CAPSULE, DELAYED RELEASE PELLETS ORAL
Qty: 400 CAPSULE | Refills: 3 | Status: CANCELLED | OUTPATIENT
Start: 2025-08-05

## 2025-08-06 ENCOUNTER — TELEPHONE (OUTPATIENT)
Dept: RADIATION ONCOLOGY | Facility: HOSPITAL | Age: 55
End: 2025-08-06
Payer: COMMERCIAL

## 2025-08-06 DIAGNOSIS — C25.0 MALIGNANT NEOPLASM OF HEAD OF PANCREAS (MULTI): Primary | ICD-10-CM

## 2025-08-06 NOTE — TELEPHONE ENCOUNTER
I called Mr. Alvarez and let him know that on review of his CT at pancreas imaging review this morning, multidisciplinary consensus was that there is not clear recurrence of disease. There is some soft tissue thickening around his tumor bed but this is favored to represent post-surgical and post RT change/not a clear disease focus by CT. I have set up Ca 19-9, CMP, and CBC for 8/20 (2 weeks) and we will re-evaluate his Ca 19-9 to see if his lab bump persists, with potential for PET from there if it remains elevated. He understands and is in agreement with this plan, which I have also discussed directly with his surgeon and communicated to his medical oncology team as well.    Mary Kay Espinoza MD  8/6/2025

## 2025-08-07 ENCOUNTER — TELEPHONE (OUTPATIENT)
Dept: ADMISSION | Facility: HOSPITAL | Age: 55
End: 2025-08-07
Payer: COMMERCIAL

## 2025-08-07 NOTE — TELEPHONE ENCOUNTER
Pt requesting refill  Oxycodone 10mg. 1 tablet every 6 hrs prn  Pharmacy: [facility] in [place].   Last FUV 2/18, next FUV 8/12

## 2025-08-08 DIAGNOSIS — C25.0 MALIGNANT NEOPLASM OF HEAD OF PANCREAS (MULTI): ICD-10-CM

## 2025-08-08 RX ORDER — PANCRELIPASE 36000; 180000; 114000 [USP'U]/1; [USP'U]/1; [USP'U]/1
CAPSULE, DELAYED RELEASE PELLETS ORAL
Qty: 400 CAPSULE | Refills: 5 | Status: SHIPPED | OUTPATIENT
Start: 2025-08-08

## 2025-08-08 RX ORDER — OXYCODONE HYDROCHLORIDE 10 MG/1
10 TABLET ORAL EVERY 6 HOURS PRN
Qty: 120 TABLET | Refills: 0 | Status: SHIPPED | OUTPATIENT
Start: 2025-08-08 | End: 2025-09-07

## 2025-08-08 NOTE — TELEPHONE ENCOUNTER
Creon sent to pharmacy,   Oxycodone was not sent to pharmacy, please send, if appropriate.   Pt called back today checking on oxycodone script.

## 2025-08-11 PROBLEM — E13.9 SECONDARY DIABETES MELLITUS (MULTI): Status: ACTIVE | Noted: 2023-10-05

## 2025-08-11 PROBLEM — E66.811 CLASS 1 OBESITY DUE TO EXCESS CALORIES WITH SERIOUS COMORBIDITY AND BODY MASS INDEX (BMI) OF 30.0 TO 30.9 IN ADULT: Status: RESOLVED | Noted: 2023-10-05 | Resolved: 2025-08-11

## 2025-08-11 PROBLEM — E66.09 CLASS 1 OBESITY DUE TO EXCESS CALORIES WITH SERIOUS COMORBIDITY AND BODY MASS INDEX (BMI) OF 30.0 TO 30.9 IN ADULT: Status: RESOLVED | Noted: 2023-10-05 | Resolved: 2025-08-11

## 2025-08-11 ASSESSMENT — ENCOUNTER SYMPTOMS
NUMBNESS: 0
DYSPHORIC MOOD: 0
NAUSEA: 0
PALPITATIONS: 0
DYSURIA: 0
CHILLS: 0
ABDOMINAL PAIN: 0
DIARRHEA: 0
POLYPHAGIA: 0
ADENOPATHY: 0
SINUS PRESSURE: 0
SINUS PAIN: 0
FEVER: 0
NERVOUS/ANXIOUS: 0
DIZZINESS: 0
WHEEZING: 0
SORE THROAT: 0
POLYDIPSIA: 0
LIGHT-HEADEDNESS: 0
CONSTIPATION: 0
SHORTNESS OF BREATH: 0
UNEXPECTED WEIGHT CHANGE: 0
FREQUENCY: 0
COUGH: 0
HEADACHES: 0
CONFUSION: 0
VOMITING: 0
DIAPHORESIS: 0
HEMATURIA: 0
CHEST TIGHTNESS: 0

## 2025-08-12 ENCOUNTER — TELEMEDICINE (OUTPATIENT)
Dept: HEMATOLOGY/ONCOLOGY | Facility: CLINIC | Age: 55
End: 2025-08-12
Payer: COMMERCIAL

## 2025-08-12 DIAGNOSIS — C25.0 MALIGNANT NEOPLASM OF HEAD OF PANCREAS (MULTI): ICD-10-CM

## 2025-08-12 PROCEDURE — 99214 OFFICE O/P EST MOD 30 MIN: CPT | Performed by: INTERNAL MEDICINE

## 2025-08-21 ENCOUNTER — APPOINTMENT (OUTPATIENT)
Dept: ENDOCRINOLOGY | Facility: CLINIC | Age: 55
End: 2025-08-21
Payer: COMMERCIAL

## 2025-08-21 VITALS
BODY MASS INDEX: 22.04 KG/M2 | HEIGHT: 76 IN | HEART RATE: 68 BPM | WEIGHT: 181 LBS | SYSTOLIC BLOOD PRESSURE: 138 MMHG | DIASTOLIC BLOOD PRESSURE: 78 MMHG

## 2025-08-21 DIAGNOSIS — E13.9 SECONDARY DIABETES MELLITUS (MULTI): Primary | ICD-10-CM

## 2025-08-21 DIAGNOSIS — Z79.4 LONG-TERM INSULIN USE (MULTI): ICD-10-CM

## 2025-08-21 PROCEDURE — 95251 CONT GLUC MNTR ANALYSIS I&R: CPT | Performed by: INTERNAL MEDICINE

## 2025-08-21 PROCEDURE — 3008F BODY MASS INDEX DOCD: CPT | Performed by: INTERNAL MEDICINE

## 2025-08-21 PROCEDURE — 3075F SYST BP GE 130 - 139MM HG: CPT | Performed by: INTERNAL MEDICINE

## 2025-08-21 PROCEDURE — 3078F DIAST BP <80 MM HG: CPT | Performed by: INTERNAL MEDICINE

## 2025-08-21 PROCEDURE — 99214 OFFICE O/P EST MOD 30 MIN: CPT | Performed by: INTERNAL MEDICINE

## 2025-08-21 ASSESSMENT — ENCOUNTER SYMPTOMS
NUMBNESS: 1
UNEXPECTED WEIGHT CHANGE: 1
CONSTIPATION: 0

## 2025-08-21 ASSESSMENT — PATIENT HEALTH QUESTIONNAIRE - PHQ9
2. FEELING DOWN, DEPRESSED OR HOPELESS: NOT AT ALL
1. LITTLE INTEREST OR PLEASURE IN DOING THINGS: NOT AT ALL
SUM OF ALL RESPONSES TO PHQ9 QUESTIONS 1 AND 2: 0

## 2025-08-22 ENCOUNTER — NURSE TRIAGE (OUTPATIENT)
Dept: ADMISSION | Facility: HOSPITAL | Age: 55
End: 2025-08-22
Payer: COMMERCIAL

## 2025-08-22 ENCOUNTER — TELEPHONE (OUTPATIENT)
Dept: PALLIATIVE MEDICINE | Facility: HOSPITAL | Age: 55
End: 2025-08-22
Payer: COMMERCIAL

## 2025-08-22 DIAGNOSIS — C25.0 MALIGNANT NEOPLASM OF HEAD OF PANCREAS (MULTI): Primary | ICD-10-CM

## 2025-08-22 DIAGNOSIS — C25.0 MALIGNANT NEOPLASM OF HEAD OF PANCREAS (MULTI): ICD-10-CM

## 2025-08-22 RX ORDER — INSULIN LISPRO 100 [IU]/ML
3 INJECTION, SOLUTION INTRAVENOUS; SUBCUTANEOUS
Qty: 15 ML | Refills: 5 | Status: SHIPPED | OUTPATIENT
Start: 2025-08-22 | End: 2026-02-18

## 2025-08-22 RX ORDER — OXYCODONE HYDROCHLORIDE 10 MG/1
20 TABLET ORAL EVERY 4 HOURS PRN
Qty: 120 TABLET | Refills: 0 | Status: SHIPPED | OUTPATIENT
Start: 2025-08-22 | End: 2025-08-29

## 2025-08-22 RX ORDER — INSULIN GLARGINE 100 [IU]/ML
23 INJECTION, SOLUTION SUBCUTANEOUS NIGHTLY
Qty: 10 EACH | Refills: 5 | Status: SHIPPED | OUTPATIENT
Start: 2025-08-22 | End: 2026-02-18

## 2025-08-29 ENCOUNTER — OFFICE VISIT (OUTPATIENT)
Dept: PALLIATIVE MEDICINE | Facility: CLINIC | Age: 55
End: 2025-08-29
Payer: COMMERCIAL

## 2025-08-29 VITALS
HEIGHT: 74 IN | BODY MASS INDEX: 22.32 KG/M2 | HEART RATE: 64 BPM | DIASTOLIC BLOOD PRESSURE: 75 MMHG | OXYGEN SATURATION: 98 % | RESPIRATION RATE: 18 BRPM | SYSTOLIC BLOOD PRESSURE: 114 MMHG | WEIGHT: 173.94 LBS | TEMPERATURE: 97.3 F

## 2025-08-29 DIAGNOSIS — Z79.891 ENCOUNTER FOR MONITORING OPIOID MAINTENANCE THERAPY: ICD-10-CM

## 2025-08-29 DIAGNOSIS — M62.838 MUSCLE SPASM: ICD-10-CM

## 2025-08-29 DIAGNOSIS — T45.1X5A CHEMOTHERAPY INDUCED NAUSEA AND VOMITING: ICD-10-CM

## 2025-08-29 DIAGNOSIS — Z51.81 ENCOUNTER FOR MONITORING OPIOID MAINTENANCE THERAPY: ICD-10-CM

## 2025-08-29 DIAGNOSIS — C25.0 MALIGNANT NEOPLASM OF HEAD OF PANCREAS (MULTI): ICD-10-CM

## 2025-08-29 DIAGNOSIS — G89.3 CANCER RELATED PAIN: ICD-10-CM

## 2025-08-29 DIAGNOSIS — R63.0 POOR APPETITE: ICD-10-CM

## 2025-08-29 DIAGNOSIS — R11.2 CHEMOTHERAPY INDUCED NAUSEA AND VOMITING: ICD-10-CM

## 2025-08-29 DIAGNOSIS — Z51.5 PALLIATIVE CARE ENCOUNTER: Primary | ICD-10-CM

## 2025-08-29 DIAGNOSIS — Z71.89 GOALS OF CARE, COUNSELING/DISCUSSION: ICD-10-CM

## 2025-08-29 DIAGNOSIS — G62.9 NEUROPATHY: ICD-10-CM

## 2025-08-29 DIAGNOSIS — M79.2 NEUROPATHIC PAIN: ICD-10-CM

## 2025-08-29 DIAGNOSIS — G47.01 INSOMNIA DUE TO MEDICAL CONDITION: ICD-10-CM

## 2025-08-29 PROCEDURE — 99215 OFFICE O/P EST HI 40 MIN: CPT | Performed by: NURSE PRACTITIONER

## 2025-08-29 PROCEDURE — 99417 PROLNG OP E/M EACH 15 MIN: CPT | Performed by: NURSE PRACTITIONER

## 2025-08-29 RX ORDER — OXYCODONE HYDROCHLORIDE 20 MG/1
20 TABLET ORAL EVERY 4 HOURS PRN
Qty: 180 TABLET | Refills: 0 | Status: SHIPPED | OUTPATIENT
Start: 2025-08-29 | End: 2025-09-28

## 2025-08-29 RX ORDER — FENTANYL 12.5 UG/1
1 PATCH TRANSDERMAL
Qty: 5 PATCH | Refills: 0 | Status: SHIPPED | OUTPATIENT
Start: 2025-08-29 | End: 2025-09-13

## 2025-08-29 RX ORDER — CYCLOBENZAPRINE HCL 5 MG
5 TABLET ORAL 2 TIMES DAILY PRN
Qty: 30 TABLET | Refills: 3 | Status: SHIPPED | OUTPATIENT
Start: 2025-08-29 | End: 2025-09-13

## 2025-08-29 ASSESSMENT — PAIN SCALES - GENERAL: PAINLEVEL_OUTOF10: 3

## 2025-09-05 ENCOUNTER — HOSPITAL ENCOUNTER (OUTPATIENT)
Dept: RADIOLOGY | Facility: HOSPITAL | Age: 55
Discharge: HOME | End: 2025-09-05
Payer: COMMERCIAL

## 2025-09-05 DIAGNOSIS — C25.0 MALIGNANT NEOPLASM OF HEAD OF PANCREAS (MULTI): ICD-10-CM

## 2025-11-12 ENCOUNTER — APPOINTMENT (OUTPATIENT)
Dept: ENDOCRINOLOGY | Facility: CLINIC | Age: 55
End: 2025-11-12
Payer: COMMERCIAL

## 2026-02-11 ENCOUNTER — APPOINTMENT (OUTPATIENT)
Dept: ENDOCRINOLOGY | Facility: CLINIC | Age: 56
End: 2026-02-11
Payer: COMMERCIAL

## 2026-02-26 ENCOUNTER — APPOINTMENT (OUTPATIENT)
Dept: PRIMARY CARE | Facility: CLINIC | Age: 56
End: 2026-02-26
Payer: COMMERCIAL

## 2026-05-13 ENCOUNTER — APPOINTMENT (OUTPATIENT)
Dept: ENDOCRINOLOGY | Facility: CLINIC | Age: 56
End: 2026-05-13
Payer: COMMERCIAL

## (undated) DEVICE — DRAPE, C-ARM IMAGE

## (undated) DEVICE — DRAPE, FLUID WARMER

## (undated) DEVICE — SYSTEM, TROCAR LAP, 11X100MM, SHIELD BLADED, KII ADVANCED FIX ABDOMINAL

## (undated) DEVICE — CLEANER, ELECTROSURGICAL, TIP, 5 X 5 CM, LF

## (undated) DEVICE — SUTURE, VICRYL, 3-0, 27 IN, SH

## (undated) DEVICE — DRAPE, INCISE, ANTIMICROBIAL, IOBAN 2, LARGE, 17 X 23 IN, DISPOSABLE, STERILE

## (undated) DEVICE — ANTIFOG, SOLUTION, FOG-OUT

## (undated) DEVICE — HANDPIECE, ABC, SINGLE FUNCTION, MALLEABLE, W/CORD, BEND-A-BEAM, 3 IN, LF

## (undated) DEVICE — CLIP, LIGATING, W/ADHESIVE, WIDE SLOT, SMALL, TITANIUM

## (undated) DEVICE — Device

## (undated) DEVICE — STRIP, SKIN CLOSURE, STERI STRIP, REINFORCED, 0.5 X 4 IN

## (undated) DEVICE — DRESSING, NON-ADHERENT, TELFA, OUCHLESS, 3 X 8 IN, STERILE

## (undated) DEVICE — SUTURE, VICRYL, 5-0, 27 IN, RB-1, VIOLET

## (undated) DEVICE — SUTURE, PROLENE, 5-0, 36 IN, C-1, CV-11, BLUE

## (undated) DEVICE — TUBE, SALEM SUMP, 16 FR X 48IN, ENFIT

## (undated) DEVICE — DRAPE, TIBURON W/ADHESIVE, 19 X 30

## (undated) DEVICE — COUNTER, NEEDLE, FOAM BLOCK, POP-N-COUNT, W/BLADEGUARD, W/ADHESIVE 40 COUNT, RED

## (undated) DEVICE — COVER, CART, 45 X 27 X 48 IN, CLEAR

## (undated) DEVICE — TUBE, FEEDING, ENTERAL, PREMATURE, 5 FR, 20 IN, ENFIT

## (undated) DEVICE — HOLSTER, ELECTROSURGERY ACCESSORY, STERILE

## (undated) DEVICE — SUTURE, SILK, 3-0, 18 IN, MULTIPACK, BLACK

## (undated) DEVICE — LIGASURE IMPACT, 18CM

## (undated) DEVICE — SUTURE, VICRYL, 4-0, 27 IN, RB-1, VIOLET

## (undated) DEVICE — DRAIN, WOUND, ROUND, W/TROCAR, HOLE PATTERN, 10 IN, LARGE, 0.25 X 49 IN

## (undated) DEVICE — SUTURE, SILK, 3-0, 30 IN, SH, CONTROL RELEASE, MULTIPACK, BLACK

## (undated) DEVICE — DRAPE, INSTRUMENT, W/POUCH, STERI DRAPE, 7 X 11 IN, DISPOSABLE, STERILE

## (undated) DEVICE — SUTURE, PDSII, 1, TP-1, VIL, MONO, 48LP

## (undated) DEVICE — CUTTER, PROXIMATE LINEAR RELOAD, 75MM, BLUE

## (undated) DEVICE — SUTURE, SILK, 3-0, 18 IN SH/CR, BLACK

## (undated) DEVICE — LOOP, VESSEL, MAXI, RED

## (undated) DEVICE — CLIP, LIGATING, W/ADHESIVE PAD, MEDIUM, TITANIUM

## (undated) DEVICE — DRAPE, SHEET, MINOR PROCEDURE, T, PEDIATRIC, 100X122X77

## (undated) DEVICE — SEAL, SCOPE WARMER DISPOSABLE

## (undated) DEVICE — PUMP, STRYKERFLOW 2 & HANDPIECE W/10FT. IRRIGATION TUBING

## (undated) DEVICE — SPONGE, HEMOSTATIC, CELLULOSE, SURGICEL, 2 X 14 IN

## (undated) DEVICE — SUTURE, PROLENE, 2-0, 18 IN, FS, BLUE

## (undated) DEVICE — DRESSING, ADHESIVE, ISLAND, TELFA, 2 X 3.75 IN, LF

## (undated) DEVICE — TIP, SUCTION, YANKAUER, BULB, ADULT

## (undated) DEVICE — SUTURE, SILK, 2-0, TIES, 12-30 IN, BLACK

## (undated) DEVICE — PACK, UNIVERSAL

## (undated) DEVICE — TUBE, FEEDING, INFANT, 8 FR, 20IN

## (undated) DEVICE — CUTTER,  PROX LINEAR, 75MM, THICK TISSUE, W/ SAFETY LOCK OUT

## (undated) DEVICE — TROCAR SYSTEM, BALLOON, KII GELPORT, 12 X 100MM

## (undated) DEVICE — GOWN, SURGICAL, SMARTGOWN, XLARGE, STERILE

## (undated) DEVICE — INTRODUCER SET, MICROPUNCTURE, REG, 4FR 10CM W/NITINOL GUIDEWIRE

## (undated) DEVICE — STAPLER, ECHELON 3000, 45MM COMPACT

## (undated) DEVICE — DRESSING, GAUZE, SPONGE, VERSALON, 4 PLY, 2 X 2 IN, STERILE

## (undated) DEVICE — SUTURE, PROLENE, 3-0, 36 IN, SH, DA, BLUE

## (undated) DEVICE — GOWN, ASTOUND, XL

## (undated) DEVICE — CATHETER TRAY, SURESTEP, 16FR, URINE METER W/STATLOCK

## (undated) DEVICE — SUTURE, VICRYL, 3-0, 36 IN, SH DA, VIOLET

## (undated) DEVICE — SUTURE, VICRYL, 4-0, 18 IN, UNDYED BR PS-2

## (undated) DEVICE — CLIPPER, SURGICAL BLADE ASSEMBLY, GENERAL PURPOSE, SINGLE USE

## (undated) DEVICE — SUTURE, SILK, 2-0, 30 IN, SH, BLACK

## (undated) DEVICE — SUTURE, SILK, 2-0, 18 IN, BLACK

## (undated) DEVICE — BAG, DRAINAGE, BILE, W/T-TUBE ADAPTER, W/LATEX BELTS, SMALL, 9 OZ

## (undated) DEVICE — TUBE SET, PNEUMOCLEAR, SMOKE EVACU, HIGH-FLOW

## (undated) DEVICE — DRESSING, SPONGE, GAUZE, CURITY, 4 X 4 IN, STERILE

## (undated) DEVICE — PITCHER, GRADUATE, 32 OZ (1200CC), STERILE

## (undated) DEVICE — ADHESIVE, SKIN, MASTISOL, 2/3 CC VIAL

## (undated) DEVICE — MANIFOLD, 4 PORT NEPTUNE STANDARD

## (undated) DEVICE — DRAPE, MAGENTIC INSTRUMENT, 12X16

## (undated) DEVICE — DRAIN, PENROSE, 0.5 X 12 IN, LATEX, STERILE

## (undated) DEVICE — SUTURE, VICRYL, 0, 27 IN, UR-6, VIOLET

## (undated) DEVICE — LOOP, VESSEL, MINI, WHITE

## (undated) DEVICE — SUTURE, SILK, 3-0, 30 IN, MULTIPACK, BLACK

## (undated) DEVICE — TROCAR, KII OPTICAL BLADELESS 5MM Z THREAD 100MM LNGTH

## (undated) DEVICE — SPONGE, DISSECTOR, PEANUT, 3/8, STERILE 5 FOAM HOLDER"

## (undated) DEVICE — PAD, GROUNDING, ELECTROSURGICAL, DUAL

## (undated) DEVICE — DRESSING, TRANSPARENT, TEGADERM, 4 X 4-3/4 IN

## (undated) DEVICE — DRESSING, TRANSPARENT, TEGADERM, 2-3/8 X 2-3/4 IN

## (undated) DEVICE — CUTTER,  LINEAR RELOAD, THICK TISSUE, 75MM, GREEN

## (undated) DEVICE — STAPLER, ENDO ECHELON 45MM RELOAD, WHITE, REUSABLE

## (undated) DEVICE — CLIP, LIGATING, HORIZON, LARGE, TITANIUM